# Patient Record
Sex: MALE | Race: WHITE | Employment: FULL TIME | URBAN - NONMETROPOLITAN AREA
[De-identification: names, ages, dates, MRNs, and addresses within clinical notes are randomized per-mention and may not be internally consistent; named-entity substitution may affect disease eponyms.]

---

## 2017-07-20 ENCOUNTER — HOSPITAL ENCOUNTER (EMERGENCY)
Age: 57
Discharge: HOME | End: 2017-07-20
Payer: COMMERCIAL

## 2017-07-20 ENCOUNTER — APPOINTMENT (OUTPATIENT)
Dept: CT IMAGING | Age: 57
End: 2017-07-20
Payer: COMMERCIAL

## 2017-07-20 ENCOUNTER — APPOINTMENT (OUTPATIENT)
Dept: GENERAL RADIOLOGY | Age: 57
End: 2017-07-20
Payer: COMMERCIAL

## 2017-07-20 VITALS
SYSTOLIC BLOOD PRESSURE: 131 MMHG | RESPIRATION RATE: 18 BRPM | HEART RATE: 83 BPM | BODY MASS INDEX: 31.81 KG/M2 | OXYGEN SATURATION: 97 % | HEIGHT: 73 IN | TEMPERATURE: 98.2 F | DIASTOLIC BLOOD PRESSURE: 73 MMHG | WEIGHT: 240 LBS

## 2017-07-20 DIAGNOSIS — M79.602 LEFT ARM PAIN: ICD-10-CM
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
## 2017-07-20 DIAGNOSIS — T07.XXXA MULTIPLE BRUISES: ICD-10-CM

## 2017-07-20 DIAGNOSIS — K80.20 CALCULUS OF GALLBLADDER WITHOUT CHOLECYSTITIS WITHOUT OBSTRUCTION: ICD-10-CM

## 2017-07-20 DIAGNOSIS — T07.XXXA MULTIPLE CONTUSIONS: ICD-10-CM

## 2017-07-20 DIAGNOSIS — S20.212A CONTUSION OF CHEST WALL, LEFT, INITIAL ENCOUNTER: Primary | ICD-10-CM

## 2017-07-20 LAB
ALBUMIN SERPL-MCNC: 4.2 G/DL (ref 3.5–5.2)
ALP BLD-CCNC: 106 U/L (ref 40–130)
ALT SERPL-CCNC: 46 U/L (ref 5–41)
ANION GAP SERPL CALCULATED.3IONS-SCNC: 12 MMOL/L (ref 7–19)
APTT: 27.7 SEC (ref 26–36.2)
AST SERPL-CCNC: 25 U/L (ref 5–40)
BASOPHILS ABSOLUTE: 0.1 K/UL (ref 0–0.2)
BASOPHILS RELATIVE PERCENT: 0.8 % (ref 0–1)
BILIRUB SERPL-MCNC: 0.3 MG/DL (ref 0.2–1.2)
BUN BLDV-MCNC: 13 MG/DL (ref 6–20)
CALCIUM SERPL-MCNC: 9.8 MG/DL (ref 8.6–10)
CHLORIDE BLD-SCNC: 103 MMOL/L (ref 98–111)
CO2: 25 MMOL/L (ref 22–29)
CREAT SERPL-MCNC: 1.1 MG/DL (ref 0.5–1.2)
EOSINOPHILS ABSOLUTE: 0.3 K/UL (ref 0–0.6)
EOSINOPHILS RELATIVE PERCENT: 2.5 % (ref 0–5)
GFR NON-AFRICAN AMERICAN: >60
GLUCOSE BLD-MCNC: 98 MG/DL (ref 74–109)
HCT VFR BLD CALC: 43.1 % (ref 42–52)
HEMOGLOBIN: 14.8 G/DL (ref 14–18)
INR BLD: 0.88 (ref 0.88–1.18)
LIPASE: 30 U/L (ref 13–60)
LYMPHOCYTES ABSOLUTE: 1.9 K/UL (ref 1.1–4.5)
LYMPHOCYTES RELATIVE PERCENT: 19.4 % (ref 20–40)
MCH RBC QN AUTO: 32.5 PG (ref 27–31)
MCHC RBC AUTO-ENTMCNC: 34.3 G/DL (ref 33–37)
MCV RBC AUTO: 94.7 FL (ref 80–94)
MONOCYTES ABSOLUTE: 0.9 K/UL (ref 0–0.9)
MONOCYTES RELATIVE PERCENT: 9.2 % (ref 0–10)
NEUTROPHILS ABSOLUTE: 6.8 K/UL (ref 1.5–7.5)
NEUTROPHILS RELATIVE PERCENT: 67.5 % (ref 50–65)
PDW BLD-RTO: 13 % (ref 11.5–14.5)
PLATELET # BLD: 272 K/UL (ref 130–400)
PMV BLD AUTO: 10.2 FL (ref 9.4–12.4)
POTASSIUM SERPL-SCNC: 4 MMOL/L (ref 3.5–5)
PROTHROMBIN TIME: 11.8 SEC (ref 12–14.6)
RBC # BLD: 4.55 M/UL (ref 4.7–6.1)
SODIUM BLD-SCNC: 140 MMOL/L (ref 136–145)
TOTAL PROTEIN: 6.8 G/DL (ref 6.6–8.7)
WBC # BLD: 10 K/UL (ref 4.8–10.8)

## 2017-07-20 PROCEDURE — 73080 X-RAY EXAM OF ELBOW: CPT

## 2017-07-20 PROCEDURE — 71260 CT THORAX DX C+: CPT

## 2017-07-20 PROCEDURE — 6360000002 HC RX W HCPCS: Performed by: NURSE PRACTITIONER

## 2017-07-20 PROCEDURE — 99284 EMERGENCY DEPT VISIT MOD MDM: CPT

## 2017-07-20 PROCEDURE — 74177 CT ABD & PELVIS W/CONTRAST: CPT

## 2017-07-20 PROCEDURE — 6360000004 HC RX CONTRAST MEDICATION: Performed by: NURSE PRACTITIONER

## 2017-07-20 PROCEDURE — 83690 ASSAY OF LIPASE: CPT

## 2017-07-20 PROCEDURE — 85610 PROTHROMBIN TIME: CPT

## 2017-07-20 PROCEDURE — 85730 THROMBOPLASTIN TIME PARTIAL: CPT

## 2017-07-20 PROCEDURE — 85025 COMPLETE CBC W/AUTO DIFF WBC: CPT

## 2017-07-20 PROCEDURE — 90471 IMMUNIZATION ADMIN: CPT | Performed by: NURSE PRACTITIONER

## 2017-07-20 PROCEDURE — 73090 X-RAY EXAM OF FOREARM: CPT

## 2017-07-20 PROCEDURE — 80053 COMPREHEN METABOLIC PANEL: CPT

## 2017-07-20 PROCEDURE — 90715 TDAP VACCINE 7 YRS/> IM: CPT | Performed by: NURSE PRACTITIONER

## 2017-07-20 PROCEDURE — 99283 EMERGENCY DEPT VISIT LOW MDM: CPT | Performed by: NURSE PRACTITIONER

## 2017-07-20 PROCEDURE — 36415 COLL VENOUS BLD VENIPUNCTURE: CPT

## 2017-07-20 RX ORDER — CYCLOBENZAPRINE HCL 10 MG
10 TABLET ORAL 3 TIMES DAILY PRN
Qty: 30 TABLET | Refills: 0 | Status: SHIPPED | OUTPATIENT
Start: 2017-07-20 | End: 2017-07-30

## 2017-07-20 RX ORDER — NAPROXEN 500 MG/1
500 TABLET ORAL 2 TIMES DAILY
Qty: 20 TABLET | Refills: 0 | Status: SHIPPED | OUTPATIENT
Start: 2017-07-20 | End: 2022-08-21

## 2017-07-20 RX ORDER — HYDROCODONE BITARTRATE AND ACETAMINOPHEN 5; 325 MG/1; MG/1
1 TABLET ORAL EVERY 6 HOURS PRN
Qty: 15 TABLET | Refills: 0 | Status: SHIPPED | OUTPATIENT
Start: 2017-07-20 | End: 2017-07-27

## 2017-07-20 RX ADMIN — TETANUS TOXOID, REDUCED DIPHTHERIA TOXOID AND ACELLULAR PERTUSSIS VACCINE, ADSORBED 0.5 ML: 5; 2.5; 8; 8; 2.5 SUSPENSION INTRAMUSCULAR at 18:50

## 2017-07-20 RX ADMIN — IOVERSOL 90 ML: 741 INJECTION INTRA-ARTERIAL; INTRAVENOUS at 18:03

## 2017-07-20 ASSESSMENT — ENCOUNTER SYMPTOMS
ABDOMINAL PAIN: 0
BACK PAIN: 0
COUGH: 0
DIARRHEA: 0
SORE THROAT: 0
SHORTNESS OF BREATH: 0
WHEEZING: 0
NAUSEA: 0
VOMITING: 0
EYE DISCHARGE: 0
ABDOMINAL DISTENTION: 1

## 2017-07-20 ASSESSMENT — PAIN DESCRIPTION - PAIN TYPE: TYPE: ACUTE PAIN

## 2017-07-20 ASSESSMENT — PAIN DESCRIPTION - LOCATION: LOCATION: SHOULDER

## 2017-07-20 ASSESSMENT — PAIN SCALES - GENERAL: PAINLEVEL_OUTOF10: 4

## 2017-07-20 ASSESSMENT — PAIN DESCRIPTION - ORIENTATION: ORIENTATION: LEFT

## 2022-08-21 ENCOUNTER — APPOINTMENT (OUTPATIENT)
Dept: CT IMAGING | Age: 62
End: 2022-08-21
Payer: COMMERCIAL

## 2022-08-21 ENCOUNTER — HOSPITAL ENCOUNTER (OUTPATIENT)
Age: 62
Setting detail: OBSERVATION
Discharge: HOME | End: 2022-08-22
Attending: INTERNAL MEDICINE
Payer: COMMERCIAL

## 2022-08-21 ENCOUNTER — APPOINTMENT (OUTPATIENT)
Dept: GENERAL RADIOLOGY | Age: 62
End: 2022-08-21
Payer: COMMERCIAL

## 2022-08-21 DIAGNOSIS — R07.9 ACUTE CHEST PAIN: Primary | ICD-10-CM

## 2022-08-21 PROBLEM — E03.9 HYPOTHYROIDISM: Status: ACTIVE | Noted: 2022-08-21

## 2022-08-21 PROBLEM — I10 HYPERTENSION: Status: ACTIVE | Noted: 2022-08-21

## 2022-08-21 LAB
ALBUMIN SERPL-MCNC: 4 G/DL (ref 3.5–5.2)
ALP BLD-CCNC: 108 U/L (ref 40–130)
ALT SERPL-CCNC: 18 U/L (ref 5–41)
ANION GAP SERPL CALCULATED.3IONS-SCNC: 8 MMOL/L (ref 7–19)
AST SERPL-CCNC: 15 U/L (ref 5–40)
BASOPHILS ABSOLUTE: 0.1 K/UL (ref 0–0.2)
BASOPHILS RELATIVE PERCENT: 1.1 % (ref 0–1)
BILIRUB SERPL-MCNC: 0.3 MG/DL (ref 0.2–1.2)
BUN BLDV-MCNC: 13 MG/DL (ref 8–23)
CALCIUM SERPL-MCNC: 9.1 MG/DL (ref 8.8–10.2)
CHLORIDE BLD-SCNC: 105 MMOL/L (ref 98–111)
CO2: 27 MMOL/L (ref 22–29)
CREAT SERPL-MCNC: 1.1 MG/DL (ref 0.5–1.2)
D DIMER: 1.11 UG/ML FEU (ref 0–0.48)
EOSINOPHILS ABSOLUTE: 0.2 K/UL (ref 0–0.6)
EOSINOPHILS RELATIVE PERCENT: 3 % (ref 0–5)
ETHANOL: <10 MG/DL (ref 0–0.08)
GFR AFRICAN AMERICAN: >59
GFR NON-AFRICAN AMERICAN: >60
GLUCOSE BLD-MCNC: 98 MG/DL (ref 74–109)
HCT VFR BLD CALC: 46 % (ref 42–52)
HEMOGLOBIN: 15.3 G/DL (ref 14–18)
IMMATURE GRANULOCYTES #: 0 K/UL
LIPASE: 20 U/L (ref 13–60)
LYMPHOCYTES ABSOLUTE: 2.9 K/UL (ref 1.1–4.5)
LYMPHOCYTES RELATIVE PERCENT: 35.4 % (ref 20–40)
MCH RBC QN AUTO: 31.4 PG (ref 27–31)
MCHC RBC AUTO-ENTMCNC: 33.3 G/DL (ref 33–37)
MCV RBC AUTO: 94.5 FL (ref 80–94)
MONOCYTES ABSOLUTE: 0.8 K/UL (ref 0–0.9)
MONOCYTES RELATIVE PERCENT: 9.3 % (ref 0–10)
NEUTROPHILS ABSOLUTE: 4.1 K/UL (ref 1.5–7.5)
NEUTROPHILS RELATIVE PERCENT: 50.7 % (ref 50–65)
PDW BLD-RTO: 13.3 % (ref 11.5–14.5)
PLATELET # BLD: 262 K/UL (ref 130–400)
PMV BLD AUTO: 9.9 FL (ref 9.4–12.4)
POTASSIUM REFLEX MAGNESIUM: 3.8 MMOL/L (ref 3.5–5)
PRO-BNP: 113 PG/ML (ref 0–900)
RBC # BLD: 4.87 M/UL (ref 4.7–6.1)
SARS-COV-2, NAAT: NOT DETECTED
SODIUM BLD-SCNC: 140 MMOL/L (ref 136–145)
TOTAL PROTEIN: 6.3 G/DL (ref 6.6–8.7)
TROPONIN: <0.01 NG/ML (ref 0–0.03)
WBC # BLD: 8.1 K/UL (ref 4.8–10.8)

## 2022-08-21 PROCEDURE — G0378 HOSPITAL OBSERVATION PER HR: HCPCS

## 2022-08-21 PROCEDURE — 85379 FIBRIN DEGRADATION QUANT: CPT

## 2022-08-21 PROCEDURE — 93005 ELECTROCARDIOGRAM TRACING: CPT | Performed by: PHYSICIAN ASSISTANT

## 2022-08-21 PROCEDURE — 82077 ASSAY SPEC XCP UR&BREATH IA: CPT

## 2022-08-21 PROCEDURE — 85025 COMPLETE CBC W/AUTO DIFF WBC: CPT

## 2022-08-21 PROCEDURE — 99285 EMERGENCY DEPT VISIT HI MDM: CPT

## 2022-08-21 PROCEDURE — 87635 SARS-COV-2 COVID-19 AMP PRB: CPT

## 2022-08-21 PROCEDURE — 71275 CT ANGIOGRAPHY CHEST: CPT

## 2022-08-21 PROCEDURE — 2580000003 HC RX 258: Performed by: NURSE PRACTITIONER

## 2022-08-21 PROCEDURE — 71045 X-RAY EXAM CHEST 1 VIEW: CPT | Performed by: RADIOLOGY

## 2022-08-21 PROCEDURE — 71275 CT ANGIOGRAPHY CHEST: CPT | Performed by: RADIOLOGY

## 2022-08-21 PROCEDURE — 6360000004 HC RX CONTRAST MEDICATION: Performed by: PHYSICIAN ASSISTANT

## 2022-08-21 PROCEDURE — 83690 ASSAY OF LIPASE: CPT

## 2022-08-21 PROCEDURE — 36415 COLL VENOUS BLD VENIPUNCTURE: CPT

## 2022-08-21 PROCEDURE — 80053 COMPREHEN METABOLIC PANEL: CPT

## 2022-08-21 PROCEDURE — 6370000000 HC RX 637 (ALT 250 FOR IP): Performed by: PHYSICIAN ASSISTANT

## 2022-08-21 PROCEDURE — 71045 X-RAY EXAM CHEST 1 VIEW: CPT

## 2022-08-21 PROCEDURE — 6370000000 HC RX 637 (ALT 250 FOR IP): Performed by: NURSE PRACTITIONER

## 2022-08-21 PROCEDURE — 84484 ASSAY OF TROPONIN QUANT: CPT

## 2022-08-21 PROCEDURE — 83880 ASSAY OF NATRIURETIC PEPTIDE: CPT

## 2022-08-21 RX ORDER — ONDANSETRON 4 MG/1
4 TABLET, ORALLY DISINTEGRATING ORAL EVERY 8 HOURS PRN
Status: DISCONTINUED | OUTPATIENT
Start: 2022-08-21 | End: 2022-08-22 | Stop reason: HOSPADM

## 2022-08-21 RX ORDER — BUPROPION HYDROCHLORIDE 75 MG/1
75 TABLET ORAL 2 TIMES DAILY
Status: DISCONTINUED | OUTPATIENT
Start: 2022-08-21 | End: 2022-08-22 | Stop reason: HOSPADM

## 2022-08-21 RX ORDER — ENOXAPARIN SODIUM 100 MG/ML
30 INJECTION SUBCUTANEOUS 2 TIMES DAILY
Status: DISCONTINUED | OUTPATIENT
Start: 2022-08-22 | End: 2022-08-22 | Stop reason: HOSPADM

## 2022-08-21 RX ORDER — LISINOPRIL 20 MG/1
20 TABLET ORAL DAILY
Status: DISCONTINUED | OUTPATIENT
Start: 2022-08-22 | End: 2022-08-22 | Stop reason: HOSPADM

## 2022-08-21 RX ORDER — BUPROPION HYDROCHLORIDE 75 MG/1
75 TABLET ORAL 2 TIMES DAILY
COMMUNITY

## 2022-08-21 RX ORDER — ACETAMINOPHEN 325 MG/1
650 TABLET ORAL EVERY 6 HOURS PRN
Status: DISCONTINUED | OUTPATIENT
Start: 2022-08-21 | End: 2022-08-22 | Stop reason: HOSPADM

## 2022-08-21 RX ORDER — HYDROCHLOROTHIAZIDE 25 MG/1
12.5 TABLET ORAL DAILY
Status: DISCONTINUED | OUTPATIENT
Start: 2022-08-22 | End: 2022-08-22 | Stop reason: HOSPADM

## 2022-08-21 RX ORDER — ENOXAPARIN SODIUM 100 MG/ML
40 INJECTION SUBCUTANEOUS DAILY
Status: DISCONTINUED | OUTPATIENT
Start: 2022-08-22 | End: 2022-08-21 | Stop reason: DRUGHIGH

## 2022-08-21 RX ORDER — ACETAMINOPHEN 650 MG/1
650 SUPPOSITORY RECTAL EVERY 6 HOURS PRN
Status: DISCONTINUED | OUTPATIENT
Start: 2022-08-21 | End: 2022-08-22 | Stop reason: HOSPADM

## 2022-08-21 RX ORDER — POLYETHYLENE GLYCOL 3350 17 G/17G
17 POWDER, FOR SOLUTION ORAL DAILY PRN
Status: DISCONTINUED | OUTPATIENT
Start: 2022-08-21 | End: 2022-08-22 | Stop reason: HOSPADM

## 2022-08-21 RX ORDER — ONDANSETRON 2 MG/ML
4 INJECTION INTRAMUSCULAR; INTRAVENOUS EVERY 6 HOURS PRN
Status: DISCONTINUED | OUTPATIENT
Start: 2022-08-21 | End: 2022-08-22 | Stop reason: HOSPADM

## 2022-08-21 RX ORDER — SODIUM CHLORIDE 9 MG/ML
INJECTION, SOLUTION INTRAVENOUS PRN
Status: DISCONTINUED | OUTPATIENT
Start: 2022-08-21 | End: 2022-08-22 | Stop reason: HOSPADM

## 2022-08-21 RX ORDER — LISINOPRIL AND HYDROCHLOROTHIAZIDE 20; 12.5 MG/1; MG/1
1 TABLET ORAL DAILY
Status: DISCONTINUED | OUTPATIENT
Start: 2022-08-22 | End: 2022-08-21 | Stop reason: CLARIF

## 2022-08-21 RX ORDER — NITROGLYCERIN 0.4 MG/1
0.4 TABLET SUBLINGUAL EVERY 5 MIN PRN
Status: DISCONTINUED | OUTPATIENT
Start: 2022-08-21 | End: 2022-08-22 | Stop reason: HOSPADM

## 2022-08-21 RX ORDER — SODIUM CHLORIDE 0.9 % (FLUSH) 0.9 %
5-40 SYRINGE (ML) INJECTION PRN
Status: DISCONTINUED | OUTPATIENT
Start: 2022-08-21 | End: 2022-08-22 | Stop reason: HOSPADM

## 2022-08-21 RX ORDER — ASPIRIN 81 MG/1
81 TABLET, CHEWABLE ORAL DAILY
Status: DISCONTINUED | OUTPATIENT
Start: 2022-08-22 | End: 2022-08-22 | Stop reason: HOSPADM

## 2022-08-21 RX ORDER — PANTOPRAZOLE SODIUM 40 MG/1
40 TABLET, DELAYED RELEASE ORAL DAILY
COMMUNITY

## 2022-08-21 RX ORDER — ATORVASTATIN CALCIUM 40 MG/1
40 TABLET, FILM COATED ORAL NIGHTLY
Status: DISCONTINUED | OUTPATIENT
Start: 2022-08-21 | End: 2022-08-22 | Stop reason: HOSPADM

## 2022-08-21 RX ORDER — LEVOTHYROXINE SODIUM 0.1 MG/1
200 TABLET ORAL DAILY
Status: DISCONTINUED | OUTPATIENT
Start: 2022-08-22 | End: 2022-08-22 | Stop reason: HOSPADM

## 2022-08-21 RX ORDER — SODIUM CHLORIDE 0.9 % (FLUSH) 0.9 %
5-40 SYRINGE (ML) INJECTION EVERY 12 HOURS SCHEDULED
Status: DISCONTINUED | OUTPATIENT
Start: 2022-08-21 | End: 2022-08-22 | Stop reason: HOSPADM

## 2022-08-21 RX ADMIN — SODIUM CHLORIDE, PRESERVATIVE FREE 10 ML: 5 INJECTION INTRAVENOUS at 22:43

## 2022-08-21 RX ADMIN — ASPIRIN 325 MG: 325 TABLET, COATED ORAL at 17:54

## 2022-08-21 RX ADMIN — NITROGLYCERIN 0.4 MG: 0.4 TABLET, ORALLY DISINTEGRATING SUBLINGUAL at 17:54

## 2022-08-21 RX ADMIN — ATORVASTATIN CALCIUM 40 MG: 40 TABLET, FILM COATED ORAL at 22:41

## 2022-08-21 RX ADMIN — IOPAMIDOL 90 ML: 755 INJECTION, SOLUTION INTRAVENOUS at 18:43

## 2022-08-21 RX ADMIN — BUPROPION HYDROCHLORIDE 75 MG: 75 TABLET, FILM COATED ORAL at 22:41

## 2022-08-21 ASSESSMENT — ENCOUNTER SYMPTOMS
BACK PAIN: 0
EYE DISCHARGE: 0
APNEA: 0
COUGH: 0
SHORTNESS OF BREATH: 1
NAUSEA: 1
EYE ITCHING: 0
COLOR CHANGE: 0
PHOTOPHOBIA: 0

## 2022-08-21 ASSESSMENT — PAIN DESCRIPTION - LOCATION
LOCATION: CHEST
LOCATION: CHEST

## 2022-08-21 ASSESSMENT — PAIN - FUNCTIONAL ASSESSMENT: PAIN_FUNCTIONAL_ASSESSMENT: 0-10

## 2022-08-21 ASSESSMENT — PAIN SCALES - GENERAL
PAINLEVEL_OUTOF10: 0
PAINLEVEL_OUTOF10: 1

## 2022-08-21 ASSESSMENT — PAIN DESCRIPTION - DESCRIPTORS
DESCRIPTORS: HEAVINESS;PRESSURE
DESCRIPTORS: HEAVINESS

## 2022-08-21 NOTE — ED PROVIDER NOTES
[facility]  eMERGENCYdEPARTMENT eNCOUnter      Pt Name: Radha Ward  MRN: [id number]  [place] 1960  Date of evaluation: 8/21/2022  Provider:MOUNIKA Stapleton    CHIEF COMPLAINT       Chief Complaint   Patient presents with    Chest Pain     X2 days notes goes down left arm and nausea         HISTORY OF PRESENT ILLNESS  (Location/Symptom, Timing/Onset, Context/Setting, Quality, Duration, Modifying Factors, Severity.)   Radha Ward is a 57 y.o. male who presents to the emergency department with complaints of chest pain x 2 days with nausea goes down left arm. HTN risk factors and smoker status. No cholesterol meds or diabetic status. He is a night shift worker and tells me yesterday he felt this pressure it was pleuritic and he states that it created nausea he does not really want to call it in our right pain it happened 1 time and self resolved and then again while at work and then today when he woke up and \"felt like something just was not right\" I do feel like I smell alcohol on him his wife tells me he drinks 6-7 beers when he is not working. Denies prior withdrawal. Father had stroke hx. No heart issues. When asked last time he had cardiac work up [employer]. \"    HPI    Nursing Notes were reviewed and I agree. REVIEW OF SYSTEMS    (2-9 systems for level 4, 10 or more for level 5)     Review of Systems   Constitutional:  Negative for activity change, appetite change, chills and fever. HENT:  Negative for congestion and dental problem. Eyes:  Negative for photophobia, discharge and itching. Respiratory:  Positive for shortness of breath. Negative for apnea and cough. Cardiovascular:  Positive for chest pain. Musculoskeletal:  Negative for arthralgias, back pain, gait problem, myalgias and neck pain. Skin:  Negative for color change, pallor and rash. Neurological:  Negative for dizziness, seizures and syncope. Psychiatric/Behavioral:  Negative for agitation.  The patient is not nervous/anxious. Except as noted above the remainder of the review of systems was reviewed and negative. PAST MEDICAL HISTORY     Past Medical History:   Diagnosis Date    Hypertension     Hypothyroidism          SURGICAL HISTORY       Past Surgical History:   Procedure Laterality Date    APPENDECTOMY      HERNIA REPAIR  2005         CURRENT MEDICATIONS       Current Discharge Medication List        CONTINUE these medications which have NOT CHANGED    Details   buPROPion (WELLBUTRIN) 75 MG tablet Take 75 mg by mouth 2 times daily      pantoprazole (PROTONIX) 40 MG tablet Take 40 mg by mouth daily      levothyroxine (SYNTHROID) 175 MCG tablet 200 mcg  Refills: 2      lisinopril-hydrochlorothiazide (PRINZIDE;ZESTORETIC) 20-12.5 MG per tablet   Refills: 2             ALLERGIES     Patient has no known allergies. FAMILY HISTORY       Family History   Problem Relation Age of Onset    Colon Polyps Neg Hx     Colon Cancer Neg Hx     Liver Disease Neg Hx           SOCIAL HISTORY       Social History     Socioeconomic History    Marital status:      Spouse name: None    Number of children: None    Years of education: None    Highest education level: None   Tobacco Use    Smoking status: Every Day     Packs/day: 3.00     Types: Cigarettes    Smokeless tobacco: Never   Vaping Use    Vaping Use: Never used   Substance and Sexual Activity    Alcohol use: Yes     Alcohol/week: 15.0 standard drinks     Types: 15 Cans of beer per week     Comment: weekly    Drug use: No       SCREENINGS    Leigha Coma Scale  Eye Opening: Spontaneous  Best Verbal Response: Oriented  Best Motor Response: Obeys commands  Leigha Coma Scale Score: 15      PHYSICAL EXAM    (up to 7 forlevel 4, 8 or more for level 5)     ED Triage Vitals [08/21/22 1741]   BP Temp Temp src Heart Rate Resp SpO2 Height Weight   (!) 165/91 97.6 °F (36.4 °C) -- 76 16 94 % -- 240 lb (108.9 kg)       Physical Exam  Vitals and nursing note reviewed. Constitutional:       General: He is not in acute distress. Appearance: Normal appearance. He is well-developed. He is not diaphoretic. HENT:      Head: Normocephalic and atraumatic. Right Ear: External ear normal.      Left Ear: External ear normal.      Nose: Nose normal.      Mouth/Throat:      Mouth: Mucous membranes are moist.   Eyes:      Pupils: Pupils are equal, round, and reactive to light. Neck:      Trachea: No tracheal deviation. Cardiovascular:      Rate and Rhythm: Normal rate and regular rhythm. Pulses: Normal pulses. Heart sounds: Normal heart sounds. No murmur heard. Pulmonary:      Effort: Pulmonary effort is normal.      Breath sounds: Normal breath sounds. No stridor. No wheezing. Chest:      Chest wall: No tenderness. Abdominal:      General: Abdomen is flat. Bowel sounds are normal. There is no distension. Palpations: Abdomen is soft. Tenderness: There is no abdominal tenderness. Musculoskeletal:         General: Normal range of motion. Cervical back: Normal range of motion and neck supple. Skin:     General: Skin is warm and dry. Capillary Refill: Capillary refill takes less than 2 seconds. Neurological:      General: No focal deficit present. Mental Status: He is alert and oriented to person, place, and time. Mental status is at baseline. Psychiatric:         Mood and Affect: Mood normal.         Behavior: Behavior normal.         Thought Content: Thought content normal.         Judgment: Judgment normal.         DIAGNOSTIC RESULTS     RADIOLOGY:   Non-plain film images such as CT, Ultrasound and MRI are read by the radiologist. Plain radiographic images are visualized and preliminarilyinterpreted by Oracio Christensen, * with the below findings:      Interpretation per the Radiologist below, if available at the time of this note:    CTA PULMONARY W CONTRAST   Final Result   1.   No evidence of acute pulmonary thromboembolism. 2.  Subsegmental atelectatic bands in the posterior basal segments of both lower lobes. 3.  Old granulomatous disease involving lung, mediastinum, liver, spleen. 4.  Cholelithiasis. 5.  Left adrenal adenoma, as detailed above. Recommendation: Follow up as clinically indicated. All CT scans at this facility utilize dose modulation, iterative reconstruction, and/or weight based dosing when appropriate to reduce radiation dose to as low as reasonably achievable. Electronically Signed by Felix Blanc MD at 21-Aug-2022 09:41:05 PM               XR CHEST PORTABLE   Final Result   negative study   Recommendation:  Follow up as clinically indicated. Electronically Signed by Jericho Paulson at 21-Aug-2022 07:24:48 PM               NM MYOCARDIAL SPECT REST EXERCISE OR RX    (Results Pending)       LABS:  Labs Reviewed   CBC WITH AUTO DIFFERENTIAL - Abnormal; Notable for the following components:       Result Value    MCV 94.5 (*)     MCH 31.4 (*)     Basophils % 1.1 (*)     All other components within normal limits   COMPREHENSIVE METABOLIC PANEL W/ REFLEX TO MG FOR LOW K - Abnormal; Notable for the following components: Total Protein 6.3 (*)     All other components within normal limits   D-DIMER, QUANTITATIVE - Abnormal; Notable for the following components:    D-Dimer, Quant 1.11 (*)     All other components within normal limits   COVID-19, RAPID   TROPONIN   BRAIN NATRIURETIC PEPTIDE   ETHANOL   LIPASE   TSH WITH REFLEX TO FT4   CBC   BASIC METABOLIC PANEL W/ REFLEX TO MG FOR LOW K   HEMOGLOBIN A1C   LIPID PANEL       All other labs were within normal range or notreturned as of this dictation.     RE-ASSESSMENT        EMERGENCY DEPARTMENT COURSE and DIFFERENTIAL DIAGNOSIS/MDM:   Vitals:    Vitals:    08/21/22 1931 08/21/22 2002 08/21/22 2032 08/21/22 2103   BP: 130/75 118/71 (!) 145/76 128/82   Pulse: 66 65 61 65   Resp: 18 11 18 25   Temp:       SpO2: 92% 96% 91% 94%   Weight: EKG -sinus rhythm normal with rate of 70 no ST changes no prior EKG for comparison. MDM  Patient is asymptomatic at this time heart score places him under moderate risk CTA is negative for PE after D-dimer elevated. Dr. Amy Sánchez with hospitalist service will admit under their care. PROCEDURES:    Procedures      FINAL IMPRESSION      1. Acute chest pain          DISPOSITION/PLAN   DISPOSITION Admitted 08/21/2022 09:05:15 PM      PATIENT REFERRED TO:  No follow-up provider specified.     DISCHARGE MEDICATIONS:  Current Discharge Medication List          (Please note that portions of this note were completed with a voice recognition program.  Efforts were made to edit the dictations but occasionallywords are mis-transcribed.)    Rocio Maldonado, [address] [place] [address]  08/21/22 1070

## 2022-08-22 ENCOUNTER — APPOINTMENT (OUTPATIENT)
Dept: NUCLEAR MEDICINE | Age: 62
End: 2022-08-22
Payer: COMMERCIAL

## 2022-08-22 VITALS
RESPIRATION RATE: 18 BRPM | HEIGHT: 73 IN | TEMPERATURE: 97.7 F | SYSTOLIC BLOOD PRESSURE: 130 MMHG | WEIGHT: 226.7 LBS | OXYGEN SATURATION: 95 % | HEART RATE: 63 BPM | DIASTOLIC BLOOD PRESSURE: 84 MMHG | BODY MASS INDEX: 30.05 KG/M2

## 2022-08-22 LAB
ANION GAP SERPL CALCULATED.3IONS-SCNC: 11 MMOL/L (ref 7–19)
BUN BLDV-MCNC: 14 MG/DL (ref 8–23)
CALCIUM SERPL-MCNC: 9.5 MG/DL (ref 8.8–10.2)
CHLORIDE BLD-SCNC: 104 MMOL/L (ref 98–111)
CHOLESTEROL, TOTAL: 147 MG/DL (ref 160–199)
CO2: 26 MMOL/L (ref 22–29)
CREAT SERPL-MCNC: 1.2 MG/DL (ref 0.5–1.2)
EKG P AXIS: 76 DEGREES
EKG P AXIS: 85 DEGREES
EKG P-R INTERVAL: 236 MS
EKG P-R INTERVAL: 244 MS
EKG Q-T INTERVAL: 384 MS
EKG Q-T INTERVAL: 422 MS
EKG QRS DURATION: 84 MS
EKG QRS DURATION: 88 MS
EKG QTC CALCULATION (BAZETT): 401 MS
EKG QTC CALCULATION (BAZETT): 415 MS
EKG T AXIS: 42 DEGREES
EKG T AXIS: 75 DEGREES
GFR AFRICAN AMERICAN: >59
GFR NON-AFRICAN AMERICAN: >60
GLUCOSE BLD-MCNC: 87 MG/DL (ref 74–109)
HBA1C MFR BLD: 5.7 % (ref 4–6)
HCT VFR BLD CALC: 43.8 % (ref 42–52)
HDLC SERPL-MCNC: 41 MG/DL (ref 55–121)
HEMOGLOBIN: 14.7 G/DL (ref 14–18)
LDL CHOLESTEROL CALCULATED: 73 MG/DL
LV EF: 58 %
LV EF: 65 %
LVEF MODALITY: NORMAL
LVEF MODALITY: NORMAL
MAGNESIUM: 2.1 MG/DL (ref 1.6–2.4)
MCH RBC QN AUTO: 32 PG (ref 27–31)
MCHC RBC AUTO-ENTMCNC: 33.6 G/DL (ref 33–37)
MCV RBC AUTO: 95.4 FL (ref 80–94)
PDW BLD-RTO: 13.6 % (ref 11.5–14.5)
PHOSPHORUS: 3.4 MG/DL (ref 2.5–4.5)
PLATELET # BLD: 249 K/UL (ref 130–400)
PMV BLD AUTO: 10.7 FL (ref 9.4–12.4)
POTASSIUM REFLEX MAGNESIUM: 3.6 MMOL/L (ref 3.5–5)
RBC # BLD: 4.59 M/UL (ref 4.7–6.1)
SODIUM BLD-SCNC: 141 MMOL/L (ref 136–145)
TRIGL SERPL-MCNC: 164 MG/DL (ref 0–149)
TROPONIN: <0.01 NG/ML (ref 0–0.03)
TSH REFLEX FT4: 2.79 UIU/ML (ref 0.35–5.5)
VITAMIN D 25-HYDROXY: 22.7 NG/ML
WBC # BLD: 9 K/UL (ref 4.8–10.8)

## 2022-08-22 PROCEDURE — 93010 ELECTROCARDIOGRAM REPORT: CPT | Performed by: INTERNAL MEDICINE

## 2022-08-22 PROCEDURE — G0378 HOSPITAL OBSERVATION PER HR: HCPCS

## 2022-08-22 PROCEDURE — 6360000002 HC RX W HCPCS: Performed by: NURSE PRACTITIONER

## 2022-08-22 PROCEDURE — 85027 COMPLETE CBC AUTOMATED: CPT

## 2022-08-22 PROCEDURE — C8929 TTE W OR WO FOL WCON,DOPPLER: HCPCS

## 2022-08-22 PROCEDURE — 84484 ASSAY OF TROPONIN QUANT: CPT

## 2022-08-22 PROCEDURE — 36415 COLL VENOUS BLD VENIPUNCTURE: CPT

## 2022-08-22 PROCEDURE — 82306 VITAMIN D 25 HYDROXY: CPT

## 2022-08-22 PROCEDURE — 93005 ELECTROCARDIOGRAM TRACING: CPT | Performed by: NURSE PRACTITIONER

## 2022-08-22 PROCEDURE — 83036 HEMOGLOBIN GLYCOSYLATED A1C: CPT

## 2022-08-22 PROCEDURE — 6360000004 HC RX CONTRAST MEDICATION: Performed by: HOSPITALIST

## 2022-08-22 PROCEDURE — 78452 HT MUSCLE IMAGE SPECT MULT: CPT

## 2022-08-22 PROCEDURE — 83735 ASSAY OF MAGNESIUM: CPT

## 2022-08-22 PROCEDURE — 84443 ASSAY THYROID STIM HORMONE: CPT

## 2022-08-22 PROCEDURE — 3430000000 HC RX DIAGNOSTIC RADIOPHARMACEUTICAL: Performed by: NURSE PRACTITIONER

## 2022-08-22 PROCEDURE — 2580000003 HC RX 258: Performed by: NURSE PRACTITIONER

## 2022-08-22 PROCEDURE — 96372 THER/PROPH/DIAG INJ SC/IM: CPT

## 2022-08-22 PROCEDURE — 78452 HT MUSCLE IMAGE SPECT MULT: CPT | Performed by: INTERNAL MEDICINE

## 2022-08-22 PROCEDURE — 80048 BASIC METABOLIC PNL TOTAL CA: CPT

## 2022-08-22 PROCEDURE — A9502 TC99M TETROFOSMIN: HCPCS | Performed by: NURSE PRACTITIONER

## 2022-08-22 PROCEDURE — 84100 ASSAY OF PHOSPHORUS: CPT

## 2022-08-22 PROCEDURE — 6370000000 HC RX 637 (ALT 250 FOR IP): Performed by: NURSE PRACTITIONER

## 2022-08-22 PROCEDURE — 80061 LIPID PANEL: CPT

## 2022-08-22 PROCEDURE — 93018 CV STRESS TEST I&R ONLY: CPT | Performed by: INTERNAL MEDICINE

## 2022-08-22 PROCEDURE — 93016 CV STRESS TEST SUPVJ ONLY: CPT | Performed by: INTERNAL MEDICINE

## 2022-08-22 RX ORDER — ERGOCALCIFEROL 1.25 MG/1
50000 CAPSULE ORAL WEEKLY
Qty: 5 CAPSULE | Refills: 0 | Status: SHIPPED | OUTPATIENT
Start: 2022-08-29

## 2022-08-22 RX ORDER — ASPIRIN 81 MG/1
81 TABLET, CHEWABLE ORAL DAILY
Qty: 30 TABLET | Refills: 0 | Status: SHIPPED | OUTPATIENT
Start: 2022-08-23

## 2022-08-22 RX ORDER — NITROGLYCERIN 0.4 MG/1
TABLET SUBLINGUAL
Qty: 25 TABLET | Refills: 0 | Status: SHIPPED | OUTPATIENT
Start: 2022-08-22

## 2022-08-22 RX ORDER — ERGOCALCIFEROL 1.25 MG/1
50000 CAPSULE ORAL WEEKLY
Status: DISCONTINUED | OUTPATIENT
Start: 2022-08-22 | End: 2022-08-22 | Stop reason: HOSPADM

## 2022-08-22 RX ORDER — ATORVASTATIN CALCIUM 40 MG/1
40 TABLET, FILM COATED ORAL NIGHTLY
Qty: 30 TABLET | Refills: 0 | Status: SHIPPED | OUTPATIENT
Start: 2022-08-22

## 2022-08-22 RX ADMIN — TETROFOSMIN 8 MILLICURIE: 1.38 INJECTION, POWDER, LYOPHILIZED, FOR SOLUTION INTRAVENOUS at 07:17

## 2022-08-22 RX ADMIN — PERFLUTREN 1.5 ML: 6.52 INJECTION, SUSPENSION INTRAVENOUS at 07:30

## 2022-08-22 RX ADMIN — TETROFOSMIN 24 MILLICURIE: 1.38 INJECTION, POWDER, LYOPHILIZED, FOR SOLUTION INTRAVENOUS at 08:44

## 2022-08-22 RX ADMIN — ASPIRIN 81 MG 81 MG: 81 TABLET ORAL at 09:58

## 2022-08-22 RX ADMIN — BUPROPION HYDROCHLORIDE 75 MG: 75 TABLET, FILM COATED ORAL at 09:59

## 2022-08-22 RX ADMIN — SODIUM CHLORIDE, PRESERVATIVE FREE 10 ML: 5 INJECTION INTRAVENOUS at 10:03

## 2022-08-22 RX ADMIN — LISINOPRIL 20 MG: 20 TABLET ORAL at 09:59

## 2022-08-22 RX ADMIN — ENOXAPARIN SODIUM 30 MG: 100 INJECTION SUBCUTANEOUS at 09:58

## 2022-08-22 RX ADMIN — HYDROCHLOROTHIAZIDE 12.5 MG: 25 TABLET ORAL at 09:59

## 2022-08-22 RX ADMIN — ERGOCALCIFEROL 50000 UNITS: 1.25 CAPSULE ORAL at 14:05

## 2022-08-22 RX ADMIN — REGADENOSON 0.4 MG: 0.08 INJECTION, SOLUTION INTRAVENOUS at 09:46

## 2022-08-22 RX ADMIN — ACETAMINOPHEN 650 MG: 325 TABLET, FILM COATED ORAL at 15:38

## 2022-08-22 ASSESSMENT — PAIN SCALES - GENERAL: PAINLEVEL_OUTOF10: 4

## 2022-08-22 ASSESSMENT — PAIN DESCRIPTION - LOCATION: LOCATION: HEAD

## 2022-08-22 NOTE — PROGRESS NOTES
4 Eyes Skin Assessment    Uvaldo Jonas is being assessed upon: Admission    I agree that Charlie Ramires, RN, along with Ange Chance (either 2 RN's or 1 LPN and 1 RN) have performed a thorough Head to Toe Skin Assessment on the patient. ALL assessment sites listed below have been assessed. Areas assessed by both nurses:     [x]   Head, Face, and Ears   [x]   Shoulders, Back, and Chest  [x]   Arms, Elbows, and Hands   [x]   Coccyx, Sacrum, and Ischium  [x]   Legs, Feet, and Heels    Does the Patient have Skin Breakdown?  No    Hipolito Prevention initiated: No  Wound Care Orders initiated: No    WOC nurse consulted for Pressure Injury (Stage 3,4, Unstageable, DTI, NWPT, and Complex wounds) and New or Established Ostomies: No        Primary Nurse eSignature: Maddison Michaels RN on 8/22/2022 at 4:27 AM      Co-Signer eSignature: Electronically signed by Ange Chance RN on 8/22/22 at 4:30 AM CDT

## 2022-08-22 NOTE — PROGRESS NOTES
Automatic Dose Adjustment of                Subcutaneous Anticoagulant for Prophylaxis    Gavin Beltran is a 57 y.o. male. Recent Labs     08/21/22  1750   CREATININE 1.1       CrCl cannot be calculated (Unknown ideal weight.). Weight:  Wt Readings from Last 1 Encounters:   08/21/22 240 lb (108.9 kg)           Pharmacy has adjusted subcutaneous anticoagulant for prophylaxis to Enoxaparin 30 mg SC twice daily based on the patient's weight and estimated CrCl per [facility] policy.                Electronically signed by Michelle Booker [facility] on 8/21/2022 at 10:33 PM

## 2022-08-22 NOTE — H&P
[facility] - History & Physical      PCP: Hakeem Chavez    Date of Admission: 8/21/2022    Date of Service: 8/21/2022    Chief Complaint:  Chest pain    History Of Present Illness: The patient is a 57 y.o. male who presented to [address] [place] [address] ED complaining of chest pain. Pt has history of hypothyroidism, HTN and cigarette smoking. Pt has had intermittent episodes of left sided chest pressure occurring at rest associated with nausea worsening today. He denies fevers, vomiting as well as new or worsening shortness of breath. He denies current chest pain as well as past known history of CAD. In ED, initial ekg and troponin were negative for ischemia. CTA pulmonary- No evidence of acute pulmonary thromboembolism. Cholelithiasis. Wbc 8.1, LFTs normal, sodium 140, potassium 3.8, creatinine 1.1, hgb 15, platelets 994. Pt is placed in observation for further evaluation by hospitalist service. Past Medical History:        Diagnosis Date    Hypertension     Hypothyroidism        Past Surgical History:        Procedure Laterality Date    APPENDECTOMY      HERNIA REPAIR  2005       Home Medications:  Prior to Admission medications    Medication Sig Start Date End Date Taking? Authorizing Provider   buPROPion (WELLBUTRIN) 75 MG tablet Take 75 mg by mouth 2 times daily   Yes Historical Provider, MD   pantoprazole (PROTONIX) 40 MG tablet Take 40 mg by mouth daily   Yes Historical Provider, MD   levothyroxine (SYNTHROID) 175 MCG tablet 200 mcg 6/2/15   Historical Provider, MD   lisinopril-hydrochlorothiazide (PRINZIDE;ZESTORETIC) 20-12.5 MG per tablet  6/2/15   Historical Provider, MD       Allergies:    Patient has no known allergies. Social History:    The patient currently lives with wife  Tobacco:   reports that he has been smoking cigarettes. He has been smoking an average of 3 packs per day.  He has never used smokeless tobacco.  Alcohol:   reports current alcohol use of about 15.0 standard drinks per week. Illicit Drugs: denies    Family History:      Problem Relation Age of Onset    Colon Polyps Neg Hx     Colon Cancer Neg Hx     Liver Disease Neg Hx        Review of Systems:   Review of Systems   Constitutional:  Negative for fever. Respiratory:  Positive for shortness of breath (reports unchanged from baseline). Cardiovascular:  Positive for chest pain. Gastrointestinal:  Positive for nausea. All other systems reviewed and are negative. 14 point review of systems is negative except as specifically addressed above. Physical Examination:  BP (!) 145/76   Pulse 61   Temp 97.6 °F (36.4 °C)   Resp 18   Wt 240 lb (108.9 kg)   SpO2 91%   BMI 31.66 kg/m²   Physical Exam  Vitals reviewed. Constitutional:       Appearance: Normal appearance. HENT:      Right Ear: External ear normal.      Left Ear: External ear normal.   Eyes:      Conjunctiva/sclera: Conjunctivae normal.   Cardiovascular:      Rate and Rhythm: Normal rate and regular rhythm. Pulmonary:      Effort: Pulmonary effort is normal.      Comments: Decreased breath sounds throughout  Abdominal:      Tenderness: There is no abdominal tenderness. Musculoskeletal:      Cervical back: Neck supple. Right lower leg: No edema. Left lower leg: No edema. Skin:     General: Skin is warm and dry. Neurological:      Mental Status: He is alert. Diagnostic Data:  CBC:  Recent Labs     08/21/22  1750   WBC 8.1   HGB 15.3   HCT 46.0        BMP:  Recent Labs     08/21/22  1750      K 3.8      CO2 27   BUN 13   CREATININE 1.1   CALCIUM 9.1     Recent Labs     08/21/22  1750   AST 15   ALT 18   BILITOT 0.3   ALKPHOS 108       Cardiac Enzymes:   Recent Labs     08/21/22  1750   TROPONINI <0.01       XR CHEST PORTABLE    Result Date: 8/21/2022  NO PRIOR REPORT AVAILABLE Exam: X-RAY OF THE CHEST Clinical data: Chest pain.   Technique: Single view of the chest. Prior studies: No prior studies submitted. Findings: The lungs are grossly clear; no evidence of acute infiltrate or pleural effusion. Cardiac silhouette is within normal limits. No acute osseous abnormality is detected. negative study Recommendation:  Follow up as clinically indicated. Electronically Signed by Azul Jacob at 21-Aug-2022 07:24:48 PM             CTA PULMONARY W CONTRAST    Result Date: 8/21/2022  NO PRIOR REPORT AVAILABLE Exam: CTA OF THE CHEST WITH CONTRAST Clinical data: Elevated dimer. Technique: Axial CT angiography images through the lungs were acquired with contrast and imaged using soft tissue and lung algorithms. Coronal, sagittal, and 3D volume reconstructions were performed. Reformatted/3D-MPR images were performed. Radiation dose: CTDIvol =24.64 mGy, DLP =705 mGy x cm. Prior studies: Radiograph of the chest done on same day. Findings: Lungs: Sub-segmental atelectatic bands in the posterior basal segments of both lower lobes. Calcified granuloma measuring 5 mm in the apicoposterior segment of the left upper lobe. There are smaller calcified granulomas in the medial basal right lower lobe and medial basal left lower lobe. No pulmonary infiltrate identified. No pulmonary mass identified. No pleural effusions identified. No pneumothorax. The airway is clear. Soft Tissues: There are multiple calcified me to sentinel lymph nodes. No mediastinal, axillary or supraclavicular adenopathy isidentified. No thyromegaly. Vascular: No filling defect within the pulmonary arteries to the segmental branch level. Unremarkable aorta. Grossly unremarkable sized heart. No definite abnormality seen on 3D reformatted images. Bony structures: No acute or destructive abnormality Upper Abdomen: There is a small sliding hiatal hernia. There are a few punctate scattered calcified granulomas in the visualized liver and spleen. There are intraluminal gallstones.   There is left adrenal adenoma measuring approximately 2.6 x 2.0 x 3.0 cm.    1.  No evidence of acute pulmonary thromboembolism. 2.  Subsegmental atelectatic bands in the posterior basal segments of both lower lobes. 3.  Old granulomatous disease involving lung, mediastinum, liver, spleen. 4.  Cholelithiasis. 5.  Left adrenal adenoma, as detailed above. Recommendation: Follow up as clinically indicated. All CT scans at this facility utilize dose modulation, iterative reconstruction, and/or weight based dosing when appropriate to reduce radiation dose to as low as reasonably achievable. Electronically Signed by Judith Valenzuela MD at 21-Aug-2022 09:41:05 PM               Assessment/Plan:  Active Problems:    Chest pain in adult    Hypertension    Hypothyroidism  Resolved Problems:    * No resolved hospital problems. *     Active Problems:    Chest pain in adult/Abnormal CT pulmonary-cholelithiasis/left adrenal adenoma   -trend serial troponin  -follow ekg  -echocardiogram  -cardiac meds asa/statin/ntg prn  -lexiscan stress test  -cards consult as warranted  -fasting lipid panel  -HgA1c  -tobacco cessation education  -telemetry   -will need outpatient follow up with pcp   for incidental adrenal adenoma noted  -lipase  -etoh    Hypertension  -monitor blood pressure  -continue antihypertensive meds  -avoid hypotension    Hypothyroidism   -continue synthroid   -tsh w/reflex FT4  Resolved Problems:    * No resolved hospital problems.  *  Signed:  CELESTINA Ramesh - CNP, 8/21/2022 9:17 PM

## 2022-08-22 NOTE — PROGRESS NOTES
Dale Case arrived to room # [phone] . Presented with: chest pain  Mental Status: Patient is oriented, alert, coherent, and logical.   Vitals:    08/22/22 0253   BP: 138/64   Pulse: 57   Resp: 14   Temp: 97.2 °F (36.2 °C)   SpO2: 93%     Patient safety contract and falls prevention contract reviewed with patient Yes. Oriented Patient to room. Call light within reach. Yes.   Needs, issues or concerns expressed at this time: no.      Electronically signed by Timmy Davis RN on 8/22/2022 at 4:26 AM

## 2022-08-22 NOTE — DISCHARGE SUMMARY
Sera Conde  :  1960  MRN:  [id number]    Admit date:  2022  Discharge date:  2022    Discharging Physician:  Dr. Norberto Nevarez Directive: Full Code    Consults: None     Primary Care Physician:  Skip Schneider    Discharge Diagnoses: Active Problems:    Chest pain in adult    Hypertension    Hypothyroidism  Resolved Problems:    * No resolved hospital problems. *      Portions of this note have been copied forward, however, changed to reflect the most current clinical status of this patient. Hospital Course:   History Of Present Illness: The patient is a 57 y.o. male with a PMH of HTN, cigarette smoking and thyroid disease who presented to [address] [place] [address] ED on 2022 complaining of chest pain. Pt has had intermittent episodes of left sided chest pressure occurring at rest associated with nausea worsening on his day of admission. Denies a previous history of CAD. In ED, initial ekg and troponin were negative for ischemia. CTA pulmonary- No evidence of acute pulmonary thromboembolism. Cholelithiasis. Wbc 8.1, LFTs normal, sodium 140, potassium 3.8, creatinine 1.1, hgb 15, platelets 434. He was placed in observation for further work-up. Troponin trends have remained negative. Chemistries remain within normal limits  . Vitamin D22.7-supplemented with 50,000 units ergocalciferol weekly. CBC remains within normal limits. 2D echo performed-normal LV function EF 65% mild MR and trace TR. He remains free from chest pain since admission. He will be discharged home in stable condition  Demi scan performed on 2022 is negative. He is to follow-up with his PCP within 1 week.   He will be discharged home in stable condition    Significant Diagnostic Studies:   ECHO Complete 2D W Doppler W Color    Result Date: 2022  Transthoracic Echocardiography Report (TTE)  Demographics   Patient Name   Reid Seaman   Date of Study           2022   MRN            [id number]         Gender Male   Date of Birth  1960     Room Number             [facility]-0717   Age            57 year(s)   Height:        73 inches      Referring Physician     Tre Tabares   Weight:        236.01 pounds  Sonographer             Devorah Schulz   BSA:           2.31 m^2       Interpreting Physician  Sharon Chandler MD   BMI:           31.14 kg/m^2  Procedure Type of Study   TTE procedure:ECHO NO CONTRAST WITH DOP/COLR. Study Location: Echo Lab Technical Quality: Poor visualization due to body habitus. Patient Status: Inpatient HR: 56 bpm BP: 138/64 mmHg Indications:Chest pain. Conclusions   Summary  Normal left ventricular cavity with overall normal systolic function. EF  65%  Morphologically normal valves with mild MR and trace TR   Signature   ----------------------------------------------------------------  Electronically signed by Sharon Chandler MD(Interpreting  physician) on 08/22/2022 04:18 PM  ----------------------------------------------------------------   Findings   Mitral Valve  Structurally normal mitral valve with normal leaflet mobility. No evidence  of mitral valve stenosis  Mild MR   Aortic Valve  Aortic valve appears to be tricuspid. Structurally normal aortic valve. No significant aortic regurgitation or stenosis is noted. Tricuspid Valve  Tricuspid valve is structurally normal.  Trace TR   Pulmonic Valve  The pulmonic valve was not well visualized. Left Atrium  Normal size left atrium. Left Ventricle  Normal left ventricular size with preserved LV function and an estimated  ejection fraction of approximately 65%. No evidence of left ventricular mass or thrombus noted. Right Atrium  Normal right atrial dimension with no evidence of thrombus or mass noted. Right Ventricle  Normal right ventricular size with preserved RV function. Pericardial Effusion  No evidence of significant pericardial effusion is noted. Pleural Effusion  No evidence of pleural effusion.    Miscellaneous  Aortic root is within normal limits. 2D Measurements and Calculations(cm)   LVIDd: 4.37 cm                      LVIDs: 2.73 cm  IVSd: 0.95 cm  LVPWd: 0.99 cm                      AO Root Dimension: 2 cm  Rt. Vent. Dimension: 2.85 cm        LA Dimension: 3.9 cm  % Ejection Fraction: 67.8 %         LA Area: 14 cm^2  LA Volume: 35.2 ml                  LV Systolic dimension: 8.04KC  LA Volume Index: 15 ml/m^2          LV PW diastolic: 1.56PA  LV dimension: 4.37 cm               LVOT diameter: 2.1 cm                                      RA Systolic pressure: 3mmHg                                      RV Diastolic dimension: 1.27LX  Cardic Output (CO): 3.66l/min  Ascending Aorta: 3 cm  Doppler Measurements and Calculations   AV Peak Velocity:102 cm/s              MV Peak E-Wave: 90 cm/s  AV Mean Velocity:72.1 cm/s             MV Peak A-Wave: 52.7 cm/s  AV Peak Gradient: 4.16 mmHg            MV E/A Ratio: 1.71 %  AV Mean Gradient: 2 mmHg               MV Mean velocity: 60.1cm/s  AV Area (Continuity):2.92 cm^2         MV Peak Gradient: 3.24 mmHg  AV VTI:22.4 cm/s                       MV Mean gradient: 2 mmHg  TR Velocity:277 cm/s                   MV P1/2t: 141 msec  TR Gradient:30.69 mmHg                 MVA by PHT1.56 cm^2  Estimated RAP:3 mmHg  RVSP:32 mmHg   MV E' septal velocity: 8.7cm/s  MV E' lateral velocity:12.4 cm/s      XR CHEST PORTABLE    Result Date: 8/21/2022  NO PRIOR REPORT AVAILABLE Exam: X-RAY OF THE CHEST Clinical data: Chest pain. Technique: Single view of the chest. Prior studies: No prior studies submitted. Findings: The lungs are grossly clear; no evidence of acute infiltrate or pleural effusion. Cardiac silhouette is within normal limits. No acute osseous abnormality is detected. negative study Recommendation:  Follow up as clinically indicated.  Electronically Signed by Marky Pride at 21-Aug-2022 07:24:48 PM             CTA PULMONARY W CONTRAST    Result Date: 8/21/2022  NO PRIOR REPORT AVAILABLE Exam: CTA OF THE CHEST WITH CONTRAST Clinical data: Elevated dimer. Technique: Axial CT angiography images through the lungs were acquired with contrast and imaged using soft tissue and lung algorithms. Coronal, sagittal, and 3D volume reconstructions were performed. Reformatted/3D-MPR images were performed. Radiation dose: CTDIvol =24.64 mGy, DLP =705 mGy x cm. Prior studies: Radiograph of the chest done on same day. Findings: Lungs: Sub-segmental atelectatic bands in the posterior basal segments of both lower lobes. Calcified granuloma measuring 5 mm in the apicoposterior segment of the left upper lobe. There are smaller calcified granulomas in the medial basal right lower lobe and medial basal left lower lobe. No pulmonary infiltrate identified. No pulmonary mass identified. No pleural effusions identified. No pneumothorax. The airway is clear. Soft Tissues: There are multiple calcified me to sentinel lymph nodes. No mediastinal, axillary or supraclavicular adenopathy isidentified. No thyromegaly. Vascular: No filling defect within the pulmonary arteries to the segmental branch level. Unremarkable aorta. Grossly unremarkable sized heart. No definite abnormality seen on 3D reformatted images. Bony structures: No acute or destructive abnormality Upper Abdomen: There is a small sliding hiatal hernia. There are a few punctate scattered calcified granulomas in the visualized liver and spleen. There are intraluminal gallstones. There is left adrenal adenoma measuring approximately 2.6 x 2.0 x 3.0  cm.    1.  No evidence of acute pulmonary thromboembolism. 2.  Subsegmental atelectatic bands in the posterior basal segments of both lower lobes. 3.  Old granulomatous disease involving lung, mediastinum, liver, spleen. 4.  Cholelithiasis. 5.  Left adrenal adenoma, as detailed above. Recommendation: Follow up as clinically indicated.  All CT scans at this facility utilize dose modulation, iterative reconstruction, and/or weight based dosing when appropriate to reduce radiation dose to as low as reasonably achievable. Electronically Signed by Abel Carlos MD at 21-Aug-2022 09:41:05 PM               Pertinent Labs:   CBC:   Recent Labs     08/21/22  1750 08/22/22  0154   WBC 8.1 9.0   HGB 15.3 14.7    249     BMP:    Recent Labs     08/21/22  1750 08/22/22  0154    141   K 3.8 3.6    104   CO2 27 26   BUN 13 14   CREATININE 1.1 1.2   GLUCOSE 98 87     INR: No results for input(s): INR in the last 72 hours. Physical Exam:  Vital Signs: /84   Pulse 63   Temp 97.7 °F (36.5 °C) (Temporal)   Resp 18   Ht 6' 1\" (1.854 m)   Wt 226 lb 11.2 oz (102.8 kg)   SpO2 95%   BMI 29.91 kg/m²   Physical Exam  Vitals and nursing note reviewed. Constitutional:       General: He is not in acute distress. Appearance: Normal appearance. He is not ill-appearing. HENT:      Head: Normocephalic and atraumatic. Right Ear: External ear normal.      Left Ear: External ear normal.      Nose: Nose normal.      Mouth/Throat:      Mouth: Mucous membranes are moist.   Eyes:      Extraocular Movements: Extraocular movements intact. Conjunctiva/sclera: Conjunctivae normal.      Pupils: Pupils are equal, round, and reactive to light. Cardiovascular:      Rate and Rhythm: Normal rate and regular rhythm. Pulses: Normal pulses. Heart sounds: Normal heart sounds. Pulmonary:      Effort: Pulmonary effort is normal. No respiratory distress. Breath sounds: Normal breath sounds. No wheezing, rhonchi or rales. Abdominal:      General: Bowel sounds are normal. There is no distension. Palpations: Abdomen is soft. Tenderness: There is no abdominal tenderness. Musculoskeletal:         General: No swelling, tenderness or deformity. Normal range of motion. Cervical back: Normal range of motion and neck supple. No muscular tenderness. Right lower leg: No edema. Left lower leg: No edema. Skin:     General: Skin is warm and dry. Findings: No bruising or lesion. Neurological:      Mental Status: He is alert and oriented to person, place, and time. Psychiatric:         Mood and Affect: Mood normal.         Behavior: Behavior normal.         Thought Content: Thought content normal.       Discharge Medications:         Medication List        START taking these medications      aspirin 81 MG chewable tablet  Take 1 tablet by mouth daily  Start taking on: August 23, 2022     atorvastatin 40 MG tablet  Commonly known as: LIPITOR  Take 1 tablet by mouth nightly     nitroGLYCERIN 0.4 MG SL tablet  Commonly known as: NITROSTAT  up to max of 3 total doses. If no relief after 1 dose, call 911. Vitamin D (Ergocalciferol) 68384 units Caps  Take 50,000 Units by mouth once a week  Start taking on: August 29, 2022            Edyth More taking these medications      buPROPion 75 MG tablet  Commonly known as: WELLBUTRIN     levothyroxine 175 MCG tablet  Commonly known as: SYNTHROID     lisinopril-hydroCHLOROthiazide 20-12.5 MG per tablet  Commonly known as: PRINZIDE;ZESTORETIC     pantoprazole 40 MG tablet  Commonly known as: PROTONIX               Where to Get Your Medications        These medications were sent to [address] [place] [address] [phone]  [address] [place] [address]      Phone: [phone]   aspirin 81 MG chewable tablet  atorvastatin 40 MG tablet  nitroGLYCERIN 0.4 MG SL tablet  Vitamin D (Ergocalciferol) 42537 units Caps         Discharge Instructions: Follow up with Yajaira Amin follow-up within 1 week for a hospital follow-up    Take medications as directed. Resume activity as tolerated. Diet: ADULT DIET; Regular; Low Fat/Low Chol/High Fiber/JAY JAY     Disposition: Patient is Stableand will be discharged to Home.     Time spent on discharge 37 minutes spent in assessing patient, reviewing medications, discussion with nursing, confirming safe discharge plan and preparation of discharge summary.     Signed:  CELESTINA Johns, 8/22/2022 5:56 PM

## 2022-08-23 ENCOUNTER — TELEPHONE (OUTPATIENT)
Dept: GASTROENTEROLOGY | Age: 62
End: 2022-08-23

## 2022-08-23 NOTE — TELEPHONE ENCOUNTER
Patient's wife, Gi Pena, called to reschedule Jakob's colonoscopy that is scheduled for 08/25/22. Patient was recently discharged from the hospital for chest pain. Please return patient's call to reschedule.  Thank you

## 2022-08-23 NOTE — PROGRESS NOTES
Dr. Zachariah Leon called the unit at 1905 and stated that the patient's Vernel Noel had been read and he could be discharged.

## 2022-12-08 ENCOUNTER — OFFICE VISIT (OUTPATIENT)
Dept: CARDIOLOGY CLINIC | Age: 62
End: 2022-12-08
Payer: COMMERCIAL

## 2022-12-08 VITALS
BODY MASS INDEX: 27.96 KG/M2 | SYSTOLIC BLOOD PRESSURE: 98 MMHG | DIASTOLIC BLOOD PRESSURE: 54 MMHG | WEIGHT: 211 LBS | HEIGHT: 73 IN

## 2022-12-08 DIAGNOSIS — R00.1 BRADYCARDIA: Primary | ICD-10-CM

## 2022-12-08 DIAGNOSIS — I10 HYPERTENSION, UNSPECIFIED TYPE: ICD-10-CM

## 2022-12-08 DIAGNOSIS — R55 SYNCOPE, UNSPECIFIED SYNCOPE TYPE: ICD-10-CM

## 2022-12-08 PROCEDURE — 93000 ELECTROCARDIOGRAM COMPLETE: CPT | Performed by: INTERNAL MEDICINE

## 2022-12-08 PROCEDURE — 99205 OFFICE O/P NEW HI 60 MIN: CPT | Performed by: INTERNAL MEDICINE

## 2022-12-08 PROCEDURE — 3078F DIAST BP <80 MM HG: CPT | Performed by: INTERNAL MEDICINE

## 2022-12-08 PROCEDURE — 3074F SYST BP LT 130 MM HG: CPT | Performed by: INTERNAL MEDICINE

## 2022-12-08 RX ORDER — LISINOPRIL 20 MG/1
20 TABLET ORAL DAILY
Qty: 30 TABLET | Refills: 5 | Status: SHIPPED | OUTPATIENT
Start: 2022-12-08

## 2022-12-08 RX ORDER — IBUPROFEN 800 MG/1
800 TABLET ORAL EVERY 6 HOURS PRN
COMMUNITY

## 2022-12-08 NOTE — PROGRESS NOTES
Margoth Castro is a 57 y.o. male presents with dizziness and syncope. The patient reports that he was treated in the hospital for pleuritic chest pain several months ago and at that time his cardiovascular work-up was negative for significant disease. He has since quit smoking and drinking and then he developed lightheadedness. The patient reports that he has no palpitations or tachycardia but often times he feels dizzy and nauseated typically when he is sitting down. He admits that he does not drink much water up until a few weeks ago and that in fact has somewhat resolved the problem. His blood pressure typically runs in the 120s over 70s but is occasionally low. Review of Systems   Constitutional: Negative for fever, chills, diaphoresis, activity change, appetite change, fatigue and unexpected weight change. Eyes: Negative for photophobia, pain, redness and visual disturbance. Respiratory: Negative for apnea, cough, chest tightness, shortness of breath, wheezing and stridor. Cardiovascular: Negative for chest pain, palpitations and leg swelling. Gastrointestinal: Negative for abdominal distention. Genitourinary: Negative for dysuria, urgency and frequency. Musculoskeletal: Negative for myalgias, arthralgias and gait problem. Skin: Negative for color change, pallor, rash and wound. Neurological: Negative for dizziness, tremors, speech difficulty, weakness and numbness. Hematological: Does not bruise/bleed easily. Psychiatric/Behavioral: Negative.         Past Medical History:   Diagnosis Date    Hypertension     Hypothyroidism        Past Surgical History:   Procedure Laterality Date    APPENDECTOMY      HERNIA REPAIR  2005       Family History   Problem Relation Age of Onset    Colon Polyps Neg Hx     Colon Cancer Neg Hx     Liver Disease Neg Hx        Social History     Socioeconomic History    Marital status:      Spouse name: Not on file    Number of children: Not on file Years of education: Not on file    Highest education level: Not on file   Occupational History    Not on file   Tobacco Use    Smoking status: Former     Packs/day: 3.00     Types: Cigarettes     Quit date: 2022     Years since quittin.0    Smokeless tobacco: Never   Vaping Use    Vaping Use: Never used   Substance and Sexual Activity    Alcohol use: Not Currently     Alcohol/week: 15.0 standard drinks     Types: 15 Cans of beer per week     Comment: stopped 3 weeks ago    Drug use: No    Sexual activity: Not on file   Other Topics Concern    Not on file   Social History Narrative    Not on file     Social Determinants of Health     Financial Resource Strain: Not on file   Food Insecurity: Not on file   Transportation Needs: Not on file   Physical Activity: Not on file   Stress: Not on file   Social Connections: Not on file   Intimate Partner Violence: Not on file   Housing Stability: Not on file       No Known Allergies      Current Outpatient Medications:     ibuprofen (ADVIL;MOTRIN) 800 MG tablet, Take 800 mg by mouth every 6 hours as needed for Pain, Disp: , Rfl:     aspirin 81 MG chewable tablet, Take 1 tablet by mouth daily, Disp: 30 tablet, Rfl: 0    atorvastatin (LIPITOR) 40 MG tablet, Take 1 tablet by mouth nightly, Disp: 30 tablet, Rfl: 0    nitroGLYCERIN (NITROSTAT) 0.4 MG SL tablet, up to max of 3 total doses.  If no relief after 1 dose, call 911., Disp: 25 tablet, Rfl: 0    Ergocalciferol (VITAMIN D) 72991 units CAPS, Take 50,000 Units by mouth once a week, Disp: 5 capsule, Rfl: 0    buPROPion (WELLBUTRIN) 75 MG tablet, Take 75 mg by mouth daily, Disp: , Rfl:     pantoprazole (PROTONIX) 40 MG tablet, Take 40 mg by mouth daily, Disp: , Rfl:     levothyroxine (SYNTHROID) 175 MCG tablet, 200 mcg, Disp: , Rfl: 2    lisinopril-hydrochlorothiazide (PRINZIDE;ZESTORETIC) 20-12.5 MG per tablet, Take 1 tablet by mouth daily, Disp: , Rfl: 2    PE:  Vitals:    22 8395 22 0896 22 7648 BP: (!) 142/84 120/64 (!) 98/54   Position: Supine Sitting Standing   Weight: 211 lb (95.7 kg)     Height: 6' 1\" (1.854 m)         Estimated body mass index is 27.84 kg/m² as calculated from the following:    Height as of this encounter: 6' 1\" (1.854 m). Weight as of this encounter: 211 lb (95.7 kg). Constitutional: He is oriented to person, place, and time. He appears well-developed and well-nourished in no acute distress. Neck:  Neck supple without JVD present. No trachea deviation present. No thyromegaly present. Eyes:Conjunctivae and EOM are normal. Pupils equal and reactive to light. ENT:Hearing appears normal.conjunctiva and lids are normal, ears and nose appear normal.  Cardiovascular: Normal rate, S1-S2 regular rhythm, normal heart sounds. No murmur ascultated. No gallop and no friction rub. No carotid bruits. No peripheral edema. Pulmonary/Chest:  Lungs clear to auscultation bilaterally without evidence of respiratory distress. He without wheezes. He without rales or ronchi. Musculoskeletal: Normal range of motion. Gait is normal no assitive device. Head is normocephalic and atraumatic. Skin: Skin is warm and dry without rash or pallor. Psychiatric:He is alert and oriented to person, place, and time. He has a normal mood and affect. His behavior is normal. Thought content normal.       Lab Results   Component Value Date/Time    CREATININE 1.2 08/22/2022 01:54 AM    CREATININE 1.1 08/21/2022 05:50 PM    CREATININE 1.1 07/20/2017 05:15 PM    HGB 14.7 08/22/2022 01:54 AM    HGB 15.3 08/21/2022 05:50 PM    HGB 14.8 07/20/2017 05:15 PM    PROBNP 113 08/21/2022 05:50 PM           ECG 12/08/22    Sinus rhythm normal    TTE EF 60%    Stress test normal       Assessment, Recommendations, & Plan:  57 y.o. male with orthostasis. His blood pressure dropped to 90s over 50s while standing here in the office.   He is already somewhat better with hydration but I think stopping smoking has decreased his need for aggressive blood pressure control. I will stop the hydrochlorothiazide portion of his Zestoretic and treat him simply with lisinopril and he may need a lower dose of that. I will place a 1 week monitor on him just to ensure that he is not having any bradycardic episodes but he has no conduction system disease on his ECG and I think all of this can be remedied by an increase in water intake. He drinks mostly tea at home. Disposition - RTC in 2 wks or sooner if needed      Please do not hesitate to contact me for any questions or concerns. Dr. Chela Fontana.  Luis  Electrophysiology and Cardiology  [facility] [address], Cardiology  [phone]

## 2022-12-21 DIAGNOSIS — R00.1 BRADYCARDIA: Primary | ICD-10-CM

## 2022-12-21 PROCEDURE — 93242 EXT ECG>48HR<7D RECORDING: CPT | Performed by: INTERNAL MEDICINE

## 2022-12-22 PROCEDURE — 93244 EXT ECG>48HR<7D REV&INTERPJ: CPT | Performed by: INTERNAL MEDICINE

## 2023-01-04 ENCOUNTER — OFFICE VISIT (OUTPATIENT)
Dept: CARDIOLOGY CLINIC | Age: 63
End: 2023-01-04
Payer: COMMERCIAL

## 2023-01-04 VITALS
SYSTOLIC BLOOD PRESSURE: 136 MMHG | HEIGHT: 74 IN | WEIGHT: 213 LBS | HEART RATE: 80 BPM | DIASTOLIC BLOOD PRESSURE: 70 MMHG | BODY MASS INDEX: 27.34 KG/M2

## 2023-01-04 DIAGNOSIS — R00.1 BRADYCARDIA: Primary | ICD-10-CM

## 2023-01-04 PROCEDURE — 3078F DIAST BP <80 MM HG: CPT | Performed by: INTERNAL MEDICINE

## 2023-01-04 PROCEDURE — 93000 ELECTROCARDIOGRAM COMPLETE: CPT | Performed by: INTERNAL MEDICINE

## 2023-01-04 PROCEDURE — 3075F SYST BP GE 130 - 139MM HG: CPT | Performed by: INTERNAL MEDICINE

## 2023-01-04 PROCEDURE — 99213 OFFICE O/P EST LOW 20 MIN: CPT | Performed by: INTERNAL MEDICINE

## 2023-01-04 NOTE — PROGRESS NOTES
Ortega Colorado is a 57 y.o. male presents with recent orthostasis. He said increasing his water intake has resolved the lightheadedness. He did wear a monitor a few months ago which revealed a very low burden of atrial fibrillation (less than 1%). He reports he does not have much energy because he works a swing shift and is ready for FDC soon. He has had no further chest pain. He has no palpitations or tachycardia. Review of Systems   Constitutional: Negative for fever, chills, diaphoresis, activity change, appetite change, fatigue and unexpected weight change. Eyes: Negative for photophobia, pain, redness and visual disturbance. Respiratory: Negative for apnea, cough, chest tightness, shortness of breath, wheezing and stridor. Cardiovascular: Negative for chest pain, palpitations and leg swelling. Gastrointestinal: Negative for abdominal distention. Genitourinary: Negative for dysuria, urgency and frequency. Musculoskeletal: Negative for myalgias, arthralgias and gait problem. Skin: Negative for color change, pallor, rash and wound. Neurological: Negative for dizziness, tremors, speech difficulty, weakness and numbness. Hematological: Does not bruise/bleed easily. Psychiatric/Behavioral: Negative.           Social History     Socioeconomic History    Marital status:      Spouse name: Not on file    Number of children: Not on file    Years of education: Not on file    Highest education level: Not on file   Occupational History    Not on file   Tobacco Use    Smoking status: Former     Packs/day: 3.00     Types: Cigarettes     Quit date: 2022     Years since quittin.1    Smokeless tobacco: Never   Vaping Use    Vaping Use: Never used   Substance and Sexual Activity    Alcohol use: Not Currently     Alcohol/week: 15.0 standard drinks     Types: 15 Cans of beer per week     Comment: stopped 3 weeks ago    Drug use: No    Sexual activity: Not on file   Other Topics Concern    Not on file   Social History Narrative    Not on file     Social Determinants of Health     Financial Resource Strain: Not on file   Food Insecurity: Not on file   Transportation Needs: Not on file   Physical Activity: Not on file   Stress: Not on file   Social Connections: Not on file   Intimate Partner Violence: Not on file   Housing Stability: Not on file       No Known Allergies      Current Outpatient Medications:     ibuprofen (ADVIL;MOTRIN) 800 MG tablet, Take 800 mg by mouth every 6 hours as needed for Pain, Disp: , Rfl:     lisinopril (PRINIVIL;ZESTRIL) 20 MG tablet, Take 1 tablet by mouth daily, Disp: 30 tablet, Rfl: 5    aspirin 81 MG chewable tablet, Take 1 tablet by mouth daily, Disp: 30 tablet, Rfl: 0    atorvastatin (LIPITOR) 40 MG tablet, Take 1 tablet by mouth nightly, Disp: 30 tablet, Rfl: 0    nitroGLYCERIN (NITROSTAT) 0.4 MG SL tablet, up to max of 3 total doses. If no relief after 1 dose, call 911., Disp: 25 tablet, Rfl: 0    Ergocalciferol (VITAMIN D) 47744 units CAPS, Take 50,000 Units by mouth once a week, Disp: 5 capsule, Rfl: 0    buPROPion (WELLBUTRIN) 75 MG tablet, Take 75 mg by mouth daily, Disp: , Rfl:     pantoprazole (PROTONIX) 40 MG tablet, Take 40 mg by mouth daily, Disp: , Rfl:     levothyroxine (SYNTHROID) 175 MCG tablet, 200 mcg, Disp: , Rfl: 2    PE:  Vitals:    01/04/23 0821   BP: 136/70   Pulse: 80   Weight: 213 lb (96.6 kg)   Height: 6' 2\" (1.88 m)       Estimated body mass index is 27.35 kg/m² as calculated from the following:    Height as of this encounter: 6' 2\" (1.88 m). Weight as of this encounter: 213 lb (96.6 kg). Constitutional: He is oriented to person, place, and time. He appears well-developed and well-nourished in no acute distress. Neck:  Neck supple without JVD present. No trachea deviation present. No thyromegaly present. Eyes:Conjunctivae and EOM are normal. Pupils equal and reactive to light.    ENT:Hearing appears normal.conjunctiva and lids are normal, ears and nose appear normal.  Cardiovascular: Normal rate, S1-S2 regular rhythm, normal heart sounds. 2 out of 6 holosystolic murmur ascultated. No gallop and no friction rub. No carotid bruits. No peripheral edema. Pulmonary/Chest:  Lungs clear to auscultation bilaterally without evidence of respiratory distress. He without wheezes. He without rales or ronchi. Musculoskeletal: Normal range of motion. Gait is normal  Head is normocephalic and atraumatic. Skin: Skin is warm and dry without rash or pallor. Psychiatric:He is alert and oriented to person, place, and time. He has a normal mood and affect. His behavior is normal. Thought content normal.       Lab Results   Component Value Date/Time    CREATININE 1.2 08/22/2022 01:54 AM    CREATININE 1.1 08/21/2022 05:50 PM    CREATININE 1.1 07/20/2017 05:15 PM    HGB 14.7 08/22/2022 01:54 AM    HGB 15.3 08/21/2022 05:50 PM    HGB 14.8 07/20/2017 05:15 PM    PROBNP 113 08/21/2022 05:50 PM           ECG 01/04/23    Sinus rhythm         Assessment, Recommendations, & Plan:  57 y.o. male with history of orthostasis which has now resolved. He did not have anything on his monitor which would contribute to fatigue as his heart rates were normal.  He does have a very low burden of atrial fibrillation and a [id number] score of 1. He understands that he is turning 70 this year and this would put him at increased risk for thromboembolism with atrial fibrillation although the amount of arrhythmia small. Consider repeating ambulatory monitoring in the future particularly if he begins to have symptoms of tachycardia. Disposition - RTC in 6 months or sooner if needed      Please do not hesitate to contact me for any questions or concerns. Dr. Jessica Tamayo.  Luis  Electrophysiology and Cardiology  [facility] [place], Cardiology  [phone]

## 2023-03-20 ENCOUNTER — OFFICE VISIT (OUTPATIENT)
Dept: INTERNAL MEDICINE | Facility: CLINIC | Age: 63
End: 2023-03-20
Payer: COMMERCIAL

## 2023-03-20 VITALS
SYSTOLIC BLOOD PRESSURE: 102 MMHG | TEMPERATURE: 97.7 F | RESPIRATION RATE: 16 BRPM | OXYGEN SATURATION: 98 % | HEIGHT: 74 IN | HEART RATE: 81 BPM | DIASTOLIC BLOOD PRESSURE: 62 MMHG | BODY MASS INDEX: 26.12 KG/M2 | WEIGHT: 203.5 LBS

## 2023-03-20 DIAGNOSIS — Z00.01 ANNUAL VISIT FOR GENERAL ADULT MEDICAL EXAMINATION WITH ABNORMAL FINDINGS: ICD-10-CM

## 2023-03-20 DIAGNOSIS — E03.9 ACQUIRED HYPOTHYROIDISM: ICD-10-CM

## 2023-03-20 DIAGNOSIS — R05.3 CHRONIC COUGH: ICD-10-CM

## 2023-03-20 DIAGNOSIS — I10 PRIMARY HYPERTENSION: ICD-10-CM

## 2023-03-20 DIAGNOSIS — K21.9 GASTROESOPHAGEAL REFLUX DISEASE, UNSPECIFIED WHETHER ESOPHAGITIS PRESENT: ICD-10-CM

## 2023-03-20 DIAGNOSIS — Z87.891 FORMER SMOKER: ICD-10-CM

## 2023-03-20 DIAGNOSIS — R73.02 IMPAIRED GLUCOSE TOLERANCE: ICD-10-CM

## 2023-03-20 DIAGNOSIS — R91.8 MASS OF MIDDLE LOBE OF RIGHT LUNG: Primary | ICD-10-CM

## 2023-03-20 DIAGNOSIS — J44.9 STAGE 2 MODERATE COPD BY GOLD CLASSIFICATION: ICD-10-CM

## 2023-03-20 DIAGNOSIS — E27.8 ADRENAL NODULE: ICD-10-CM

## 2023-03-20 DIAGNOSIS — E55.9 VITAMIN D DEFICIENCY: ICD-10-CM

## 2023-03-20 PROBLEM — R07.9 CHEST PAIN IN ADULT: Status: ACTIVE | Noted: 2022-08-21

## 2023-03-20 PROBLEM — E27.9 ADRENAL NODULE: Status: ACTIVE | Noted: 2023-03-20

## 2023-03-20 PROBLEM — R07.9 CHEST PAIN IN ADULT: Status: RESOLVED | Noted: 2022-08-21 | Resolved: 2023-03-20

## 2023-03-20 RX ORDER — PANTOPRAZOLE SODIUM 40 MG/1
40 TABLET, DELAYED RELEASE ORAL DAILY
Qty: 30 TABLET | Refills: 5 | Status: SHIPPED | OUTPATIENT
Start: 2023-03-20

## 2023-03-20 RX ORDER — LEVOTHYROXINE SODIUM 0.2 MG/1
200 TABLET ORAL DAILY
Qty: 30 TABLET | Refills: 5 | Status: SHIPPED | OUTPATIENT
Start: 2023-03-20

## 2023-03-20 RX ORDER — IBUPROFEN 800 MG/1
800 TABLET ORAL 3 TIMES DAILY
Qty: 90 TABLET | Refills: 3 | Status: ON HOLD | OUTPATIENT
Start: 2023-03-20 | End: 2023-04-01 | Stop reason: SDUPTHER

## 2023-03-20 RX ORDER — LISINOPRIL 10 MG/1
10 TABLET ORAL DAILY
Qty: 30 TABLET | Refills: 5 | Status: ON HOLD | OUTPATIENT
Start: 2023-03-20 | End: 2023-03-31

## 2023-03-20 RX ORDER — BUPROPION HYDROCHLORIDE 75 MG/1
1 TABLET ORAL DAILY
COMMUNITY
Start: 2023-03-14 | End: 2023-03-20

## 2023-03-20 RX ORDER — PANTOPRAZOLE SODIUM 40 MG/1
1 TABLET, DELAYED RELEASE ORAL DAILY
COMMUNITY
Start: 2023-03-14 | End: 2023-03-20 | Stop reason: SDUPTHER

## 2023-03-20 RX ORDER — ERGOCALCIFEROL 1.25 MG/1
1 CAPSULE ORAL WEEKLY
COMMUNITY
Start: 2023-03-14 | End: 2023-03-20

## 2023-03-20 RX ORDER — LEVOTHYROXINE SODIUM 0.2 MG/1
1 TABLET ORAL DAILY
COMMUNITY
Start: 2023-03-14 | End: 2023-03-20 | Stop reason: SDUPTHER

## 2023-03-20 RX ORDER — IBUPROFEN 800 MG/1
800 TABLET ORAL 3 TIMES DAILY
Qty: 270 TABLET | Refills: 3 | Status: SHIPPED | OUTPATIENT
Start: 2023-03-20 | End: 2023-03-20 | Stop reason: SDUPTHER

## 2023-03-20 RX ORDER — IBUPROFEN 800 MG/1
1 TABLET ORAL 3 TIMES DAILY
COMMUNITY
Start: 2023-03-14 | End: 2023-03-20 | Stop reason: SDUPTHER

## 2023-03-20 RX ORDER — BENZONATATE 200 MG/1
200 CAPSULE ORAL 3 TIMES DAILY PRN
Qty: 60 CAPSULE | Refills: 0 | Status: SHIPPED | OUTPATIENT
Start: 2023-03-20 | End: 2023-04-06

## 2023-03-20 RX ORDER — LISINOPRIL 20 MG/1
1 TABLET ORAL DAILY
COMMUNITY
Start: 2022-12-08 | End: 2023-03-20 | Stop reason: SDUPTHER

## 2023-03-20 RX ORDER — ASPIRIN 81 MG/1
81 TABLET ORAL DAILY
COMMUNITY

## 2023-03-20 RX ORDER — POTASSIUM CHLORIDE 750 MG/1
1 TABLET, FILM COATED, EXTENDED RELEASE ORAL DAILY
COMMUNITY
Start: 2023-03-02

## 2023-03-20 RX ORDER — BUPROPION HYDROCHLORIDE 75 MG/1
75 TABLET ORAL DAILY
Qty: 30 TABLET | Refills: 5 | Status: CANCELLED | OUTPATIENT
Start: 2023-03-20

## 2023-03-20 NOTE — PROGRESS NOTES
CC: cough    History:  Castillo Trujillo is a 63 y.o. male   He notes he has been having cough consistently since November. It has been productive of a dark sputum, but he did have one episode of bright red blood about 1 month ago, but nothing since. He has associated weight loss, going from about 243 in the fall to nearly 200 now. He has had decreased appetite and worsening fatigue rendering him able to do little else than work his 12 hour shift and then collapse in exhaustion. He has some right chest wall pain. He had a CXR on 2/23/23 that showed a right upper opacity and subsequent CT on 3/2/23 showed a large mass-like infiltrate in the right middle and upper lobes. He had not heard anything regarding next steps from his ordering physician, so he has presented here for evaluation.     ROS:  Review of Systems   Constitutional: Positive for diaphoresis, fatigue and unexpected weight change. Negative for chills and fever.   Respiratory: Positive for cough, chest tightness and shortness of breath.    Cardiovascular: Negative for chest pain and palpitations.   Gastrointestinal: Negative for abdominal pain.   Genitourinary: Negative for frequency.   Musculoskeletal: Negative for back pain.   Neurological: Positive for dizziness.   Psychiatric/Behavioral: Negative for dysphoric mood. The patient is nervous/anxious.         reports that he quit smoking about 3 months ago. His smoking use included cigarettes. He has a 135.00 pack-year smoking history. He has been exposed to tobacco smoke. He has never used smokeless tobacco. He reports current alcohol use. He reports that he does not use drugs.      Current Outpatient Medications:   •  aspirin 81 MG EC tablet, Take 1 tablet by mouth Daily., Disp: , Rfl:   •  buPROPion (WELLBUTRIN) 75 MG tablet, Take 1 tablet by mouth Daily., Disp: , Rfl:   •  ibuprofen (ADVIL,MOTRIN) 800 MG tablet, Take 1 tablet by mouth 3 (Three) Times a Day., Disp: , Rfl:   •  levothyroxine (SYNTHROID,  "LEVOTHROID) 200 MCG tablet, Take 1 tablet by mouth Daily., Disp: , Rfl:   •  lisinopril (PRINIVIL,ZESTRIL) 20 MG tablet, Take 1 tablet by mouth Daily., Disp: , Rfl:   •  pantoprazole (PROTONIX) 40 MG EC tablet, Take 1 tablet by mouth Daily., Disp: , Rfl:   •  potassium chloride 10 MEQ CR tablet, Take 1 tablet by mouth Daily., Disp: , Rfl:   •  vitamin D (ERGOCALCIFEROL) 1.25 MG (25978 UT) capsule capsule, Take 1 Units by mouth 1 (One) Time Per Week., Disp: , Rfl:     OBJECTIVE:  /62 (BP Location: Left arm, Patient Position: Sitting, Cuff Size: Adult)   Pulse 81   Temp 97.7 °F (36.5 °C)   Resp 16   Ht 188 cm (74\")   Wt 92.3 kg (203 lb 8 oz)   SpO2 98%   BMI 26.13 kg/m²    Physical Exam  Constitutional:       General: He is not in acute distress.     Appearance: He is ill-appearing.   Cardiovascular:      Rate and Rhythm: Normal rate and regular rhythm.      Heart sounds: Normal heart sounds. No murmur heard.  Pulmonary:      Effort: Pulmonary effort is normal.      Breath sounds: Stridor: right lung field. Rhonchi present. No wheezing.   Neurological:      Mental Status: He is alert and oriented to person, place, and time.      Gait: Gait normal.   Psychiatric:         Mood and Affect: Mood normal.         Behavior: Behavior normal.         Assessment/Plan     Diagnoses and all orders for this visit:    1. Mass of middle lobe of right lung (Primary)  -     NM PET/CT Skull Base to Mid Thigh; Future  -     benzonatate (TESSALON) 200 MG capsule; Take 1 capsule by mouth 3 (Three) Times a Day As Needed for Cough.  Dispense: 60 capsule; Refill: 0  -     Full Pulmonary Function Test With Bronchodilator; Future  Notably, no mass was noted on CT at [facility] in 8/2022. Given mass-like development with weight loss highly suspicious for malignancy, PET for further evaluation and also order PFTs for characterization of lung function in the event of intervention or surgery. We may also need to consider treatment of lung " disease if needed.     2. Adrenal nodule (HCC)  This was noted on CTA in 8/2022 and does not have a significant growth on recent CT, which is reassuring. When neoplastic workup is pushed along, we will pursue functional workup for nodule, though metastasis remains on the differential as well.     3. Primary hypertension  -     lisinopril (PRINIVIL,ZESTRIL) 10 MG tablet; Take 1 tablet by mouth Daily.  Dispense: 30 tablet; Refill: 5  -     Comprehensive Metabolic Panel; Future  -     Lipid Panel; Future  -     CBC & Differential; Future  Well controlled, BP goal for age is <140/90 per JNC 8 guidelines and decrease lisinopril given weight loss.     4. Gastroesophageal reflux disease, unspecified whether esophagitis present  -     pantoprazole (PROTONIX) 40 MG EC tablet; Take 1 tablet by mouth Daily.  Dispense: 30 tablet; Refill: 5  Stable without exacerbation on PPI.    5. Acquired hypothyroidism  -     TSH; Future  -     levothyroxine (SYNTHROID, LEVOTHROID) 200 MCG tablet; Take 1 tablet by mouth Daily.  Dispense: 30 tablet; Refill: 5  TSH to monitor and continue supplementation.     6. Vitamin D deficiency  -     Vitamin D,25-Hydroxy; Future    7. Chronic cough  -     benzonatate (TESSALON) 200 MG capsule; Take 1 capsule by mouth 3 (Three) Times a Day As Needed for Cough.  Dispense: 60 capsule; Refill: 0    8. Impaired glucose tolerance  -     Hemoglobin A1c; Future    9. Former smoker  -     Full Pulmonary Function Test With Bronchodilator; Future  He quit in 11/2022.     10. Annual visit for general adult medical examination with abnormal findings  -     Hepatitis C Antibody; Future    Other orders  -     ibuprofen (ADVIL,MOTRIN) 800 MG tablet; Take 1 tablet by mouth 3 (Three) Times a Day.  Dispense: 90 tablet; Refill: 3        An After Visit Summary was printed and given to the patient at discharge.  Return in about 6 weeks (around 5/1/2023) for Recheck with me.      Juan Vaughn D.O. 3/21/2023   Electronically  signed.

## 2023-03-22 ENCOUNTER — HOSPITAL ENCOUNTER (OUTPATIENT)
Dept: CT IMAGING | Facility: HOSPITAL | Age: 63
Discharge: HOME | End: 2023-03-22
Payer: COMMERCIAL

## 2023-03-22 ENCOUNTER — LAB (OUTPATIENT)
Dept: LAB | Facility: HOSPITAL | Age: 63
End: 2023-03-22
Payer: COMMERCIAL

## 2023-03-22 DIAGNOSIS — R73.02 IMPAIRED GLUCOSE TOLERANCE: ICD-10-CM

## 2023-03-22 DIAGNOSIS — I10 PRIMARY HYPERTENSION: ICD-10-CM

## 2023-03-22 DIAGNOSIS — E55.9 VITAMIN D DEFICIENCY: ICD-10-CM

## 2023-03-22 DIAGNOSIS — R91.8 MASS OF MIDDLE LOBE OF RIGHT LUNG: ICD-10-CM

## 2023-03-22 DIAGNOSIS — E03.9 ACQUIRED HYPOTHYROIDISM: ICD-10-CM

## 2023-03-22 PROCEDURE — 0 FLUDEOXYGLUCOSE F18 SOLUTION: Performed by: INTERNAL MEDICINE

## 2023-03-22 PROCEDURE — 78815 PET IMAGE W/CT SKULL-THIGH: CPT

## 2023-03-22 PROCEDURE — A9552 F18 FDG: HCPCS | Performed by: INTERNAL MEDICINE

## 2023-03-22 RX ADMIN — FLUDEOXYGLUCOSE F18 1 DOSE: 300 INJECTION INTRAVENOUS at 13:04

## 2023-03-23 ENCOUNTER — PATIENT ROUNDING (BHMG ONLY) (OUTPATIENT)
Dept: INTERNAL MEDICINE | Facility: CLINIC | Age: 63
End: 2023-03-23
Payer: COMMERCIAL

## 2023-03-23 NOTE — PROGRESS NOTES
March 23, 2023    Hello, may I speak with Castillo Trujillo?    My name is DEEPTHI SARAH      I am  with [facility]  [address], [address]  [place] [address] [phone].    Before we get started may I verify your date of birth? 1960    I am calling to officially welcome you to our practice and ask about your recent visit. Is this a good time to talk? yes    Tell me about your visit with us. What things went well?  ALL WENT WELL. VERY INFORMATIVE AND GOOD MANNERS.       We're always looking for ways to make our patients' experiences even better. Do you have recommendations on ways we may improve?  no    Overall were you satisfied with your first visit to our practice? yes       I appreciate you taking the time to speak with me today. Is there anything else I can do for you? no      Thank you, and have a great day.

## 2023-03-30 ENCOUNTER — APPOINTMENT (OUTPATIENT)
Dept: CT IMAGING | Facility: HOSPITAL | Age: 63
DRG: 186 | End: 2023-03-30
Payer: COMMERCIAL

## 2023-03-30 ENCOUNTER — APPOINTMENT (OUTPATIENT)
Dept: GENERAL RADIOLOGY | Facility: HOSPITAL | Age: 63
DRG: 186 | End: 2023-03-30
Payer: COMMERCIAL

## 2023-03-30 ENCOUNTER — HOSPITAL ENCOUNTER (INPATIENT)
Facility: HOSPITAL | Age: 63
LOS: 2 days | Discharge: HOME | DRG: 186 | End: 2023-04-01
Attending: EMERGENCY MEDICINE | Admitting: INTERNAL MEDICINE
Payer: COMMERCIAL

## 2023-03-30 ENCOUNTER — TELEPHONE (OUTPATIENT)
Dept: INTERNAL MEDICINE | Facility: CLINIC | Age: 63
End: 2023-03-30
Payer: COMMERCIAL

## 2023-03-30 DIAGNOSIS — R55 SYNCOPE AND COLLAPSE: Primary | ICD-10-CM

## 2023-03-30 DIAGNOSIS — R07.9 CHEST PAIN, UNSPECIFIED TYPE: ICD-10-CM

## 2023-03-30 DIAGNOSIS — J90 PLEURAL EFFUSION ON RIGHT: ICD-10-CM

## 2023-03-30 DIAGNOSIS — R91.8 LUNG MASS: ICD-10-CM

## 2023-03-30 LAB
ALBUMIN SERPL-MCNC: 3.3 G/DL (ref 3.5–5.2)
ALBUMIN/GLOB SERPL: 0.8 G/DL
ALP SERPL-CCNC: 262 U/L (ref 39–117)
ALT SERPL W P-5'-P-CCNC: 27 U/L (ref 1–41)
ANION GAP SERPL CALCULATED.3IONS-SCNC: 13 MMOL/L (ref 5–15)
AST SERPL-CCNC: 18 U/L (ref 1–40)
BASOPHILS # BLD AUTO: 0.08 10*3/MM3 (ref 0–0.2)
BASOPHILS NFR BLD AUTO: 0.4 % (ref 0–1.5)
BILIRUB SERPL-MCNC: 0.5 MG/DL (ref 0–1.2)
BUN SERPL-MCNC: 8 MG/DL (ref 8–23)
BUN/CREAT SERPL: 10.3 (ref 7–25)
CALCIUM SPEC-SCNC: 9.5 MG/DL (ref 8.6–10.5)
CHLORIDE SERPL-SCNC: 95 MMOL/L (ref 98–107)
CO2 SERPL-SCNC: 24 MMOL/L (ref 22–29)
CREAT SERPL-MCNC: 0.78 MG/DL (ref 0.76–1.27)
D-LACTATE SERPL-SCNC: 1.5 MMOL/L (ref 0.5–2)
DEPRECATED RDW RBC AUTO: 47.6 FL (ref 37–54)
EGFRCR SERPLBLD CKD-EPI 2021: 99 ML/MIN/1.73
EOSINOPHIL # BLD AUTO: 0.14 10*3/MM3 (ref 0–0.4)
EOSINOPHIL NFR BLD AUTO: 0.7 % (ref 0.3–6.2)
ERYTHROCYTE [DISTWIDTH] IN BLOOD BY AUTOMATED COUNT: 15.7 % (ref 12.3–15.4)
GEN 5 2HR TROPONIN T REFLEX: <6 NG/L
GLOBULIN UR ELPH-MCNC: 3.9 GM/DL
GLUCOSE SERPL-MCNC: 127 MG/DL (ref 65–99)
HCT VFR BLD AUTO: 37.8 % (ref 37.5–51)
HGB BLD-MCNC: 11.2 G/DL (ref 13–17.7)
HOLD SPECIMEN: NORMAL
HOLD SPECIMEN: NORMAL
IMM GRANULOCYTES # BLD AUTO: 0.16 10*3/MM3 (ref 0–0.05)
IMM GRANULOCYTES NFR BLD AUTO: 0.8 % (ref 0–0.5)
LYMPHOCYTES # BLD AUTO: 1.89 10*3/MM3 (ref 0.7–3.1)
LYMPHOCYTES NFR BLD AUTO: 9 % (ref 19.6–45.3)
MCH RBC QN AUTO: 24.8 PG (ref 26.6–33)
MCHC RBC AUTO-ENTMCNC: 29.6 G/DL (ref 31.5–35.7)
MCV RBC AUTO: 83.8 FL (ref 79–97)
MONOCYTES # BLD AUTO: 1.32 10*3/MM3 (ref 0.1–0.9)
MONOCYTES NFR BLD AUTO: 6.3 % (ref 5–12)
NEUTROPHILS NFR BLD AUTO: 17.35 10*3/MM3 (ref 1.7–7)
NEUTROPHILS NFR BLD AUTO: 82.8 % (ref 42.7–76)
NRBC BLD AUTO-RTO: 0 /100 WBC (ref 0–0.2)
PLATELET # BLD AUTO: 644 10*3/MM3 (ref 140–450)
PMV BLD AUTO: 8.4 FL (ref 6–12)
POTASSIUM SERPL-SCNC: 4 MMOL/L (ref 3.5–5.2)
PROCALCITONIN SERPL-MCNC: 0.13 NG/ML (ref 0–0.25)
PROT SERPL-MCNC: 7.2 G/DL (ref 6–8.5)
RBC # BLD AUTO: 4.51 10*6/MM3 (ref 4.14–5.8)
SODIUM SERPL-SCNC: 132 MMOL/L (ref 136–145)
TROPONIN T DELTA: NORMAL
TROPONIN T SERPL HS-MCNC: <6 NG/L
WBC NRBC COR # BLD: 20.94 10*3/MM3 (ref 3.4–10.8)
WHOLE BLOOD HOLD COAG: NORMAL
WHOLE BLOOD HOLD SPECIMEN: NORMAL

## 2023-03-30 PROCEDURE — 25010000002 CEFTRIAXONE PER 250 MG: Performed by: EMERGENCY MEDICINE

## 2023-03-30 PROCEDURE — 71045 X-RAY EXAM CHEST 1 VIEW: CPT

## 2023-03-30 PROCEDURE — 25010000002 HYDROMORPHONE PER 4 MG: Performed by: EMERGENCY MEDICINE

## 2023-03-30 PROCEDURE — 25510000001 IOPAMIDOL PER 1 ML: Performed by: EMERGENCY MEDICINE

## 2023-03-30 PROCEDURE — 84484 ASSAY OF TROPONIN QUANT: CPT | Performed by: EMERGENCY MEDICINE

## 2023-03-30 PROCEDURE — 83605 ASSAY OF LACTIC ACID: CPT | Performed by: EMERGENCY MEDICINE

## 2023-03-30 PROCEDURE — 84145 PROCALCITONIN (PCT): CPT | Performed by: INTERNAL MEDICINE

## 2023-03-30 PROCEDURE — 87205 SMEAR GRAM STAIN: CPT | Performed by: INTERNAL MEDICINE

## 2023-03-30 PROCEDURE — 87040 BLOOD CULTURE FOR BACTERIA: CPT | Performed by: EMERGENCY MEDICINE

## 2023-03-30 PROCEDURE — 85025 COMPLETE CBC W/AUTO DIFF WBC: CPT | Performed by: EMERGENCY MEDICINE

## 2023-03-30 PROCEDURE — 36415 COLL VENOUS BLD VENIPUNCTURE: CPT

## 2023-03-30 PROCEDURE — 99285 EMERGENCY DEPT VISIT HI MDM: CPT

## 2023-03-30 PROCEDURE — 25010000002 AZITHROMYCIN PER 500 MG: Performed by: EMERGENCY MEDICINE

## 2023-03-30 PROCEDURE — 93010 ELECTROCARDIOGRAM REPORT: CPT | Performed by: INTERNAL MEDICINE

## 2023-03-30 PROCEDURE — 87070 CULTURE OTHR SPECIMN AEROBIC: CPT | Performed by: INTERNAL MEDICINE

## 2023-03-30 PROCEDURE — 93005 ELECTROCARDIOGRAM TRACING: CPT

## 2023-03-30 PROCEDURE — 93005 ELECTROCARDIOGRAM TRACING: CPT | Performed by: EMERGENCY MEDICINE

## 2023-03-30 PROCEDURE — 25010000002 ONDANSETRON PER 1 MG: Performed by: EMERGENCY MEDICINE

## 2023-03-30 PROCEDURE — 71275 CT ANGIOGRAPHY CHEST: CPT

## 2023-03-30 PROCEDURE — 80053 COMPREHEN METABOLIC PANEL: CPT | Performed by: EMERGENCY MEDICINE

## 2023-03-30 RX ORDER — LISINOPRIL 10 MG/1
10 TABLET ORAL DAILY
Status: DISCONTINUED | OUTPATIENT
Start: 2023-03-30 | End: 2023-04-01 | Stop reason: HOSPADM

## 2023-03-30 RX ORDER — SODIUM CHLORIDE 0.9 % (FLUSH) 0.9 %
10 SYRINGE (ML) INJECTION EVERY 12 HOURS SCHEDULED
Status: DISCONTINUED | OUTPATIENT
Start: 2023-03-30 | End: 2023-04-01 | Stop reason: HOSPADM

## 2023-03-30 RX ORDER — LEVOTHYROXINE SODIUM 0.1 MG/1
200 TABLET ORAL
Status: DISCONTINUED | OUTPATIENT
Start: 2023-03-31 | End: 2023-04-01 | Stop reason: HOSPADM

## 2023-03-30 RX ORDER — SODIUM CHLORIDE 0.9 % (FLUSH) 0.9 %
10 SYRINGE (ML) INJECTION AS NEEDED
Status: DISCONTINUED | OUTPATIENT
Start: 2023-03-30 | End: 2023-04-01 | Stop reason: HOSPADM

## 2023-03-30 RX ORDER — HYDROCODONE BITARTRATE AND ACETAMINOPHEN 5; 325 MG/1; MG/1
1 TABLET ORAL ONCE
Status: COMPLETED | OUTPATIENT
Start: 2023-03-30 | End: 2023-03-30

## 2023-03-30 RX ORDER — SODIUM CHLORIDE, SODIUM LACTATE, POTASSIUM CHLORIDE, CALCIUM CHLORIDE 600; 310; 30; 20 MG/100ML; MG/100ML; MG/100ML; MG/100ML
50 INJECTION, SOLUTION INTRAVENOUS CONTINUOUS
Status: DISCONTINUED | OUTPATIENT
Start: 2023-03-30 | End: 2023-04-01 | Stop reason: HOSPADM

## 2023-03-30 RX ORDER — ONDANSETRON 2 MG/ML
4 INJECTION INTRAMUSCULAR; INTRAVENOUS ONCE
Status: COMPLETED | OUTPATIENT
Start: 2023-03-30 | End: 2023-03-30

## 2023-03-30 RX ORDER — ASPIRIN 81 MG/1
81 TABLET ORAL DAILY
Status: DISCONTINUED | OUTPATIENT
Start: 2023-03-30 | End: 2023-04-01 | Stop reason: HOSPADM

## 2023-03-30 RX ORDER — PANTOPRAZOLE SODIUM 40 MG/1
40 TABLET, DELAYED RELEASE ORAL DAILY
Status: DISCONTINUED | OUTPATIENT
Start: 2023-03-30 | End: 2023-04-01 | Stop reason: HOSPADM

## 2023-03-30 RX ORDER — PROMETHAZINE HYDROCHLORIDE 25 MG/1
12.5 TABLET ORAL EVERY 8 HOURS PRN
Status: DISCONTINUED | OUTPATIENT
Start: 2023-03-30 | End: 2023-04-01 | Stop reason: HOSPADM

## 2023-03-30 RX ORDER — ACETAMINOPHEN 160 MG/5ML
650 SOLUTION ORAL EVERY 4 HOURS PRN
Status: DISCONTINUED | OUTPATIENT
Start: 2023-03-30 | End: 2023-04-01 | Stop reason: HOSPADM

## 2023-03-30 RX ORDER — SODIUM CHLORIDE 9 MG/ML
40 INJECTION, SOLUTION INTRAVENOUS AS NEEDED
Status: DISCONTINUED | OUTPATIENT
Start: 2023-03-30 | End: 2023-04-01 | Stop reason: HOSPADM

## 2023-03-30 RX ORDER — ACETAMINOPHEN 650 MG/1
650 SUPPOSITORY RECTAL EVERY 4 HOURS PRN
Status: DISCONTINUED | OUTPATIENT
Start: 2023-03-30 | End: 2023-04-01 | Stop reason: HOSPADM

## 2023-03-30 RX ORDER — HYDROMORPHONE HYDROCHLORIDE 1 MG/ML
0.5 INJECTION, SOLUTION INTRAMUSCULAR; INTRAVENOUS; SUBCUTANEOUS ONCE
Status: COMPLETED | OUTPATIENT
Start: 2023-03-30 | End: 2023-03-30

## 2023-03-30 RX ORDER — BENZONATATE 100 MG/1
200 CAPSULE ORAL 3 TIMES DAILY PRN
Status: DISCONTINUED | OUTPATIENT
Start: 2023-03-30 | End: 2023-04-01 | Stop reason: HOSPADM

## 2023-03-30 RX ORDER — GUAIFENESIN 600 MG/1
1200 TABLET, EXTENDED RELEASE ORAL EVERY 12 HOURS SCHEDULED
Status: DISCONTINUED | OUTPATIENT
Start: 2023-03-30 | End: 2023-04-01 | Stop reason: HOSPADM

## 2023-03-30 RX ORDER — CODEINE PHOSPHATE AND GUAIFENESIN 10; 100 MG/5ML; MG/5ML
10 SOLUTION ORAL EVERY 6 HOURS PRN
Status: DISCONTINUED | OUTPATIENT
Start: 2023-03-30 | End: 2023-03-31

## 2023-03-30 RX ORDER — HYDROCODONE POLISTIREX AND CHLORPHENIRAMINE POLISTIREX 10; 8 MG/5ML; MG/5ML
5 SUSPENSION, EXTENDED RELEASE ORAL EVERY 12 HOURS PRN
Status: DISCONTINUED | OUTPATIENT
Start: 2023-03-30 | End: 2023-03-31

## 2023-03-30 RX ORDER — LEVOFLOXACIN 5 MG/ML
750 INJECTION, SOLUTION INTRAVENOUS EVERY 24 HOURS
Status: DISCONTINUED | OUTPATIENT
Start: 2023-03-31 | End: 2023-04-01 | Stop reason: HOSPADM

## 2023-03-30 RX ORDER — ACETAMINOPHEN 325 MG/1
650 TABLET ORAL EVERY 4 HOURS PRN
Status: DISCONTINUED | OUTPATIENT
Start: 2023-03-30 | End: 2023-04-01 | Stop reason: HOSPADM

## 2023-03-30 RX ADMIN — CEFTRIAXONE 1 G: 1 INJECTION, POWDER, FOR SOLUTION INTRAMUSCULAR; INTRAVENOUS at 11:24

## 2023-03-30 RX ADMIN — HYDROCODONE BITARTRATE AND ACETAMINOPHEN 1 TABLET: 5; 325 TABLET ORAL at 21:54

## 2023-03-30 RX ADMIN — SODIUM CHLORIDE, POTASSIUM CHLORIDE, SODIUM LACTATE AND CALCIUM CHLORIDE 50 ML/HR: 600; 310; 30; 20 INJECTION, SOLUTION INTRAVENOUS at 17:45

## 2023-03-30 RX ADMIN — SODIUM CHLORIDE 2721 ML: 9 INJECTION, SOLUTION INTRAVENOUS at 10:15

## 2023-03-30 RX ADMIN — ONDANSETRON 4 MG: 2 INJECTION INTRAMUSCULAR; INTRAVENOUS at 09:44

## 2023-03-30 RX ADMIN — Medication 10 ML: at 21:11

## 2023-03-30 RX ADMIN — SODIUM CHLORIDE 500 ML: 0.9 INJECTION, SOLUTION INTRAVENOUS at 09:37

## 2023-03-30 RX ADMIN — AZITHROMYCIN MONOHYDRATE 500 MG: 500 INJECTION, POWDER, LYOPHILIZED, FOR SOLUTION INTRAVENOUS at 12:05

## 2023-03-30 RX ADMIN — HYDROCODONE POLISTIREX AND CHLORPHENIRAMINE POLISTIREX 5 ML: 10; 8 SUSPENSION, EXTENDED RELEASE ORAL at 17:25

## 2023-03-30 RX ADMIN — LISINOPRIL 10 MG: 10 TABLET ORAL at 17:45

## 2023-03-30 RX ADMIN — GUAIFENESIN 1200 MG: 600 TABLET, EXTENDED RELEASE ORAL at 21:11

## 2023-03-30 RX ADMIN — GUAIFENESIN AND CODEINE PHOSPHATE 10 ML: 100; 10 SOLUTION ORAL at 18:19

## 2023-03-30 RX ADMIN — IOPAMIDOL 100 ML: 755 INJECTION, SOLUTION INTRAVENOUS at 11:06

## 2023-03-30 RX ADMIN — HYDROMORPHONE HYDROCHLORIDE 0.5 MG: 1 INJECTION, SOLUTION INTRAMUSCULAR; INTRAVENOUS; SUBCUTANEOUS at 10:16

## 2023-03-30 NOTE — ED PROVIDER NOTES
Subjective   History of Present Illness  Patient is a 63-year-old male with a history of hypertension who presents to the ER with right sided chest pain.  Patient states he has had right-sided chest pain since 11 PM last night.  Patient states the pain is sharp with no radiation and has been constant.  Patient was recently diagnosed with right-sided lung cancer.  He states the pain is worse with movement, breathing or cough.  He has had associated cough and shortness of air.  While in the wheelchair being transported to the ER room, patient proceeded to have a syncopal episode.  He did have an episode of nausea and vomiting right after the syncopal episode.  He states he does feel a little better now but still does not feel good.  He denies any fever, abdominal pain, diarrhea, urinary changes, focal neurologic changes.        Review of Systems   Constitutional: Negative.    HENT: Negative.    Eyes: Negative.    Respiratory: Positive for cough and shortness of breath.    Cardiovascular: Positive for chest pain.   Gastrointestinal: Positive for nausea and vomiting.   Endocrine: Negative.    Genitourinary: Negative.    Musculoskeletal: Negative.    Skin: Negative.    Allergic/Immunologic: Negative.    Neurological: Positive for syncope.   Hematological: Negative.    Psychiatric/Behavioral: Negative.    All other systems reviewed and are negative.      Past Medical History:   Diagnosis Date   • Arthritis    • GERD (gastroesophageal reflux disease)    • Hypertension    • Thyroid disorder        No Known Allergies    Past Surgical History:   Procedure Laterality Date   • APPENDECTOMY         Family History   Problem Relation Age of Onset   • Stomach cancer Mother    • Stroke Father    • Breast cancer Sister    • Colon cancer Brother        Social History     Socioeconomic History   • Marital status:    Tobacco Use   • Smoking status: Former     Packs/day: 3.00     Years: 45.00     Pack years: 135.00     Types:  Cigarettes     Quit date: 2022     Years since quittin.3     Passive exposure: Past   • Smokeless tobacco: Never   Vaping Use   • Vaping Use: Never used   Substance and Sexual Activity   • Alcohol use: Yes     Comment: 2-3 weekly   • Drug use: Never   • Sexual activity: Yes     Partners: Female           Objective   Physical Exam  Vitals and nursing note reviewed.   Constitutional:       Appearance: He is well-developed.   HENT:      Head: Normocephalic and atraumatic.   Eyes:      Conjunctiva/sclera: Conjunctivae normal.      Pupils: Pupils are equal, round, and reactive to light.   Cardiovascular:      Rate and Rhythm: Normal rate and regular rhythm.      Heart sounds: Normal heart sounds.   Pulmonary:      Effort: Pulmonary effort is normal. No tachypnea or respiratory distress.      Breath sounds: Examination of the right-middle field reveals decreased breath sounds. Examination of the right-lower field reveals decreased breath sounds. Decreased breath sounds present.   Abdominal:      Palpations: Abdomen is soft.      Tenderness: There is no abdominal tenderness.   Musculoskeletal:         General: No deformity. Normal range of motion.      Cervical back: Normal range of motion.   Skin:     General: Skin is warm.   Neurological:      Mental Status: He is alert and oriented to person, place, and time.   Psychiatric:         Behavior: Behavior normal.         Procedures           ED Course      EKG: Normal sinus rhythm with a rate of 71, no acute ischemia or infarction  EKG 2: Normal sinus rhythm with a rate of 76, no acute ischemia or infarction         Lab Results (last 24 hours)     Procedure Component Value Units Date/Time    CBC & Differential [[id number]]  (Abnormal) Collected: 23 0908    Specimen: Blood Updated: 23 1035    Narrative:      The following orders were created for panel order CBC & Differential.  Procedure                               Abnormality         Status                      ---------                               -----------         ------                     CBC Auto Differential[[id number]]        Abnormal            Final result                 Please view results for these tests on the individual orders.    Comprehensive Metabolic Panel [[id number]]  (Abnormal) Collected: 03/30/23 0908    Specimen: Blood Updated: 03/30/23 1121     Glucose 127 mg/dL      BUN 8 mg/dL      Creatinine 0.78 mg/dL      Sodium 132 mmol/L      Potassium 4.0 mmol/L      Chloride 95 mmol/L      CO2 24.0 mmol/L      Calcium 9.5 mg/dL      Total Protein 7.2 g/dL      Albumin 3.3 g/dL      ALT (SGPT) 27 U/L      AST (SGOT) 18 U/L      Alkaline Phosphatase 262 U/L      Total Bilirubin 0.5 mg/dL      Globulin 3.9 gm/dL      A/G Ratio 0.8 g/dL      BUN/Creatinine Ratio 10.3     Anion Gap 13.0 mmol/L      eGFR 99.0 mL/min/1.73     Narrative:      GFR Normal >60  Chronic Kidney Disease <60  Kidney Failure <15      High Sensitivity Troponin T [[id number]]  (Normal) Collected: 03/30/23 0908    Specimen: Blood Updated: 03/30/23 1118     HS Troponin T <6 ng/L     Narrative:      High Sensitive Troponin T Reference Range:  <10.0 ng/L- Negative Female for AMI  <15.0 ng/L- Negative Male for AMI  >=10 - Abnormal Female indicating possible myocardial injury.  >=15 - Abnormal Male indicating possible myocardial injury.   Clinicians would have to utilize clinical acumen, EKG, Troponin, and serial changes to determine if it is an Acute Myocardial Infarction or myocardial injury due to an underlying chronic condition.         CBC Auto Differential [[id number]]  (Abnormal) Collected: 03/30/23 0908    Specimen: Blood Updated: 03/30/23 1035     WBC 20.94 10*3/mm3      RBC 4.51 10*6/mm3      Hemoglobin 11.2 g/dL      Hematocrit 37.8 %      MCV 83.8 fL      MCH 24.8 pg      MCHC 29.6 g/dL      RDW 15.7 %      RDW-SD 47.6 fl      MPV 8.4 fL      Platelets 644 10*3/mm3      Neutrophil % 82.8 %      Lymphocyte % 9.0 %      Monocyte  % 6.3 %      Eosinophil % 0.7 %      Basophil % 0.4 %      Immature Grans % 0.8 %      Neutrophils, Absolute 17.35 10*3/mm3      Lymphocytes, Absolute 1.89 10*3/mm3      Monocytes, Absolute 1.32 10*3/mm3      Eosinophils, Absolute 0.14 10*3/mm3      Basophils, Absolute 0.08 10*3/mm3      Immature Grans, Absolute 0.16 10*3/mm3      nRBC 0.0 /100 WBC     Blood Culture - Blood, Arm, Right [[id number]] Collected: 03/30/23 0946    Specimen: Blood from Arm, Right Updated: 03/30/23 0955    Blood Culture - Blood, Arm, Left [[id number]] Collected: 03/30/23 0946    Specimen: Blood from Arm, Left Updated: 03/30/23 0955    Lactic Acid, Plasma [[id number]]  (Normal) Collected: 03/30/23 0946    Specimen: Blood Updated: 03/30/23 1011     Lactate 1.5 mmol/L     High Sensitivity Troponin T 2Hr [[id number]] Collected: 03/30/23 1205    Specimen: Blood Updated: 03/30/23 1228     HS Troponin T <6 ng/L      Troponin T Delta --     Comment: Unable to calculate.       Narrative:      High Sensitive Troponin T Reference Range:  <10.0 ng/L- Negative Female for AMI  <15.0 ng/L- Negative Male for AMI  >=10 - Abnormal Female indicating possible myocardial injury.  >=15 - Abnormal Male indicating possible myocardial injury.   Clinicians would have to utilize clinical acumen, EKG, Troponin, and serial changes to determine if it is an Acute Myocardial Infarction or myocardial injury due to an underlying chronic condition.             CT Angiogram Chest   Final Result      XR Chest 1 View   Final Result      Summary:  1. No pulmonary embolism.  2. Known right upper lobe mass.  3. Moderate right pleural effusion.  4. No pneumothorax.     Summary:  1. Known right lung mass.  2. Small pleural effusion persists.  3. Superimposed pneumonia around the mass cannot be ruled out on this  study. A chest CT is planned.     This report was finalized on 03/30/2023 09:41 by Dr. Raman Polo MD.           Specimen Collected: 03/30/23 09:38 CDT Last                                        Medical Decision Making  Patient is a 63-year-old male with a history of hypertension who presents to the ER with right sided chest pain.  Patient states he has had right-sided chest pain since 11 PM last night.  Patient states the pain is sharp with no radiation and has been constant.  Patient was recently diagnosed with right-sided lung cancer.  He states the pain is worse with movement, breathing or cough.  He has had associated cough and shortness of air.  While in the wheelchair being transported to the ER room, patient proceeded to have a syncopal episode.  He did have an episode of nausea and vomiting right after the syncopal episode.  He states he does feel a little better now but still does not feel good.  He denies any fever, abdominal pain, diarrhea, urinary changes, focal neurologic changes.    Differential diagnosis: Acute coronary syndrome, pulmonary embolus, pneumothorax, pleurisy    Patient was given Dilaudid, Zofran and IV fluids.  Pain was improving with treatment.  Labs showed leukocytosis and thrombocytosis.  Troponin x2 was negative.  Patient was given Rocephin and azithromycin.  Cultures have been obtained.  Chest x-ray showed a known right lung mass, small pleural effusion and superimposed pneumonia could not be ruled out.  CTA of the chest showed no evidence of pulmonary embolism.  Patient did have a known right upper lobe mass and a moderate right pleural effusion.  There is no pneumothorax.  I discussed the case with Dr. Pryor with the hospitalist group.  Patient was then admitted to the hospitalist service by Dr. Simental for syncope with pleural effusion and lung mass.      HEART Score for Major Cardiac Events - MDCalc  Calculated on Mar 30 2023 2:54 PM  3 points -> Low Score (0-3 points) Risk of MACE of 0.9-1.7%.    Chest pain, unspecified type: acute illness or injury  Lung mass: chronic illness or injury  Pleural effusion on right: acute illness or  injury  Syncope and collapse: acute illness or injury  Amount and/or Complexity of Data Reviewed  Labs: ordered. Decision-making details documented in ED Course.  Radiology: ordered. Decision-making details documented in ED Course.  ECG/medicine tests: ordered. Decision-making details documented in ED Course.  Discussion of management or test interpretation with external provider(s): Dr Pryor with hospitalist group    Risk  Prescription drug management.  Decision regarding hospitalization.          Final diagnoses:   Syncope and collapse   Pleural effusion on right   Lung mass   Chest pain, unspecified type       ED Disposition  ED Disposition     ED Disposition   Decision to Admit    Condition   --    Comment   Level of Care: Telemetry [5]   Diagnosis: Syncope and collapse [780.2.ICD-9-CM]   Admitting Physician: STEPHENIE SAMS [[id number]]   Attending Physician: STEPHENIE SAMS [[id number]]   Certification: I Certify That Inpatient Hospital Services Are Medically Necessary For Greater Than 2 Midnights               No follow-up provider specified.       Medication List      No changes were made to your prescriptions during this visit.          Mary Carmen Bernard MD  03/30/23 0849

## 2023-03-30 NOTE — CONSULTS
Referring Provider: Dr. Viral Simental  Reason for Consultation: pleural effusin    Patient Care Team:  Juan Vaughn DO as PCP - General (Internal Medicine)    Chief complaint: shortness of breath     Subjective      History of present illness: Mr. Trujillo is a 63-year-old male with arthritis, GERD, hypertension, former chronic tobacco use and recent history of hemoptysis with work-up by his primary care provider, Dr. Vaughn, identifying a right upper lobe opacity on x-ray and then a right upper lobe and middle lobe masslike infiltrate on CT scan.  A PET scan was performed on March 22 that shows a hypermetabolic right middle and upper lobe mass with mildly enlarged and hypermetabolic right hilar and subcarinal mediastinal lymph nodes, small right pleural effusion, area of abnormality along the ascending colon and indeterminate left adrenal gland nodule with mild hypermetabolic activity.  A needle biopsy of the lung mass has been ordered and in fact scheduled for 4/7. Earlier this morning, the patient developed severe shortness of breath and right-sided chest pain.  He was advised to proceed to the ER for further evaluation.  He also reported a productive cough with green phlegm.  White blood cell count elevated at 20.9, sodium 132.  In the ER he had a syncopal episode along with nausea and vomiting thereafter.  CT angiogram of the chest today shows no pulmonary embolism, known right pleural effusion and right upper lobe mass.  He has been admitted to the hospitalist service and given the aforementioned findings, cardiothoracic surgery services have been consulted at the request for diagnostic and therapeutic thoracocentesis.    He is on Rocephin and Levaquin for suspected postobstructive pneumonia. He is on room air.       History  Code Status and Medical Interventions:   Ordered at: 03/30/23 1529     Level Of Support Discussed With:    Patient     Code Status (Patient has no pulse and is not breathing):     CPR (Attempt to Resuscitate)     Medical Interventions (Patient has pulse or is breathing):    Full Support     Past Medical History:   Diagnosis Date   • Arthritis    • GERD (gastroesophageal reflux disease)    • Hypertension    • Thyroid disorder    ,   Past Surgical History:   Procedure Laterality Date   • APPENDECTOMY     ,   Family History   Problem Relation Age of Onset   • Stomach cancer Mother    • Stroke Father    • Breast cancer Sister    • Colon cancer Brother    ,   Social History     Tobacco Use   • Smoking status: Former     Packs/day: 3.00     Years: 45.00     Pack years: 135.00     Types: Cigarettes     Quit date: 2022     Years since quittin.3     Passive exposure: Past   • Smokeless tobacco: Never   Vaping Use   • Vaping Use: Never used   Substance Use Topics   • Alcohol use: Yes     Comment: 2-3 weekly   • Drug use: Never   ,   Medications Prior to Admission   Medication Sig Dispense Refill Last Dose   • aspirin 81 MG EC tablet Take 1 tablet by mouth Daily.   3/29/2023   • benzonatate (TESSALON) 200 MG capsule Take 1 capsule by mouth 3 (Three) Times a Day As Needed for Cough. 60 capsule 0 3/29/2023   • ibuprofen (ADVIL,MOTRIN) 800 MG tablet Take 1 tablet by mouth 3 (Three) Times a Day. 90 tablet 3 3/30/2023   • levothyroxine (SYNTHROID, LEVOTHROID) 200 MCG tablet Take 1 tablet by mouth Daily. 30 tablet 5 3/29/2023   • lisinopril (PRINIVIL,ZESTRIL) 10 MG tablet Take 1 tablet by mouth Daily. 30 tablet 5 3/29/2023   • pantoprazole (PROTONIX) 40 MG EC tablet Take 1 tablet by mouth Daily. 30 tablet 5 3/29/2023   • potassium chloride 10 MEQ CR tablet Take 1 tablet by mouth Daily.   3/29/2023   • buPROPion (WELLBUTRIN) 75 MG tablet Take 1 tablet by mouth Daily.      • vitamin D (ERGOCALCIFEROL) 1.25 MG (88156 UT) capsule capsule Take 1 capsule by mouth 1 (One) Time Per Week. MONDAY      , Allergies: Patient has no known allergies.    Review of Systems  Review of Systems   Constitutional:  "Positive for fatigue. Negative for chills, diaphoresis and fever.   HENT: Negative for trouble swallowing.    Respiratory: Positive for cough and shortness of breath.    Cardiovascular: Positive for chest pain. Negative for leg swelling.   Gastrointestinal: Positive for nausea and vomiting.   Genitourinary: Negative for difficulty urinating.   Musculoskeletal: Positive for arthralgias and back pain.   Neurological: Positive for syncope and weakness.   Psychiatric/Behavioral: Negative for agitation and behavioral problems.        Objective     Vital Signs   Visit Vitals  /67 (BP Location: Right arm)   Pulse 87   Temp 97.8 °F (36.6 °C) (Oral)   Resp 18   Ht 188 cm (74\")   Wt 95 kg (209 lb 8 oz)   SpO2 92%   BMI 26.90 kg/m²       Physical Exam  Vitals reviewed.   Constitutional:       General: He is not in acute distress.     Appearance: He is well-developed. He is ill-appearing. He is not diaphoretic.   HENT:      Head: Normocephalic and atraumatic.   Eyes:      Pupils: Pupils are equal, round, and reactive to light.   Cardiovascular:      Rate and Rhythm: Normal rate and regular rhythm.      Heart sounds: Normal heart sounds. No murmur heard.    No friction rub.   Pulmonary:      Effort: Pulmonary effort is normal. No respiratory distress.      Breath sounds: Examination of the right-upper field reveals decreased breath sounds. Examination of the right-middle field reveals decreased breath sounds. Examination of the right-lower field reveals decreased breath sounds. Decreased breath sounds and rhonchi present. No wheezing or rales.   Abdominal:      General: There is no distension.      Palpations: Abdomen is soft.      Tenderness: There is no abdominal tenderness. There is no guarding.   Musculoskeletal:      Cervical back: Normal range of motion and neck supple.      Right lower leg: No edema.      Left lower leg: No edema.   Skin:     General: Skin is warm and dry.      Coloration: Skin is not pale.      " Findings: No rash.   Neurological:      Mental Status: He is alert and oriented to person, place, and time.   Psychiatric:         Behavior: Behavior normal.           LAB:   CBC:  Results from last 7 days   Lab Units 03/31/23  0357 03/30/23  0908   WBC 10*3/mm3 19.85* 20.94*   HEMATOCRIT % 36.7* 37.8   PLATELETS 10*3/mm3 535* 644*          BMP:)  Results from last 7 days   Lab Units 03/31/23  0357 03/30/23  0908   SODIUM mmol/L 134* 132*   POTASSIUM mmol/L 3.9 4.0   CHLORIDE mmol/L 98 95*   CO2 mmol/L 25.0 24.0   GLUCOSE mg/dL 98 127*   BUN mg/dL 9 8   CREATININE mg/dL 0.59* 0.78            IMAGES:       Imaging Results (Last 24 Hours)     Procedure Component Value Units Date/Time    CT Angiogram Chest [[id number]] Collected: 03/30/23 1116     Updated: 03/30/23 1123    Narrative:      EXAMINATION: CT ANGIOGRAM CHEST-      3/30/2023 10:57 AM CDT     HISTORY: Pulmonary embolism (PE) suspected, unknown D-dimer. Right-sided  chest pain. Recent diagnosis of lung cancer. Dizziness.     In order to have a CT radiation dose as low as reasonably achievable  Automated Exposure Control was utilized for adjustment of the mA and/or  KV according to patient size.     DLP in mGycm= 225.     CT angiogram chest with IV contrast.  CT angiography protocol.   CT imaging with bolus IV contrast injection.   Under concurrent supervision axial, sagittal, coronal,  three-dimensional, and MIP data sets were constructed on an independent  work station.     Normal heart size.  Normal thoracic aorta with no aneurysm or dissection.     Normally opacified pulmonary arteries.  No pulmonary embolism.     Masslike consolidation of the anterior right upper lung. This was  previously evaluated by PET/CT.     Moderate right pleural effusion.     No pneumothorax or heart failure.     The left lung is clear.     Summary:  1. No pulmonary embolism.  2. Known right upper lobe mass.  3. Moderate right pleural effusion.  4. No pneumothorax.                                    This report was finalized on 03/30/2023 11:20 by Dr. Raman Polo MD.        NM PET/CT Skull Base to Mid Thigh (Order [id number])  Study Result    Narrative & Impression   Exam: NM PET/CT SKULL BASE TO MID THIGH-     Indication: Initial staging of lung cancer     Comparison: Outside chest CT 03/02/2023     13.0 mCi F-18 FDG was administered IV per protocol via the RIGHT  antecubital fossa. Blood glucose at the time of injection was 92 mg/dl.     PET/CT images were obtained from the base of the skull to the proximal  femurs approximately 60 minutes after radiotracer administration.  Noncontrast CT images of the neck, chest, abdomen, and pelvis were  obtained for attenuation correction and anatomic localization. DLP: 1071  mGy cm. Automated exposure control was also utilized to decrease patient  radiation dose.     Findings:  Background right hepatic lobe metabolic activity measures max SUV 2.4.     *  3.8 x 6.3 x 15.0 cm hypermetabolic masslike consolidation in the  anterior RIGHT middle and upper lobes.     *  10 mm RIGHT hilar lymph node with mild hypermetabolic activity, max  SUV 3. 15 mm subcarinal mediastinal lymph node with internal  calcifications, with max SUV of 3.7. No axillary or supraclavicular  hypermetabolic lymph nodes.     *  No abnormal hypermetabolic activity in the neck.     *  2.7 cm LEFT adrenal gland nodule with internal density of 13  Hounsfield units and max SUV of 2.9.      *  9 cm long segment of marked for metabolic activity in the ascending  colon with max SUV of 22. No associated colonic wall thickening within  this area of hypermetabolic activity.     *  No focal abnormal metabolic bone lesion.     Non-FDG avid findings:     Lower intracranial cavity appears unremarkable. No acute orbital  finding. Parotid glands appear normal. No enlarged cervical lymph nodes.  Mastoid air cells and paranasal sinuses are clear.     Central airways are clear. Small RIGHT pleural  effusion. No right-sided  pleural nodularity. No LEFT pleural effusion. Nonaneurysmal thoracic  aorta. No pericardial effusion.     Unenhanced liver appears unremarkable. Multiple gallstones within the  gallbladder lumen. No gallbladder wall thickening. Pancreas and RIGHT  adrenal gland are unremarkable. No urolithiasis or hydronephrosis. No  focal urinary bladder abnormality. Prostate is enlarged.     No abnormal bowel distention or evidence of active bowel inflammation.  No ascites or free pelvic fluid. No pelvic mass or pelvic collection.  Nonaneurysmal atherosclerotic abdominal aorta. No enlarged abdominal or  pelvic lymph nodes. No aggressive osseous lesion.     IMPRESSION:     1.  3.8 x 6.3 x 15.0 cm area of hypermetabolic masslike consolidation in  the anterior RIGHT middle and upper lobes. This likely represents a  primary lung neoplasm. Mildly enlarged and mildly hypermetabolic RIGHT  hilar and subcarinal mediastinal lymph nodes are suspicious for blanche  spread of neoplasm although they contain central calcifications and  granulomatous process is also in the differential.      2.  No definite evidence of metastatic disease in the neck, abdomen, or  pelvis.      3.  There is an indeterminate 2.7 cm LEFT adrenal gland nodule with mild  hypermetabolic activity. CT appearance favors adenoma. Also favoring  adenoma, a LEFT adrenal gland nodule is mentioned on a CT from 2017  performed at an outside institution, the report for which is available  in care everywhere.     4.  Long segment of marked hypermetabolic activity in the ascending  colon. On CT, the colon within this region appears relatively normal  without significant wall thickening or surrounding inflammatory change.  Hypermetabolic activity may be physiologic related to excreted tracer,  or related to an inflammatory/infectious process. However, recommend  correlation with colonoscopy to exclude a neoplastic process.     5.  Small RIGHT pleural  effusion.  This report was finalized on 03/22/2023 15:49 by Dr. Yogi Lopez MD.                    Assessment & Plan            Syncope and collapse  Right lung mass with mediastinal adenopathy, hypermetabolic   Chronic tobacco use, recent discontinuance  Leukocytosis  Suspected postobstructive pneumonia        I discussed the patient's findings and my recommendations with patient and family. Additionally, patient was evaluated by Dr. Kim last night. Discussed with Dr. Whiting in radiology-recommend to perform thoracentesis first and then proceed with previously ordered needle biopsy of lung mass if thoracentesis nondiagnostic. Therefore, will proceed with ultrasound guided right thoracocentesis today by interventional radiology with fluid submitted for cytology and cultures. Described procedural conduct with patient and family. Answered all questions to the best of my ability.      Continue antibiotics per hospitalist service for treatment of postobstructive pneumonia.         Barbara May, APRN  03/31/23  09:45 CDT

## 2023-03-30 NOTE — TELEPHONE ENCOUNTER
PATIENTS WIFE HAS CALLED,  SHE STATED THAT PATIENT WOKE UP THIS MORNING AND COULD NOT HARDLY BREATH.  PATIENT STATED THAT HE HAS TO HOLD A PILLOW TO HIS CHEST TO COUGH.  PATIENT STATED THAT HE FEELS LIKE HE HAS A COLLAPSED LUNG.  PATIENT WAS ADVISED TO GO TO THE ER BY THE HUB BUT DECLINED.  PATIENTS WIFE STATED THAT HE WOULD NOT LET HER CALL 911.   SHE WAS ADVISED THAT HE NEEDED TO GO TO THE ER IF HE COULD NOT BREATH.    PATIENT DOES HAVE A PRODUCTIVE COUGH WITH GREEN PHLEGM.

## 2023-03-30 NOTE — H&P
[facility] Medicine Services  HISTORY AND PHYSICAL    Date of Admission: 3/30/2023  Primary Care Physician: Juan Vaughn, DO    Subjective   Primary Historian:   Wife    Chief Complaint: Cough, shortness of breath, difficulty breathing    History of Present Illness    MS to admit this 63-year-old man who for further evaluation of syncopal episode.  Patient reportedly was hypotensive after an episode of vomiting.  Patient reportedly has lung mass and supposedly to undergo biopsy soon.  CT imaging of the chest showed moderate pleural effusion and right upper lobe mass.        White count is 20,000 with predominance of neutrophils at 83%, he has thrombocytosis which could be reactive (644  Sodium is 132, albumin 3.3  Lactic acid of 1.6    Blood pressure was as low as 83/64 but significantly improved at 1/6/1959 systolic.  There has been another episode approximately 45 minutes later where blood pressure was 75/35.  Since then blood pressure has been adequate anywhere from 132-163 systolic.    His primary care provider is Dr. Vaughn.  Clinic record dating back to March 20 reviewed  Cough since November.  Reportedly has had hemoptysis, weight loss of about 43 pounds since fall last year  Decreased appetite and worsening fatigue  Has prior work-up including chest x-ray showing right upper lobe opacity.  Had a follow-up CT scan on March 2 which showed large masslike infiltrate right middle and upper lobes.  He is a former smoker who quit in November 2022.    He requested for PET scan, pulmonary function test    Telephone conversation of wife to medical assistant at Dr. Vaughn clinic indicates he woke up and could not hardly breathe.  He had to hold below to his chest to cough.  They were advised to come to the emergency room for further evaluation.    Patient had difficulty breathing yesterday.  Could not sleep a little related to this, positive pleurisy  Felt feverish described  "as \"hot flashes\".  Positive nonproductive coughing spells  While in the triage area, reportedly vomited and passed out.  No reported incontinence    Patient known hypertensive and takes lisinopril.  Last intake was yesterday.      Review of Systems   Denies dysphagia or odynophagia  Denies any headache  Denies any urinary or bowel disturbance  Denies any focal weakness  No reported calf pain but noted left leg bigger than the other      Otherwise complete ROS reviewed and negative except as mentioned in the HPI.    Past Medical History:   Past Medical History:   Diagnosis Date   • Arthritis    • GERD (gastroesophageal reflux disease)    • Hypertension    • Thyroid disorder      Past Surgical History:  Past Surgical History:   Procedure Laterality Date   • APPENDECTOMY       Social History:  reports that he quit smoking about 4 months ago. His smoking use included cigarettes. He has a 135.00 pack-year smoking history. He has been exposed to tobacco smoke. He has never used smokeless tobacco. He reports current alcohol use. He reports that he does not use drugs.    Family History: family history includes Breast cancer in his sister; Colon cancer in his brother; Stomach cancer in his mother; Stroke in his father.   Allergies:  No Known Allergies    Medications:  Prior to Admission medications    Medication Sig Start Date End Date Taking? Authorizing Provider   aspirin 81 MG EC tablet Take 1 tablet by mouth Daily.    Provider, MD Sofia   benzonatate (TESSALON) 200 MG capsule Take 1 capsule by mouth 3 (Three) Times a Day As Needed for Cough. 3/20/23   Juan Vaughn, DO   ibuprofen (ADVIL,MOTRIN) 800 MG tablet Take 1 tablet by mouth 3 (Three) Times a Day. 3/20/23   Juan Vaughn DO   levothyroxine (SYNTHROID, LEVOTHROID) 200 MCG tablet Take 1 tablet by mouth Daily. 3/20/23   Juan Vaughn DO   lisinopril (PRINIVIL,ZESTRIL) 10 MG tablet Take 1 tablet by mouth Daily. 3/20/23   Juan Vaughn" "Ganesh, DO   pantoprazole (PROTONIX) 40 MG EC tablet Take 1 tablet by mouth Daily. 3/20/23   Juan Vaughn, DO   potassium chloride 10 MEQ CR tablet Take 1 tablet by mouth Daily. 3/2/23   Provider, MD DARRYL Black have utilized all available immediate resources to obtain, update, or review the patient's current medications (including all prescriptions, over-the-counter products, herbals, cannabis/cannabidiol products, and vitamin/mineral/dietary (nutritional) supplements).    Objective     Vital Signs: /74 (BP Location: Right arm, Patient Position: Sitting)   Pulse 76   Temp 98.9 °F (37.2 °C) (Oral)   Resp 21   Ht 188 cm (74\")   Wt 90.7 kg (200 lb)   SpO2 95%   BMI 25.68 kg/m²   Physical Exam      No dentures  Has thick facial hair  GEN: Awake, alert, interactive, in NAD  HEENT: Atraumatic, PERRLA, EOMI, Anicteric, Trachea midline  Lungs: Diminished breath sound on the right.  No adventitious sounds on the left.  heart: RRR, +S1/s2, no rub  ABD: soft, nt/nd, +BS, no guarding/rebound  Extremities: atraumatic, no cyanosis, left lower extremity appears to be edematous compared to the right but no calf tenderness  No gross rash  No gross skin lesion left lower extremity  Skin: no rashes or lesions  Neuro: AAOx3, no focal deficits  Psych: normal mood & affect      Results Reviewed:  Lab Results (last 24 hours)     Procedure Component Value Units Date/Time    High Sensitivity Troponin T 2Hr [[id number]] Collected: 03/30/23 1205    Specimen: Blood Updated: 03/30/23 1228     HS Troponin T <6 ng/L      Troponin T Delta --     Comment: Unable to calculate.       Narrative:      High Sensitive Troponin T Reference Range:  <10.0 ng/L- Negative Female for AMI  <15.0 ng/L- Negative Male for AMI  >=10 - Abnormal Female indicating possible myocardial injury.  >=15 - Abnormal Male indicating possible myocardial injury.   Clinicians would have to utilize clinical acumen, EKG, Troponin, and serial changes to " determine if it is an Acute Myocardial Infarction or myocardial injury due to an underlying chronic condition.         Comprehensive Metabolic Panel [[id number]]  (Abnormal) Collected: 03/30/23 0908    Specimen: Blood Updated: 03/30/23 1121     Glucose 127 mg/dL      BUN 8 mg/dL      Creatinine 0.78 mg/dL      Sodium 132 mmol/L      Potassium 4.0 mmol/L      Chloride 95 mmol/L      CO2 24.0 mmol/L      Calcium 9.5 mg/dL      Total Protein 7.2 g/dL      Albumin 3.3 g/dL      ALT (SGPT) 27 U/L      AST (SGOT) 18 U/L      Alkaline Phosphatase 262 U/L      Total Bilirubin 0.5 mg/dL      Globulin 3.9 gm/dL      A/G Ratio 0.8 g/dL      BUN/Creatinine Ratio 10.3     Anion Gap 13.0 mmol/L      eGFR 99.0 mL/min/1.73     Narrative:      GFR Normal >60  Chronic Kidney Disease <60  Kidney Failure <15      High Sensitivity Troponin T [[id number]]  (Normal) Collected: 03/30/23 0908    Specimen: Blood Updated: 03/30/23 1118     HS Troponin T <6 ng/L     Narrative:      High Sensitive Troponin T Reference Range:  <10.0 ng/L- Negative Female for AMI  <15.0 ng/L- Negative Male for AMI  >=10 - Abnormal Female indicating possible myocardial injury.  >=15 - Abnormal Male indicating possible myocardial injury.   Clinicians would have to utilize clinical acumen, EKG, Troponin, and serial changes to determine if it is an Acute Myocardial Infarction or myocardial injury due to an underlying chronic condition.         CBC & Differential [[id number]]  (Abnormal) Collected: 03/30/23 0908    Specimen: Blood Updated: 03/30/23 1035    Narrative:      The following orders were created for panel order CBC & Differential.  Procedure                               Abnormality         Status                     ---------                               -----------         ------                     CBC Auto Differential[[id number]]        Abnormal            Final result                 Please view results for these tests on the individual orders.    CBC  Auto Differential [[id number]]  (Abnormal) Collected: 03/30/23 0908    Specimen: Blood Updated: 03/30/23 1035     WBC 20.94 10*3/mm3      RBC 4.51 10*6/mm3      Hemoglobin 11.2 g/dL      Hematocrit 37.8 %      MCV 83.8 fL      MCH 24.8 pg      MCHC 29.6 g/dL      RDW 15.7 %      RDW-SD 47.6 fl      MPV 8.4 fL      Platelets 644 10*3/mm3      Neutrophil % 82.8 %      Lymphocyte % 9.0 %      Monocyte % 6.3 %      Eosinophil % 0.7 %      Basophil % 0.4 %      Immature Grans % 0.8 %      Neutrophils, Absolute 17.35 10*3/mm3      Lymphocytes, Absolute 1.89 10*3/mm3      Monocytes, Absolute 1.32 10*3/mm3      Eosinophils, Absolute 0.14 10*3/mm3      Basophils, Absolute 0.08 10*3/mm3      Immature Grans, Absolute 0.16 10*3/mm3      nRBC 0.0 /100 WBC     [address] Draw [[id number]] Collected: 03/30/23 0908    Specimen: Blood Updated: 03/30/23 1017    Narrative:      The following orders were created for panel order [address] Draw.  Procedure                               Abnormality         Status                     ---------                               -----------         ------                     Green Top (Gel)[[id number]]                                  Final result               Lavender Top[[id number]]                                     Final result               Red Top[[id number]]                                          Final result               Light Blue Top[[id number]]                                   Final result                 Please view results for these tests on the individual orders.    Green Top (Gel) [[id number]] Collected: 03/30/23 0908    Specimen: Blood Updated: 03/30/23 1017     Extra Tube Hold for add-ons.     Comment: Auto resulted.       Lavender Top [[id number]] Collected: 03/30/23 0908    Specimen: Blood Updated: 03/30/23 1017     Extra Tube hold for add-on     Comment: Auto resulted       Red Top [[id number]] Collected: 03/30/23 0908    Specimen: Blood Updated: 03/30/23 1017     Extra Tube Hold for  add-ons.     Comment: Auto resulted.       Light Blue Top [[id number]] Collected: 03/30/23 0908    Specimen: Blood Updated: 03/30/23 1017     Extra Tube Hold for add-ons.     Comment: Auto resulted       Lactic Acid, Plasma [[id number]]  (Normal) Collected: 03/30/23 0946    Specimen: Blood Updated: 03/30/23 1011     Lactate 1.5 mmol/L     Blood Culture - Blood, Arm, Left [[id number]] Collected: 03/30/23 0946    Specimen: Blood from Arm, Left Updated: 03/30/23 0955    Blood Culture - Blood, Arm, Right [[id number]] Collected: 03/30/23 0946    Specimen: Blood from Arm, Right Updated: 03/30/23 0955        Imaging Results (Last 24 Hours)     Procedure Component Value Units Date/Time    CT Angiogram Chest [[id number]] Collected: 03/30/23 1116     Updated: 03/30/23 1123    Narrative:      EXAMINATION: CT ANGIOGRAM CHEST-      3/30/2023 10:57 AM CDT     HISTORY: Pulmonary embolism (PE) suspected, unknown D-dimer. Right-sided  chest pain. Recent diagnosis of lung cancer. Dizziness.     In order to have a CT radiation dose as low as reasonably achievable  Automated Exposure Control was utilized for adjustment of the mA and/or  KV according to patient size.     DLP in mGycm= 225.     CT angiogram chest with IV contrast.  CT angiography protocol.   CT imaging with bolus IV contrast injection.   Under concurrent supervision axial, sagittal, coronal,  three-dimensional, and MIP data sets were constructed on an independent  work station.     Normal heart size.  Normal thoracic aorta with no aneurysm or dissection.     Normally opacified pulmonary arteries.  No pulmonary embolism.     Masslike consolidation of the anterior right upper lung. This was  previously evaluated by PET/CT.     Moderate right pleural effusion.     No pneumothorax or heart failure.     The left lung is clear.     Summary:  1. No pulmonary embolism.  2. Known right upper lobe mass.  3. Moderate right pleural effusion.  4. No pneumothorax.                                    This report was finalized on 03/30/2023 11:20 by Dr. Raman Polo MD.    XR Chest 1 View [[id number]] Collected: 03/30/23 0938     Updated: 03/30/23 0944    Narrative:      EXAMINATION: XR CHEST 1 VW-     3/30/2023 9:33 AM CDT     HISTORY: Chest pain.     1 view chest x-ray.     Comparison is made with a PET CT from 8 days ago.     Normal heart size.     The left lung is clear.     Anterior right lower lobe mass lesion seen as an ill-defined opacity on  the chest x-ray.     Small amount of right pleural fluid persists.     No pneumothorax.     Summary:  1. Known right lung mass.  2. Small pleural effusion persists.  3. Superimposed pneumonia around the mass cannot be ruled out on this  study. A chest CT is planned.     This report was finalized on 03/30/2023 09:41 by Dr. Raman Polo MD.        I have personally reviewed and interpreted the radiology studies and ECG obtained at time of admission.     Assessment / Plan   Assessment:   Active Hospital Problems    Diagnosis    • **Syncope and collapse        Medical Decision Making  Number and Complexity of problems:   · Suspected vasovagal syncope following vomiting  · Hypotension probably vasovagal in nature  · History of hypertension-takes lisinopril prior to this admit  · Right lung mass; pleural effusion-suspected malignancy  · Shortness of breath/difficulty breathing related to above  · Cough believed secondary to right lung mass, could not totally rule out absence of pneumonia (possibly postobstructive)  · Leukocytosis possibly reaction to stress   · Tobacco in remission  · Pleurisy secondary to above      Treatment Plan  The patient will be admitted to my service here at [facility]    CT surgery consult.  May need pleural drainage for diagnostic and therapeutic purpose  Oxygen supplementation  Telemetry  Cough syrup/medication  Oxygen as needed to maintain saturation greater than 90%  Venous ultrasound of lower extremity due to asymmetric  swelling left lower extremity in the setting of suspected underlying lung malignancy and a former smoker  Empiric antibiotic    Conditions and Status       Full code    MDM Data  External documents reviewed: Reviewed  Cardiac tracing (EKG, telemetry) interpretation:  EKG showed normal sinus rhythm rate of 75 with no acute ST-T wave changes  Radiology interpretation: Reviewed and discussed with patient and family at bedside    Labs reviewed: Yes  Any tests that were considered but not ordered: None  Decision rules/scores evaluated (example [id number], Wells, etc): None     Discussed with: Wife and patient    Care Planning  Shared decision making wife and patient   code status and discussions: Full code  Disposition  Social Determinants of Health that impact treatment or disposition: None identified at this time   estimated length of stay is to be determined    I confirmed that the patient's advanced care plan is present, code status is documented, and a surrogate decision maker is listed in the patient's medical record.     The patient's surrogate decision maker is wife  The patient was seen and examined by me on  Electronically signed by Viral Simental MD, 03/30/23, 14:42 CDT.

## 2023-03-31 ENCOUNTER — APPOINTMENT (OUTPATIENT)
Dept: ULTRASOUND IMAGING | Facility: HOSPITAL | Age: 63
DRG: 186 | End: 2023-03-31
Payer: COMMERCIAL

## 2023-03-31 ENCOUNTER — APPOINTMENT (OUTPATIENT)
Dept: GENERAL RADIOLOGY | Facility: HOSPITAL | Age: 63
DRG: 186 | End: 2023-03-31
Payer: COMMERCIAL

## 2023-03-31 PROBLEM — J18.9 POSTOBSTRUCTIVE PNEUMONIA: Status: ACTIVE | Noted: 2023-03-31

## 2023-03-31 PROBLEM — R91.8 MASS OF RIGHT LUNG: Status: ACTIVE | Noted: 2023-03-31

## 2023-03-31 PROBLEM — F17.201 TOBACCO ABUSE, IN REMISSION: Status: ACTIVE | Noted: 2023-03-31

## 2023-03-31 LAB
ALBUMIN SERPL-MCNC: 3.1 G/DL (ref 3.5–5.2)
ALBUMIN/GLOB SERPL: 0.8 G/DL
ALP SERPL-CCNC: 224 U/L (ref 39–117)
ALT SERPL W P-5'-P-CCNC: 18 U/L (ref 1–41)
ANION GAP SERPL CALCULATED.3IONS-SCNC: 11 MMOL/L (ref 5–15)
APPEARANCE FLD: ABNORMAL
APTT PPP: 36.5 SECONDS (ref 24.1–35)
AST SERPL-CCNC: 9 U/L (ref 1–40)
BASOPHILS # BLD AUTO: 0.04 10*3/MM3 (ref 0–0.2)
BASOPHILS NFR BLD AUTO: 0.2 % (ref 0–1.5)
BILIRUB SERPL-MCNC: 0.4 MG/DL (ref 0–1.2)
BUN SERPL-MCNC: 9 MG/DL (ref 8–23)
BUN/CREAT SERPL: 15.3 (ref 7–25)
CALCIUM SPEC-SCNC: 9.5 MG/DL (ref 8.6–10.5)
CHLORIDE SERPL-SCNC: 98 MMOL/L (ref 98–107)
CO2 SERPL-SCNC: 25 MMOL/L (ref 22–29)
COLOR FLD: YELLOW
CREAT SERPL-MCNC: 0.59 MG/DL (ref 0.76–1.27)
DEPRECATED RDW RBC AUTO: 47.2 FL (ref 37–54)
EGFRCR SERPLBLD CKD-EPI 2021: 107.7 ML/MIN/1.73
EOSINOPHIL # BLD AUTO: 0.08 10*3/MM3 (ref 0–0.4)
EOSINOPHIL NFR BLD AUTO: 0.4 % (ref 0.3–6.2)
ERYTHROCYTE [DISTWIDTH] IN BLOOD BY AUTOMATED COUNT: 15.5 % (ref 12.3–15.4)
GLOBULIN UR ELPH-MCNC: 3.8 GM/DL
GLUCOSE SERPL-MCNC: 98 MG/DL (ref 65–99)
HCT VFR BLD AUTO: 36.7 % (ref 37.5–51)
HGB BLD-MCNC: 11.1 G/DL (ref 13–17.7)
IMM GRANULOCYTES # BLD AUTO: 0.16 10*3/MM3 (ref 0–0.05)
IMM GRANULOCYTES NFR BLD AUTO: 0.8 % (ref 0–0.5)
INR PPP: 1.18 (ref 0.91–1.09)
LYMPHOCYTES # BLD AUTO: 0.95 10*3/MM3 (ref 0.7–3.1)
LYMPHOCYTES NFR BLD AUTO: 4.8 % (ref 19.6–45.3)
LYMPHOCYTES NFR FLD MANUAL: 4 %
MCH RBC QN AUTO: 25.1 PG (ref 26.6–33)
MCHC RBC AUTO-ENTMCNC: 30.2 G/DL (ref 31.5–35.7)
MCV RBC AUTO: 83 FL (ref 79–97)
MONOCYTES # BLD AUTO: 1.34 10*3/MM3 (ref 0.1–0.9)
MONOCYTES NFR BLD AUTO: 6.8 % (ref 5–12)
MONOCYTES NFR FLD: 3 %
NEUTROPHILS NFR BLD AUTO: 17.28 10*3/MM3 (ref 1.7–7)
NEUTROPHILS NFR BLD AUTO: 87 % (ref 42.7–76)
NEUTROPHILS NFR FLD MANUAL: 93 %
NRBC BLD AUTO-RTO: 0 /100 WBC (ref 0–0.2)
PLATELET # BLD AUTO: 535 10*3/MM3 (ref 140–450)
PMV BLD AUTO: 8.8 FL (ref 6–12)
POTASSIUM SERPL-SCNC: 3.9 MMOL/L (ref 3.5–5.2)
PROCALCITONIN SERPL-MCNC: 0.45 NG/ML (ref 0–0.25)
PROT SERPL-MCNC: 6.9 G/DL (ref 6–8.5)
PROTHROMBIN TIME: 15.2 SECONDS (ref 11.8–14.8)
RBC # BLD AUTO: 4.42 10*6/MM3 (ref 4.14–5.8)
RBC # FLD AUTO: 1000 /MM3
SODIUM SERPL-SCNC: 134 MMOL/L (ref 136–145)
WBC # FLD AUTO: 6571 /MM3
WBC NRBC COR # BLD: 19.85 10*3/MM3 (ref 3.4–10.8)

## 2023-03-31 PROCEDURE — 84145 PROCALCITONIN (PCT): CPT | Performed by: INTERNAL MEDICINE

## 2023-03-31 PROCEDURE — 93970 EXTREMITY STUDY: CPT

## 2023-03-31 PROCEDURE — 89051 BODY FLUID CELL COUNT: CPT

## 2023-03-31 PROCEDURE — 83615 LACTATE (LD) (LDH) ENZYME: CPT

## 2023-03-31 PROCEDURE — 93970 EXTREMITY STUDY: CPT | Performed by: SURGERY

## 2023-03-31 PROCEDURE — 87205 SMEAR GRAM STAIN: CPT

## 2023-03-31 PROCEDURE — 25010000002 LEVOFLOXACIN PER 250 MG: Performed by: INTERNAL MEDICINE

## 2023-03-31 PROCEDURE — 85730 THROMBOPLASTIN TIME PARTIAL: CPT

## 2023-03-31 PROCEDURE — 80053 COMPREHEN METABOLIC PANEL: CPT | Performed by: INTERNAL MEDICINE

## 2023-03-31 PROCEDURE — 87070 CULTURE OTHR SPECIMN AEROBIC: CPT

## 2023-03-31 PROCEDURE — 82042 OTHER SOURCE ALBUMIN QUAN EA: CPT

## 2023-03-31 PROCEDURE — 76942 ECHO GUIDE FOR BIOPSY: CPT

## 2023-03-31 PROCEDURE — 87075 CULTR BACTERIA EXCEPT BLOOD: CPT

## 2023-03-31 PROCEDURE — 0W993ZX DRAINAGE OF RIGHT PLEURAL CAVITY, PERCUTANEOUS APPROACH, DIAGNOSTIC: ICD-10-PCS | Performed by: INTERNAL MEDICINE

## 2023-03-31 PROCEDURE — 71045 X-RAY EXAM CHEST 1 VIEW: CPT

## 2023-03-31 PROCEDURE — 88305 TISSUE EXAM BY PATHOLOGIST: CPT

## 2023-03-31 PROCEDURE — 88112 CYTOPATH CELL ENHANCE TECH: CPT

## 2023-03-31 PROCEDURE — 83986 ASSAY PH BODY FLUID NOS: CPT

## 2023-03-31 PROCEDURE — 85025 COMPLETE CBC W/AUTO DIFF WBC: CPT | Performed by: INTERNAL MEDICINE

## 2023-03-31 PROCEDURE — 85610 PROTHROMBIN TIME: CPT

## 2023-03-31 RX ORDER — CODEINE PHOSPHATE AND GUAIFENESIN 10; 100 MG/5ML; MG/5ML
10 SOLUTION ORAL EVERY 4 HOURS
Status: DISCONTINUED | OUTPATIENT
Start: 2023-03-31 | End: 2023-04-01 | Stop reason: HOSPADM

## 2023-03-31 RX ORDER — BUPROPION HYDROCHLORIDE 75 MG/1
75 TABLET ORAL DAILY
COMMUNITY

## 2023-03-31 RX ORDER — ERGOCALCIFEROL 1.25 MG/1
50000 CAPSULE ORAL WEEKLY
COMMUNITY

## 2023-03-31 RX ORDER — HYDROCODONE POLISTIREX AND CHLORPHENIRAMINE POLISTIREX 10; 8 MG/5ML; MG/5ML
5 SUSPENSION, EXTENDED RELEASE ORAL EVERY 12 HOURS SCHEDULED
Status: DISCONTINUED | OUTPATIENT
Start: 2023-03-31 | End: 2023-04-01 | Stop reason: HOSPADM

## 2023-03-31 RX ORDER — LISINOPRIL AND HYDROCHLOROTHIAZIDE 20; 12.5 MG/1; MG/1
1 TABLET ORAL 2 TIMES DAILY
COMMUNITY
End: 2023-04-06 | Stop reason: SDUPTHER

## 2023-03-31 RX ADMIN — GUAIFENESIN AND CODEINE PHOSPHATE 10 ML: 100; 10 SOLUTION ORAL at 21:54

## 2023-03-31 RX ADMIN — LEVOTHYROXINE SODIUM 200 MCG: 100 TABLET ORAL at 06:07

## 2023-03-31 RX ADMIN — Medication 10 ML: at 09:52

## 2023-03-31 RX ADMIN — GUAIFENESIN AND CODEINE PHOSPHATE 10 ML: 100; 10 SOLUTION ORAL at 23:28

## 2023-03-31 RX ADMIN — GUAIFENESIN 1200 MG: 600 TABLET, EXTENDED RELEASE ORAL at 21:54

## 2023-03-31 RX ADMIN — HYDROCODONE POLISTIREX AND CHLORPHENIRAMINE POLISTIREX 5 ML: 10; 8 SUSPENSION, EXTENDED RELEASE ORAL at 22:49

## 2023-03-31 RX ADMIN — HYDROCODONE POLISTIREX AND CHLORPHENIRAMINE POLISTIREX 5 ML: 10; 8 SUSPENSION, EXTENDED RELEASE ORAL at 11:19

## 2023-03-31 RX ADMIN — LISINOPRIL 10 MG: 10 TABLET ORAL at 09:51

## 2023-03-31 RX ADMIN — LEVOFLOXACIN 750 MG: 750 INJECTION, SOLUTION INTRAVENOUS at 06:07

## 2023-03-31 RX ADMIN — PANTOPRAZOLE SODIUM 40 MG: 40 TABLET, DELAYED RELEASE ORAL at 09:51

## 2023-03-31 RX ADMIN — GUAIFENESIN 1200 MG: 600 TABLET, EXTENDED RELEASE ORAL at 09:51

## 2023-03-31 RX ADMIN — GUAIFENESIN AND CODEINE PHOSPHATE 10 ML: 100; 10 SOLUTION ORAL at 15:15

## 2023-03-31 NOTE — PROGRESS NOTES
[facility] Medicine Services  INPATIENT PROGRESS NOTE    Patient Name: Castillo Trujillo  Date of Admission: 3/30/2023  Today's Date: 03/31/23  Length of Stay: 1  Primary Care Physician: Juan Vaughn DO    Subjective   Chief Complaint: Follow-up shortness of breath  HPI   Mr. Trujillo is a 63-year-old male that presented to [facility] on 3/30 for severe shortness of breath and right-sided chest pain.  Productive cough with green phlegm.  The ER, he had a syncopal episode along with nausea and vomiting.  CT angiogram of the chest shows no pulmonary embolism, known right pleural effusion and right upper lobe mass with concern for suspected postobstructive pneumonia.  Former chronic tobacco use (quit in November 2022), recent history of hemoptysis and weight loss of about 43 pounds since fall last year.  He has had work-up by his primary care provider as right upper lobe opacity identified on chest x-ray.  CT scan showed right upper lobe and middle lobe masslike infiltrate.  He had a PET scan on March 22 that showed a hypermetabolic right middle and upper lobe mass with mildly enlarged hypermetabolic right hilar and subcarinal mediastinal lymph nodes, small right pleural effusion, area of abnormality along the ascending colon and indeterminate left adrenal gland nodule with mild hypermetabolic activity.  A needle biopsy of the lung mass has been scheduled for 4/7.    Up in bed.  On room air.  CTS planning for ultrasound-guided right thoracentesis today by interventional radiology.    Review of Systems   All pertinent negatives and positives are as above. All other systems have been reviewed and are negative unless otherwise stated.     Objective    Temp:  [97.8 °F (36.6 °C)-98.7 °F (37.1 °C)] 98.5 °F (36.9 °C)  Heart Rate:  [76-87] 77  Resp:  [16-20] 18  BP: (154-167)/(59-73) 157/67  Physical Exam  Vitals reviewed.   Constitutional:       General: He is not in acute  distress.     Appearance: He is not toxic-appearing.      Comments: Up in bed.  No acute distress.  On room air.  Discussed with his nurse Minerva.   HENT:      Head: Normocephalic and atraumatic.      Mouth/Throat:      Mouth: Mucous membranes are moist.      Pharynx: Oropharynx is clear.   Eyes:      Extraocular Movements: Extraocular movements intact.      Conjunctiva/sclera: Conjunctivae normal.      Pupils: Pupils are equal, round, and reactive to light.   Cardiovascular:      Rate and Rhythm: Normal rate and regular rhythm.      Pulses: Normal pulses.   Pulmonary:      Effort: Pulmonary effort is normal. No respiratory distress.      Breath sounds: Decreased breath sounds present.   Abdominal:      General: Bowel sounds are normal. There is no distension.      Palpations: Abdomen is soft.      Tenderness: There is no abdominal tenderness.   Musculoskeletal:         General: No swelling or tenderness. Normal range of motion.      Cervical back: Normal range of motion and neck supple. No muscular tenderness.   Skin:     General: Skin is warm and dry.      Findings: No erythema or rash.   Neurological:      General: No focal deficit present.      Mental Status: He is alert and oriented to person, place, and time.      Cranial Nerves: No cranial nerve deficit.      Motor: No weakness.   Psychiatric:         Mood and Affect: Mood normal.         Behavior: Behavior normal.       Results Review:  I have reviewed the labs, radiology results, and diagnostic studies.    Laboratory Data:   Results from last 7 days   Lab Units 03/31/23  0357 03/30/23  0908   WBC 10*3/mm3 19.85* 20.94*   HEMOGLOBIN g/dL 11.1* 11.2*   HEMATOCRIT % 36.7* 37.8   PLATELETS 10*3/mm3 535* 644*        Results from last 7 days   Lab Units 03/31/23  0357 03/30/23  0908   SODIUM mmol/L 134* 132*   POTASSIUM mmol/L 3.9 4.0   CHLORIDE mmol/L 98 95*   CO2 mmol/L 25.0 24.0   BUN mg/dL 9 8   CREATININE mg/dL 0.59* 0.78   CALCIUM mg/dL 9.5 9.5   BILIRUBIN  mg/dL 0.4 0.5   ALK PHOS U/L 224* 262*   ALT (SGPT) U/L 18 27   AST (SGOT) U/L 9 18   GLUCOSE mg/dL 98 127*       Culture Data:   Microbiology Results (last 10 days)     Procedure Component Value - Date/Time    Respiratory Culture - Sputum, Cough [[id number]] Collected: 03/30/23 2352    Lab Status: Preliminary result Specimen: Sputum from Cough Updated: 03/31/23 0704     Gram Stain Many (4+) WBCs per low power field      Few (2+) Epithelial cells per low power field      Few (2+) Mixed gram positive lamar      Few (2+) Gram negative bacilli    Blood Culture - Blood, Arm, Right [[id number]]  (Normal) Collected: 03/30/23 0946    Lab Status: Preliminary result Specimen: Blood from Arm, Right Updated: 03/31/23 1001     Blood Culture No growth at 24 hours    Blood Culture - Blood, Arm, Left [[id number]]  (Normal) Collected: 03/30/23 0946    Lab Status: Preliminary result Specimen: Blood from Arm, Left Updated: 03/31/23 1001     Blood Culture No growth at 24 hours        Radiology Data:   Imaging Results (Last 24 Hours)     Procedure Component Value Units Date/Time    US Thoracentesis [[id number]] Resulted: 03/31/23 1312    Specimen: Body Fluid Updated: 03/31/23 1312    US Venous Doppler Lower Extremity Bilateral (duplex) [[id number]] Resulted: 03/31/23 1240     Updated: 03/31/23 1254          I have reviewed the patient's current medications.     Assessment/Plan   Assessment  Active Hospital Problems    Diagnosis    • **Syncope and collapse    • Right lung mass    • Postobstructive pneumonia    • Tobacco abuse, in remission    • Primary hypertension        Treatment Plan  Suspected postobstructive pneumonia on Levaquin.  He received 30 mL/kilogram fluid bolus in ED.  WBC 20 with follow-up 19.  Procalcitonin 0.45.  Lactate 1.5.  Respiratory culture if able.  Follow blood culture.    Cardiothoracic surgery consulted.  Planning for ultrasound-guided right thoracentesis by interventional radiology.  Follow cytology and cultures.   May need to proceed with previously ordered needle biopsy of lung mass if thoracentesis nondiagnostic.    Continue efforts at pulmonary toilet.    Venous duplex bilateral lower extremities preliminary negative for thrombus.    Medical Decision Making  Number and Complexity of problems: 2 acute complex problems in the form of suspected postobstructive pneumonia, right lung mass and pleural effusion-suspected malignancy  Differential Diagnosis:     Conditions and Status        Condition is unchanged.     [facility] Data  External documents reviewed: Prior epic records  Cardiac tracing (EKG, telemetry) interpretation: Normal sinus rhythm  Radiology interpretation: Interpreted by radiology  Labs reviewed: As above  Any tests that were considered but not ordered: None considered at present     Decision rules/scores evaluated (example [id number], Wells, etc): None considered at present     Discussed with: Patient and Dr. Simental     Care Planning  Shared decision making: Patient is agreeable to ongoing work-up and treatment  Code status and discussions: Full code with full interventions    Disposition  Social Determinants of Health that impact treatment or disposition: None identified at present  I expect the patient to be discharged to home in 2-3 days.     Electronically signed by TRESSA Contreras, 03/31/23, 14:12 CDT.

## 2023-03-31 NOTE — CONSULTS
Adult Nutrition  Assessment/PES    Patient Name:  Castillo Trujillo  YOB: 1960  MRN: [id number]  Admit Date:  3/30/2023    Assessment Date:  3/31/2023         Reason for Assessment     Row Name 03/31/23 1556          Reason for Assessment    Reason For Assessment identified at risk by screening criteria     Diagnosis cardiac disease;pulmonary disease     Identified At Risk by Screening Criteria MST SCORE 2+;unintentional loss of 10 lbs or more in the past 2 mos                Nutrition/Diet History     Row Name 03/31/23 1556          Nutrition/Diet History    Typical Intake (Food/Fluid/EN/PN) Pt reported 50lb loss x 6 m. and poor appetite. Diet changed to regular. Added Boost Breeze Berry; pt likes berry and apple Ensure Clear. Pt denied food allergies and has family x 4 in room to bring in food/snacks from [facility]. Discussed goal for weight stabilization, meals/snacks ~4+ times per day.     Factors Affecting Nutritional Intake appetite;respiratory difficulty/therapies                Labs/Tests/Procedures/Meds     Row Name 03/31/23 1553          Labs/Procedures/Meds    Lab Results Reviewed reviewed        Diagnostic Tests/Procedures    Diagnostic Test/Procedure Reviewed reviewed        Medications    Pertinent Medications Reviewed reviewed     Pertinent Medications Comments See MAR                Physical Findings     Row Name 03/31/23 1558          Physical Findings    Overall Physical Appearance Room air, BM 3/29, Rohith Score 20.                Estimated/Assessed Needs - Anthropometrics     Row Name 03/31/23 1558          Anthropometrics    Weight for Calculation 95 kg (209 lb 8 oz)        Estimated/Assessed Needs    Additional Documentation Fluid Requirements (Group);[place]-St. Jeor Equation (Group);Protein Requirements (Group)        [place]-St. Jeor Equation    RMR ([place]land-St. Jeor Equation) 1805.04     [place]-St. Jeor Activity Factors 1.4 - 1.5     Activity Factors ([place]land-St. Jeor) 7451.316 -  2707.56        Protein Requirements    Weight Used For Protein Calculations 95 kg (209 lb 8 oz)     Est Protein Requirement Amount (gms/kg) 1.3 gm protein     Estimated Protein Requirements (gms/day) 123.54        Fluid Requirements    Fluid Requirements (mL/day) 1805     RDA Method (mL) 1805                Nutrition Prescription Ordered     Row Name 03/31/23 1558          Nutrition Prescription PO    Current PO Diet Regular     Fluid Consistency Thin     Common Modifiers Cardiac                Evaluation of Received Nutrient/Fluid Intake     Row Name 03/31/23 1558          Nutrient/Fluid Evaluation    Number of Days Evaluated Other (comment)  admisison < 24 hours        Fluid Intake Evaluation    Oral Fluid (mL) 500  admit to present total        PO Evaluation    Number of Days PO Intake Evaluated Insufficient Data     % PO Intake did not like dinner last night; NPO for testing today, snacking on cottage cheese fruit snackers from cafeteria during visit                       Problem/Interventions:   Problem 1     Row Name 03/31/23 1600          Nutrition Diagnoses Problem 1    Problem 1 Malnutrition     Etiology (related to) Medical Diagnosis     Pulmonary/Critical Care Chronic respiratory failure     Substance Use Tobacco     Signs/Symptoms (evidenced by) Unintended Weight Change;Report/Observation;Report of Mnimal PO Intake     Unintended Weight Change Loss     Number of Pounds Lost 50     Weight loss time period 6 m.     Reported/Observed By RD/Tech     Other Comment moderate muscle and fat loss.                      Intervention Goal     Row Name 03/31/23 1601          Intervention Goal    General Disease management/therapy;Meet nutritional needs for age/condition;Reduce/improve symptoms     PO Tolerate PO;Increase intake;Meet estimated needs     Weight No significant weight loss                Nutrition Intervention     Row Name 03/31/23 1601          Nutrition Intervention    RD/Tech Action Follow Tx  progress;Care plan reviewd;Recommend/ordered     Recommended/Ordered Diet;Supplement                Nutrition Prescription     Row Name 03/31/23 1601          Nutrition Prescription PO    PO Prescription Begin/change diet;Begin/change supplement     Begin/Change Diet to Regular     Fluid Consistency Thin     Supplement Boost Breeze     Supplement Frequency 3 times a day     New PO Prescription Ordered? Yes                Education/Evaluation     Row Name 03/31/23 1601          Education    Education No discharge needs identified at this time        Monitor/Evaluation    Monitor Per protocol                 Electronically signed by:  Bette Oakley RD  03/31/23 16:01 CDT

## 2023-03-31 NOTE — PROGRESS NOTES
Malnutrition Severity Assessment    Patient Name:  Castillo Trujillo  YOB: 1960  MRN: [id number]  Admit Date:  3/30/2023    Patient meets criteria for : Moderate (non-severe) Malnutrition    Malnutrition Severity Assessment  Malnutrition Type: Chronic Disease - Related Malnutrition  Malnutrition Type (last 8 hours)     Malnutrition Severity Assessment     Row Name 03/31/23 1559       Malnutrition Severity Assessment    Malnutrition Type Chronic Disease - Related Malnutrition    Row Name 03/31/23 1559       Insufficient Energy Intake     Insufficient Energy Intake Findings Moderate    Insufficient Energy Intake  < or equal to 50% of est. energy requirement for > or equal to 5d)    Row Name 03/31/23 1559       Unintentional Weight Loss     Unintentional Weight Loss Findings Severe    Unintentional Weight Loss  Weight loss greater than 10% in six months    Row Name 03/31/23 1559       Muscle Loss    Loss of Muscle Mass Findings Moderate    Anabaptism Region Moderate - slight depression    Clavicle Bone Region Moderate - some protrusion in females, visible in males    Acromion Bone Region Moderate - acromion may slightly protrude    Dorsal Hand Region Moderate - slight depression    Posterior Calf Region Moderate - some roundness, slight firmness    Row Name 03/31/23 1559       Fat Loss    Subcutaneous Fat Loss Findings Moderate    Orbital Region  Moderate -  somewhat hollowness, slightly dark circles    Upper Arm Region Moderate - some fat tissue, not ample    Thoracic & Lumbar Region None    Row Name 03/31/23 1559       Criteria Met (Must meet criteria for severity in at least 2 of these categories: M Wasting, Fat Loss, Fluid, Secondary Signs, Wt. Status, Intake)    Patient meets criteria for  Moderate (non-severe) Malnutrition                Electronically signed by:  Bette Oakley RD  03/31/23 16:02 CDT

## 2023-03-31 NOTE — PAYOR COMM NOTE
"3/31/23. [facility] [phone]  [phone]    ER ADMIT TO INPATIENT ON 3/30/23. FAXING FOR INPATIENT REVIEW AND APPROVAL.    Castillo Trujillo (63 y.o. Male)     Date of Birth   1960    Social Security Number       Address   [address] [place] [address]    Home Phone   [phone]    MRN   [id number]       [facility]    Marital Status                               Admission Date   3/30/23    Admission Type   Emergency    Admitting Provider   Viral Simental MD    Attending Provider   Viral Simental MD    Department, Room/Bed   [address], 435/1       Discharge Date       Discharge Disposition       Discharge Destination                               Attending Provider: Viral Simental MD    Allergies: No Known Allergies    Isolation: None   Infection: None   Code Status: CPR    Ht: 188 cm (74\")   Wt: 95 kg (209 lb 8 oz)    Admission Cmt: None   Principal Problem: Syncope and collapse [R55]                 Active Insurance as of 3/30/2023     Primary Coverage     Payor Plan Insurance Group Employer/Plan Group    [employer]HE[employer] BLUE CROSS ANTHEM BLUE CROSS BLUE SHIELD PPO [id number]     Payor Plan Address Payor Plan Phone Number Payor Plan Fax Number Effective Dates    [address] [phone]  8/1/2018 - None Entered    [place] [address]       Subscriber Name Subscriber Birth Date Member ID       CASTILLO TRUJILLO 1960 [id number]                 Emergency Contacts      (Rel.) Home Phone Work Phone Mobile Phone    VALENTIN TRUJILLO (Spouse) [phone] -- --           [facility] Encounter Date/Time: 3/30/2023 [facility]   Hospital Account: [id number]    MRN: [id number]   Patient:  Castillo Trujillo   Contact Serial #: [id number]   SSN:          ENCOUNTER             Patient Class: Inpatient   Unit: [address] Service: Medicine     Bed: 435/1   Admitting Provider: " Viral Simental*   Referring Physician: Provider, No Known   Attending Provider: Viral Simental*   Adm Diagnosis: Syncope and collapse [R5*               PATIENT          Name: Castillo Trujillo : 1960 (63 yrs)   Address: [address] [place] [address] Sex: Male   [place] [address]   County: [place]   Marital Status:  Ethnicity: NOT                                                                              Race: WHITE   Primary Care Provider: Juan Vaughn DO Patients Phone: Home Phone: [phone]     Mobile Phone: [phone]   EMERGENCY CONTACT   Contact Name Legal Guardian? Relationship to Patient Home Phone Work Phone   1. VALENTIN TRUJILLO  2. *No Contact Specified*      Spouse    [phone]              GUARANTOR            Guarantor: Castillo Trujillo     : 1960   Address: [address] [place] [address] Sex: Male     [place] [id number]     Relation to Patient: Self       Home Phone: [phone]   Guarantor ID: [id number]       Work Phone:     GUARANTOR EMPLOYER   Employer:           Status: UNKNOWN   COVERAGE          PRIMARY INSURANCE   Payor: ANTHEM BLUE CROSS Plan: ANTHEM BLUE CROSS BLUE SHIELD PPO   Group Number: [id number] Insurance Type: INDEMNITY   Subscriber Name: CASTILLO TRUJILLO Subscriber : 1960   Subscriber ID: [id number] Coverage Address: [facility] [address]  [place] [address]   Pat. Rel. to Subscriber: Self Coverage Phone: [phone]   SECONDARY INSURANCE   Payor: N/A Plan: N/A   Group Number:   Insurance Type:     Subscriber Name:   Subscriber :     Subscriber ID:   Coverage Address:     Pat. Rel. to Subscriber:   Coverage Phone:        Contact Serial # ([id number])         2023    Chart ID ([id number])             Viral Simental MD   Physician  Hospitalist  H&P      Signed  Date of Service:  23  Creation Time:  23     Signed        [facility] Team  "[facility] Medicine Services  HISTORY AND PHYSICAL     Date of Admission: 3/30/2023  Primary Care Physician: Juan Vaughn,      Subjective   Primary Historian:   Wife     Chief Complaint: Cough, shortness of breath, difficulty breathing     History of Present Illness     MS to admit this 63-year-old man who for further evaluation of syncopal episode.  Patient reportedly was hypotensive after an episode of vomiting.  Patient reportedly has lung mass and supposedly to undergo biopsy soon.  CT imaging of the chest showed moderate pleural effusion and right upper lobe mass.                 White count is 20,000 with predominance of neutrophils at 83%, he has thrombocytosis which could be reactive (644  Sodium is 132, albumin 3.3  Lactic acid of 1.6     Blood pressure was as low as 83/64 but significantly improved at 1/6/1959 systolic.  There has been another episode approximately 45 minutes later where blood pressure was 75/35.  Since then blood pressure has been adequate anywhere from 132-163 systolic.     His primary care provider is Dr. Vaughn.  Clinic record dating back to March 20 reviewed  Cough since November.  Reportedly has had hemoptysis, weight loss of about 43 pounds since fall last year  Decreased appetite and worsening fatigue  Has prior work-up including chest x-ray showing right upper lobe opacity.  Had a follow-up CT scan on March 2 which showed large masslike infiltrate right middle and upper lobes.  He is a former smoker who quit in November 2022.     He requested for PET scan, pulmonary function test     Telephone conversation of wife to medical assistant at Dr. Vaughn clinic indicates he woke up and could not hardly breathe.  He had to hold below to his chest to cough.  They were advised to come to the emergency room for further evaluation.     Patient had difficulty breathing yesterday.  Could not sleep a little related to this, positive pleurisy  Felt feverish described as \"hot " "flashes\".  Positive nonproductive coughing spells  While in the triage area, reportedly vomited and passed out.  No reported incontinence     Patient known hypertensive and takes lisinopril.  Last intake was yesterday.        Review of Systems   Denies dysphagia or odynophagia  Denies any headache  Denies any urinary or bowel disturbance  Denies any focal weakness  No reported calf pain but noted left leg bigger than the other            Barbara May APRN   Nurse Practitioner  Cardiothoracic Surgery  Consults      Incomplete  Date of Service:  03/30/23 1601  Creation Time:  03/30/23 1601  Consult Orders   Inpatient Cardiothoracic Surgery Consult [[id number]] ordered by Viral Simental MD at 03/30/23 1454          Incomplete        Expand AllCollapse All    Referring Provider: Dr. Viral Simental  Reason for Consultation: pleural effusin     Patient Care Team:  Juan Vaughn DO as PCP - General (Internal Medicine)     Chief complaint: shortness of breath         Subjective          History of present illness: Mr. Trujillo is a 63-year-old male with arthritis, GERD, hypertension, former chronic tobacco use and recent history of hemoptysis with work-up by his primary care provider, Dr. Vaughn, identifying a right upper lobe opacity on x-ray and then a right upper lobe and middle lobe masslike infiltrate on CT scan.  A PET scan was performed on March 22 that shows a hypermetabolic right middle and upper lobe mass with mildly enlarged and hypermetabolic right hilar and subcarinal mediastinal lymph nodes, small right pleural effusion, area of abnormality along the ascending colon and indeterminate left adrenal gland nodule with mild hypermetabolic activity.  A needle biopsy of the lung mass has been ordered and in fact scheduled for 4/7. Earlier this morning, the patient developed severe shortness of breath and right-sided chest pain.  He was advised to proceed to the ER for further evaluation.  He " also reported a productive cough with green phlegm.  White blood cell count elevated at 20.9, sodium 132.  In the ER he had a syncopal episode along with nausea and vomiting thereafter.  CT angiogram of the chest today shows no pulmonary embolism, known right pleural effusion and right upper lobe mass.  He has been admitted to the hospitalist service and given the aforementioned findings, cardiothoracic surgery services have been consulted at the request for diagnostic and therapeutic thoracocentesis.     He is on Rocephin and Levaquin for suspected postobstructive pneumonia. He is on room air.         History      Code Status and Medical Interventions:   Ordered at: 23 1529     Level Of Support Discussed With:     Patient     Code Status (Patient has no pulse and is not breathing):     CPR (Attempt to Resuscitate)     Medical Interventions (Patient has pulse or is breathing):     Full Support      Medical History        Past Medical History:   Diagnosis Date   • Arthritis     • GERD (gastroesophageal reflux disease)     • Hypertension     • Thyroid disorder        ,   Surgical History         Past Surgical History:   Procedure Laterality Date   • APPENDECTOMY          ,         Family History   Problem Relation Age of Onset   • Stomach cancer Mother     • Stroke Father     • Breast cancer Sister     • Colon cancer Brother     ,   Social History            Tobacco Use   • Smoking status: Former       Packs/day: 3.00       Years: 45.00       Pack years: 135.00       Types: Cigarettes       Quit date: 2022       Years since quittin.3       Passive exposure: Past   • Smokeless tobacco: Never   Vaping Use   • Vaping Use: Never used   Substance Use Topics   • Alcohol use: Yes       Comment: 2-3 weekly   • Drug use: Never   ,   Prescriptions Prior to Admission           Medications Prior to Admission   Medication Sig Dispense Refill Last Dose   • aspirin 81 MG EC tablet Take 1 tablet by mouth Daily.        "  • benzonatate (TESSALON) 200 MG capsule Take 1 capsule by mouth 3 (Three) Times a Day As Needed for Cough. 60 capsule 0     • ibuprofen (ADVIL,MOTRIN) 800 MG tablet Take 1 tablet by mouth 3 (Three) Times a Day. 90 tablet 3     • levothyroxine (SYNTHROID, LEVOTHROID) 200 MCG tablet Take 1 tablet by mouth Daily. 30 tablet 5     • lisinopril (PRINIVIL,ZESTRIL) 10 MG tablet Take 1 tablet by mouth Daily. 30 tablet 5     • pantoprazole (PROTONIX) 40 MG EC tablet Take 1 tablet by mouth Daily. 30 tablet 5     • potassium chloride 10 MEQ CR tablet Take 1 tablet by mouth Daily.            , Allergies: Patient has no known allergies.     Review of Systems  Review of Systems   Respiratory: Positive for cough and shortness of breath.    Cardiovascular: Positive for chest pain.   Gastrointestinal: Positive for nausea and vomiting.   Neurological: Positive for syncope and weakness.               Objective         Vital Signs   Visit Vitals  /64   Pulse 76   Temp 98.9 °F (37.2 °C) (Oral)   Resp 21   Ht 188 cm (74\")   Wt 90.7 kg (200 lb)   SpO2 95%   BMI 25.68 kg/m²         Physical Exam        LAB:   CBC:       Results from last 7 days   Lab Units 03/30/23  0908   WBC 10*3/mm3 20.94*   HEMATOCRIT % 37.8   PLATELETS 10*3/mm3 644*            BMP:)       Results from last 7 days   Lab Units 03/30/23  0908   SODIUM mmol/L 132*   POTASSIUM mmol/L 4.0   CHLORIDE mmol/L 95*   CO2 mmol/L 24.0   GLUCOSE mg/dL 127*   BUN mg/dL 8   CREATININE mg/dL 0.78                IMAGES:               Imaging Results (Last 24 Hours)      Procedure Component Value Units Date/Time     CT Angiogram Chest [[id number]] Collected: 03/30/23 1116       Updated: 03/30/23 1123     Narrative:       EXAMINATION: CT ANGIOGRAM CHEST-      3/30/2023 10:57 AM CDT     HISTORY: Pulmonary embolism (PE) suspected, unknown D-dimer. Right-sided  chest pain. Recent diagnosis of lung cancer. Dizziness.     In order to have a CT radiation dose as low as reasonably " achievable  Automated Exposure Control was utilized for adjustment of the mA and/or  KV according to patient size.     DLP in mGycm= 225.     CT angiogram chest with IV contrast.  CT angiography protocol.   CT imaging with bolus IV contrast injection.   Under concurrent supervision axial, sagittal, coronal,  three-dimensional, and MIP data sets were constructed on an independent  work station.     Normal heart size.  Normal thoracic aorta with no aneurysm or dissection.     Normally opacified pulmonary arteries.  No pulmonary embolism.     Masslike consolidation of the anterior right upper lung. This was  previously evaluated by PET/CT.     Moderate right pleural effusion.     No pneumothorax or heart failure.     The left lung is clear.     Summary:  1. No pulmonary embolism.  2. Known right upper lobe mass.  3. Moderate right pleural effusion.  4. No pneumothorax.                                   This report was finalized on 03/30/2023 11:20 by Dr. Raman Polo MD.     XR Chest 1 View [[id number]] Collected: 03/30/23 0938       Updated: 03/30/23 0944     Narrative:       EXAMINATION: XR CHEST 1 VW-     3/30/2023 9:33 AM CDT     HISTORY: Chest pain.     1 view chest x-ray.     Comparison is made with a PET CT from 8 days ago.     Normal heart size.     The left lung is clear.     Anterior right lower lobe mass lesion seen as an ill-defined opacity on  the chest x-ray.     Small amount of right pleural fluid persists.     No pneumothorax.     Summary:  1. Known right lung mass.  2. Small pleural effusion persists.  3. Superimposed pneumonia around the mass cannot be ruled out on this  study. A chest CT is planned.     This report was finalized on 03/30/2023 09:41 by Dr. Raman Polo MD.                  Assessment & Plan          3/31/23 CHEST TUBE                  ED to Hosp-Admission (Current) on 3/30/2023    03/30/23 0933 -- 51 -- 75/35 Abnormal  -- -- 100   03/30/23 0918 -- 71 -- 116/67 -- -- 96   03/30/23  0913 -- 74 -- 100/50 -- -- 98   03/30/23 0908 -- 72 -- 97/46 -- -- 94   03/30/23 0906 -- -- 27 -- -- -- --   03/30/23 0903 -- 72 -- -- -- -- 96   03/30/23 0902 -- 76 -- -- -- -- --   03/30/23 0901 -- -- -- 157/70 -- -- --   03/30/23 0856 98.9 (37.2) 59 20 83/64 Abnormal  -- room air 100     Intake      Range & Units 03:57  (3/31/23)  [facility] 1 d ago  (3/30/23)  [facility] 7 mo ago  (8/22/22)  [facility] 7 mo ago  (8/21/22)  [facility]   WBC  3.40 - 10.80 10*3/mm3 19.85 High   20.94 High   9.0 R  8.1 R    RBC  4.14 - 5.80 10*6/mm3 4.42  4.51  4.59 Low  R  4.87 R    Hemoglobin  13.0 - 17.7 g/dL 11.1 Low   11.2 Low   14.7 R  15.3 R    Hematocrit  37.5 - 51.0 % 36.7 Low   37.8  43.8 R  46.0 R    MCV  79.0 - 97.0 fL 83.0  83.8  95.4 High  R  94.5 High  R    MCH  26.6 - 33.0 pg 25.1 Low   24.8 Low   32.0 High  R  31.4 High  R    MCHC  31.5 - 35.7 g/dL 30.2 Low   29.6 Low   33.6 R  33.3 R    RDW  12.3 - 15.4 % 15.5 High   15.7 High   13.6 R  13.3 R    RDW-SD  37.0 - 54.0 fl 47.2  47.6      MPV  6.0 - 12.0 fL 8.8  8.4  10.7 R  9.9 R    Platelets  140 - 450 10*3/mm3 535 High   644 High   249 R  262 R    Neutrophil %  42.7 - 76.0 % 87.0 High   82.8 High    50.7 R    Lymphocyte %  19.6 - 45.3 % 4.8 Low           Next appt: 04/06/2023 at 07:00 AM in Pulmonology ([facility] PUL LAB ROOM 2)     Specimen Information: Blood    0 Result Notes       Component  Ref Range & Units 03:57 1 d ago   Procalcitonin  0.00 - 0.25 ng/mL 0.45 High           Contains abnormal data Comprehensive Metabolic Panel  Order: [id number]   Status: Final result      Visible to patient: No (not released)      Next appt: 04/06/2023 at 07:00 AM in Pulmonology ([facility] PUL LAB ROOM 2)     Specimen Information: Blood    0 Result Notes       Component  Ref Range & Units 03:57 1 d ago   Glucose  65 - 99 mg/dL 98  127 High     BUN  8 - 23 mg/dL 9  8    Creatinine  0.76 - 1.27 mg/dL 0.59 Low   0.78    Sodium  136 - 145 mmol/L 134 Low                  Current Facility-Administered Medications   Medication Dose Route Frequency Provider Last Rate Last Admin   • acetaminophen (TYLENOL) tablet 650 mg  650 mg Oral Q4H PRN Viral Simental MD        Or   • acetaminophen (TYLENOL) 160 MG/5ML solution 650 mg  650 mg Oral Q4H PRN Viral Simental MD        Or   • acetaminophen (TYLENOL) suppository 650 mg  650 mg Rectal Q4H PRN Viral Simental MD       • aspirin EC tablet 81 mg  81 mg Oral Daily Viral Simental MD       • benzonatate (TESSALON) capsule 200 mg  200 mg Oral TID PRN Viral Simental MD       • guaiFENesin (MUCINEX) 12 hr tablet 1,200 mg  1,200 mg Oral Q12H Viral Simental MD   1,200 mg at 03/30/23 2111   • guaiFENesin-codeine (ROMILAR-AC) syrup 10 mL  10 mL Oral Q6H PRN Viral Simental MD   10 mL at 03/30/23 1819   • Hydrocod Simon-Chlorphe Simon ER (TUSSIONEX PENNKINETIC) 10-8 MG/5ML ER suspension 5 mL  5 mL Oral Q12H PRN Viral Simental MD   5 mL at 03/30/23 1725   • lactated ringers infusion  50 mL/hr Intravenous Continuous Viral Simental MD 50 mL/hr at 03/30/23 1745 50 mL/hr at 03/30/23 1745   • levoFLOXacin (LEVAQUIN) 750 mg/150 mL D5W (premix) (LEVAQUIN) 750 mg  750 mg Intravenous Q24H Viral Simental MD   750 mg at 03/31/23 0607   • levothyroxine (SYNTHROID, LEVOTHROID) tablet 200 mcg  200 mcg Oral Q AM Viral Simental MD   200 mcg at 03/31/23 0607   • lisinopril (PRINIVIL,ZESTRIL) tablet 10 mg  10 mg Oral Daily Viral Simental MD   10 mg at 03/30/23 1745   • pantoprazole (PROTONIX) EC tablet 40 mg  40 mg Oral Daily Viral Simental MD       • promethazine (PHENERGAN) tablet 12.5 mg  12.5 mg Oral Q8H PRN Viral Simental MD       • sodium chloride 0.9 % flush 10 mL  10 mL Intravenous PRN Viral Simental MD       • sodium chloride 0.9 % flush 10 mL  10 mL Intravenous PRN Viral Simental MD        • sodium chloride 0.9 % flush 10 mL  10 mL Intravenous Q12H Viral Simental MD   10 mL at 03/30/23 2111   • sodium chloride 0.9 % flush 10 mL  10 mL Intravenous PRN Viral Simental MD       • sodium chloride 0.9 % infusion 40 mL  40 mL Intravenous Viral Swan MD

## 2023-04-01 ENCOUNTER — READMISSION MANAGEMENT (OUTPATIENT)
Dept: CALL CENTER | Facility: HOSPITAL | Age: 63
End: 2023-04-01
Payer: COMMERCIAL

## 2023-04-01 VITALS
BODY MASS INDEX: 26.41 KG/M2 | TEMPERATURE: 98 F | RESPIRATION RATE: 18 BRPM | HEIGHT: 74 IN | DIASTOLIC BLOOD PRESSURE: 71 MMHG | OXYGEN SATURATION: 93 % | WEIGHT: 205.8 LBS | HEART RATE: 74 BPM | SYSTOLIC BLOOD PRESSURE: 150 MMHG

## 2023-04-01 PROBLEM — E44.0 MODERATE MALNUTRITION: Status: ACTIVE | Noted: 2023-04-01

## 2023-04-01 LAB
ALBUMIN FLD-MCNC: 2 G/DL
ANION GAP SERPL CALCULATED.3IONS-SCNC: 9 MMOL/L (ref 5–15)
BUN SERPL-MCNC: 10 MG/DL (ref 8–23)
BUN/CREAT SERPL: 16.4 (ref 7–25)
CALCIUM SPEC-SCNC: 9 MG/DL (ref 8.6–10.5)
CHLORIDE SERPL-SCNC: 97 MMOL/L (ref 98–107)
CO2 SERPL-SCNC: 27 MMOL/L (ref 22–29)
CREAT SERPL-MCNC: 0.61 MG/DL (ref 0.76–1.27)
DEPRECATED RDW RBC AUTO: 47.7 FL (ref 37–54)
EGFRCR SERPLBLD CKD-EPI 2021: 106.6 ML/MIN/1.73
ERYTHROCYTE [DISTWIDTH] IN BLOOD BY AUTOMATED COUNT: 15.6 % (ref 12.3–15.4)
GLUCOSE SERPL-MCNC: 102 MG/DL (ref 65–99)
HCT VFR BLD AUTO: 34.7 % (ref 37.5–51)
HGB BLD-MCNC: 10.2 G/DL (ref 13–17.7)
LDH FLD-CCNC: 396 U/L
MCH RBC QN AUTO: 24.4 PG (ref 26.6–33)
MCHC RBC AUTO-ENTMCNC: 29.4 G/DL (ref 31.5–35.7)
MCV RBC AUTO: 83 FL (ref 79–97)
PH FLD: 7.44 [PH]
PLATELET # BLD AUTO: 515 10*3/MM3 (ref 140–450)
PMV BLD AUTO: 8.5 FL (ref 6–12)
POTASSIUM SERPL-SCNC: 3.6 MMOL/L (ref 3.5–5.2)
RBC # BLD AUTO: 4.18 10*6/MM3 (ref 4.14–5.8)
SODIUM SERPL-SCNC: 133 MMOL/L (ref 136–145)
WBC NRBC COR # BLD: 16.43 10*3/MM3 (ref 3.4–10.8)

## 2023-04-01 PROCEDURE — 25010000002 LEVOFLOXACIN PER 250 MG: Performed by: INTERNAL MEDICINE

## 2023-04-01 PROCEDURE — 80048 BASIC METABOLIC PNL TOTAL CA: CPT | Performed by: NURSE PRACTITIONER

## 2023-04-01 PROCEDURE — 85027 COMPLETE CBC AUTOMATED: CPT | Performed by: NURSE PRACTITIONER

## 2023-04-01 RX ORDER — GUAIFENESIN 600 MG/1
1200 TABLET, EXTENDED RELEASE ORAL EVERY 12 HOURS SCHEDULED
Start: 2023-04-01 | End: 2023-04-06

## 2023-04-01 RX ORDER — IBUPROFEN 800 MG/1
800 TABLET ORAL 2 TIMES DAILY PRN
Start: 2023-04-01

## 2023-04-01 RX ORDER — LEVOFLOXACIN 750 MG/1
750 TABLET ORAL DAILY
Qty: 5 TABLET | Refills: 0 | Status: SHIPPED | OUTPATIENT
Start: 2023-04-01 | End: 2023-04-06

## 2023-04-01 RX ADMIN — LISINOPRIL 10 MG: 10 TABLET ORAL at 08:27

## 2023-04-01 RX ADMIN — GUAIFENESIN 1200 MG: 600 TABLET, EXTENDED RELEASE ORAL at 08:26

## 2023-04-01 RX ADMIN — LEVOTHYROXINE SODIUM 200 MCG: 100 TABLET ORAL at 06:07

## 2023-04-01 RX ADMIN — HYDROCODONE POLISTIREX AND CHLORPHENIRAMINE POLISTIREX 5 ML: 10; 8 SUSPENSION, EXTENDED RELEASE ORAL at 08:27

## 2023-04-01 RX ADMIN — GUAIFENESIN AND CODEINE PHOSPHATE 10 ML: 100; 10 SOLUTION ORAL at 03:19

## 2023-04-01 RX ADMIN — LEVOFLOXACIN 750 MG: 750 INJECTION, SOLUTION INTRAVENOUS at 06:07

## 2023-04-01 RX ADMIN — Medication 10 ML: at 08:27

## 2023-04-01 RX ADMIN — GUAIFENESIN AND CODEINE PHOSPHATE 10 ML: 100; 10 SOLUTION ORAL at 06:48

## 2023-04-01 RX ADMIN — ASPIRIN 81 MG: 81 TABLET, COATED ORAL at 08:26

## 2023-04-01 RX ADMIN — PANTOPRAZOLE SODIUM 40 MG: 40 TABLET, DELAYED RELEASE ORAL at 08:27

## 2023-04-01 NOTE — DISCHARGE SUMMARY
[facility] Medicine Services  DISCHARGE SUMMARY       Date of Admission: 3/30/2023  Date of Discharge:  4/1/2023  Primary Care Physician: Juan Vaughn DO    Presenting Problem/History of Present Illness:  Shortness of breath    Final Discharge Diagnoses:  Active Hospital Problems    Diagnosis    • **Syncope and collapse    • Moderate Malnutrition (HCC)    • Right lung mass    • Postobstructive pneumonia    • Tobacco abuse, in remission    • Primary hypertension        Consults: Dr. Kim with cardiothoracic surgery    Procedures Performed:   Ultrasound-guided right thoracentesis on 3/31/2023 by interventional radiology - 1500 ml pleural fluid removed.    Pertinent Test Results:       Imaging Results (All)     Procedure Component Value Units Date/Time    XR Chest 1 View [[id number]] Collected: 03/31/23 1455     Updated: 03/31/23 1503    Narrative:      EXAMINATION: XR CHEST 1 VW-     3/31/2023 2:50 PM CDT     HISTORY: post right thoracentesis; R55-Syncope and collapse; P63-Lruxnbs  effusion, not elsewhere classified; R91.8-Other nonspecific abnormal  finding of lung field; R07.9-Chest pain, unspecified     1 view chest x-ray.     No pneumothorax status post right thoracentesis.     No residual pleural fluid.     The left lung remains essentially clear.     Underlying chronic interstitial disease.     Known right lung mass.     Normal heart size.     Summary:  1. No pneumothorax status post thoracentesis.  This report was finalized on 03/31/2023 15:00 by Dr. Raman Polo MD.    US Thoracentesis [[id number]] Collected: 03/31/23 1448    Specimen: Body Fluid Updated: 03/31/23 1457    Narrative:      EXAMINATION: US THORACENTESIS-     3/31/2023 1:12 PM CDT     HISTORY: PLEURAL EFFUSION; R55-Syncope and collapse; T10-Mwvmubm  effusion, not elsewhere classified; R91.8-Other nonspecific abnormal  finding of lung field; R07.9-Chest pain, unspecified     An appropriate time out  was performed with the patient and all present  staff members to ensure correct patient and correct site and procedure.  The procedure was discussed with the patient including risks, benefits,  and alternatives.    Consent was given.     Grayscale ultrasound evaluation confirms a sufficient amount of right  pleural fluid for percutaneous drainage.     A site for percutaneous intercostal access was selected at the right mid  back.  Routine prep, drape, and local anesthesia.     The thoracentesis catheter was placed percutaneously with ultrasound  guidance.     1500 mL's of pleural fluid was removed by gravity drainage.     No complications.  Blood loss = less than 1 cc.     The post procedure chest X-ray shows no pneumothorax.     Summary:  1. Large volume right thoracentesis with ultrasound guidance.  2. No complications.     This report was finalized on 03/31/2023 14:49 by Dr. Raman Polo MD.    US Venous Doppler Lower Extremity Bilateral (duplex) [[id number]] Collected: 03/31/23 1445     Updated: 03/31/23 1448    Narrative:      History: Swelling       Impression:      Impression: There is no evidence of deep venous thrombosis or  superficial thrombophlebitis of right or left lower extremities.     Comments: Bilateral lower extremity venous duplex exam was performed  using color Doppler flow, Doppler waveform analysis, and grayscale  imaging, with and without compression. There is no evidence of deep  venous thrombosis in the common femoral, superficial femoral, popliteal,  peroneal, anterior tibial, and posterior tibial veins bilaterally. No  thrombus is identified in the saphenofemoral junctions and greater  saphenous veins bilaterally.         This report was finalized on 03/31/2023 14:45 by Dr. Phoenix Leyva MD.    CT Angiogram Chest [[id number]] Collected: 03/30/23 1116     Updated: 03/30/23 1123    Narrative:      EXAMINATION: CT ANGIOGRAM CHEST-      3/30/2023 10:57 AM CDT     HISTORY: Pulmonary  embolism (PE) suspected, unknown D-dimer. Right-sided  chest pain. Recent diagnosis of lung cancer. Dizziness.     In order to have a CT radiation dose as low as reasonably achievable  Automated Exposure Control was utilized for adjustment of the mA and/or  KV according to patient size.     DLP in mGycm= 225.     CT angiogram chest with IV contrast.  CT angiography protocol.   CT imaging with bolus IV contrast injection.   Under concurrent supervision axial, sagittal, coronal,  three-dimensional, and MIP data sets were constructed on an independent  work station.     Normal heart size.  Normal thoracic aorta with no aneurysm or dissection.     Normally opacified pulmonary arteries.  No pulmonary embolism.     Masslike consolidation of the anterior right upper lung. This was  previously evaluated by PET/CT.     Moderate right pleural effusion.     No pneumothorax or heart failure.     The left lung is clear.     Summary:  1. No pulmonary embolism.  2. Known right upper lobe mass.  3. Moderate right pleural effusion.  4. No pneumothorax.                                   This report was finalized on 03/30/2023 11:20 by Dr. Raman Polo MD.    XR Chest 1 View [[id number]] Collected: 03/30/23 0938     Updated: 03/30/23 0944    Narrative:      EXAMINATION: XR CHEST 1 VW-     3/30/2023 9:33 AM CDT     HISTORY: Chest pain.     1 view chest x-ray.     Comparison is made with a PET CT from 8 days ago.     Normal heart size.     The left lung is clear.     Anterior right lower lobe mass lesion seen as an ill-defined opacity on  the chest x-ray.     Small amount of right pleural fluid persists.     No pneumothorax.     Summary:  1. Known right lung mass.  2. Small pleural effusion persists.  3. Superimposed pneumonia around the mass cannot be ruled out on this  study. A chest CT is planned.     This report was finalized on 03/30/2023 09:41 by Dr. Raman Polo MD.        LAB RESULTS:      Lab 04/01/23  0505 03/31/23  0928  03/31/23  0357 03/30/23  0946 03/30/23  0908   WBC 16.43*  --  19.85*  --  20.94*   HEMOGLOBIN 10.2*  --  11.1*  --  11.2*   HEMATOCRIT 34.7*  --  36.7*  --  37.8   PLATELETS 515*  --  535*  --  644*   NEUTROS ABS  --   --  17.28*  --  17.35*   IMMATURE GRANS (ABS)  --   --  0.16*  --  0.16*   LYMPHS ABS  --   --  0.95  --  1.89   MONOS ABS  --   --  1.34*  --  1.32*   EOS ABS  --   --  0.08  --  0.14   MCV 83.0  --  83.0  --  83.8   PROCALCITONIN  --   --  0.45*  --  0.13   LACTATE  --   --   --  1.5  --    PROTIME  --  15.2*  --   --   --    APTT  --  36.5*  --   --   --          Lab 04/01/23  0505 03/31/23  0357 03/30/23  0908   SODIUM 133* 134* 132*   POTASSIUM 3.6 3.9 4.0   CHLORIDE 97* 98 95*   CO2 27.0 25.0 24.0   ANION GAP 9.0 11.0 13.0   BUN 10 9 8   CREATININE 0.61* 0.59* 0.78   EGFR 106.6 107.7 99.0   GLUCOSE 102* 98 127*   CALCIUM 9.0 9.5 9.5         Lab 03/31/23  0357 03/30/23  0908   TOTAL PROTEIN 6.9 7.2   ALBUMIN 3.1* 3.3*   GLOBULIN 3.8 3.9   ALT (SGPT) 18 27   AST (SGOT) 9 18   BILIRUBIN 0.4 0.5   ALK PHOS 224* 262*         Lab 03/31/23  0928 03/30/23  1205 03/30/23  0908   HSTROP T  --  <6 <6   PROTIME 15.2*  --   --    INR 1.18*  --   --                  Brief Urine Lab Results     None        Microbiology Results (last 10 days)     Procedure Component Value - Date/Time    Respiratory Culture - Sputum, Cough [[id number]] Collected: 03/30/23 3510    Lab Status: Preliminary result Specimen: Sputum from Cough Updated: 03/31/23 0704     Gram Stain Many (4+) WBCs per low power field      Few (2+) Epithelial cells per low power field      Few (2+) Mixed gram positive lamar      Few (2+) Gram negative bacilli    Blood Culture - Blood, Arm, Right [[id number]]  (Normal) Collected: 03/30/23 0946    Lab Status: Preliminary result Specimen: Blood from Arm, Right Updated: 03/31/23 1001     Blood Culture No growth at 24 hours    Blood Culture - Blood, Arm, Left [[id number]]  (Normal) Collected: 03/30/23 0988     Lab Status: Preliminary result Specimen: Blood from Arm, Left Updated: 03/31/23 1001     Blood Culture No growth at 24 hours          Hospital Course:   Mr. Trujillo is a 63-year-old male that presented to [facility] on 3/30 for severe shortness of breath and right-sided chest pain.  Productive cough with green phlegm.  The ER, he had a syncopal episode along with nausea and vomiting.  CT angiogram of the chest shows no pulmonary embolism, known right pleural effusion and right upper lobe mass with concern for suspected postobstructive pneumonia.  Former chronic tobacco use (quit in November 2022), recent history of hemoptysis and weight loss of about 43 pounds since fall last year.  He has had work-up by his primary care provider as right upper lobe opacity identified on chest x-ray.  CT scan showed right upper lobe and middle lobe masslike infiltrate.  He had a PET scan on March 22 that showed a hypermetabolic right middle and upper lobe mass with mildly enlarged hypermetabolic right hilar and subcarinal mediastinal lymph nodes, small right pleural effusion, area of abnormality along the ascending colon and indeterminate left adrenal gland nodule with mild hypermetabolic activity.  A needle biopsy of the lung mass has been scheduled for 4/7.    Cardiothoracic surgery consulted.  Ultrasound-guided right thoracentesis on 3/31/2023 by interventional radiology - 1500 ml pleural fluid removed. May need to proceed with previously ordered needle biopsy of lung mass if thoracentesis nondiagnostic.  He is okay for discharge from cardiothoracic surgery standpoint with close follow-up as outpatient for cytology/culture follow-up.    Suspected postobstructive pneumonia on Levaquin.  He received 30 mL/kilogram fluid bolus in ED.  WBC 20 with follow-up downtrending at 16.  Procalcitonin 0.45.  Lactate 1.5.  Blood culture with no growth to date.  He is on room air.  Productive cough. Continue efforts at pulmonary  "toilet.     Venous duplex bilateral lower extremities preliminary negative for thrombus.    States breathing feels much improved after thoracentesis.  He is very eager for discharge home.  He has reached maximum benefit of hospitalization.  He is medically stable and appropriate for discharge today.    Physical Exam on Discharge:  /71 (BP Location: Right arm, Patient Position: Lying)   Pulse 74   Temp 98 °F (36.7 °C) (Oral)   Resp 18   Ht 188 cm (74\")   Wt 93.4 kg (205 lb 12.8 oz)   SpO2 93%   BMI 26.42 kg/m²   Physical Exam  Vitals reviewed.   Constitutional:       General: He is not in acute distress.     Appearance: He is not toxic-appearing.      Comments: Up in bed.  No acute distress.  On room air.  Family member at bedside.  HENT:      Head: Normocephalic and atraumatic.      Mouth/Throat:      Mouth: Mucous membranes are moist.      Pharynx: Oropharynx is clear.   Eyes:      Extraocular Movements: Extraocular movements intact.      Conjunctiva/sclera: Conjunctivae normal.      Pupils: Pupils are equal, round, and reactive to light.   Cardiovascular:      Rate and Rhythm: Normal rate and regular rhythm.      Pulses: Normal pulses.   Pulmonary:      Effort: Pulmonary effort is normal. No respiratory distress.      Breath sounds: Decreased breath sounds present.   Abdominal:      General: Bowel sounds are normal. There is no distension.      Palpations: Abdomen is soft.      Tenderness: There is no abdominal tenderness.   Musculoskeletal:         General: No swelling or tenderness. Normal range of motion.      Cervical back: Normal range of motion and neck supple. No muscular tenderness.   Skin:     General: Skin is warm and dry.      Findings: No erythema or rash.   Neurological:      General: No focal deficit present.      Mental Status: He is alert and oriented to person, place, and time.      Cranial Nerves: No cranial nerve deficit.      Motor: No weakness.   Psychiatric:         Mood and " Affect: Mood normal.         Behavior: Behavior normal.     Condition on Discharge: Medically stable.    Discharge Disposition:  Home or Self Care    Discharge Medications:     Discharge Medications      New Medications      Instructions Start Date   guaiFENesin 600 MG 12 hr tablet  Commonly known as: MUCINEX   1,200 mg, Oral, Every 12 Hours Scheduled, Obtain OTC      levoFLOXacin 750 MG tablet  Commonly known as: Levaquin   750 mg, Oral, Daily         Changes to Medications      Instructions Start Date   ibuprofen 800 MG tablet  Commonly known as: ADVILMOTRIN  What changed:   · when to take this  · reasons to take this   800 mg, Oral, 2 Times Daily PRN         Continue These Medications      Instructions Start Date   aspirin 81 MG EC tablet   81 mg, Oral, Daily      benzonatate 200 MG capsule  Commonly known as: TESSALON   200 mg, Oral, 3 Times Daily PRN      buPROPion 75 MG tablet  Commonly known as: WELLBUTRIN   75 mg, Oral, Daily      levothyroxine 200 MCG tablet  Commonly known as: SYNTHROID, LEVOTHROID   200 mcg, Oral, Daily      lisinopril-hydrochlorothiazide 20-12.5 MG per tablet  Commonly known as: PRINZIDE,ZESTORETIC   1 tablet, Oral, 2 Times Daily      pantoprazole 40 MG EC tablet  Commonly known as: PROTONIX   40 mg, Oral, Daily      potassium chloride 10 MEQ CR tablet   1 tablet, Oral, Daily      vitamin D 1.25 MG (80288 UT) capsule capsule  Commonly known as: ERGOCALCIFEROL   50,000 Units, Oral, Weekly, MONDAY             Discharge Diet:   Diet Instructions     Diet: Regular/House Diet; Texture: Regular Texture (IDDSI 7); Fluid Consistency: Thin (IDDSI 0)      Discharge Diet: Regular/House Diet    Texture: Regular Texture (IDDSI 7)    Fluid Consistency: Thin (IDDSI 0)          Activity at Discharge:   Activity Instructions     Activity as Tolerated            Follow-up Appointments:   1.  Follow-up with Dr. Vaughn in 1 week.  2.  Follow-up with cardiothoracic surgery in 1-2 weeks  Future Appointments    Date Time Provider Department Center   4/6/2023  7:00 AM  [facility] PULM LAB ROOM 2  [facility] PFT [facility]   4/7/2023  9:30 AM [facility] CT 1  [facility] CAT [facility]   4/11/2023  2:30 PM Srini Obrien MD [facility]   5/2/2023  9:00 AM Juan Vaughn DO [facility]       Test Results Pending at Discharge: Pleural cytology and cultures to be followed by CTS.    Electronically signed by TRESSA Contreras, 04/01/23, 08:56 CDT.    Time: 35 minutes.

## 2023-04-01 NOTE — OUTREACH NOTE
Prep Survey    Flowsheet Row Responses   [facility] patient discharged from? [place]   Is LACE score < 7 ? Yes   Eligibility [facility]   Date of Admission 03/30/23   Date of Discharge 04/01/23   Discharge Disposition Home or Self Care   Discharge diagnosis Syncope and collapse,   Postobstructive pneumonia   Does the patient have one of the following disease processes/diagnoses(primary or secondary)? Pneumonia   Does the patient have Home health ordered? No   Is there a DME ordered? No   Prep survey completed? Yes          Herminia CARVALHO - Registered Nurse

## 2023-04-01 NOTE — PLAN OF CARE
Problem: Adult Inpatient Plan of Care  Goal: Plan of Care Review  Outcome: Ongoing, Progressing  Flowsheets (Taken 4/1/2023 0443)  Progress: no change  Plan of Care Reviewed With: patient  Outcome Evaluation: Patient had no c/o's.  Cough meds given as ordered.  VSS. . LR still infusing.  S 67-78 first degree.  Cont. to monitor.  Call for concerns.  Good UOP.   Goal Outcome Evaluation:  Plan of Care Reviewed With: patient        Progress: no change  Outcome Evaluation: Patient had no c/o's.  Cough meds given as ordered.  VSS. . LR still infusing.  S 67-78 first degree.  Cont. to monitor.  Call for concerns.  Good UOP.

## 2023-04-02 LAB
BACTERIA SPEC RESP CULT: NORMAL
GRAM STN SPEC: NORMAL

## 2023-04-03 ENCOUNTER — TRANSITIONAL CARE MANAGEMENT TELEPHONE ENCOUNTER (OUTPATIENT)
Dept: CALL CENTER | Facility: HOSPITAL | Age: 63
End: 2023-04-03
Payer: COMMERCIAL

## 2023-04-03 NOTE — PAYOR COMM NOTE
"[place] 4-1-23  [id number]      Castillo Trujillo (63 y.o. Male)     Date of Birth   1960    Social Security Number       Address   [address] [place] [address] [place] [address]    Home Phone   [phone]    MRN   [id number]       [facility]    Marital Status                               Admission Date   3/30/23    Admission Type   Emergency    Admitting Provider   Viral Simental MD    Attending Provider       Department, Room/Bed   [facility] 4B, 435/1       Discharge Date   4/1/2023    Discharge Disposition   Home or Self Care    Discharge Destination                               Attending Provider: (none)   Allergies: No Known Allergies    Isolation: None   Infection: None   Code Status: Prior    Ht: 188 cm (74\")   Wt: 93.4 kg (205 lb 12.8 oz)    Admission Cmt: None   Principal Problem: Syncope and collapse [R55]                 Active Insurance as of 3/30/2023     Primary Coverage     Payor Plan Insurance Group Employer/Plan Group    [employer] BLUE [employer]OSS [employer] BLUE CROSS BLUE [employer] PPO [id number]     Payor Plan Address Payor Plan Phone Number Payor Plan Fax Number Effective Dates    [address] [phone]  8/1/2018 - None Entered    [place] [address]       Subscriber Name Subscriber Birth Date Member ID       CASTILLO TRUJILLO 1960 [id number]                 Emergency Contacts      (Rel.) Home Phone Work Phone Mobile Phone    VALENTIN TRUJILLO (Spouse) [phone] -- --               Discharge Summary      Vannesa Yadav APRN at 04/01/23 0856     Attestation signed by Viral Simental MD at 04/01/23 1124    I have reviewed this documentation and agree.                        [facility] Medicine Services  DISCHARGE SUMMARY       Date of Admission: 3/30/2023  Date of Discharge:  4/1/2023  Primary Care Physician: Juan Vaughn DO    Presenting Problem/History of Present Illness:  Shortness of breath    Final " Discharge Diagnoses:  Active Hospital Problems    Diagnosis    • **Syncope and collapse    • Moderate Malnutrition (HCC)    • Right lung mass    • Postobstructive pneumonia    • Tobacco abuse, in remission    • Primary hypertension        Consults: Dr. Kim with cardiothoracic surgery    Procedures Performed:   Ultrasound-guided right thoracentesis on 3/31/2023 by interventional radiology - 1500 ml pleural fluid removed.    Pertinent Test Results:       Imaging Results (All)     Procedure Component Value Units Date/Time    XR Chest 1 View [[id number]] Collected: 03/31/23 1455     Updated: 03/31/23 1503    Narrative:      EXAMINATION: XR CHEST 1 VW-     3/31/2023 2:50 PM CDT     HISTORY: post right thoracentesis; R55-Syncope and collapse; U20-Fjervha  effusion, not elsewhere classified; R91.8-Other nonspecific abnormal  finding of lung field; R07.9-Chest pain, unspecified     1 view chest x-ray.     No pneumothorax status post right thoracentesis.     No residual pleural fluid.     The left lung remains essentially clear.     Underlying chronic interstitial disease.     Known right lung mass.     Normal heart size.     Summary:  1. No pneumothorax status post thoracentesis.  This report was finalized on 03/31/2023 15:00 by Dr. Raman Polo MD.    US Thoracentesis [[id number]] Collected: 03/31/23 1448    Specimen: Body Fluid Updated: 03/31/23 1457    Narrative:      EXAMINATION: US THORACENTESIS-     3/31/2023 1:12 PM CDT     HISTORY: PLEURAL EFFUSION; R55-Syncope and collapse; Z88-Fjmdlci  effusion, not elsewhere classified; R91.8-Other nonspecific abnormal  finding of lung field; R07.9-Chest pain, unspecified     An appropriate time out was performed with the patient and all present  staff members to ensure correct patient and correct site and procedure.  The procedure was discussed with the patient including risks, benefits,  and alternatives.    Consent was given.     Grayscale ultrasound evaluation confirms a  sufficient amount of right  pleural fluid for percutaneous drainage.     A site for percutaneous intercostal access was selected at the right mid  back.  Routine prep, drape, and local anesthesia.     The thoracentesis catheter was placed percutaneously with ultrasound  guidance.     1500 mL's of pleural fluid was removed by gravity drainage.     No complications.  Blood loss = less than 1 cc.     The post procedure chest X-ray shows no pneumothorax.     Summary:  1. Large volume right thoracentesis with ultrasound guidance.  2. No complications.     This report was finalized on 03/31/2023 14:49 by Dr. Raman Polo MD.    US Venous Doppler Lower Extremity Bilateral (duplex) [[id number]] Collected: 03/31/23 1445     Updated: 03/31/23 1448    Narrative:      History: Swelling       Impression:      Impression: There is no evidence of deep venous thrombosis or  superficial thrombophlebitis of right or left lower extremities.     Comments: Bilateral lower extremity venous duplex exam was performed  using color Doppler flow, Doppler waveform analysis, and grayscale  imaging, with and without compression. There is no evidence of deep  venous thrombosis in the common femoral, superficial femoral, popliteal,  peroneal, anterior tibial, and posterior tibial veins bilaterally. No  thrombus is identified in the saphenofemoral junctions and greater  saphenous veins bilaterally.         This report was finalized on 03/31/2023 14:45 by Dr. Phoenix Leyva MD.    CT Angiogram Chest [[id number]] Collected: 03/30/23 1116     Updated: 03/30/23 1123    Narrative:      EXAMINATION: CT ANGIOGRAM CHEST-      3/30/2023 10:57 AM CDT     HISTORY: Pulmonary embolism (PE) suspected, unknown D-dimer. Right-sided  chest pain. Recent diagnosis of lung cancer. Dizziness.     In order to have a CT radiation dose as low as reasonably achievable  Automated Exposure Control was utilized for adjustment of the mA and/or  KV according to patient  size.     DLP in mGycm= 225.     CT angiogram chest with IV contrast.  CT angiography protocol.   CT imaging with bolus IV contrast injection.   Under concurrent supervision axial, sagittal, coronal,  three-dimensional, and MIP data sets were constructed on an independent  work station.     Normal heart size.  Normal thoracic aorta with no aneurysm or dissection.     Normally opacified pulmonary arteries.  No pulmonary embolism.     Masslike consolidation of the anterior right upper lung. This was  previously evaluated by PET/CT.     Moderate right pleural effusion.     No pneumothorax or heart failure.     The left lung is clear.     Summary:  1. No pulmonary embolism.  2. Known right upper lobe mass.  3. Moderate right pleural effusion.  4. No pneumothorax.                                   This report was finalized on 03/30/2023 11:20 by Dr. Raman Polo MD.    XR Chest 1 View [[id number]] Collected: 03/30/23 0938     Updated: 03/30/23 0944    Narrative:      EXAMINATION: XR CHEST 1 VW-     3/30/2023 9:33 AM CDT     HISTORY: Chest pain.     1 view chest x-ray.     Comparison is made with a PET CT from 8 days ago.     Normal heart size.     The left lung is clear.     Anterior right lower lobe mass lesion seen as an ill-defined opacity on  the chest x-ray.     Small amount of right pleural fluid persists.     No pneumothorax.     Summary:  1. Known right lung mass.  2. Small pleural effusion persists.  3. Superimposed pneumonia around the mass cannot be ruled out on this  study. A chest CT is planned.     This report was finalized on 03/30/2023 09:41 by Dr. Raman Polo MD.        LAB RESULTS:      Lab 04/01/23  0505 03/31/23  0928 03/31/23  0357 03/30/23  0946 03/30/23  0908   WBC 16.43*  --  19.85*  --  20.94*   HEMOGLOBIN 10.2*  --  11.1*  --  11.2*   HEMATOCRIT 34.7*  --  36.7*  --  37.8   PLATELETS 515*  --  535*  --  644*   NEUTROS ABS  --   --  17.28*  --  17.35*   IMMATURE GRANS (ABS)  --   --  0.16*  --   0.16*   LYMPHS ABS  --   --  0.95  --  1.89   MONOS ABS  --   --  1.34*  --  1.32*   EOS ABS  --   --  0.08  --  0.14   MCV 83.0  --  83.0  --  83.8   PROCALCITONIN  --   --  0.45*  --  0.13   LACTATE  --   --   --  1.5  --    PROTIME  --  15.2*  --   --   --    APTT  --  36.5*  --   --   --          Lab 04/01/23  0505 03/31/23  0357 03/30/23  0908   SODIUM 133* 134* 132*   POTASSIUM 3.6 3.9 4.0   CHLORIDE 97* 98 95*   CO2 27.0 25.0 24.0   ANION GAP 9.0 11.0 13.0   BUN 10 9 8   CREATININE 0.61* 0.59* 0.78   EGFR 106.6 107.7 99.0   GLUCOSE 102* 98 127*   CALCIUM 9.0 9.5 9.5         Lab 03/31/23  0357 03/30/23  0908   TOTAL PROTEIN 6.9 7.2   ALBUMIN 3.1* 3.3*   GLOBULIN 3.8 3.9   ALT (SGPT) 18 27   AST (SGOT) 9 18   BILIRUBIN 0.4 0.5   ALK PHOS 224* 262*         Lab 03/31/23  0928 03/30/23  1205 03/30/23  0908   HSTROP T  --  <6 <6   PROTIME 15.2*  --   --    INR 1.18*  --   --                  Brief Urine Lab Results     None        Microbiology Results (last 10 days)     Procedure Component Value - Date/Time    Respiratory Culture - Sputum, Cough [[id number]] Collected: 03/30/23 8544    Lab Status: Preliminary result Specimen: Sputum from Cough Updated: 03/31/23 0704     Gram Stain Many (4+) WBCs per low power field      Few (2+) Epithelial cells per low power field      Few (2+) Mixed gram positive lamar      Few (2+) Gram negative bacilli    Blood Culture - Blood, Arm, Right [[id number]]  (Normal) Collected: 03/30/23 0946    Lab Status: Preliminary result Specimen: Blood from Arm, Right Updated: 03/31/23 1001     Blood Culture No growth at 24 hours    Blood Culture - Blood, Arm, Left [[id number]]  (Normal) Collected: 03/30/23 0946    Lab Status: Preliminary result Specimen: Blood from Arm, Left Updated: 03/31/23 1001     Blood Culture No growth at 24 hours          Hospital Course:   Mr. Trujillo is a 63-year-old male that presented to [facility] on 3/30 for severe shortness of breath and right-sided chest  pain.  Productive cough with green phlegm.  The ER, he had a syncopal episode along with nausea and vomiting.  CT angiogram of the chest shows no pulmonary embolism, known right pleural effusion and right upper lobe mass with concern for suspected postobstructive pneumonia.  Former chronic tobacco use (quit in November 2022), recent history of hemoptysis and weight loss of about 43 pounds since fall last year.  He has had work-up by his primary care provider as right upper lobe opacity identified on chest x-ray.  CT scan showed right upper lobe and middle lobe masslike infiltrate.  He had a PET scan on March 22 that showed a hypermetabolic right middle and upper lobe mass with mildly enlarged hypermetabolic right hilar and subcarinal mediastinal lymph nodes, small right pleural effusion, area of abnormality along the ascending colon and indeterminate left adrenal gland nodule with mild hypermetabolic activity.  A needle biopsy of the lung mass has been scheduled for 4/7.    Cardiothoracic surgery consulted.  Ultrasound-guided right thoracentesis on 3/31/2023 by interventional radiology - 1500 ml pleural fluid removed. May need to proceed with previously ordered needle biopsy of lung mass if thoracentesis nondiagnostic.  He is okay for discharge from cardiothoracic surgery standpoint with close follow-up as outpatient for cytology/culture follow-up.    Suspected postobstructive pneumonia on Levaquin.  He received 30 mL/kilogram fluid bolus in ED.  WBC 20 with follow-up downtrending at 16.  Procalcitonin 0.45.  Lactate 1.5.  Blood culture with no growth to date.  He is on room air.  Productive cough. Continue efforts at pulmonary toilet.     Venous duplex bilateral lower extremities preliminary negative for thrombus.    States breathing feels much improved after thoracentesis.  He is very eager for discharge home.  He has reached maximum benefit of hospitalization.  He is medically stable and appropriate for discharge  "today.    Physical Exam on Discharge:  /71 (BP Location: Right arm, Patient Position: Lying)   Pulse 74   Temp 98 °F (36.7 °C) (Oral)   Resp 18   Ht 188 cm (74\")   Wt 93.4 kg (205 lb 12.8 oz)   SpO2 93%   BMI 26.42 kg/m²   Physical Exam  Vitals reviewed.   Constitutional:       General: He is not in acute distress.     Appearance: He is not toxic-appearing.      Comments: Up in bed.  No acute distress.  On room air.  Family member at bedside.  HENT:      Head: Normocephalic and atraumatic.      Mouth/Throat:      Mouth: Mucous membranes are moist.      Pharynx: Oropharynx is clear.   Eyes:      Extraocular Movements: Extraocular movements intact.      Conjunctiva/sclera: Conjunctivae normal.      Pupils: Pupils are equal, round, and reactive to light.   Cardiovascular:      Rate and Rhythm: Normal rate and regular rhythm.      Pulses: Normal pulses.   Pulmonary:      Effort: Pulmonary effort is normal. No respiratory distress.      Breath sounds: Decreased breath sounds present.   Abdominal:      General: Bowel sounds are normal. There is no distension.      Palpations: Abdomen is soft.      Tenderness: There is no abdominal tenderness.   Musculoskeletal:         General: No swelling or tenderness. Normal range of motion.      Cervical back: Normal range of motion and neck supple. No muscular tenderness.   Skin:     General: Skin is warm and dry.      Findings: No erythema or rash.   Neurological:      General: No focal deficit present.      Mental Status: He is alert and oriented to person, place, and time.      Cranial Nerves: No cranial nerve deficit.      Motor: No weakness.   Psychiatric:         Mood and Affect: Mood normal.         Behavior: Behavior normal.     Condition on Discharge: Medically stable.    Discharge Disposition:  Home or Self Care    Discharge Medications:     Discharge Medications      New Medications      Instructions Start Date   guaiFENesin 600 MG 12 hr tablet  Commonly known " as: MUCINEX   1,200 mg, Oral, Every 12 Hours Scheduled, Obtain OTC      levoFLOXacin 750 MG tablet  Commonly known as: Levaquin   750 mg, Oral, Daily         Changes to Medications      Instructions Start Date   ibuprofen 800 MG tablet  Commonly known as: ADVIL,MOTRIN  What changed:   · when to take this  · reasons to take this   800 mg, Oral, 2 Times Daily PRN         Continue These Medications      Instructions Start Date   aspirin 81 MG EC tablet   81 mg, Oral, Daily      benzonatate 200 MG capsule  Commonly known as: TESSALON   200 mg, Oral, 3 Times Daily PRN      buPROPion 75 MG tablet  Commonly known as: WELLBUTRIN   75 mg, Oral, Daily      levothyroxine 200 MCG tablet  Commonly known as: SYNTHROID, LEVOTHROID   200 mcg, Oral, Daily      lisinopril-hydrochlorothiazide 20-12.5 MG per tablet  Commonly known as: PRINZIDE,ZESTORETIC   1 tablet, Oral, 2 Times Daily      pantoprazole 40 MG EC tablet  Commonly known as: PROTONIX   40 mg, Oral, Daily      potassium chloride 10 MEQ CR tablet   1 tablet, Oral, Daily      vitamin D 1.25 MG (61076 UT) capsule capsule  Commonly known as: ERGOCALCIFEROL   50,000 Units, Oral, Weekly, MONDAY             Discharge Diet:   Diet Instructions     Diet: Regular/House Diet; Texture: Regular Texture (IDDSI 7); Fluid Consistency: Thin (IDDSI 0)      Discharge Diet: Regular/House Diet    Texture: Regular Texture (IDDSI 7)    Fluid Consistency: Thin (IDDSI 0)          Activity at Discharge:   Activity Instructions     Activity as Tolerated            Follow-up Appointments:   1.  Follow-up with Dr. Vaughn in 1 week.  2.  Follow-up with cardiothoracic surgery in 1-2 weeks  Future Appointments   Date Time Provider Department Center   4/6/2023  7:00 AM  [facility] PULM LAB ROOM 2  [facility] PFT [facility]   4/7/2023  9:30 AM P[facility] CT 1  [facility] CAT [facility]   4/11/2023  2:30 PM Srini Obrien MD [facility]   5/2/2023  9:00 AM Juan Vaughn DO [facility]       Test Results Pending at  Discharge: Pleural cytology and cultures to be followed by CTS.    Electronically signed by TRESSA Contreras, 04/01/23, 08:56 CDT.    Time: 35 minutes.           Electronically signed by Viral Simental MD at 04/01/23 1124

## 2023-04-03 NOTE — OUTREACH NOTE
Call Center TCM Note    Flowsheet Row Responses   [facility] patient discharged from? [place]   Does the patient have one of the following disease processes/diagnoses(primary or secondary)? Pneumonia   TCM attempt successful? No   Unsuccessful attempts Attempt 1          Yanet Mohan LPN    4/3/2023, 15:10 CDT

## 2023-04-03 NOTE — OUTREACH NOTE
"Call Center TCM Note    Flowsheet Row Responses   [facility] patient discharged from? [place]   Does the patient have one of the following disease processes/diagnoses(primary or secondary)? Pneumonia   TCM attempt successful? Yes   Call start time 1640   Call end time 1643   Discharge diagnosis Syncope and collapse,   Postobstructive pneumonia   Meds reviewed with patient/caregiver? Yes   Is the patient having any side effects they believe may be caused by any medication additions or changes? No   Does the patient have all medications ordered at discharge? Yes   Is the patient taking all medications as directed (includes completed medication regime)? Yes   Comments Has appt with PCP on 4/6   Does the patient have an appointment with their PCP within 7 days of discharge? Yes   Pulse Ox monitoring None   Psychosocial issues? No   Did the patient receive a copy of their discharge instructions? Yes   Nursing interventions Reviewed instructions with patient   What is the patient's perception of their health status since discharge? Improving   Nursing Interventions Nurse provided patient education   If the patient is a current smoker, are they able to teach back resources for cessation? Not a smoker   Is the patient/caregiver able to teach back the hierarchy of who to call/visit for symptoms/problems? PCP, Specialist, Home health nurse, Urgent Care, ED, 911 Yes   Additional teach back comments States he is doing \"pretty good\".  \"I'm doing a whole lot better\".    Is the patient/caregiver able to teach back signs and symptoms of worsening condition: Fever/chills, Shortness of breath, Chest pain   Is the patient/caregiver able to teach back importance of completing antibiotic course of treatment? Yes   TCM call completed? Yes   Call end time 1643          Yanet Mohan LPN    4/3/2023, 16:44 CDT      "

## 2023-04-04 ENCOUNTER — TELEPHONE (OUTPATIENT)
Dept: PULMONOLOGY | Facility: CLINIC | Age: 63
End: 2023-04-04
Payer: COMMERCIAL

## 2023-04-04 LAB
BACTERIA SPEC AEROBE CULT: NORMAL
BACTERIA SPEC AEROBE CULT: NORMAL

## 2023-04-04 NOTE — TELEPHONE ENCOUNTER
Partial NP referral received 03/10/23.  Faxed back x2  to request additional information with no response returned to schedule.  Fax entry was closed.

## 2023-04-05 LAB
BACTERIA SPEC ANAEROBE CULT: NORMAL
CYTO UR: NORMAL
LAB AP CASE REPORT: NORMAL
Lab: NORMAL
PATH REPORT.ADDENDUM SPEC: NORMAL
PATH REPORT.FINAL DX SPEC: NORMAL
PATH REPORT.GROSS SPEC: NORMAL

## 2023-04-06 ENCOUNTER — HOSPITAL ENCOUNTER (OUTPATIENT)
Dept: PULMONOLOGY | Facility: HOSPITAL | Age: 63
Discharge: HOME | End: 2023-04-06
Admitting: INTERNAL MEDICINE
Payer: COMMERCIAL

## 2023-04-06 ENCOUNTER — OFFICE VISIT (OUTPATIENT)
Dept: INTERNAL MEDICINE | Facility: CLINIC | Age: 63
End: 2023-04-06
Payer: COMMERCIAL

## 2023-04-06 VITALS
HEIGHT: 74 IN | BODY MASS INDEX: 26.1 KG/M2 | DIASTOLIC BLOOD PRESSURE: 58 MMHG | TEMPERATURE: 96.9 F | WEIGHT: 203.4 LBS | OXYGEN SATURATION: 97 % | RESPIRATION RATE: 16 BRPM | HEART RATE: 67 BPM | SYSTOLIC BLOOD PRESSURE: 136 MMHG

## 2023-04-06 DIAGNOSIS — R91.8 MASS OF MIDDLE LOBE OF RIGHT LUNG: ICD-10-CM

## 2023-04-06 DIAGNOSIS — J90 PLEURAL EFFUSION: ICD-10-CM

## 2023-04-06 DIAGNOSIS — J18.9 POSTOBSTRUCTIVE PNEUMONIA: ICD-10-CM

## 2023-04-06 DIAGNOSIS — Z87.891 FORMER SMOKER: ICD-10-CM

## 2023-04-06 DIAGNOSIS — R91.8 MASS OF RIGHT LUNG: Primary | ICD-10-CM

## 2023-04-06 DIAGNOSIS — E87.1 HYPONATREMIA: ICD-10-CM

## 2023-04-06 LAB
BACTERIA FLD CULT: NORMAL
GRAM STN SPEC: NORMAL
GRAM STN SPEC: NORMAL
QT INTERVAL: 386 MS
QTC INTERVAL: 419 MS

## 2023-04-06 PROCEDURE — 94060 EVALUATION OF WHEEZING: CPT | Performed by: INTERNAL MEDICINE

## 2023-04-06 PROCEDURE — 94729 DIFFUSING CAPACITY: CPT | Performed by: INTERNAL MEDICINE

## 2023-04-06 PROCEDURE — 94060 EVALUATION OF WHEEZING: CPT

## 2023-04-06 PROCEDURE — 94726 PLETHYSMOGRAPHY LUNG VOLUMES: CPT | Performed by: INTERNAL MEDICINE

## 2023-04-06 PROCEDURE — 94729 DIFFUSING CAPACITY: CPT

## 2023-04-06 PROCEDURE — 94726 PLETHYSMOGRAPHY LUNG VOLUMES: CPT

## 2023-04-06 RX ORDER — LISINOPRIL 20 MG/1
20 TABLET ORAL DAILY
COMMUNITY
End: 2023-04-06 | Stop reason: SDUPTHER

## 2023-04-06 RX ORDER — LISINOPRIL 10 MG/1
10 TABLET ORAL DAILY
COMMUNITY

## 2023-04-06 RX ORDER — ALBUTEROL SULFATE 2.5 MG/3ML
2.5 SOLUTION RESPIRATORY (INHALATION) ONCE
Status: COMPLETED | OUTPATIENT
Start: 2023-04-06 | End: 2023-04-06

## 2023-04-06 RX ADMIN — ALBUTEROL SULFATE 2.5 MG: 2.5 SOLUTION RESPIRATORY (INHALATION) at 07:24

## 2023-04-06 NOTE — PROGRESS NOTES
Transitional Care Follow Up Visit  Subjective     Castillo Trujillo is a 63 y.o. male who presents for a transitional care management visit.    Within 48 business hours after discharge our office contacted him via telephone to coordinate his care and needs.      I reviewed and discussed the details of that call along with the discharge summary, hospital problems, inpatient lab results, inpatient diagnostic studies, and consultation reports with Castillo.     Current outpatient and discharge medications have been reconciled for the patient.  Reviewed by: TRESSA Pitt      Date of TCM Phone Call 4/1/2023   [facility]   Date of Admission 3/30/2023   Date of Discharge 4/1/2023   Discharge Disposition Home or Self Care     Risk for Readmission (LACE) Score: 5 (4/1/2023  5:01 AM)      History of Present Illness   Course During Hospital Stay:  Mr. Trujillo was recently admitted for severe shortness of breath and right sided chest pain. He was found to have pleural effusion and thoracentesis was performed, 1500ml of fluid was removed. His history is significant for smoking, weight loss, and hemoptysis. He recently underwent PET in March that showed right lobe mass. He was diagnosed with postobstructive pneumonia and treated with Levaquin. Blood cultures were negative. A needle biopsy of the lung mass is scheduled on 4/7.      The following portions of the patient's history were reviewed and updated as appropriate: problem list.    Review of Systems   Constitutional: Negative for fatigue and fever.   Respiratory: Negative.    Cardiovascular: Negative.    Gastrointestinal: Negative.    Psychiatric/Behavioral: Negative.        Objective   Physical Exam  Constitutional:       General: He is not in acute distress.  Cardiovascular:      Rate and Rhythm: Normal rate and regular rhythm.      Heart sounds: Normal heart sounds.   Pulmonary:      Effort: Pulmonary effort is normal.      Breath sounds: No  wheezing, rhonchi or rales.      Comments: Decreased, worse right middle to lower  Psychiatric:         Mood and Affect: Mood normal.         Behavior: Behavior normal.         Thought Content: Thought content normal.         Judgment: Judgment normal.       Anaerobic Culture - Pleural Fluid, Pleural Cavity (03/31/2023 13:59)   Body Fluid Culture - Body Fluid, Pleural Cavity (03/31/2023 13:59)   CBC (No Diff) (04/01/2023 05:05)   Basic Metabolic Panel (04/01/2023 05:05)   Non-gynecologic Cytology (03/31/2023 13:59)   XR Chest 1 View (03/30/2023 09:33)   US Venous Doppler Lower Extremity Bilateral (duplex) (03/31/2023 12:54)   CT Angiogram Chest (03/30/2023 11:06)   XR Chest 1 View (03/31/2023 14:50)     Assessment & Plan   Diagnoses and all orders for this visit:    1. Right lung mass (Primary)  2. Postobstructive pneumonia  3. Pleural effusion  Symptoms significantly improved since thoracentesis. Pleural fluid was negative for malignancy. His last dose of levaquin is today. Will await biopsy results of lung mass to determine next steps.      4. Hyponatremia   Mild hyponatremia noted on recent labs. He says he drinks a lot of fluids. Discussed avoiding overhydration due to this. He is asymptomatic.

## 2023-04-07 ENCOUNTER — HOSPITAL ENCOUNTER (OUTPATIENT)
Dept: GENERAL RADIOLOGY | Facility: HOSPITAL | Age: 63
Discharge: HOME | End: 2023-04-07
Payer: COMMERCIAL

## 2023-04-07 ENCOUNTER — HOSPITAL ENCOUNTER (OUTPATIENT)
Dept: CT IMAGING | Facility: HOSPITAL | Age: 63
Discharge: HOME | End: 2023-04-07
Payer: COMMERCIAL

## 2023-04-07 VITALS
BODY MASS INDEX: 25.98 KG/M2 | SYSTOLIC BLOOD PRESSURE: 150 MMHG | HEART RATE: 75 BPM | HEIGHT: 74 IN | OXYGEN SATURATION: 95 % | TEMPERATURE: 97.8 F | WEIGHT: 202.4 LBS | DIASTOLIC BLOOD PRESSURE: 69 MMHG | RESPIRATION RATE: 19 BRPM

## 2023-04-07 DIAGNOSIS — R91.8 MASS OF MIDDLE LOBE OF RIGHT LUNG: ICD-10-CM

## 2023-04-07 LAB
APTT PPP: 39.1 SECONDS (ref 24.1–35)
INR PPP: 1.06 (ref 0.91–1.09)
PLATELET # BLD AUTO: 684 10*3/MM3 (ref 140–450)
PROTHROMBIN TIME: 13.9 SECONDS (ref 11.8–14.8)

## 2023-04-07 PROCEDURE — 85049 AUTOMATED PLATELET COUNT: CPT | Performed by: RADIOLOGY

## 2023-04-07 PROCEDURE — 85610 PROTHROMBIN TIME: CPT | Performed by: RADIOLOGY

## 2023-04-07 PROCEDURE — 25010000002 FENTANYL CITRATE (PF) 50 MCG/ML SOLUTION: Performed by: RADIOLOGY

## 2023-04-07 PROCEDURE — 71045 X-RAY EXAM CHEST 1 VIEW: CPT

## 2023-04-07 PROCEDURE — 25010000002 MIDAZOLAM PER 1 MG: Performed by: RADIOLOGY

## 2023-04-07 PROCEDURE — 85730 THROMBOPLASTIN TIME PARTIAL: CPT | Performed by: RADIOLOGY

## 2023-04-07 RX ORDER — FENTANYL CITRATE 50 UG/ML
INJECTION, SOLUTION INTRAMUSCULAR; INTRAVENOUS
Status: COMPLETED | OUTPATIENT
Start: 2023-04-07 | End: 2023-04-07

## 2023-04-07 RX ORDER — MIDAZOLAM HYDROCHLORIDE 1 MG/ML
INJECTION INTRAMUSCULAR; INTRAVENOUS
Status: COMPLETED | OUTPATIENT
Start: 2023-04-07 | End: 2023-04-07

## 2023-04-07 RX ORDER — SODIUM CHLORIDE 9 MG/ML
40 INJECTION, SOLUTION INTRAVENOUS AS NEEDED
Status: DISCONTINUED | OUTPATIENT
Start: 2023-04-07 | End: 2023-04-08 | Stop reason: HOSPADM

## 2023-04-07 RX ORDER — SODIUM CHLORIDE 0.9 % (FLUSH) 0.9 %
3 SYRINGE (ML) INJECTION EVERY 12 HOURS SCHEDULED
Status: DISCONTINUED | OUTPATIENT
Start: 2023-04-07 | End: 2023-04-08 | Stop reason: HOSPADM

## 2023-04-07 RX ORDER — SODIUM CHLORIDE 0.9 % (FLUSH) 0.9 %
10 SYRINGE (ML) INJECTION AS NEEDED
Status: DISCONTINUED | OUTPATIENT
Start: 2023-04-07 | End: 2023-04-08 | Stop reason: HOSPADM

## 2023-04-07 RX ADMIN — FENTANYL CITRATE 25 MCG: 50 INJECTION, SOLUTION INTRAMUSCULAR; INTRAVENOUS at 11:13

## 2023-04-07 RX ADMIN — MIDAZOLAM 1 MG: 1 INJECTION INTRAMUSCULAR; INTRAVENOUS at 11:12

## 2023-04-07 NOTE — NURSING NOTE
Patient checked in for Lung Bx. Reports taking Asprin 81 mg today. Called Radiologist to inquire before proceeding with pre work. Verbal ok to proceed to get patient ready continue with procedure.

## 2023-04-10 PROBLEM — J44.9 STAGE 2 MODERATE COPD BY GOLD CLASSIFICATION: Status: ACTIVE | Noted: 2023-04-10

## 2023-04-14 ENCOUNTER — TELEPHONE (OUTPATIENT)
Dept: INTERNAL MEDICINE | Facility: CLINIC | Age: 63
End: 2023-04-14
Payer: COMMERCIAL

## 2023-04-14 NOTE — TELEPHONE ENCOUNTER
It remains in progress without a final result yet. I expect it will be back next week, but can be a total of 1-2 weeks typically. We'll be in touch when we have results.

## 2023-04-17 ENCOUNTER — TELEPHONE (OUTPATIENT)
Dept: CT IMAGING | Facility: HOSPITAL | Age: 63
End: 2023-04-17
Payer: COMMERCIAL

## 2023-04-19 ENCOUNTER — OFFICE VISIT (OUTPATIENT)
Dept: CARDIAC SURGERY | Facility: CLINIC | Age: 63
End: 2023-04-19
Payer: COMMERCIAL

## 2023-04-19 VITALS
DIASTOLIC BLOOD PRESSURE: 80 MMHG | OXYGEN SATURATION: 95 % | SYSTOLIC BLOOD PRESSURE: 168 MMHG | WEIGHT: 201.6 LBS | HEART RATE: 87 BPM | BODY MASS INDEX: 25.87 KG/M2 | HEIGHT: 74 IN

## 2023-04-19 DIAGNOSIS — R91.8 MASS OF RIGHT LUNG: Primary | ICD-10-CM

## 2023-04-19 LAB
QT INTERVAL: 382 MS
QTC INTERVAL: 426 MS

## 2023-04-25 ENCOUNTER — TELEPHONE (OUTPATIENT)
Dept: INTERNAL MEDICINE | Facility: CLINIC | Age: 63
End: 2023-04-25
Payer: COMMERCIAL

## 2023-04-25 NOTE — TELEPHONE ENCOUNTER
Spoke to patient per Dr Vaughn let the patient know that Dr Kim is going to call the patient and discuss the results with them. Patient voice understanding    chlorhexidine

## 2023-04-26 LAB
BEAKER LAB AP INTRAOPERATIVE CONSULTATION: NORMAL
CYTO UR: NORMAL
LAB AP CASE REPORT: NORMAL
LAB AP CLINICAL INFORMATION: NORMAL
Lab: NORMAL
PATH REPORT.FINAL DX SPEC: NORMAL
PATH REPORT.GROSS SPEC: NORMAL

## 2023-04-27 ENCOUNTER — TELEPHONE (OUTPATIENT)
Dept: CARDIAC SURGERY | Facility: CLINIC | Age: 63
End: 2023-04-27
Payer: COMMERCIAL

## 2023-04-27 DIAGNOSIS — R91.8 MASS OF RIGHT LUNG: Primary | ICD-10-CM

## 2023-04-27 NOTE — TELEPHONE ENCOUNTER
Dr. Kim discussed pathology results with Dr. Jackson.  Ultimately, favor repeating needle biopsy to ensure accurate diagnosis.  Called to discuss with patient and his wife.  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  They are agreeable to proceed with repeat needle biopsy.  I have ordered this.  Please schedule and notify patient.

## 2023-05-02 ENCOUNTER — HOSPITAL ENCOUNTER (OUTPATIENT)
Dept: GENERAL RADIOLOGY | Facility: HOSPITAL | Age: 63
Discharge: HOME | End: 2023-05-02
Payer: COMMERCIAL

## 2023-05-02 ENCOUNTER — LAB (OUTPATIENT)
Dept: LAB | Facility: HOSPITAL | Age: 63
End: 2023-05-02
Payer: COMMERCIAL

## 2023-05-02 ENCOUNTER — OFFICE VISIT (OUTPATIENT)
Dept: INTERNAL MEDICINE | Facility: CLINIC | Age: 63
End: 2023-05-02
Payer: MEDICARE

## 2023-05-02 VITALS
OXYGEN SATURATION: 98 % | BODY MASS INDEX: 25.98 KG/M2 | RESPIRATION RATE: 16 BRPM | HEART RATE: 78 BPM | DIASTOLIC BLOOD PRESSURE: 80 MMHG | TEMPERATURE: 97.6 F | SYSTOLIC BLOOD PRESSURE: 160 MMHG | WEIGHT: 202.4 LBS | HEIGHT: 74 IN

## 2023-05-02 DIAGNOSIS — R91.8 MASS OF RIGHT LUNG: ICD-10-CM

## 2023-05-02 DIAGNOSIS — K21.9 GASTROESOPHAGEAL REFLUX DISEASE, UNSPECIFIED WHETHER ESOPHAGITIS PRESENT: ICD-10-CM

## 2023-05-02 DIAGNOSIS — E03.9 ACQUIRED HYPOTHYROIDISM: ICD-10-CM

## 2023-05-02 DIAGNOSIS — R06.09 DYSPNEA ON EXERTION: ICD-10-CM

## 2023-05-02 DIAGNOSIS — R91.8 MASS OF RIGHT LUNG: Primary | ICD-10-CM

## 2023-05-02 DIAGNOSIS — J44.9 STAGE 2 MODERATE COPD BY GOLD CLASSIFICATION: ICD-10-CM

## 2023-05-02 DIAGNOSIS — Z00.01 ANNUAL VISIT FOR GENERAL ADULT MEDICAL EXAMINATION WITH ABNORMAL FINDINGS: ICD-10-CM

## 2023-05-02 DIAGNOSIS — I10 PRIMARY HYPERTENSION: ICD-10-CM

## 2023-05-02 PROBLEM — J18.9 POSTOBSTRUCTIVE PNEUMONIA: Status: RESOLVED | Noted: 2023-03-31 | Resolved: 2023-05-02

## 2023-05-02 LAB
25(OH)D3 SERPL-MCNC: 25.8 NG/ML (ref 30–100)
ALBUMIN SERPL-MCNC: 3.2 G/DL (ref 3.5–5.2)
ALBUMIN/GLOB SERPL: 0.8 G/DL
ALP SERPL-CCNC: 192 U/L (ref 39–117)
ALT SERPL W P-5'-P-CCNC: 17 U/L (ref 1–41)
ANION GAP SERPL CALCULATED.3IONS-SCNC: 11.7 MMOL/L (ref 5–15)
AST SERPL-CCNC: 13 U/L (ref 1–40)
BASOPHILS # BLD AUTO: 0.06 10*3/MM3 (ref 0–0.2)
BASOPHILS NFR BLD AUTO: 0.7 % (ref 0–1.5)
BILIRUB SERPL-MCNC: 0.2 MG/DL (ref 0–1.2)
BUN SERPL-MCNC: 8 MG/DL (ref 8–23)
BUN/CREAT SERPL: 9.2 (ref 7–25)
CALCIUM SPEC-SCNC: 9.5 MG/DL (ref 8.6–10.5)
CHLORIDE SERPL-SCNC: 101 MMOL/L (ref 98–107)
CHOLEST SERPL-MCNC: 139 MG/DL (ref 0–200)
CO2 SERPL-SCNC: 26.3 MMOL/L (ref 22–29)
CREAT SERPL-MCNC: 0.87 MG/DL (ref 0.76–1.27)
DEPRECATED RDW RBC AUTO: 41.4 FL (ref 37–54)
EGFRCR SERPLBLD CKD-EPI 2021: 95.8 ML/MIN/1.73
EOSINOPHIL # BLD AUTO: 0.14 10*3/MM3 (ref 0–0.4)
EOSINOPHIL NFR BLD AUTO: 1.5 % (ref 0.3–6.2)
ERYTHROCYTE [DISTWIDTH] IN BLOOD BY AUTOMATED COUNT: 14.7 % (ref 12.3–15.4)
GLOBULIN UR ELPH-MCNC: 4.1 GM/DL
GLUCOSE SERPL-MCNC: 103 MG/DL (ref 65–99)
HBA1C MFR BLD: 5.5 % (ref 4.8–5.6)
HCT VFR BLD AUTO: 33.1 % (ref 37.5–51)
HCV AB SER DONR QL: NORMAL
HDLC SERPL-MCNC: 53 MG/DL (ref 40–60)
HGB BLD-MCNC: 10.1 G/DL (ref 13–17.7)
IMM GRANULOCYTES # BLD AUTO: 0.03 10*3/MM3 (ref 0–0.05)
IMM GRANULOCYTES NFR BLD AUTO: 0.3 % (ref 0–0.5)
LDLC SERPL CALC-MCNC: 76 MG/DL (ref 0–100)
LDLC/HDLC SERPL: 1.47 {RATIO}
LYMPHOCYTES # BLD AUTO: 1.42 10*3/MM3 (ref 0.7–3.1)
LYMPHOCYTES NFR BLD AUTO: 15.4 % (ref 19.6–45.3)
MCH RBC QN AUTO: 24 PG (ref 26.6–33)
MCHC RBC AUTO-ENTMCNC: 30.5 G/DL (ref 31.5–35.7)
MCV RBC AUTO: 78.6 FL (ref 79–97)
MONOCYTES # BLD AUTO: 0.85 10*3/MM3 (ref 0.1–0.9)
MONOCYTES NFR BLD AUTO: 9.2 % (ref 5–12)
NEUTROPHILS NFR BLD AUTO: 6.7 10*3/MM3 (ref 1.7–7)
NEUTROPHILS NFR BLD AUTO: 72.9 % (ref 42.7–76)
NRBC BLD AUTO-RTO: 0 /100 WBC (ref 0–0.2)
PLATELET # BLD AUTO: 502 10*3/MM3 (ref 140–450)
PMV BLD AUTO: 9.4 FL (ref 6–12)
POTASSIUM SERPL-SCNC: 3.6 MMOL/L (ref 3.5–5.2)
PROT SERPL-MCNC: 7.3 G/DL (ref 6–8.5)
RBC # BLD AUTO: 4.21 10*6/MM3 (ref 4.14–5.8)
SODIUM SERPL-SCNC: 139 MMOL/L (ref 136–145)
TRIGL SERPL-MCNC: 40 MG/DL (ref 0–150)
TSH SERPL DL<=0.05 MIU/L-ACNC: 5.24 UIU/ML (ref 0.27–4.2)
VLDLC SERPL-MCNC: 10 MG/DL (ref 5–40)
WBC NRBC COR # BLD: 9.2 10*3/MM3 (ref 3.4–10.8)

## 2023-05-02 PROCEDURE — 3077F SYST BP >= 140 MM HG: CPT | Performed by: INTERNAL MEDICINE

## 2023-05-02 PROCEDURE — 1159F MED LIST DOCD IN RCRD: CPT | Performed by: INTERNAL MEDICINE

## 2023-05-02 PROCEDURE — 3079F DIAST BP 80-89 MM HG: CPT | Performed by: INTERNAL MEDICINE

## 2023-05-02 PROCEDURE — 83540 ASSAY OF IRON: CPT | Performed by: INTERNAL MEDICINE

## 2023-05-02 PROCEDURE — 36415 COLL VENOUS BLD VENIPUNCTURE: CPT

## 2023-05-02 PROCEDURE — 86803 HEPATITIS C AB TEST: CPT

## 2023-05-02 PROCEDURE — 1160F RVW MEDS BY RX/DR IN RCRD: CPT | Performed by: INTERNAL MEDICINE

## 2023-05-02 PROCEDURE — 99214 OFFICE O/P EST MOD 30 MIN: CPT | Performed by: INTERNAL MEDICINE

## 2023-05-02 PROCEDURE — 82728 ASSAY OF FERRITIN: CPT | Performed by: INTERNAL MEDICINE

## 2023-05-02 PROCEDURE — 71046 X-RAY EXAM CHEST 2 VIEWS: CPT

## 2023-05-02 PROCEDURE — 84466 ASSAY OF TRANSFERRIN: CPT | Performed by: INTERNAL MEDICINE

## 2023-05-02 RX ORDER — TIOTROPIUM BROMIDE AND OLODATEROL 3.124; 2.736 UG/1; UG/1
2 SPRAY, METERED RESPIRATORY (INHALATION)
Qty: 4 G | Refills: 3 | Status: SHIPPED | OUTPATIENT
Start: 2023-05-02

## 2023-05-02 RX ORDER — PANTOPRAZOLE SODIUM 40 MG/1
40 TABLET, DELAYED RELEASE ORAL DAILY
Qty: 90 TABLET | Refills: 1 | Status: SHIPPED | OUTPATIENT
Start: 2023-05-02

## 2023-05-02 RX ORDER — ALBUTEROL SULFATE 90 UG/1
2 AEROSOL, METERED RESPIRATORY (INHALATION) EVERY 4 HOURS PRN
Qty: 8 G | Refills: 3 | Status: SHIPPED | OUTPATIENT
Start: 2023-05-02

## 2023-05-02 RX ORDER — LEVOTHYROXINE SODIUM 0.2 MG/1
200 TABLET ORAL DAILY
Qty: 90 TABLET | Refills: 1 | Status: SHIPPED | OUTPATIENT
Start: 2023-05-02

## 2023-05-02 RX ORDER — LISINOPRIL 10 MG/1
10 TABLET ORAL DAILY
Qty: 90 TABLET | Refills: 1 | Status: SHIPPED | OUTPATIENT
Start: 2023-05-02

## 2023-05-02 NOTE — PROGRESS NOTES
CC: f/u lung mass    History:  Castillo Trujillo is a 63 y.o. male   He presents today with his wife and a family friend. He notes he has been staying active, but has noticed progressive worsening of dyspnea. Previous biopsy showed inflammatory pseudotumor, though a repeat is ordered to be sure of this diagnosis vs other potential malignancy. PFTs show moderate to severe obstructive disease, so concern for surgical performance exists.       ROS:  Review of Systems   Respiratory: Positive for cough (with intermittent hemoptysis) and shortness of breath.    Cardiovascular: Negative for chest pain.        reports that he quit smoking about 5 months ago. His smoking use included cigarettes. He started smoking about 47 years ago. He has a 161.00 pack-year smoking history. He has been exposed to tobacco smoke. He has never used smokeless tobacco. He reports current alcohol use. He reports that he does not currently use drugs.      Current Outpatient Medications:   •  albuterol sulfate  (90 Base) MCG/ACT inhaler, Inhale 2 puffs Every 4 (Four) Hours As Needed for Wheezing., Disp: 8 g, Rfl: 3  •  aspirin 81 MG EC tablet, Take 1 tablet by mouth Daily., Disp: , Rfl:   •  buPROPion (WELLBUTRIN) 75 MG tablet, Take 1 tablet by mouth Daily., Disp: , Rfl:   •  ibuprofen (ADVIL,MOTRIN) 800 MG tablet, Take 1 tablet by mouth 2 (Two) Times a Day As Needed for Mild Pain., Disp: , Rfl:   •  levothyroxine (SYNTHROID, LEVOTHROID) 200 MCG tablet, Take 1 tablet by mouth Daily., Disp: 30 tablet, Rfl: 5  •  lisinopril (PRINIVIL,ZESTRIL) 10 MG tablet, Take 1 tablet by mouth Daily., Disp: , Rfl:   •  pantoprazole (PROTONIX) 40 MG EC tablet, Take 1 tablet by mouth Daily., Disp: 30 tablet, Rfl: 5  •  tiotropium bromide-olodaterol (Stiolto Respimat) 2.5-2.5 MCG/ACT aerosol solution inhaler, Inhale 2 puffs Daily., Disp: 4 g, Rfl: 3  •  vitamin D (ERGOCALCIFEROL) 1.25 MG (94992 UT) capsule capsule, Take 1 capsule by mouth 1 (One) Time Per Week.  "MONDAY, Disp: , Rfl:   •  potassium chloride 10 MEQ CR tablet, Take 1 tablet by mouth Daily. (Patient not taking: Reported on 5/2/2023), Disp: , Rfl:     OBJECTIVE:  /80 (BP Location: Left arm, Patient Position: Sitting, Cuff Size: Adult)   Pulse 78   Temp 97.6 °F (36.4 °C)   Resp 16   Ht 188 cm (74\")   Wt 91.8 kg (202 lb 6.4 oz)   SpO2 98%   BMI 25.99 kg/m²    Physical Exam  Constitutional:       General: He is not in acute distress.  Cardiovascular:      Rate and Rhythm: Normal rate and regular rhythm.      Heart sounds: Normal heart sounds. No murmur heard.  Pulmonary:      Effort: Pulmonary effort is normal. No respiratory distress.      Breath sounds: Decreased air movement present. Examination of the right-middle field reveals decreased breath sounds. Examination of the right-lower field reveals decreased breath sounds. Decreased breath sounds present. No wheezing or rhonchi.   Neurological:      Mental Status: He is alert and oriented to person, place, and time.      Gait: Gait normal.   Psychiatric:         Mood and Affect: Mood normal.         Behavior: Behavior normal.         Assessment/Plan     Diagnoses and all orders for this visit:    1. Right lung mass (Primary)  2. Dyspnea on exertion  -     XR Chest PA & Lateral; Future  Roentgenogram to evaluate for possible re-accumulation of right effusion.     3. Primary hypertension  -     lisinopril (PRINIVIL,ZESTRIL) 10 MG tablet; Take 1 tablet by mouth Daily.  Dispense: 90 tablet; Refill: 1  Well controlled, BP goal for age is <140/90 per JNC 8 guidelines and continue current medications    4. Stage 2 moderate COPD by GOLD classification  -     tiotropium bromide-olodaterol (Stiolto Respimat) 2.5-2.5 MCG/ACT aerosol solution inhaler; Inhale 2 puffs Daily.  Dispense: 4 g; Refill: 3  -     albuterol sulfate  (90 Base) MCG/ACT inhaler; Inhale 2 puffs Every 4 (Four) Hours As Needed for Wheezing.  Dispense: 8 g; Refill: 3  Continue inhaled " medications as he has had more success managing symptoms with Stiolto.     5. Acquired hypothyroidism  -     levothyroxine (SYNTHROID, LEVOTHROID) 200 MCG tablet; Take 1 tablet by mouth Daily.  Dispense: 90 tablet; Refill: 1  Labs per previous orders and may adjust if needed.     6. Gastroesophageal reflux disease, unspecified whether esophagitis present  -     pantoprazole (PROTONIX) 40 MG EC tablet; Take 1 tablet by mouth Daily.  Dispense: 90 tablet; Refill: 1  Stable without exacerbation on PPI.    42 minutes spent in pre-charting, evaluation and documentation on the date of service.     An After Visit Summary was printed and given to the patient at discharge.  Return in about 3 months (around 8/2/2023) for Medicare Wellness with me.      Juan Vaughn D.O. 5/2/2023   Electronically signed.

## 2023-05-02 NOTE — TELEPHONE ENCOUNTER
Per Dr Kim - patient to have Right Thoracentesis with radiology same date/time as needle biopsy, then full PFT's and then to have OV with Dr Kim and CT Chest same day.

## 2023-05-02 NOTE — TELEPHONE ENCOUNTER
Dr. Kim discussed with Dr. Vaughn today.  Patient was seen in the office with a chest x-ray by Dr. Vaughn.  This shows reaccumulation of right-sided pleural fluid.  Ultimately, recommend to proceed with right thoracentesis by radiology at the same time of the right sided lung mass biopsy.  Afterwards, we will obtain repeat full pulmonary function test, a new CT scan of the chest, and follow-up with Dr. Kim.  I called to inform the patient on the above he verbalizes understanding.  We are awaiting approval by radiology services for the lung nodule biopsy prior to scheduling all the above testing.

## 2023-05-03 NOTE — TELEPHONE ENCOUNTER
Nava at scheduling called to report pt's appts have been scheduled.  Pt has bx & thoracentesis on 5-9-23 and PFT & CT are on 5-12-23.  They need to know if OK for pt to hold ASA for 5days prior to bx.  She requested we call pt with this information.  I have sched pt's follow up with Dr Kim for 5-17-23 at 10:15am.  Pt can be informed of this appt as well when we call re: ASA instructions/[place]

## 2023-05-03 NOTE — TELEPHONE ENCOUNTER
Called and discussed with radiology scheduling. Was informed that patient was approved for the needle biopsy yesterday at 925.  This was dropped into their scheduling work queue and patient would be called today.  We did discuss order for right-sided thoracentesis to be done at same time.  I called and discussed with Magnolia Deleon and she will review with radiology providers and let us know.

## 2023-05-04 DIAGNOSIS — J90 RECURRENT RIGHT PLEURAL EFFUSION: ICD-10-CM

## 2023-05-04 DIAGNOSIS — D50.9 MICROCYTIC ANEMIA: Primary | ICD-10-CM

## 2023-05-04 DIAGNOSIS — R91.8 MASS OF RIGHT LUNG: Primary | ICD-10-CM

## 2023-05-04 LAB
FERRITIN SERPL-MCNC: 608 NG/ML (ref 30–400)
IRON 24H UR-MRATE: 23 MCG/DL (ref 59–158)
IRON SATN MFR SERPL: 10 % (ref 20–50)
TIBC SERPL-MCNC: 225 MCG/DL (ref 298–536)
TRANSFERRIN SERPL-MCNC: 151 MG/DL (ref 200–360)

## 2023-05-08 NOTE — PROGRESS NOTES
Subjective   Patient ID: Castillo Trujillo is a 63 y.o. male who is here for follow-up of a known lung mass.   Chief Complaint   Patient presents with   • Pleural Effusion     Pt is here for hospital follow-up   • Lung Nodule     History of Present Illness    He feels well since discharge.  He was found to have a large mass and pleural effusion.  The pleural fluid was evacuated and did not demonstrate malignancy.  He is a non-smoker.  Pathology still pending.        The following portions of the patient's history were reviewed and updated as appropriate: allergies, current medications, past family history, past medical history, past social history, past surgical history and problem list.       Objective   Physical Exam  Constitutional:       Appearance: He is well-developed.   HENT:      Head: Normocephalic and atraumatic.   Eyes:      Pupils: Pupils are equal, round, and reactive to light.   Neck:      Thyroid: No thyromegaly.      Vascular: No JVD.      Trachea: No tracheal deviation.   Cardiovascular:      Rate and Rhythm: Normal rate and regular rhythm.      Heart sounds: Normal heart sounds. No murmur heard.    No friction rub. No gallop.   Pulmonary:      Effort: Pulmonary effort is normal. No respiratory distress.      Breath sounds: Normal breath sounds. No wheezing or rales.   Chest:      Chest wall: No tenderness.   Abdominal:      General: There is no distension.      Palpations: Abdomen is soft.      Tenderness: There is no abdominal tenderness.   Musculoskeletal:         General: Normal range of motion.      Cervical back: Normal range of motion and neck supple.   Lymphadenopathy:      Cervical: No cervical adenopathy.   Skin:     General: Skin is warm and dry.   Neurological:      Mental Status: He is alert and oriented to person, place, and time.      Cranial Nerves: No cranial nerve deficit.               Diagnoses and all orders for this visit:    1. Right lung mass (Primary)          Assessment & Plan      He clinically is doing well after thoracocentesis.  Awaiting pathology at this time prior to making further recommendations.  As he remains clinically able to breathe well and is thriving we will defer any further recommendations time.  Once pathology is available further recommendations be forthcoming.  I will call him to discuss further.  Case between hospitalization and this office visit is also remarkable for myself speaking with Dr. Vaughn.      The patient has been congratulated for non-smoking status.      Many thanks for the opportunity to care for your patient.    I will continue to keep you apprised of provided care as it ensues.      Follow-up depending on pathology finding.

## 2023-05-09 ENCOUNTER — HOSPITAL ENCOUNTER (OUTPATIENT)
Dept: CT IMAGING | Facility: HOSPITAL | Age: 63
Discharge: HOME | DRG: 178 | End: 2023-05-09
Payer: COMMERCIAL

## 2023-05-09 ENCOUNTER — APPOINTMENT (OUTPATIENT)
Dept: CARDIOLOGY | Facility: HOSPITAL | Age: 63
DRG: 178 | End: 2023-05-09
Payer: COMMERCIAL

## 2023-05-09 ENCOUNTER — APPOINTMENT (OUTPATIENT)
Dept: GENERAL RADIOLOGY | Facility: HOSPITAL | Age: 63
DRG: 178 | End: 2023-05-09
Payer: COMMERCIAL

## 2023-05-09 ENCOUNTER — HOSPITAL ENCOUNTER (INPATIENT)
Facility: HOSPITAL | Age: 63
LOS: 21 days | Discharge: HOME | DRG: 178 | End: 2023-05-30
Attending: FAMILY MEDICINE | Admitting: FAMILY MEDICINE
Payer: COMMERCIAL

## 2023-05-09 ENCOUNTER — HOSPITAL ENCOUNTER (OUTPATIENT)
Dept: ULTRASOUND IMAGING | Facility: HOSPITAL | Age: 63
Discharge: HOME | DRG: 178 | End: 2023-05-09
Payer: COMMERCIAL

## 2023-05-09 VITALS
BODY MASS INDEX: 25.67 KG/M2 | HEIGHT: 74 IN | RESPIRATION RATE: 20 BRPM | OXYGEN SATURATION: 93 % | DIASTOLIC BLOOD PRESSURE: 64 MMHG | HEART RATE: 86 BPM | WEIGHT: 200 LBS | TEMPERATURE: 97.8 F | SYSTOLIC BLOOD PRESSURE: 144 MMHG

## 2023-05-09 VITALS
DIASTOLIC BLOOD PRESSURE: 73 MMHG | HEART RATE: 79 BPM | SYSTOLIC BLOOD PRESSURE: 110 MMHG | OXYGEN SATURATION: 94 % | RESPIRATION RATE: 16 BRPM

## 2023-05-09 DIAGNOSIS — R91.8 MASS OF RIGHT LUNG: ICD-10-CM

## 2023-05-09 DIAGNOSIS — J86.9 EMPYEMA LUNG: Primary | ICD-10-CM

## 2023-05-09 DIAGNOSIS — J86.9 EMPYEMA, RIGHT: ICD-10-CM

## 2023-05-09 DIAGNOSIS — J86.9 EMPYEMA: ICD-10-CM

## 2023-05-09 DIAGNOSIS — J90 RECURRENT RIGHT PLEURAL EFFUSION: Primary | ICD-10-CM

## 2023-05-09 DIAGNOSIS — J90 RECURRENT RIGHT PLEURAL EFFUSION: ICD-10-CM

## 2023-05-09 LAB
ALBUMIN SERPL-MCNC: 3.2 G/DL (ref 3.5–5.2)
ALBUMIN/GLOB SERPL: 0.7 G/DL
ALP SERPL-CCNC: 400 U/L (ref 39–117)
ALT SERPL W P-5'-P-CCNC: 27 U/L (ref 1–41)
ANION GAP SERPL CALCULATED.3IONS-SCNC: 14 MMOL/L (ref 5–15)
APPEARANCE FLD: ABNORMAL
APTT PPP: 38.8 SECONDS (ref 24.1–35)
AST SERPL-CCNC: 21 U/L (ref 1–40)
BASOPHILS # BLD AUTO: 0.05 10*3/MM3 (ref 0–0.2)
BASOPHILS NFR BLD AUTO: 0.4 % (ref 0–1.5)
BILIRUB SERPL-MCNC: 0.6 MG/DL (ref 0–1.2)
BUN SERPL-MCNC: 12 MG/DL (ref 8–23)
BUN/CREAT SERPL: 17.6 (ref 7–25)
CALCIUM SPEC-SCNC: 9.3 MG/DL (ref 8.6–10.5)
CHLORIDE SERPL-SCNC: 98 MMOL/L (ref 98–107)
CO2 SERPL-SCNC: 25 MMOL/L (ref 22–29)
COLOR FLD: ABNORMAL
CREAT SERPL-MCNC: 0.68 MG/DL (ref 0.76–1.27)
CRP SERPL-MCNC: 26.79 MG/DL (ref 0–0.5)
D-LACTATE SERPL-SCNC: 1.2 MMOL/L (ref 0.5–2)
DEPRECATED RDW RBC AUTO: 45.5 FL (ref 37–54)
EGFRCR SERPLBLD CKD-EPI 2021: 103.2 ML/MIN/1.73
EOSINOPHIL # BLD AUTO: 0.01 10*3/MM3 (ref 0–0.4)
EOSINOPHIL NFR BLD AUTO: 0.1 % (ref 0.3–6.2)
ERYTHROCYTE [DISTWIDTH] IN BLOOD BY AUTOMATED COUNT: 15.8 % (ref 12.3–15.4)
GLOBULIN UR ELPH-MCNC: 4.5 GM/DL
GLUCOSE SERPL-MCNC: 138 MG/DL (ref 65–99)
HCT VFR BLD AUTO: 32.3 % (ref 37.5–51)
HGB BLD-MCNC: 9.6 G/DL (ref 13–17.7)
IMM GRANULOCYTES # BLD AUTO: 0.08 10*3/MM3 (ref 0–0.05)
IMM GRANULOCYTES NFR BLD AUTO: 0.6 % (ref 0–0.5)
INR PPP: 1.12 (ref 0.91–1.09)
LYMPHOCYTES # BLD AUTO: 1.4 10*3/MM3 (ref 0.7–3.1)
LYMPHOCYTES NFR BLD AUTO: 10.6 % (ref 19.6–45.3)
LYMPHOCYTES NFR FLD MANUAL: 11 %
MAGNESIUM SERPL-MCNC: 2.1 MG/DL (ref 1.6–2.4)
MCH RBC QN AUTO: 23.8 PG (ref 26.6–33)
MCHC RBC AUTO-ENTMCNC: 29.7 G/DL (ref 31.5–35.7)
MCV RBC AUTO: 80.1 FL (ref 79–97)
MONOCYTES # BLD AUTO: 1.08 10*3/MM3 (ref 0.1–0.9)
MONOCYTES NFR BLD AUTO: 8.2 % (ref 5–12)
NEUTROPHILS NFR BLD AUTO: 10.54 10*3/MM3 (ref 1.7–7)
NEUTROPHILS NFR BLD AUTO: 80.1 % (ref 42.7–76)
NEUTROPHILS NFR FLD MANUAL: 89 %
NRBC BLD AUTO-RTO: 0 /100 WBC (ref 0–0.2)
NUC CELL # FLD: ABNORMAL /MM3
PLATELET # BLD AUTO: 607 10*3/MM3 (ref 140–450)
PLATELET # BLD AUTO: 654 10*3/MM3 (ref 140–450)
PMV BLD AUTO: 8.9 FL (ref 6–12)
POTASSIUM SERPL-SCNC: 3.1 MMOL/L (ref 3.5–5.2)
PROCALCITONIN SERPL-MCNC: 0.18 NG/ML (ref 0–0.25)
PROT SERPL-MCNC: 7.7 G/DL (ref 6–8.5)
PROTHROMBIN TIME: 14.5 SECONDS (ref 11.8–14.8)
RBC # BLD AUTO: 4.03 10*6/MM3 (ref 4.14–5.8)
RBC # FLD AUTO: ABNORMAL /MM3
SODIUM SERPL-SCNC: 137 MMOL/L (ref 136–145)
WBC NRBC COR # BLD: 13.16 10*3/MM3 (ref 3.4–10.8)

## 2023-05-09 PROCEDURE — 84145 PROCALCITONIN (PCT): CPT | Performed by: NURSE PRACTITIONER

## 2023-05-09 PROCEDURE — 88305 TISSUE EXAM BY PATHOLOGIST: CPT | Performed by: NURSE PRACTITIONER

## 2023-05-09 PROCEDURE — 88341 IMHCHEM/IMCYTCHM EA ADD ANTB: CPT

## 2023-05-09 PROCEDURE — 83735 ASSAY OF MAGNESIUM: CPT | Performed by: NURSE PRACTITIONER

## 2023-05-09 PROCEDURE — 0BBC3ZX EXCISION OF RIGHT UPPER LUNG LOBE, PERCUTANEOUS APPROACH, DIAGNOSTIC: ICD-10-PCS | Performed by: RADIOLOGY

## 2023-05-09 PROCEDURE — 87206 SMEAR FLUORESCENT/ACID STAI: CPT | Performed by: NURSE PRACTITIONER

## 2023-05-09 PROCEDURE — 0 LIDOCAINE 1 % SOLUTION: Performed by: RADIOLOGY

## 2023-05-09 PROCEDURE — 87070 CULTURE OTHR SPECIMN AEROBIC: CPT | Performed by: NURSE PRACTITIONER

## 2023-05-09 PROCEDURE — 87205 SMEAR GRAM STAIN: CPT | Performed by: NURSE PRACTITIONER

## 2023-05-09 PROCEDURE — G0378 HOSPITAL OBSERVATION PER HR: HCPCS

## 2023-05-09 PROCEDURE — 88323 CONSLTJ&REPRT MATRL PREP SLD: CPT

## 2023-05-09 PROCEDURE — 88312 SPECIAL STAINS GROUP 1: CPT | Performed by: NURSE PRACTITIONER

## 2023-05-09 PROCEDURE — 83605 ASSAY OF LACTIC ACID: CPT | Performed by: NURSE PRACTITIONER

## 2023-05-09 PROCEDURE — 87075 CULTR BACTERIA EXCEPT BLOOD: CPT | Performed by: NURSE PRACTITIONER

## 2023-05-09 PROCEDURE — 85025 COMPLETE CBC W/AUTO DIFF WBC: CPT | Performed by: NURSE PRACTITIONER

## 2023-05-09 PROCEDURE — 87102 FUNGUS ISOLATION CULTURE: CPT | Performed by: NURSE PRACTITIONER

## 2023-05-09 PROCEDURE — 82042 OTHER SOURCE ALBUMIN QUAN EA: CPT | Performed by: NURSE PRACTITIONER

## 2023-05-09 PROCEDURE — 87116 MYCOBACTERIA CULTURE: CPT | Performed by: NURSE PRACTITIONER

## 2023-05-09 PROCEDURE — 25010000002 VANCOMYCIN 1 G RECONSTITUTED SOLUTION 1 EACH VIAL: Performed by: INTERNAL MEDICINE

## 2023-05-09 PROCEDURE — 71045 X-RAY EXAM CHEST 1 VIEW: CPT

## 2023-05-09 PROCEDURE — 80053 COMPREHEN METABOLIC PANEL: CPT | Performed by: NURSE PRACTITIONER

## 2023-05-09 PROCEDURE — 94761 N-INVAS EAR/PLS OXIMETRY MLT: CPT

## 2023-05-09 PROCEDURE — 88172 CYTP DX EVAL FNA 1ST EA SITE: CPT | Performed by: NURSE PRACTITIONER

## 2023-05-09 PROCEDURE — 0W993ZX DRAINAGE OF RIGHT PLEURAL CAVITY, PERCUTANEOUS APPROACH, DIAGNOSTIC: ICD-10-PCS | Performed by: RADIOLOGY

## 2023-05-09 PROCEDURE — 87015 SPECIMEN INFECT AGNT CONCNTJ: CPT | Performed by: NURSE PRACTITIONER

## 2023-05-09 PROCEDURE — 25010000002 MIDAZOLAM PER 1 MG: Performed by: RADIOLOGY

## 2023-05-09 PROCEDURE — 83615 LACTATE (LD) (LDH) ENZYME: CPT | Performed by: NURSE PRACTITIONER

## 2023-05-09 PROCEDURE — 75989 ABSCESS DRAINAGE UNDER X-RAY: CPT

## 2023-05-09 PROCEDURE — 84157 ASSAY OF PROTEIN OTHER: CPT | Performed by: NURSE PRACTITIONER

## 2023-05-09 PROCEDURE — 89051 BODY FLUID CELL COUNT: CPT | Performed by: NURSE PRACTITIONER

## 2023-05-09 PROCEDURE — 94640 AIRWAY INHALATION TREATMENT: CPT

## 2023-05-09 PROCEDURE — 94799 UNLISTED PULMONARY SVC/PX: CPT

## 2023-05-09 PROCEDURE — 85610 PROTHROMBIN TIME: CPT | Performed by: RADIOLOGY

## 2023-05-09 PROCEDURE — 88365 INSITU HYBRIDIZATION (FISH): CPT

## 2023-05-09 PROCEDURE — 85049 AUTOMATED PLATELET COUNT: CPT | Performed by: RADIOLOGY

## 2023-05-09 PROCEDURE — 88112 CYTOPATH CELL ENHANCE TECH: CPT | Performed by: NURSE PRACTITIONER

## 2023-05-09 PROCEDURE — 88364 INSITU HYBRIDIZATION (FISH): CPT

## 2023-05-09 PROCEDURE — 87040 BLOOD CULTURE FOR BACTERIA: CPT | Performed by: NURSE PRACTITIONER

## 2023-05-09 PROCEDURE — 85730 THROMBOPLASTIN TIME PARTIAL: CPT | Performed by: RADIOLOGY

## 2023-05-09 PROCEDURE — 0 CEFEPIME PER 500 MG: Performed by: NURSE PRACTITIONER

## 2023-05-09 PROCEDURE — 25010000002 FENTANYL CITRATE (PF) 50 MCG/ML SOLUTION: Performed by: RADIOLOGY

## 2023-05-09 PROCEDURE — 82945 GLUCOSE OTHER FLUID: CPT | Performed by: NURSE PRACTITIONER

## 2023-05-09 PROCEDURE — 88342 IMHCHEM/IMCYTCHM 1ST ANTB: CPT

## 2023-05-09 PROCEDURE — 0W9930Z DRAINAGE OF RIGHT PLEURAL CAVITY WITH DRAINAGE DEVICE, PERCUTANEOUS APPROACH: ICD-10-PCS | Performed by: RADIOLOGY

## 2023-05-09 PROCEDURE — 76942 ECHO GUIDE FOR BIOPSY: CPT

## 2023-05-09 PROCEDURE — 86140 C-REACTIVE PROTEIN: CPT | Performed by: INTERNAL MEDICINE

## 2023-05-09 PROCEDURE — 94664 DEMO&/EVAL PT USE INHALER: CPT

## 2023-05-09 RX ORDER — SODIUM CHLORIDE 9 MG/ML
40 INJECTION, SOLUTION INTRAVENOUS AS NEEDED
Status: DISCONTINUED | OUTPATIENT
Start: 2023-05-09 | End: 2023-05-09 | Stop reason: SDUPTHER

## 2023-05-09 RX ORDER — LEVOTHYROXINE SODIUM 0.1 MG/1
200 TABLET ORAL
Status: DISCONTINUED | OUTPATIENT
Start: 2023-05-10 | End: 2023-05-30 | Stop reason: HOSPADM

## 2023-05-09 RX ORDER — ACETAMINOPHEN 325 MG/1
650 TABLET ORAL EVERY 4 HOURS PRN
Status: DISCONTINUED | OUTPATIENT
Start: 2023-05-09 | End: 2023-05-30 | Stop reason: HOSPADM

## 2023-05-09 RX ORDER — SODIUM CHLORIDE 0.9 % (FLUSH) 0.9 %
10 SYRINGE (ML) INJECTION AS NEEDED
Status: DISCONTINUED | OUTPATIENT
Start: 2023-05-09 | End: 2023-05-09 | Stop reason: SDUPTHER

## 2023-05-09 RX ORDER — MIDAZOLAM HYDROCHLORIDE 1 MG/ML
INJECTION INTRAMUSCULAR; INTRAVENOUS AS NEEDED
Status: COMPLETED | OUTPATIENT
Start: 2023-05-09 | End: 2023-05-09

## 2023-05-09 RX ORDER — LIDOCAINE HYDROCHLORIDE 10 MG/ML
10 INJECTION, SOLUTION INFILTRATION; PERINEURAL ONCE
Status: DISCONTINUED | OUTPATIENT
Start: 2023-05-09 | End: 2023-05-09

## 2023-05-09 RX ORDER — FENTANYL CITRATE 50 UG/ML
INJECTION, SOLUTION INTRAMUSCULAR; INTRAVENOUS AS NEEDED
Status: COMPLETED | OUTPATIENT
Start: 2023-05-09 | End: 2023-05-09

## 2023-05-09 RX ORDER — HYDROCODONE BITARTRATE AND ACETAMINOPHEN 5; 325 MG/1; MG/1
1 TABLET ORAL EVERY 6 HOURS PRN
Status: DISCONTINUED | OUTPATIENT
Start: 2023-05-09 | End: 2023-05-11

## 2023-05-09 RX ORDER — SODIUM CHLORIDE 9 MG/ML
75 INJECTION, SOLUTION INTRAVENOUS CONTINUOUS
Status: DISCONTINUED | OUTPATIENT
Start: 2023-05-09 | End: 2023-05-12

## 2023-05-09 RX ORDER — ONDANSETRON 4 MG/1
4 TABLET, FILM COATED ORAL EVERY 6 HOURS PRN
Status: DISCONTINUED | OUTPATIENT
Start: 2023-05-09 | End: 2023-05-30 | Stop reason: HOSPADM

## 2023-05-09 RX ORDER — LISINOPRIL 10 MG/1
10 TABLET ORAL DAILY
Status: DISCONTINUED | OUTPATIENT
Start: 2023-05-09 | End: 2023-05-30 | Stop reason: HOSPADM

## 2023-05-09 RX ORDER — SODIUM CHLORIDE 9 MG/ML
40 INJECTION, SOLUTION INTRAVENOUS AS NEEDED
Status: DISCONTINUED | OUTPATIENT
Start: 2023-05-09 | End: 2023-05-30 | Stop reason: HOSPADM

## 2023-05-09 RX ORDER — SODIUM CHLORIDE 0.9 % (FLUSH) 0.9 %
3 SYRINGE (ML) INJECTION EVERY 12 HOURS SCHEDULED
Status: DISCONTINUED | OUTPATIENT
Start: 2023-05-09 | End: 2023-05-10 | Stop reason: HOSPADM

## 2023-05-09 RX ORDER — IPRATROPIUM BROMIDE AND ALBUTEROL SULFATE 2.5; .5 MG/3ML; MG/3ML
3 SOLUTION RESPIRATORY (INHALATION)
Status: DISCONTINUED | OUTPATIENT
Start: 2023-05-09 | End: 2023-05-30 | Stop reason: HOSPADM

## 2023-05-09 RX ORDER — ONDANSETRON 2 MG/ML
4 INJECTION INTRAMUSCULAR; INTRAVENOUS EVERY 6 HOURS PRN
Status: DISCONTINUED | OUTPATIENT
Start: 2023-05-09 | End: 2023-05-30 | Stop reason: HOSPADM

## 2023-05-09 RX ORDER — ACETAMINOPHEN 650 MG/1
650 SUPPOSITORY RECTAL EVERY 4 HOURS PRN
Status: DISCONTINUED | OUTPATIENT
Start: 2023-05-09 | End: 2023-05-30 | Stop reason: HOSPADM

## 2023-05-09 RX ORDER — SODIUM CHLORIDE 0.9 % (FLUSH) 0.9 %
10 SYRINGE (ML) INJECTION AS NEEDED
Status: DISCONTINUED | OUTPATIENT
Start: 2023-05-09 | End: 2023-05-30 | Stop reason: HOSPADM

## 2023-05-09 RX ORDER — ACETAMINOPHEN 160 MG/5ML
650 SOLUTION ORAL EVERY 4 HOURS PRN
Status: DISCONTINUED | OUTPATIENT
Start: 2023-05-09 | End: 2023-05-30 | Stop reason: HOSPADM

## 2023-05-09 RX ORDER — ASPIRIN 81 MG/1
81 TABLET ORAL DAILY
Status: DISCONTINUED | OUTPATIENT
Start: 2023-05-09 | End: 2023-05-30 | Stop reason: HOSPADM

## 2023-05-09 RX ORDER — POTASSIUM CHLORIDE 750 MG/1
40 CAPSULE, EXTENDED RELEASE ORAL
Status: COMPLETED | OUTPATIENT
Start: 2023-05-09 | End: 2023-05-09

## 2023-05-09 RX ORDER — LIDOCAINE HYDROCHLORIDE 10 MG/ML
INJECTION, SOLUTION INFILTRATION; PERINEURAL AS NEEDED
Status: COMPLETED | OUTPATIENT
Start: 2023-05-09 | End: 2023-05-09

## 2023-05-09 RX ORDER — PANTOPRAZOLE SODIUM 40 MG/1
40 TABLET, DELAYED RELEASE ORAL DAILY
Status: DISCONTINUED | OUTPATIENT
Start: 2023-05-09 | End: 2023-05-30 | Stop reason: HOSPADM

## 2023-05-09 RX ORDER — SODIUM CHLORIDE 0.9 % (FLUSH) 0.9 %
10 SYRINGE (ML) INJECTION EVERY 12 HOURS SCHEDULED
Status: DISCONTINUED | OUTPATIENT
Start: 2023-05-09 | End: 2023-05-30 | Stop reason: HOSPADM

## 2023-05-09 RX ADMIN — Medication 10 ML: at 20:08

## 2023-05-09 RX ADMIN — ASPIRIN 81 MG: 81 TABLET, COATED ORAL at 17:12

## 2023-05-09 RX ADMIN — POTASSIUM CHLORIDE 40 MEQ: 10 CAPSULE, COATED, EXTENDED RELEASE ORAL at 17:23

## 2023-05-09 RX ADMIN — LIDOCAINE HYDROCHLORIDE 10 ML: 10 INJECTION, SOLUTION INFILTRATION; PERINEURAL at 11:42

## 2023-05-09 RX ADMIN — LISINOPRIL 10 MG: 10 TABLET ORAL at 17:12

## 2023-05-09 RX ADMIN — PANTOPRAZOLE SODIUM 40 MG: 40 TABLET, DELAYED RELEASE ORAL at 17:13

## 2023-05-09 RX ADMIN — CEFEPIME 2 G: 2 INJECTION, POWDER, FOR SOLUTION INTRAVENOUS at 17:14

## 2023-05-09 RX ADMIN — SODIUM CHLORIDE 75 ML/HR: 9 INJECTION, SOLUTION INTRAVENOUS at 21:58

## 2023-05-09 RX ADMIN — IPRATROPIUM BROMIDE AND ALBUTEROL SULFATE 3 ML: 2.5; .5 SOLUTION RESPIRATORY (INHALATION) at 19:48

## 2023-05-09 RX ADMIN — LIDOCAINE HYDROCHLORIDE 20 ML: 10 INJECTION, SOLUTION INFILTRATION; PERINEURAL at 11:06

## 2023-05-09 RX ADMIN — MIDAZOLAM 0.5 MG: 1 INJECTION INTRAMUSCULAR; INTRAVENOUS at 11:06

## 2023-05-09 RX ADMIN — FENTANYL CITRATE 25 MCG: 50 INJECTION, SOLUTION INTRAMUSCULAR; INTRAVENOUS at 11:06

## 2023-05-09 RX ADMIN — POTASSIUM CHLORIDE 40 MEQ: 10 CAPSULE, COATED, EXTENDED RELEASE ORAL at 20:08

## 2023-05-09 RX ADMIN — SODIUM CHLORIDE 75 ML/HR: 9 INJECTION, SOLUTION INTRAVENOUS at 17:15

## 2023-05-09 RX ADMIN — VANCOMYCIN HYDROCHLORIDE 1000 MG: 1 INJECTION, POWDER, LYOPHILIZED, FOR SOLUTION INTRAVENOUS at 17:13

## 2023-05-09 RX ADMIN — HYDROCODONE BITARTRATE AND ACETAMINOPHEN 1 TABLET: 5; 325 TABLET ORAL at 17:23

## 2023-05-09 NOTE — NURSING NOTE
Pt brought to CAT scan and discussed procedures with Dr Callaway. Pt was then positioned and draped via sterile technique. Conscious sedation administered per MD order, pt tolerated well.

## 2023-05-09 NOTE — TELEPHONE ENCOUNTER
Received call from Landy in ultrasound that thoracentesis was performed today and green fluid was aspirated.  Additional pleural fluid studies have been ordered.  Dr. Kim updated and anticipate hospital admission.

## 2023-05-09 NOTE — INTERVAL H&P NOTE
H&P reviewed. The patient was examined and there are no changes to the H&P.   Thoracentesis today reveals what appears to be a right sided empyema. Will proceed with both biopsy and chest tube placement after discussion with patient and CT surgery. Risk, Benefits, and Alternatives discussed with the patient. Plan is for moderate sedation, and the patient agrees to proceed with procedure.    An immediate assessment was done prior to the administration of moderate sedation.

## 2023-05-09 NOTE — PLAN OF CARE
Goal Outcome Evaluation:   A&O. RA with frequent cough. Pt has been resting throughout the day. Complained of pain in right upper back, pain medication per chart was given. No complaints of SOB, no fever noted. Right drainage was assessed with green drainage, removed per Stephanie, charge nurse replaced with chest tube, right chest tube intact, continuous suction. IV infusing. SCDs. Family at bedside. VSS.

## 2023-05-09 NOTE — H&P
"    [facility] Medicine Services  HISTORY AND PHYSICAL    Date of Admission: 5/9/2023  Primary Care Physician: Juan Vaughn, DO    Subjective   Primary Historian: Patient and wife    Chief Complaint: Back pain, chest tube site pain    History of Present Illness  Castillo Trujillo is a 63-year-old male long-term tobacco use now in remission (stopped 11/22), COPD, hypothyroidism, hypertension, GERD.  Patient admitted 3/30 - 4/1, 2023 with syncope and collapse.  Right lung mass with postobstructive pneumonia noted.  Patient underwent ultrasound-guided right thoracentesis on 3/31/2023 with 1500 mL fluid removed.  Patient reports a 60 pound weight loss over the last 6 months.  Due to hemoptysis and weight loss primary care provider started work-up. PET scan on March 22 that showed a hypermetabolic right middle and upper lobe mass with mildly enlarged hypermetabolic right hilar and subcarinal mediastinal lymph nodes, small right pleural effusion, area of abnormality along the ascending colon and indeterminate left adrenal gland nodule with mild hypermetabolic activity.  Needle biopsy was performed 4/7, he states the right upper lobe mass is a \"pseudomass\" as pathology was nondiagnostic for neoplasm.  Patient has been seen seen by Dr. Kim, cardiothoracic surgeon, during admission and hospital follow-up in office.  Patient back today for repeat needle biopsy by radiologist.  Thoracentesis revealed right-sided empyema, only 200 mL of cloudy yellowish/greenish fluid could be obtained.  Chest tube placed hospitalist have been asked to admit, cardiothoracic surgeon will follow along for management of chest tube.  Currently patient is complaining of acute on chronic back pain and chest tube entry site discomfort.  He has no shortness of breathing.  He is quite pleasant.  He is asking for something to eat.  He is admitted for further evaluation treatment.    Review of Systems   Otherwise " complete ROS reviewed and negative except as mentioned in the HPI.    Past Medical History:   Past Medical History:   Diagnosis Date   • Arthritis    • GERD (gastroesophageal reflux disease)    • Hypertension    • Thyroid disorder      Past Surgical History:  Past Surgical History:   Procedure Laterality Date   • APPENDECTOMY     • INCISIONAL HERNIA REPAIR Left     GROIN WITH MESH     Social History:  reports that he quit smoking about 5 months ago. His smoking use included cigarettes. He started smoking about 47 years ago. He has a 161.00 pack-year smoking history. He has been exposed to tobacco smoke. He has never used smokeless tobacco. He reports current alcohol use. He reports that he does not currently use drugs.    Family History: family history includes Breast cancer in his sister; Colon cancer in his brother; Stomach cancer in his mother; Stroke in his father.       Allergies:  No Known Allergies    Medications:  Prior to Admission medications    Medication Sig Start Date End Date Taking? Authorizing Provider   albuterol sulfate  (90 Base) MCG/ACT inhaler Inhale 2 puffs Every 4 (Four) Hours As Needed for Wheezing. 5/2/23   Juan Vaughn, DO   aspirin 81 MG EC tablet Take 1 tablet by mouth Daily.    Provider, MD Sofia   ibuprofen (ADVIL,MOTRIN) 800 MG tablet Take 1 tablet by mouth 2 (Two) Times a Day As Needed for Mild Pain. 4/1/23   Vannesa Yadav APRN   levothyroxine (SYNTHROID, LEVOTHROID) 200 MCG tablet Take 1 tablet by mouth Daily. 5/2/23   Juan Vaughn, DO   lisinopril (PRINIVIL,ZESTRIL) 10 MG tablet Take 1 tablet by mouth Daily. 5/2/23   Juan Vaughn DO   pantoprazole (PROTONIX) 40 MG EC tablet Take 1 tablet by mouth Daily. 5/2/23   Juan Vaughn, DO   potassium chloride 10 MEQ CR tablet Take 1 tablet by mouth Daily.  Patient not taking: Reported on 5/2/2023 3/2/23   ProviderSofia MD   tiotropium bromide-olodaterol (Stiolto Respimat) 2.5-2.5 MCG/ACT  aerosol solution inhaler Inhale 2 puffs Daily. 5/2/23   Juan Vaughn,    vitamin D (ERGOCALCIFEROL) 1.25 MG (85682 UT) capsule capsule Take 1 capsule by mouth 1 (One) Time Per Week. MONDAY    Provider, MD Sofia     I have utilized all available immediate resources to obtain, update, or review the patient's current medications (including all prescriptions, over-the-counter products, herbals, cannabis/cannabidiol products, and vitamin/mineral/dietary (nutritional) supplements).    Objective     Vital Signs: /53 (BP Location: Right arm, Patient Position: Lying)   Pulse 81   Temp 98.4 °F (36.9 °C) (Oral)   Resp 16   SpO2 95%   Physical Exam  Constitutional:       General: He is not in acute distress.     Appearance: He is ill-appearing. He is not toxic-appearing.   HENT:      Head: Normocephalic and atraumatic.      Mouth/Throat:      Mouth: Mucous membranes are moist.      Pharynx: Oropharynx is clear.      Comments: Edentulous  Eyes:      Extraocular Movements: Extraocular movements intact.      Conjunctiva/sclera: Conjunctivae normal.   Cardiovascular:      Rate and Rhythm: Normal rate and regular rhythm.   Pulmonary:      Effort: Pulmonary effort is normal.      Breath sounds: Normal breath sounds.   Abdominal:      General: There is no distension.      Palpations: Abdomen is soft.   Musculoskeletal:      Cervical back: Normal range of motion and neck supple.      Right lower leg: No edema.      Left lower leg: No edema.   Skin:     General: Skin is warm and dry.      Comments: Sallow   Neurological:      General: No focal deficit present.      Mental Status: He is alert and oriented to person, place, and time.   Psychiatric:         Mood and Affect: Mood normal.         Behavior: Behavior normal.          Results Reviewed:  Lab Results (last 24 hours)     Procedure Component Value Units Date/Time    CBC Auto Differential [[id number]] Collected: 05/09/23 1613    Specimen: Blood Updated:  05/09/23 1631    Comprehensive Metabolic Panel [[id number]] Collected: 05/09/23 1613    Specimen: Blood Updated: 05/09/23 1631    Procalcitonin [[id number]] Collected: 05/09/23 1613    Specimen: Blood Updated: 05/09/23 1631    C-reactive Protein [[id number]] Collected: 05/09/23 1613    Specimen: Blood Updated: 05/09/23 1631    Lactic Acid, Plasma [[id number]] Collected: 05/09/23 1613    Specimen: Blood Updated: 05/09/23 1631        Imaging Results (Last 24 Hours)     ** No results found for the last 24 hours. **      Study Result    Narrative & Impression   CT-GUIDED right CHEST TUBE PLACEMENT, 05/09/2023     HISTORY: Male who is 63 years-old, with concern for malignancy and now  with a loculated right effusion     DOSE LENGTH PRODUCT: 3065 mGy cm. Automated exposure control was also  utilized to decrease patient radiation dose.     The procedure, including but not limited to the risk of hemorrhage,  infection, lung injury was discussed with the patient prior to  examination, informed consent was obtained.      PROCEDURE: Multiple nonenhanced axial images were obtained for  localization purposes with the patient in the left lateral decubitus  position. Radiation dose reduction techniques were utilized, including  automated exposure control and exposure modulation based on body size.  Skin site over the right posterior chest wall was selected and marked.  The skin was prepped with chlorhexidine solution and sterilely draped.  Following adequate local anesthesia with 1% lidocaine, dermatotomy was  made and a 5F trocar guided Yueh was advanced into the pleural space to  a predetermined depth. Trocar was removed with catheter position  confirmed by cloudy fluid return. A Bentson wire was advanced through  the catheter with subsequent discontinuation of the Yueh catheter over  the wire. Serial dilation of the soft tissues performed utilizing 8, 9  and 11 Kazakh soft tissue dilators. A 12 Kazakh locking pigtail catheter  was  then advanced into the pleural space over the Bentson wire. Bentson  wire and stiffener were then removed with position of the pigtail  confirmed by repeat CT. Pigtail was locked and the catheter was secured  to the skin using the provided device. Additional cloudy yellow fluid  obtained by syringe. Collection bag was secured to the catheter. Patient  tolerated the procedure well. There were no immediate complications  noted on the postprocedure repeat CT.     IMPRESSION:  1. Successful CT-guided right chest tube placement, 12  Czech locking  pigtail catheter.  2. No immediate complication.  3. Patient to return to preoperative holding, awaiting admission for  antibiotics in the setting of presumed empyema.     SEDATION: Conscious sedation was provided with supervision by myself and  a dedicated certified nurse observer with 0.5 mg IV Versed and 25 mcg IV  fentanyl. Continuous monitoring of heart rhythm, blood pressure, and  pulse oximetry was performed throughout the procedure. The first dose of  sedation was administered at 1103 hours, and my personal supervision of  the patient was completed at time of the procedure completion at 1149  hours. Total intraprocedure time was 46 minutes.  This report was finalized on 05/09/2023 14:10 by Dr Dejon Callaway, .     Study Result    Narrative & Impression   PROCEDURE: CT NEEDLE BIOPSY LUNG-     HISTORY: right lung mass, Dr. Kim discussed with Dr. Jackson and plan to  repeat biopsy to ensure accurate diagnosis; R91.8-Other nonspecific  abnormal finding of lung field     COMPLICATIONS: None.     DOSE LENGTH PRODUCT: 3065 mGy cm. Automated exposure control was also  utilized to decrease patient radiation dose.     DESCRIPTION:     The procedural risks, benefits, and alternatives were discussed with the  patient.  The patient was apprised of the procedural risks including  potential bleeding, infection, injury to the lung or surrounding  structures, and pneumothorax possibly  requiring chest tube placement.   All questions were satisfactorily answered prior to the procedure.   Verbal and written informed consent was then obtained from the patient.            Medications: Versed 0.5 mg IV, Fentanyl 25 mcg IV.         A formal time-out was taken correctly identifying the patient's name,  patient's date of birth, patient's medical record number, procedure  type, and procedure site.  The patient was placed in the supine position  on the CT procedure table.     Prior to sterile preparation and local anesthetic, noninfused axial CT  scans were obtained. This redemonstrated an area of consolidation in the  anterior/juxtapleural right upper lobe. This is similar to the recent  comparison CT scans although the loculated right pleural effusion has  increased, with the effusion overlying the intended course of the  biopsy. This was discussed with Dr. Kim. Because of the complex nature  of the loculated effusion it seems very unlikely that all of this fluid  could be drained before biopsy. We decided to proceed with biopsy given  the critical need of ensuring diagnosis. The optimal site for biopsy was  marked at the skin. The skin was cleansed with chlorhexidine solution  and sterilely draped. 1% buffered lidocaine was used to anesthetize the  overlying skin and soft tissues to the level of the pleura. A 17-gauge  coaxial needle was introduced down to the right upper lobe consolidation  using an anterior lateral approach. 5 X 2 cm core biopsy samples were  obtained using an 18-gauge cutting needle. Cytopathology was present at  the time of the procedure and determined that adequate tissue samples  were obtained. Patient tolerated the procedure well. There were no  immediate complications.     IMPRESSION:  1. Successful and uncomplicated transthoracic CT guided core biopsy of  the right upper lobe consolidation of interest. No immediate  complication. Patient tolerated procedure well.  2. Exam to  be followed by CT guided pleural drain placement and then  patient Hospital admission for treatment of presumed empyema.     SEDATION: Conscious sedation was provided with supervision by myself and  a dedicated certified nursing observer with 0.5 mg IV Versed and 25 mcg  IV fentanyl. Continuous monitoring of heart rhythm, blood pressure, and  pulse oximetry was performed throughout the procedure. The first dose of  sedation was administered at 1103 hours, and my personal supervision of  the patient was completed at time of the procedure completion at 1149  hours. Total intraprocedure time was 46 minutes.   This report was finalized on 05/09/2023 13:25 by Dr Dejon Callaway, .     Study Result    Narrative & Impression   INDICATIONS FOR PROCEDURE: Right pleural effusion     PROCEDURE:   1. Thoracentesis, diagnostic and therapeutic.  2. Ultrasound guidance for thoracentesis, imaging supervision and  interpretation.     ANESTHESIA: 1% lidocaine, injected locally.          COMPLICATIONS: None.        EXAMINATIONS FOR COMPARISON:  CT scan dated 03/30/2023.     DESCRIPTION OF PROCEDURE:   The risk and benefits of the procedure were explained to the patient.   Specifically, the risks of bleeding, infection, pneumothorax (possible  thoracostomy tube), and damage to surrounding structures were discussed.  The patient's questions were answered. The patient opted to proceed.  Written and verbal consent were obtained from the patient.     TIME OUT was taken and the patient's name, medical record number, date  of birth, procedure and entry site were confirmed. The site of the  procedure was confirmed and marked.      The right posterior thorax was prepped and draped in sterile fashion.  The area was anesthetized with buffered lidocaine 2%.      A 5 Welsh 15cm pigtail catheter was inserted into the pleural effusion  using ultrasound guidance. The collection was quite loculated/complex.  Approximately 200 mL of cloudy  yellowish/greenish fluid was recovered  and sent to [facility] for analysis.     The patient tolerated the procedure well. No immediate complications  were noted.     IMPRESSION:  Successful ultrasound guided right thoracentesis. Fluid collection was  quite complex and only 200 mL of cloudy yellowish/greenish fluid could  be obtained, set aside for lab analysis. Findings and recommendations  were discussed with SUKUMAR COATS on 5/9/2023 at 12:29 PM CDT.          Assessment / Plan   Assessment:   Active Hospital Problems    Diagnosis    • Empyema, right    • Recurrent right pleural effusion    • Stage 2 moderate COPD by GOLD classification    • Right lung mass    • Primary hypertension        Treatment Plan  1.  The patient will be admitted to Dr. Novak's service here at [facility].   2.  Start cefepime, pharmacy to dose vancomycin  3.  Home medications reviewed and restarted as appropriate  4.  DVT prophylaxis with SCDs  5.  Pain management  6.  Stat labs  7.  Supplemental oxygen as needed, incentive spirometry, continuous pulse oximetry  8.  Normal saline 75 mill/hour  9.  Consult cardiothoracic surgeon-Dr. Kim    Medical Decision Making  Number and Complexity of problems: 5  Differential Diagnosis: None    Conditions and Status        Condition is unchanged.     [facility] Data  External documents reviewed: No  Radiology interpretation: Reviewed  Labs reviewed: Yes  Any tests that were considered but not ordered: No     Decision rules/scores evaluated (example [id number], Wells, etc): No     Discussed with: Patient, wife Lilia and Dr. Pryor     Care Planning  Shared decision making: Patient, wife Lilia and Dr. Pryor  Code status and discussions: Full    Disposition  Social Determinants of Health that impact treatment or disposition: No  Estimated length of stay is 2+ days.     I confirmed that the patient's advanced care plan is present, code status is documented, and a surrogate decision maker is  listed in the patient's medical record.     The patient's surrogate decision maker is wife Lilia.     The patient was seen and examined by me on 5/9/2023 at 3:36 PM.    Electronically signed by TRESSA Hitchcock, 05/09/23, 16:51 CDT.

## 2023-05-09 NOTE — PROGRESS NOTES
"Pharmacy Dosing Service  Pharmacokinetics  Vancomycin Initial Evaluation  Assessment/Action/Plan:  Loading dose?: N/A  Current Order: Vancomycin 1000 mg IVPB every 12 hours  Current end date: 05/16/2023  Levels:Obtain Vancomycin trough prior to dose on 5/10 at 1600  Additional antimicrobial agent(s): Cefepime    Vancomycin dosage initiated based on population pharmacokinetic parameters. Pharmacy will continue to follow daily and adjust dose accordingly.     Subjective:  Castillo Trujillo is a 63 y.o. male with a Vancomycin \"Pharmacy to Dose\" consult for the treatment of Emphysema , day 1 of 7 of treatment.    AUC Model Data:  Loading dose: N/A  Regimen: 1000 mg IV every 12 hours.  Start time: 17:00 on 05/09/2023  Exposure target: AUC24 (range) 400-600 mg/L.hr   AUC24,ss: 516 mg/L.hr  PAUC*: 69 %  Ctrough,ss: 15.1 mg/L  Pconc*: 35 %  Tox.: 10 %    Objective:  Ht:  ; Wt:    Estimated Creatinine Clearance: 108.6 mL/min (by C-G formula based on SCr of 0.87 mg/dL).   Creatinine   Date Value Ref Range Status   05/02/2023 0.87 0.76 - 1.27 mg/dL Final   04/01/2023 0.61 (L) 0.76 - 1.27 mg/dL Final   03/31/2023 0.59 (L) 0.76 - 1.27 mg/dL Final      Lab Results   Component Value Date    WBC 9.20 05/02/2023    WBC 16.43 (H) 04/01/2023    WBC 19.85 (H) 03/31/2023      Baseline culture results:  Microbiology Results (last 10 days)       ** No results found for the last 240 hours. **            Cora Barrett, PharmD  05/09/23 16:14 CDT    "

## 2023-05-10 ENCOUNTER — APPOINTMENT (OUTPATIENT)
Dept: GENERAL RADIOLOGY | Facility: HOSPITAL | Age: 63
DRG: 178 | End: 2023-05-10
Payer: COMMERCIAL

## 2023-05-10 PROBLEM — J86.9 EMPYEMA LUNG: Status: ACTIVE | Noted: 2023-05-10

## 2023-05-10 LAB
ALBUMIN FLD-MCNC: 1.7 G/DL
ANION GAP SERPL CALCULATED.3IONS-SCNC: 13 MMOL/L (ref 5–15)
BUN SERPL-MCNC: 9 MG/DL (ref 8–23)
BUN/CREAT SERPL: 14.3 (ref 7–25)
CALCIUM SPEC-SCNC: 8.6 MG/DL (ref 8.6–10.5)
CHLORIDE SERPL-SCNC: 99 MMOL/L (ref 98–107)
CO2 SERPL-SCNC: 21 MMOL/L (ref 22–29)
CREAT SERPL-MCNC: 0.63 MG/DL (ref 0.76–1.27)
EGFRCR SERPLBLD CKD-EPI 2021: 105.6 ML/MIN/1.73
GLUCOSE FLD-MCNC: <2 MG/DL
GLUCOSE SERPL-MCNC: 93 MG/DL (ref 65–99)
LDH FLD-CCNC: >2500 U/L
POTASSIUM SERPL-SCNC: 3.2 MMOL/L (ref 3.5–5.2)
PROT FLD-MCNC: 3.8 G/DL
SODIUM SERPL-SCNC: 133 MMOL/L (ref 136–145)
VANCOMYCIN TROUGH SERPL-MCNC: 5.8 MCG/ML (ref 5–20)

## 2023-05-10 PROCEDURE — 80048 BASIC METABOLIC PNL TOTAL CA: CPT | Performed by: NURSE PRACTITIONER

## 2023-05-10 PROCEDURE — 94761 N-INVAS EAR/PLS OXIMETRY MLT: CPT

## 2023-05-10 PROCEDURE — 71045 X-RAY EXAM CHEST 1 VIEW: CPT

## 2023-05-10 PROCEDURE — 94799 UNLISTED PULMONARY SVC/PX: CPT

## 2023-05-10 PROCEDURE — 99222 1ST HOSP IP/OBS MODERATE 55: CPT | Performed by: THORACIC SURGERY (CARDIOTHORACIC VASCULAR SURGERY)

## 2023-05-10 PROCEDURE — 0 CEFEPIME PER 500 MG: Performed by: NURSE PRACTITIONER

## 2023-05-10 PROCEDURE — 80202 ASSAY OF VANCOMYCIN: CPT | Performed by: INTERNAL MEDICINE

## 2023-05-10 PROCEDURE — 25010000002 VANCOMYCIN 1 G RECONSTITUTED SOLUTION 1 EACH VIAL: Performed by: INTERNAL MEDICINE

## 2023-05-10 RX ORDER — LIDOCAINE HYDROCHLORIDE 10 MG/ML
20 INJECTION, SOLUTION INFILTRATION; PERINEURAL ONCE
Status: DISCONTINUED | OUTPATIENT
Start: 2023-05-10 | End: 2023-05-10

## 2023-05-10 RX ORDER — IBUPROFEN 800 MG/1
800 TABLET ORAL 3 TIMES DAILY PRN
COMMUNITY
End: 2023-06-12 | Stop reason: SDUPTHER

## 2023-05-10 RX ORDER — LIDOCAINE HYDROCHLORIDE 10 MG/ML
10 INJECTION, SOLUTION INFILTRATION; PERINEURAL ONCE
Status: DISCONTINUED | OUTPATIENT
Start: 2023-05-10 | End: 2023-05-30 | Stop reason: HOSPADM

## 2023-05-10 RX ADMIN — IPRATROPIUM BROMIDE AND ALBUTEROL SULFATE 3 ML: 2.5; .5 SOLUTION RESPIRATORY (INHALATION) at 07:16

## 2023-05-10 RX ADMIN — CEFEPIME 2 G: 2 INJECTION, POWDER, FOR SOLUTION INTRAVENOUS at 16:30

## 2023-05-10 RX ADMIN — VANCOMYCIN HYDROCHLORIDE 1000 MG: 1 INJECTION, POWDER, LYOPHILIZED, FOR SOLUTION INTRAVENOUS at 05:31

## 2023-05-10 RX ADMIN — PANTOPRAZOLE SODIUM 40 MG: 40 TABLET, DELAYED RELEASE ORAL at 09:50

## 2023-05-10 RX ADMIN — CEFEPIME 2 G: 2 INJECTION, POWDER, FOR SOLUTION INTRAVENOUS at 00:17

## 2023-05-10 RX ADMIN — HYDROCODONE BITARTRATE AND ACETAMINOPHEN 1 TABLET: 5; 325 TABLET ORAL at 16:30

## 2023-05-10 RX ADMIN — VANCOMYCIN HYDROCHLORIDE 1000 MG: 1 INJECTION, POWDER, LYOPHILIZED, FOR SOLUTION INTRAVENOUS at 16:30

## 2023-05-10 RX ADMIN — IPRATROPIUM BROMIDE AND ALBUTEROL SULFATE 3 ML: 2.5; .5 SOLUTION RESPIRATORY (INHALATION) at 20:49

## 2023-05-10 RX ADMIN — LEVOTHYROXINE SODIUM 200 MCG: 100 TABLET ORAL at 05:16

## 2023-05-10 RX ADMIN — HYDROCODONE BITARTRATE AND ACETAMINOPHEN 1 TABLET: 5; 325 TABLET ORAL at 00:17

## 2023-05-10 RX ADMIN — IPRATROPIUM BROMIDE AND ALBUTEROL SULFATE 3 ML: 2.5; .5 SOLUTION RESPIRATORY (INHALATION) at 11:16

## 2023-05-10 RX ADMIN — IPRATROPIUM BROMIDE AND ALBUTEROL SULFATE 3 ML: 2.5; .5 SOLUTION RESPIRATORY (INHALATION) at 15:25

## 2023-05-10 RX ADMIN — HYDROCODONE BITARTRATE AND ACETAMINOPHEN 1 TABLET: 5; 325 TABLET ORAL at 10:25

## 2023-05-10 RX ADMIN — CEFEPIME 2 G: 2 INJECTION, POWDER, FOR SOLUTION INTRAVENOUS at 09:50

## 2023-05-10 RX ADMIN — LISINOPRIL 10 MG: 10 TABLET ORAL at 09:50

## 2023-05-10 RX ADMIN — SODIUM CHLORIDE 75 ML/HR: 9 INJECTION, SOLUTION INTRAVENOUS at 16:10

## 2023-05-10 RX ADMIN — ASPIRIN 81 MG: 81 TABLET, COATED ORAL at 09:50

## 2023-05-10 NOTE — PLAN OF CARE
Goal Outcome Evaluation:  Plan of Care Reviewed With: patient        Progress: no change  Outcome Evaluation: Ntn assessment completed. Pt reports good appetite. Pt does report wt loss of 60 lbs over the past six months. Oral supplement offerd/refused. Advised of alteranate menu choices and available snacks. Encouraged oral intake. Con to follow for plan of care.

## 2023-05-10 NOTE — PROGRESS NOTES
[facility] Medicine Services  INPATIENT PROGRESS NOTE    Patient Name: Castillo Trujillo  Date of Admission: 5/9/2023  Today's Date: 05/10/23  Length of Stay: 0  Primary Care Physician: Juan Vaughn DO    Subjective   Chief Complaint: Follow-up shortness of breath  HPI   Mr. Trujillo is a 63-year-old male that presented to [facility] on 5/9 for repeat needle biopsy by radiologist.  He has had approximately 60 pounds of unintentional weight loss over the past 6 months.  Longstanding history of tobacco abuse but reports that he quit smoking in November 2022.  In March 2023 PET scan revealed a finding of a right upper lobe and middle lobe lung mass demonstrating hypermetabolic activity.  He was found to have hypermetabolic adenopathy and a right-sided pleural effusion.  He was admitted to our facility on 3/30 to 4/1-he had ultrasound-guided right thoracentesis on 3/31/2023 by interventional radiology-1500 mL pleural fluid removed.  Suspected postobstructive pneumonia and was discharged on Levaquin.  He underwent needle biopsy of the lung mass that showed benign lung parenchyma with inflammatory pseudotumor. He had worsening shortness of breath in which his primary care provider, Dr. Vaughn obtained a chest x-ray that showed an increasing right-sided pleural effusion.  Decision making was made to proceed with repeat needle biopsy with repeat thoracentesis which was scheduled for 5/9.  On 5/9 during thoracentesis 200 mL of cloudy yellowish/greenish fluid aspirated.  Chest tube was placed for drainage of suspected empyema.  Hospitalist was asked to admit with cardiothoracic surgery consult for management of chest tube.    Lying in bed with wife at bedside.  States his breathing is much better today.  On room air.  He has already ambulated in the hallway once today and was looking forward to walking again this afternoon.    Review of Systems   All pertinent negatives and  positives are as above. All other systems have been reviewed and are negative unless otherwise stated.     Objective    Temp:  [97.4 °F (36.3 °C)-98.4 °F (36.9 °C)] 97.4 °F (36.3 °C)  Heart Rate:  [68-84] 82  Resp:  [16-27] 16  BP: (110-153)/(49-73) 153/65  Physical Exam  Vitals reviewed.   Constitutional:       General: He is not in acute distress.     Appearance: He is not toxic-appearing.      Comments: Lying in bed on his left side.  No acute distress.  On room air.  Spouse at bedside.   HENT:      Head: Normocephalic and atraumatic.      Mouth/Throat:      Mouth: Mucous membranes are moist.      Pharynx: Oropharynx is clear.   Eyes:      Extraocular Movements: Extraocular movements intact.      Conjunctiva/sclera: Conjunctivae normal.      Pupils: Pupils are equal, round, and reactive to light.   Cardiovascular:      Rate and Rhythm: Normal rate and regular rhythm.      Pulses: Normal pulses.   Pulmonary:      Effort: Pulmonary effort is normal. No respiratory distress.      Breath sounds: Decreased breath sounds present.   Chest:      Comments: Right chest tube in place to -20 cm H2O suction, purulent output. No airleak.  Abdominal:      General: Bowel sounds are normal. There is no distension.      Palpations: Abdomen is soft.      Tenderness: There is no abdominal tenderness.   Musculoskeletal:         General: No swelling or tenderness. Normal range of motion.      Cervical back: Normal range of motion and neck supple. No muscular tenderness.   Skin:     General: Skin is warm and dry.      Findings: No erythema or rash.   Neurological:      General: No focal deficit present.      Mental Status: He is alert and oriented to person, place, and time.      Cranial Nerves: No cranial nerve deficit.      Motor: No weakness.   Psychiatric:         Mood and Affect: Mood normal.         Behavior: Behavior normal.         Results Review:  I have reviewed the labs, radiology results, and diagnostic  studies.    Laboratory Data:   Results from last 7 days   Lab Units 05/09/23  1613 05/09/23  0743   WBC 10*3/mm3 13.16*  --    HEMOGLOBIN g/dL 9.6*  --    HEMATOCRIT % 32.3*  --    PLATELETS 10*3/mm3 654* 607*        Results from last 7 days   Lab Units 05/09/23  1613   SODIUM mmol/L 137   POTASSIUM mmol/L 3.1*   CHLORIDE mmol/L 98   CO2 mmol/L 25.0   BUN mg/dL 12   CREATININE mg/dL 0.68*   CALCIUM mg/dL 9.3   BILIRUBIN mg/dL 0.6   ALK PHOS U/L 400*   ALT (SGPT) U/L 27   AST (SGOT) U/L 21   GLUCOSE mg/dL 138*       Culture Data:   Microbiology Results (last 10 days)     Procedure Component Value - Date/Time    Body Fluid Culture - Body Fluid, Pleural Cavity [[id number]] Collected: 05/09/23 1346    Lab Status: Preliminary result Specimen: Body Fluid from Pleural Cavity Updated: 05/10/23 1128     Body Fluid Culture No growth     Gram Stain Many (4+) Gram positive cocci      Many (4+) Gram negative bacilli        Radiology Data:   Imaging Results (Last 24 Hours)     Procedure Component Value Units Date/Time    XR Chest 1 View [[id number]] Collected: 05/10/23 0803     Updated: 05/10/23 0807    Narrative:      EXAM/TECHNIQUE: XR CHEST 1 VW-     INDICATION: chest tube     COMPARISON: 05/09/2023     FINDINGS:     RIGHT posterior pleural drain in stable position. No change in small  residual right-sided pleural effusion. No change in RIGHT lung  consolidation. No change in mild LEFT basilar atelectasis. No new  airspace opacities. No visible pneumothorax. Cardiac silhouette is  stable.       Impression:         No change in appearance of the chest.  This report was finalized on 05/10/2023 08:04 by Dr. Yogi Lopez MD.    XR Chest 1 View [[id number]] Collected: 05/09/23 1924     Updated: 05/09/23 1929    Narrative:      EXAMINATION:  XR CHEST 1 VW-  5/9/2023 7:20 PM CDT     HISTORY: Chest tube placement on the right.     COMPARISON: 05/02/2023.     TECHNIQUE: Single view AP image.     FINDINGS:  There is a new chest tube  overlying the right lung base. The  pleural effusion on the right is smaller. There is no measurable  pneumothorax. There are patchy infiltrates in the mid and lower lung  zone on the right. There is minimal infiltrate at the left base. The  inspiration is not as deep on the current study. There are calcified  granulomas bilaterally. Heart size is upper limits of normal.          Impression:      1. New right chest tube. Pleural effusion on the right is smaller. No  evidence of pneumothorax.  2. Patchy consolidation in the mid and lower lung zone on the right may  represent pneumonia.  3. Minimal infiltrate left lung base likely related to a decreased level  of inspiration and atelectasis.        This report was finalized on 05/09/2023 19:26 by Dr. Dariel Beach MD.          I have reviewed the patient's current medications.     Assessment/Plan   Assessment  Active Hospital Problems    Diagnosis    • **Empyema, right    • Empyema lung    • Recurrent right pleural effusion    • Stage 2 moderate COPD by GOLD classification    • Right lung mass    • Tobacco abuse, in remission    • GERD (gastroesophageal reflux disease)    • Primary hypertension    • Acquired hypothyroidism        Treatment Plan  Cardiothoracic surgery, Dr. Kim following.  Dr. Kim sutured right-sided chest tube in place.  Chest tube management per CT surgery.    Pleural fluid culture shows gram-positive cocci in gram-negative bacilli.  Follow-up blood culture and pleural fluid   Continue empiric antibiotics with cefepime and vancomycin IV.    Potassium 3.1 on admission.  Received oral replacement.  Repeat BMP pending.    Incentive spirometer.    Pain control.    SCDs for DVT prophylaxis.  Increase activity as tolerated.    Medical Decision Making  Number and Complexity of problems: 3 acute problems in the form of right empyema, recurrent right pleural effusion in the setting of benign lung parenchyma with features of inflammatory  pseudotumor  Differential Diagnosis: None considered at present    Conditions and Status        Condition is improving.     [facility] Data  External documents reviewed: Prior epic records  Cardiac tracing (EKG, telemetry) interpretation: None  Radiology interpretation: Interpreted by radiology  Labs reviewed: As above  Any tests that were considered but not ordered: None considered at present     Decision rules/scores evaluated (example [id number], Wells, etc): None considered at present     Discussed with: Patient, spouse Lilia and Dr. Pryor     Care Planning  Shared decision making: Patient is agreeable to ongoing work-up and treatment  Code status and discussions: Full code with full interventions    Disposition  Social Determinants of Health that impact treatment or disposition: None identified at present  I expect the patient to be discharged to home in 2-3 days.     Electronically signed by TRESSA Contreras, 05/10/23, 11:44 CDT.

## 2023-05-10 NOTE — PLAN OF CARE
Goal Outcome Evaluation:  A&O. RA. Pt has been resting. Ambulated in hallway twice, tolerated well. Chest tube draining, intact, with continuous suction. Complained of right back pain, pain medication per chart was given. IV fluid infusing.Urinal in use, urine yellow color noted than from yesterday, straw color. Frequent dry coughs. Family was at bedside. VSS.

## 2023-05-10 NOTE — PLAN OF CARE
Problem: Adult Inpatient Plan of Care  Goal: Plan of Care Review  Outcome: Ongoing, Progressing  Flowsheets (Taken 5/10/2023 0316)  Progress: no change  Plan of Care Reviewed With: patient  Outcome Evaluation: Pt complained of pain x1, see MAR. No complaints of nausea. Chest tube to 20cm dry suction, flushing q8hr with 10ml of NS. SCDs. Voiding per urinal. IVF and IV abx. Safety maintained.

## 2023-05-10 NOTE — PAYOR COMM NOTE
"Castillo Trujillo (63 y.o. Male)   [id number]   admit 5/9  [facility] phone    Fax        Date of Birth   1960    Social Security Number       Address   [address] [place] [address] [place] [address]    Home Phone   [phone]    MRN   [id number]       Sabianist   Adventism    Marital Status                               Admission Date   5/9/23    Admission Type   Urgent    Admitting Provider   Osvaldo Pryor MD    Attending Provider   Osvaldo Pryor MD    Department, Room/Bed   [facility] 3C, 364/1       Discharge Date       Discharge Disposition       Discharge Destination                               Attending Provider: Osvaldo Pryor MD    Allergies: No Known Allergies    Isolation: None   Infection: None   Code Status: CPR    Ht: 188 cm (74\")   Wt: 90.7 kg (200 lb)    Admission Cmt: None   Principal Problem: Empyema, right [J86.9]                 Active Insurance as of 5/9/2023     Primary Coverage     Payor Plan Insurance Group Employer/Plan Group    [employer]HE[employer] BLUE CROSS ANTHEM BLUE CROSS BLUE SHIELD PPO [id number]     Payor Plan Address Payor Plan Phone Number Payor Plan Fax Number Effective Dates    [address] [phone]  8/1/2018 - None Entered    [facility] [id number]       Subscriber Name Subscriber Birth Date Member ID       CASTILLO TRUJILLO 1960 [id number]           Secondary Coverage     Payor Plan Insurance Group Employer/Plan Group    MEDICARE MEDICARE A & B      Payor Plan Address Payor Plan Phone Number Payor Plan Fax Number Effective Dates    [address] [phone]  3/1/2023 - None Entered    [facility] [id number]       Subscriber Name Subscriber Birth Date Member ID       CASTILLO TRUJILLO 1960 [id number]                 Emergency Contacts      (Rel.) Home Phone Work Phone Mobile Phone    VALENTIN TRUJILLO (Spouse) [phone] -- --               History & Physical      Carolynn Pryor, APRN " at 05/09/23 1536     Attestation signed by Osvaldo Pryor MD at 05/09/23 3771    I have reviewed this documentation and agree.    This visit was performed by both a physician and an APC. I personally evaluated and examined the patient. I performed all aspects of the MDM as documented.    Empiric antibiotics with vancomycin and cefepime.  Follow-up with pleural fluid studies.  Patient also had a CT-guided biopsy today and will follow-up with pathology results.  CT surgery has been consulted and appreciate their assistance.    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    Patient describes approximately 60 pounds of weight loss, unintentional, over the past 6 months.  Longstanding history of tobacco abuse, but reports that he quit smoking in November 2022.    Workup ongoing.    Electronically signed by Osvaldo Pryor MD, 5/9/2023, 16:53 CDT.                       [facility] Medicine Services  HISTORY AND PHYSICAL    Date of Admission: 5/9/2023  Primary Care Physician: Juan Vaughn, DO [place]   Primary Historian: Patient and wife    Chief Complaint: Back pain, chest tube site pain    History of Present Illness  Castillo Trujillo is a 63-year-old male long-term tobacco use now in remission (stopped 11/22), COPD, hypothyroidism, hypertension, GERD.  Patient admitted 3/30 - 4/1, 2023 with syncope and collapse.  Right lung mass with postobstructive pneumonia noted.  Patient underwent ultrasound-guided right thoracentesis on 3/31/2023 with 1500 mL fluid removed.  Patient reports a 60 pound weight loss over the last 6 months.  Due to hemoptysis and weight loss primary care provider started work-up. PET scan on March 22 that showed a hypermetabolic right middle and upper lobe mass with mildly enlarged hypermetabolic right hilar and subcarinal mediastinal lymph nodes, small right pleural effusion,  "area of abnormality along the ascending colon and indeterminate left adrenal gland nodule with mild hypermetabolic activity.  Needle biopsy was performed 4/7, he states the right upper lobe mass is a \"pseudomass\" as pathology was nondiagnostic for neoplasm.  Patient has been seen seen by Dr. Kim, cardiothoracic surgeon, during admission and hospital follow-up in office.  Patient back today for repeat needle biopsy by radiologist.  Thoracentesis revealed right-sided empyema, only 200 mL of cloudy yellowish/greenish fluid could be obtained.  Chest tube placed hospitalist have been asked to admit, cardiothoracic surgeon will follow along for management of chest tube.  Currently patient is complaining of acute on chronic back pain and chest tube entry site discomfort.  He has no shortness of breathing.  He is quite pleasant.  He is asking for something to eat.  He is admitted for further evaluation treatment.    Review of Systems   Otherwise complete ROS reviewed and negative except as mentioned in the HPI.    Past Medical History:   Past Medical History:   Diagnosis Date   • Arthritis    • GERD (gastroesophageal reflux disease)    • Hypertension    • Thyroid disorder      Past Surgical History:  Past Surgical History:   Procedure Laterality Date   • APPENDECTOMY     • INCISIONAL HERNIA REPAIR Left     GROIN WITH MESH     Social History:  reports that he quit smoking about 5 months ago. His smoking use included cigarettes. He started smoking about 47 years ago. He has a 161.00 pack-year smoking history. He has been exposed to tobacco smoke. He has never used smokeless tobacco. He reports current alcohol use. He reports that he does not currently use drugs.    Family History: family history includes Breast cancer in his sister; Colon cancer in his brother; Stomach cancer in his mother; Stroke in his father.       Allergies:  No Known Allergies    Medications:  Prior to Admission medications    Medication Sig Start Date " End Date Taking? Authorizing Provider   albuterol sulfate  (90 Base) MCG/ACT inhaler Inhale 2 puffs Every 4 (Four) Hours As Needed for Wheezing. 5/2/23   Juan Vaughn, DO   aspirin 81 MG EC tablet Take 1 tablet by mouth Daily.    Sofia Villatoro MD   ibuprofen (ADVIL,MOTRIN) 800 MG tablet Take 1 tablet by mouth 2 (Two) Times a Day As Needed for Mild Pain. 4/1/23   Vannesa Yadav APRN   levothyroxine (SYNTHROID, LEVOTHROID) 200 MCG tablet Take 1 tablet by mouth Daily. 5/2/23   Juan Vaughn DO   lisinopril (PRINIVIL,ZESTRIL) 10 MG tablet Take 1 tablet by mouth Daily. 5/2/23   Juan Vaughn DO   pantoprazole (PROTONIX) 40 MG EC tablet Take 1 tablet by mouth Daily. 5/2/23   Juan Vaughn DO   potassium chloride 10 MEQ CR tablet Take 1 tablet by mouth Daily.  Patient not taking: Reported on 5/2/2023 3/2/23   ProviderSofia MD   tiotropium bromide-olodaterol (Stiolto Respimat) 2.5-2.5 MCG/ACT aerosol solution inhaler Inhale 2 puffs Daily. 5/2/23   Juan Vaughn DO   vitamin D (ERGOCALCIFEROL) 1.25 MG (47902 UT) capsule capsule Take 1 capsule by mouth 1 (One) Time Per Week. MONDAY    Sofia Villatoro MD     I have utilized all available immediate resources to obtain, update, or review the patient's current medications (including all prescriptions, over-the-counter products, herbals, cannabis/cannabidiol products, and vitamin/mineral/dietary (nutritional) supplements).    Objective     Vital Signs: /53 (BP Location: Right arm, Patient Position: Lying)   Pulse 81   Temp 98.4 °F (36.9 °C) (Oral)   Resp 16   SpO2 95%   Physical Exam  Constitutional:       General: He is not in acute distress.     Appearance: He is ill-appearing. He is not toxic-appearing.   HENT:      Head: Normocephalic and atraumatic.      Mouth/Throat:      Mouth: Mucous membranes are moist.      Pharynx: Oropharynx is clear.      Comments: Edentulous  Eyes:      Extraocular  Movements: Extraocular movements intact.      Conjunctiva/sclera: Conjunctivae normal.   Cardiovascular:      Rate and Rhythm: Normal rate and regular rhythm.   Pulmonary:      Effort: Pulmonary effort is normal.      Breath sounds: Normal breath sounds.   Abdominal:      General: There is no distension.      Palpations: Abdomen is soft.   Musculoskeletal:      Cervical back: Normal range of motion and neck supple.      Right lower leg: No edema.      Left lower leg: No edema.   Skin:     General: Skin is warm and dry.      Comments: Sallow   Neurological:      General: No focal deficit present.      Mental Status: He is alert and oriented to person, place, and time.   Psychiatric:         Mood and Affect: Mood normal.         Behavior: Behavior normal.          Results Reviewed:  Lab Results (last 24 hours)     Procedure Component Value Units Date/Time    CBC Auto Differential [[id number]] Collected: 05/09/23 1613    Specimen: Blood Updated: 05/09/23 1631    Comprehensive Metabolic Panel [[id number]] Collected: 05/09/23 1613    Specimen: Blood Updated: 05/09/23 1631    Procalcitonin [[id number]] Collected: 05/09/23 1613    Specimen: Blood Updated: 05/09/23 1631    C-reactive Protein [[id number]] Collected: 05/09/23 1613    Specimen: Blood Updated: 05/09/23 1631    Lactic Acid, Plasma [[id number]] Collected: 05/09/23 1613    Specimen: Blood Updated: 05/09/23 1631        Imaging Results (Last 24 Hours)     ** No results found for the last 24 hours. **      Study Result    Narrative & Impression   CT-GUIDED right CHEST TUBE PLACEMENT, 05/09/2023     HISTORY: Male who is 63 years-old, with concern for malignancy and now  with a loculated right effusion     DOSE LENGTH PRODUCT: 3065 mGy cm. Automated exposure control was also  utilized to decrease patient radiation dose.     The procedure, including but not limited to the risk of hemorrhage,  infection, lung injury was discussed with the patient prior to  examination,  informed consent was obtained.      PROCEDURE: Multiple nonenhanced axial images were obtained for  localization purposes with the patient in the left lateral decubitus  position. Radiation dose reduction techniques were utilized, including  automated exposure control and exposure modulation based on body size.  Skin site over the right posterior chest wall was selected and marked.  The skin was prepped with chlorhexidine solution and sterilely draped.  Following adequate local anesthesia with 1% lidocaine, dermatotomy was  made and a 5F trocar guided Yueh was advanced into the pleural space to  a predetermined depth. Trocar was removed with catheter position  confirmed by cloudy fluid return. A Bentson wire was advanced through  the catheter with subsequent discontinuation of the Yueh catheter over  the wire. Serial dilation of the soft tissues performed utilizing 8, 9  and 11 Swiss soft tissue dilators. A 12 Swiss locking pigtail catheter  was then advanced into the pleural space over the Bentson wire. Bentson  wire and stiffener were then removed with position of the pigtail  confirmed by repeat CT. Pigtail was locked and the catheter was secured  to the skin using the provided device. Additional cloudy yellow fluid  obtained by syringe. Collection bag was secured to the catheter. Patient  tolerated the procedure well. There were no immediate complications  noted on the postprocedure repeat CT.     IMPRESSION:  1. Successful CT-guided right chest tube placement, 12  Swiss locking  pigtail catheter.  2. No immediate complication.  3. Patient to return to preoperative holding, awaiting admission for  antibiotics in the setting of presumed empyema.     SEDATION: Conscious sedation was provided with supervision by myself and  a dedicated certified nurse observer with 0.5 mg IV Versed and 25 mcg IV  fentanyl. Continuous monitoring of heart rhythm, blood pressure, and  pulse oximetry was performed throughout the  procedure. The first dose of  sedation was administered at 1103 hours, and my personal supervision of  the patient was completed at time of the procedure completion at 1149  hours. Total intraprocedure time was 46 minutes.  This report was finalized on 05/09/2023 14:10 by Dr Dejon Callaway, .     Study Result    Narrative & Impression   PROCEDURE: CT NEEDLE BIOPSY LUNG-     HISTORY: right lung mass, Dr. Kim discussed with Dr. Jackson and plan to  repeat biopsy to ensure accurate diagnosis; R91.8-Other nonspecific  abnormal finding of lung field     COMPLICATIONS: None.     DOSE LENGTH PRODUCT: 3065 mGy cm. Automated exposure control was also  utilized to decrease patient radiation dose.     DESCRIPTION:     The procedural risks, benefits, and alternatives were discussed with the  patient.  The patient was apprised of the procedural risks including  potential bleeding, infection, injury to the lung or surrounding  structures, and pneumothorax possibly requiring chest tube placement.   All questions were satisfactorily answered prior to the procedure.   Verbal and written informed consent was then obtained from the patient.            Medications: Versed 0.5 mg IV, Fentanyl 25 mcg IV.         A formal time-out was taken correctly identifying the patient's name,  patient's date of birth, patient's medical record number, procedure  type, and procedure site.  The patient was placed in the supine position  on the CT procedure table.     Prior to sterile preparation and local anesthetic, noninfused axial CT  scans were obtained. This redemonstrated an area of consolidation in the  anterior/juxtapleural right upper lobe. This is similar to the recent  comparison CT scans although the loculated right pleural effusion has  increased, with the effusion overlying the intended course of the  biopsy. This was discussed with Dr. Kim. Because of the complex nature  of the loculated effusion it seems very unlikely that all of this  fluid  could be drained before biopsy. We decided to proceed with biopsy given  the critical need of ensuring diagnosis. The optimal site for biopsy was  marked at the skin. The skin was cleansed with chlorhexidine solution  and sterilely draped. 1% buffered lidocaine was used to anesthetize the  overlying skin and soft tissues to the level of the pleura. A 17-gauge  coaxial needle was introduced down to the right upper lobe consolidation  using an anterior lateral approach. 5 X 2 cm core biopsy samples were  obtained using an 18-gauge cutting needle. Cytopathology was present at  the time of the procedure and determined that adequate tissue samples  were obtained. Patient tolerated the procedure well. There were no  immediate complications.     IMPRESSION:  1. Successful and uncomplicated transthoracic CT guided core biopsy of  the right upper lobe consolidation of interest. No immediate  complication. Patient tolerated procedure well.  2. Exam to be followed by CT guided pleural drain placement and then  patient Hospital admission for treatment of presumed empyema.     SEDATION: Conscious sedation was provided with supervision by myself and  a dedicated certified nursing observer with 0.5 mg IV Versed and 25 mcg  IV fentanyl. Continuous monitoring of heart rhythm, blood pressure, and  pulse oximetry was performed throughout the procedure. The first dose of  sedation was administered at 1103 hours, and my personal supervision of  the patient was completed at time of the procedure completion at 1149  hours. Total intraprocedure time was 46 minutes.   This report was finalized on 05/09/2023 13:25 by Dr Dejon Callaway, .     Study Result    Narrative & Impression   INDICATIONS FOR PROCEDURE: Right pleural effusion     PROCEDURE:   1. Thoracentesis, diagnostic and therapeutic.  2. Ultrasound guidance for thoracentesis, imaging supervision and  interpretation.     ANESTHESIA: 1% lidocaine, injected locally.           COMPLICATIONS: None.        EXAMINATIONS FOR COMPARISON:  CT scan dated 03/30/2023.     DESCRIPTION OF PROCEDURE:   The risk and benefits of the procedure were explained to the patient.   Specifically, the risks of bleeding, infection, pneumothorax (possible  thoracostomy tube), and damage to surrounding structures were discussed.  The patient's questions were answered. The patient opted to proceed.  Written and verbal consent were obtained from the patient.     TIME OUT was taken and the patient's name, medical record number, date  of birth, procedure and entry site were confirmed. The site of the  procedure was confirmed and marked.      The right posterior thorax was prepped and draped in sterile fashion.  The area was anesthetized with buffered lidocaine 2%.      A 5 Sinhala 15cm pigtail catheter was inserted into the pleural effusion  using ultrasound guidance. The collection was quite loculated/complex.  Approximately 200 mL of cloudy yellowish/greenish fluid was recovered  and sent to [facility] for analysis.     The patient tolerated the procedure well. No immediate complications  were noted.     IMPRESSION:  Successful ultrasound guided right thoracentesis. Fluid collection was  quite complex and only 200 mL of cloudy yellowish/greenish fluid could  be obtained, set aside for lab analysis. Findings and recommendations  were discussed with SUKUMAR COATS on 5/9/2023 at 12:29 PM CDT.          Assessment / Plan   Assessment:   Active Hospital Problems    Diagnosis    • Empyema, right    • Recurrent right pleural effusion    • Stage 2 moderate COPD by GOLD classification    • Right lung mass    • Primary hypertension        Treatment Plan  1.  The patient will be admitted to Dr. Novak's service here at [facility].   2.  Start cefepime, pharmacy to dose vancomycin  3.  Home medications reviewed and restarted as appropriate  4.  DVT prophylaxis with SCDs  5.  Pain management  6.  Stat labs  7.   Supplemental oxygen as needed, incentive spirometry, continuous pulse oximetry  8.  Normal saline 75 mill/hour  9.  Consult cardiothoracic surgeon-Dr. Kim    Medical Decision Making  Number and Complexity of problems: 5  Differential Diagnosis: None    Conditions and Status        Condition is unchanged.     [facility] Data  External documents reviewed: No  Radiology interpretation: Reviewed  Labs reviewed: Yes  Any tests that were considered but not ordered: No     Decision rules/scores evaluated (example [id number], Wells, etc): No     Discussed with: Patient, wife Lilia and Dr. Pryor     Care Planning  Shared decision making: Patient, wife Lilia and Dr. Pryor  Code status and discussions: Full    Disposition  Social Determinants of Health that impact treatment or disposition: No  Estimated length of stay is 2+ days.     I confirmed that the patient's advanced care plan is present, code status is documented, and a surrogate decision maker is listed in the patient's medical record.     The patient's surrogate decision maker is alice Rodrigues.     The patient was seen and examined by me on 5/9/2023 at 3:36 PM.    Electronically signed by TRESSA Hitchcock, 05/09/23, 16:51 CDT.              Electronically signed by Osvaldo Pryor MD at 05/09/23 1655       Vital Signs (last day)     Date/Time Temp Temp src Pulse Resp BP Patient Position SpO2    05/10/23 0721 -- -- 77 16 -- -- 98    05/10/23 0716 -- -- 82 16 -- -- 95    05/10/23 0346 98 (36.7) Oral 70 18 153/64 Lying 96    05/09/23 2302 98.2 (36.8) Oral 68 18 128/54 Lying 95    05/09/23 1952 -- -- 73 18 -- -- --    05/09/23 1948 -- -- 72 18 -- -- 97    05/09/23 1928 97.9 (36.6) Oral 71 18 127/50 Lying 97    05/09/23 1529 98.4 (36.9) Oral 81 16 144/53 Lying 95            Current Facility-Administered Medications   Medication Dose Route Frequency Provider Last Rate Last Admin   • acetaminophen (TYLENOL) tablet 650 mg  650 mg Oral Q4H PRN Carolynn Pryor APRN         Or   • acetaminophen (TYLENOL) 160 MG/5ML solution 650 mg  650 mg Oral Q4H PRN Carolynn Pryor, APRCONRADO        Or   • acetaminophen (TYLENOL) suppository 650 mg  650 mg Rectal Q4H PRN Carolynn Pryor APRCONRADO       • aspirin EC tablet 81 mg  81 mg Oral Daily Carolynn Pryor APRN   81 mg at 05/09/23 1712   • cefepime 2 gm IVPB in 100 ml NS (MBP)  2 g Intravenous Q8H Carolynn Pryor APRN   Stopped at 05/10/23 0227   • HYDROcodone-acetaminophen (NORCO) 5-325 MG per tablet 1 tablet  1 tablet Oral Q6H PRN Carolynn Pryor APRN   1 tablet at 05/10/23 0017   • ipratropium-albuterol (DUO-NEB) nebulizer solution 3 mL  3 mL Nebulization 4x Daily - RT Osvaldo Pryor MD   3 mL at 05/10/23 0716   • levothyroxine (SYNTHROID, LEVOTHROID) tablet 200 mcg  200 mcg Oral Q AM Carolynn Pryor APRN   200 mcg at 05/10/23 0516   • lidocaine (XYLOCAINE) 1 % injection 20 mL  20 mL Infiltration Once Benny Kim MD       • lisinopril (PRINIVIL,ZESTRIL) tablet 10 mg  10 mg Oral Daily Carolynn Pryor APRN   10 mg at 05/09/23 1712   • ondansetron (ZOFRAN) tablet 4 mg  4 mg Oral Q6H PRN Carolynn Pryor, TRESSA        Or   • ondansetron (ZOFRAN) injection 4 mg  4 mg Intravenous Q6H PRN Carolynn Pryor APRCONRADO       • pantoprazole (PROTONIX) EC tablet 40 mg  40 mg Oral Daily Carolynn Pryor APRN   40 mg at 05/09/23 1713   • sodium chloride 0.9 % flush 10 mL  10 mL Intravenous Q12H Carolynn Pryor APRN   10 mL at 05/09/23 2008   • sodium chloride 0.9 % flush 10 mL  10 mL Intravenous PRN Carolynn Pryor, APRN       • sodium chloride 0.9 % infusion 40 mL  40 mL Intravenous PRN Carolynn Pryor, APRN       • sodium chloride 0.9 % infusion  75 mL/hr Intravenous Continuous Carolynn Pryor APRN 75 mL/hr at 05/10/23 0706 75 mL/hr at 05/10/23 0706   • vancomycin (VANCOCIN) 1,000 mg in sodium chloride 0.9 % 250 mL IVPB-VTB  1,000 mg Intravenous Q12H Osvaldo Pryor MD 0 mL/hr at 05/09/23 1918 1,000 mg at  05/10/23 0531            5/9  &O. RA with frequent cough. Pt has been resting throughout the day. Complained of pain in right upper back, pain medication per chart was given. No complaints of SOB, no fever noted. Right drainage was assessed with green drainage, removed per Stephanie, charge nurse replaced with chest tube, right chest tube intact, continuous suction. IV infusing. SCDs. Family at bedside. VSS.     5/10 Pt complained of pain x1, see MAR. No complaints of nausea. Chest tube to 20cm dry suction, flushing q8hr with 10ml of NS. SCDs. Voiding per urinal. IVF and IV abx. Safety maintained

## 2023-05-10 NOTE — CASE MANAGEMENT/SOCIAL WORK
Discharge Planning Assessment  [facility]     Patient Name: Castillo Trujillo  MRN: [id number]  Today's Date: 5/10/2023    Admit Date: 5/9/2023        Discharge Needs Assessment     Row Name 05/10/23 1034       Living Environment    People in Home spouse    Name(s) of People in Home Lilia Trujillo - spouse    Current Living Arrangements home    Potentially Unsafe Housing Conditions none    Primary Care Provided by self;spouse/significant other    Provides Primary Care For no one    Family Caregiver if Needed spouse    Quality of Family Relationships helpful;involved;supportive    Able to Return to Prior Arrangements yes       Resource/Environmental Concerns    Resource/Environmental Concerns none    Transportation Concerns none       Food Insecurity    Within the past 12 months, you worried that your food would run out before you got the money to buy more. Never true    Within the past 12 months, the food you bought just didn't last and you didn't have money to get more. Never true       Transition Planning    Patient/Family Anticipates Transition to home    Transportation Anticipated family or friend will provide       Discharge Needs Assessment    Readmission Within the Last 30 Days no previous admission in last 30 days    Equipment Currently Used at Home bp cuff    Concerns to be Addressed discharge planning    Anticipated Changes Related to Illness none    Discharge Coordination/Progress Pt lives at home with spouse Lilia, states he is independent at home up until this admission, drives self at times but has recently began to not drive; has PCP and RX coverage and denies PDHD and DC concerns at this time, will continue to follow.               Discharge Plan    No documentation.               Continued Care and Services - Admitted Since 5/9/2023    Coordination has not been started for this encounter.          Demographic Summary    No documentation.                Functional Status    No documentation.                 Psychosocial    No documentation.                Abuse/Neglect    No documentation.                Legal    No documentation.                Substance Abuse    No documentation.                Patient Forms    No documentation.                   Miriam Valdez RN

## 2023-05-10 NOTE — CONSULTS
Referring Provider: Dr. Olegario Harris  Reason for Consultation: empyema, continuity of care    Patient Care Team:  Juan Vaughn DO as PCP - General (Internal Medicine)  Benny Kim MD as Surgeon (Cardiothoracic Surgery)    Chief complaint shortness of breath     Subjective      History of present illness: Mr. Trujillo is well-known to the cardiothoracic surgery service after initial evaluation at the end of March 2023 for the finding of a right upper lobe and middle lobe lung mass with PET scan demonstrating hypermetabolic activity.  He was also found to have hypermetabolic adenopathy and a right pleural effusion.  He underwent right thoracocentesis.  He underwent needle biopsy of the lung mass that showed benign lung parenchyma with inflammatory pseudotumor.  Decision making was made to proceed with repeat needle biopsy after discussion with pathologist and primary care by Dr. Kim.  In the interim, he was seen by Dr. Vaughn and reported increasing shortness of breath.  A chest x-ray was obtained that showed an increasing right-sided pleural effusion.  Repeat thoracocentesis at the same time of a repeat needle biopsy was scheduled and this was performed yesterday with planned repeat pulmonary function tests and an updated CT scan of the chest to be performed shortly thereafter with follow-up with Dr. Kim to discuss candidacy for surgical resection.  Unfortunately, yesterday during thoracocentesis green to yellow cloudy fluid was aspirated, 200 mL.  We were contacted by Dr. Callaway with recommendation to proceed with needle biopsy of the lung mass, place right-sided chest tube for planned drainage of suspected empyema and hospital admission which Dr. Harris graciously agreed.  Cardiothoracic surgery services have been consulted for chest tube management and continuity of care.    He denies fevers.  He reports weight loss unintentionally, cough, increasing shortness of breath, and right-sided chest pain  over the last few weeks.  WBC 13, CRP 26.79.  Pleural fluid culture shows gram-positive cocci in gram-negative bacilli so far.  Patient has been started on cefepime and vancomycin IV.    History  Code Status and Medical Interventions:   Ordered at: 23 1543     Code Status (Patient has no pulse and is not breathing):    CPR (Attempt to Resuscitate)     Medical Interventions (Patient has pulse or is breathing):    Full Support     Past Medical History:   Diagnosis Date   • Arthritis    • GERD (gastroesophageal reflux disease)    • Hypertension    • Thyroid disorder    ,   Past Surgical History:   Procedure Laterality Date   • APPENDECTOMY     • INCISIONAL HERNIA REPAIR Left     GROIN WITH MESH   ,   Family History   Problem Relation Age of Onset   • Stomach cancer Mother    • Stroke Father    • Breast cancer Sister    • Colon cancer Brother    ,   Social History     Tobacco Use   • Smoking status: Former     Packs/day: 3.50     Years: 46.00     Pack years: 161.00     Types: Cigarettes     Start date:      Quit date: 2022     Years since quittin.4     Passive exposure: Past   • Smokeless tobacco: Never   Vaping Use   • Vaping Use: Never used   Substance Use Topics   • Alcohol use: Yes     Comment: I might drink a 12-pack a month   • Drug use: Not Currently   ,   Facility-Administered Medications Prior to Admission   Medication Dose Route Frequency Provider Last Rate Last Admin   • [DISCONTINUED] lidocaine (XYLOCAINE) 1 % injection 10 mL  10 mL Subcutaneous Once Dejon Callaway MD         Medications Prior to Admission   Medication Sig Dispense Refill Last Dose   • albuterol sulfate  (90 Base) MCG/ACT inhaler Inhale 2 puffs Every 4 (Four) Hours As Needed for Wheezing. 8 g 3    • aspirin 81 MG EC tablet Take 1 tablet by mouth Daily.      • ibuprofen (ADVIL,MOTRIN) 800 MG tablet Take 1 tablet by mouth 2 (Two) Times a Day As Needed for Mild Pain.      • levothyroxine (SYNTHROID, LEVOTHROID) 200  "MCG tablet Take 1 tablet by mouth Daily. 90 tablet 1    • lisinopril (PRINIVIL,ZESTRIL) 10 MG tablet Take 1 tablet by mouth Daily. 90 tablet 1    • pantoprazole (PROTONIX) 40 MG EC tablet Take 1 tablet by mouth Daily. 90 tablet 1    • potassium chloride 10 MEQ CR tablet Take 1 tablet by mouth Daily. (Patient not taking: Reported on 5/2/2023)      • tiotropium bromide-olodaterol (Stiolto Respimat) 2.5-2.5 MCG/ACT aerosol solution inhaler Inhale 2 puffs Daily. 4 g 3    • vitamin D (ERGOCALCIFEROL) 1.25 MG (68140 UT) capsule capsule Take 1 capsule by mouth 1 (One) Time Per Week. MONDAY      , Allergies: Patient has no known allergies.    Review of Systems  Review of Systems   Constitutional: Positive for activity change and fatigue. Negative for chills, diaphoresis and fever.   Respiratory: Positive for cough and shortness of breath.    Cardiovascular: Positive for chest pain. Negative for leg swelling.   Gastrointestinal: Negative for diarrhea, nausea and vomiting.   Genitourinary: Negative for difficulty urinating.   Musculoskeletal: Positive for arthralgias.   Skin: Negative for wound.   Neurological: Positive for weakness.   Psychiatric/Behavioral: Negative for agitation, behavioral problems and confusion.        Objective     Visit Vitals  /64 (BP Location: Right arm, Patient Position: Lying)   Pulse 82   Temp 98 °F (36.7 °C) (Oral)   Resp 16   Ht 188 cm (74\")   Wt 90.7 kg (200 lb)   SpO2 95%   BMI 25.68 kg/m²       Right chest tube output: 140 mL / 24 hours, purulent yellowish color with no airleak    Physical Exam  Vitals reviewed.   Constitutional:       General: He is not in acute distress.     Appearance: Normal appearance. He is well-developed. He is not toxic-appearing or diaphoretic.   HENT:      Head: Normocephalic and atraumatic.   Eyes:      Pupils: Pupils are equal, round, and reactive to light.   Cardiovascular:      Rate and Rhythm: Normal rate and regular rhythm.      Heart sounds: Normal " heart sounds. No murmur heard.    No friction rub.   Pulmonary:      Effort: Pulmonary effort is normal. No respiratory distress.      Breath sounds: Examination of the right-middle field reveals decreased breath sounds. Examination of the right-lower field reveals decreased breath sounds. Decreased breath sounds present. No wheezing or rales.   Abdominal:      General: There is no distension.      Palpations: Abdomen is soft.      Tenderness: There is no abdominal tenderness. There is no guarding.   Musculoskeletal:      Cervical back: Normal range of motion and neck supple.      Right lower leg: No edema.      Left lower leg: No edema.   Skin:     General: Skin is warm and dry.      Coloration: Skin is not pale.      Findings: No rash.      Comments: Right chest tube in place to -20 cm H2O suction, purulent output. No air leak.  Chest tube was sutured in place by Dr. Kim this morning.   Neurological:      Mental Status: He is alert and oriented to person, place, and time.   Psychiatric:         Behavior: Behavior normal.         LAB:   CBC:  Results from last 7 days   Lab Units 05/09/23  1613 05/09/23  0743   WBC 10*3/mm3 13.16*  --    HEMATOCRIT % 32.3*  --    PLATELETS 10*3/mm3 654* 607*          BMP:)  Results from last 7 days   Lab Units 05/09/23  1613   SODIUM mmol/L 137   POTASSIUM mmol/L 3.1*   CHLORIDE mmol/L 98   CO2 mmol/L 25.0   GLUCOSE mg/dL 138*   BUN mg/dL 12   CREATININE mg/dL 0.68*           COAG:  Results from last 7 days   Lab Units 05/09/23  0743   INR  1.12*   APTT seconds 38.8*        Body Fluid Culture Abnormal Stain                Gram Stain  Many (4+) Gram positive cocci      Many (4+) Gram negative bacilli              Resulting Agency: [facility]           Specimen Collected: 05/09/23 13:46 CDT Last Resulted: 05/09/23 19:10 CDT              IMAGES:       Imaging Results (Last 24 Hours)     Procedure Component Value Units Date/Time    XR Chest 1 View [[id number]] Resulted: 05/10/23 0433      Updated: 05/10/23 0433    XR Chest 1 View [[id number]] Collected: 05/09/23 1924     Updated: 05/09/23 1929    Narrative:      EXAMINATION:  XR CHEST 1 VW-  5/9/2023 7:20 PM CDT     HISTORY: Chest tube placement on the right.     COMPARISON: 05/02/2023.     TECHNIQUE: Single view AP image.     FINDINGS:  There is a new chest tube overlying the right lung base. The  pleural effusion on the right is smaller. There is no measurable  pneumothorax. There are patchy infiltrates in the mid and lower lung  zone on the right. There is minimal infiltrate at the left base. The  inspiration is not as deep on the current study. There are calcified  granulomas bilaterally. Heart size is upper limits of normal.          Impression:      1. New right chest tube. Pleural effusion on the right is smaller. No  evidence of pneumothorax.  2. Patchy consolidation in the mid and lower lung zone on the right may  represent pneumonia.  3. Minimal infiltrate left lung base likely related to a decreased level  of inspiration and atelectasis.        This report was finalized on 05/09/2023 19:26 by Dr. Dariel Beach MD.                       Assessment & Plan          Empyema, right    Primary hypertension    Acquired hypothyroidism    GERD (gastroesophageal reflux disease)    Right lung mass    Tobacco abuse, in remission    Stage 2 moderate COPD by GOLD classification    Recurrent right pleural effusion      Discussed the patient's findings and our recommendations with patient.  Continue broad-spectrum IV antibiotics as directed by hospitalist service at this time.  Await further identification on pleural fluid cultures.  Dr. Kim sutured the right-sided chest tube in place and discussed with nursing who will apply new chest tube dressing.  Continue chest tube suction and drainage at -20 cm dry suction.  Continue to flush chest tube with 10 mL normal saline every 8 hours to maintain chest tube patency.  Await needle biopsy results of lung  mass.  Daily chest x-rays.  Discussed with patient and nursing.  Separately called patient's wife Lilia per her request and updated her on plan of care.            Barbara May, APRN  05/10/23  07:22 CDT

## 2023-05-11 ENCOUNTER — APPOINTMENT (OUTPATIENT)
Dept: GENERAL RADIOLOGY | Facility: HOSPITAL | Age: 63
DRG: 178 | End: 2023-05-11
Payer: COMMERCIAL

## 2023-05-11 LAB
BEAKER LAB AP INTRAOPERATIVE CONSULTATION: NORMAL
CYTO UR: NORMAL
CYTO UR: NORMAL
LAB AP CASE REPORT: NORMAL
LAB AP CASE REPORT: NORMAL
LAB AP CLINICAL INFORMATION: NORMAL
LAB AP DIAGNOSIS COMMENT: NORMAL
Lab: NORMAL
Lab: NORMAL
PATH REPORT.FINAL DX SPEC: NORMAL
PATH REPORT.FINAL DX SPEC: NORMAL
PATH REPORT.GROSS SPEC: NORMAL
PATH REPORT.GROSS SPEC: NORMAL
VANCOMYCIN TROUGH SERPL-MCNC: 8.7 MCG/ML (ref 5–20)

## 2023-05-11 PROCEDURE — 25010000002 VANCOMYCIN 10 G RECONSTITUTED SOLUTION: Performed by: INTERNAL MEDICINE

## 2023-05-11 PROCEDURE — 71045 X-RAY EXAM CHEST 1 VIEW: CPT

## 2023-05-11 PROCEDURE — 3E0L3GC INTRODUCTION OF OTHER THERAPEUTIC SUBSTANCE INTO PLEURAL CAVITY, PERCUTANEOUS APPROACH: ICD-10-PCS | Performed by: THORACIC SURGERY (CARDIOTHORACIC VASCULAR SURGERY)

## 2023-05-11 PROCEDURE — 94799 UNLISTED PULMONARY SVC/PX: CPT

## 2023-05-11 PROCEDURE — 80202 ASSAY OF VANCOMYCIN: CPT | Performed by: INTERNAL MEDICINE

## 2023-05-11 PROCEDURE — 0 CEFEPIME PER 500 MG: Performed by: NURSE PRACTITIONER

## 2023-05-11 PROCEDURE — 25010000002 ALTEPLASE 2 MG RECONSTITUTED SOLUTION 1 EACH VIAL: Performed by: NURSE PRACTITIONER

## 2023-05-11 PROCEDURE — 99232 SBSQ HOSP IP/OBS MODERATE 35: CPT | Performed by: THORACIC SURGERY (CARDIOTHORACIC VASCULAR SURGERY)

## 2023-05-11 PROCEDURE — 94664 DEMO&/EVAL PT USE INHALER: CPT

## 2023-05-11 PROCEDURE — 25010000002 VANCOMYCIN 1 G RECONSTITUTED SOLUTION 1 EACH VIAL: Performed by: INTERNAL MEDICINE

## 2023-05-11 PROCEDURE — 94761 N-INVAS EAR/PLS OXIMETRY MLT: CPT

## 2023-05-11 RX ORDER — HYDROCODONE BITARTRATE AND ACETAMINOPHEN 5; 325 MG/1; MG/1
1 TABLET ORAL EVERY 4 HOURS PRN
Status: DISCONTINUED | OUTPATIENT
Start: 2023-05-11 | End: 2023-05-30 | Stop reason: HOSPADM

## 2023-05-11 RX ORDER — POTASSIUM CHLORIDE 750 MG/1
40 CAPSULE, EXTENDED RELEASE ORAL EVERY 4 HOURS
Status: COMPLETED | OUTPATIENT
Start: 2023-05-11 | End: 2023-05-11

## 2023-05-11 RX ADMIN — DORNASE ALFA 5 MG: 1 SOLUTION RESPIRATORY (INHALATION) at 20:52

## 2023-05-11 RX ADMIN — CEFEPIME 2 G: 2 INJECTION, POWDER, FOR SOLUTION INTRAVENOUS at 16:28

## 2023-05-11 RX ADMIN — CEFEPIME 2 G: 2 INJECTION, POWDER, FOR SOLUTION INTRAVENOUS at 08:46

## 2023-05-11 RX ADMIN — IPRATROPIUM BROMIDE AND ALBUTEROL SULFATE 3 ML: 2.5; .5 SOLUTION RESPIRATORY (INHALATION) at 10:53

## 2023-05-11 RX ADMIN — SODIUM CHLORIDE 10 MG: 9 INJECTION INTRAMUSCULAR; INTRAVENOUS; SUBCUTANEOUS at 10:37

## 2023-05-11 RX ADMIN — HYDROCODONE BITARTRATE AND ACETAMINOPHEN 1 TABLET: 5; 325 TABLET ORAL at 20:38

## 2023-05-11 RX ADMIN — PANTOPRAZOLE SODIUM 40 MG: 40 TABLET, DELAYED RELEASE ORAL at 08:45

## 2023-05-11 RX ADMIN — IPRATROPIUM BROMIDE AND ALBUTEROL SULFATE 3 ML: 2.5; .5 SOLUTION RESPIRATORY (INHALATION) at 20:00

## 2023-05-11 RX ADMIN — HYDROCODONE BITARTRATE AND ACETAMINOPHEN 1 TABLET: 5; 325 TABLET ORAL at 11:49

## 2023-05-11 RX ADMIN — POTASSIUM CHLORIDE 40 MEQ: 10 CAPSULE, COATED, EXTENDED RELEASE ORAL at 10:36

## 2023-05-11 RX ADMIN — LEVOTHYROXINE SODIUM 200 MCG: 100 TABLET ORAL at 04:06

## 2023-05-11 RX ADMIN — ASPIRIN 81 MG: 81 TABLET, COATED ORAL at 08:45

## 2023-05-11 RX ADMIN — DORNASE ALFA 5 MG: 1 SOLUTION RESPIRATORY (INHALATION) at 10:37

## 2023-05-11 RX ADMIN — LISINOPRIL 10 MG: 10 TABLET ORAL at 08:45

## 2023-05-11 RX ADMIN — POTASSIUM CHLORIDE 40 MEQ: 10 CAPSULE, COATED, EXTENDED RELEASE ORAL at 13:04

## 2023-05-11 RX ADMIN — IPRATROPIUM BROMIDE AND ALBUTEROL SULFATE 3 ML: 2.5; .5 SOLUTION RESPIRATORY (INHALATION) at 07:19

## 2023-05-11 RX ADMIN — VANCOMYCIN HYDROCHLORIDE 1000 MG: 1 INJECTION, POWDER, LYOPHILIZED, FOR SOLUTION INTRAVENOUS at 04:06

## 2023-05-11 RX ADMIN — HYDROCODONE BITARTRATE AND ACETAMINOPHEN 1 TABLET: 5; 325 TABLET ORAL at 05:34

## 2023-05-11 RX ADMIN — VANCOMYCIN HYDROCHLORIDE 1500 MG: 10 INJECTION, POWDER, LYOPHILIZED, FOR SOLUTION INTRAVENOUS at 17:48

## 2023-05-11 RX ADMIN — SODIUM CHLORIDE 10 MG: 9 INJECTION INTRAMUSCULAR; INTRAVENOUS; SUBCUTANEOUS at 20:52

## 2023-05-11 RX ADMIN — IPRATROPIUM BROMIDE AND ALBUTEROL SULFATE 3 ML: 2.5; .5 SOLUTION RESPIRATORY (INHALATION) at 14:03

## 2023-05-11 RX ADMIN — HYDROCODONE BITARTRATE AND ACETAMINOPHEN 1 TABLET: 5; 325 TABLET ORAL at 00:00

## 2023-05-11 RX ADMIN — HYDROCODONE BITARTRATE AND ACETAMINOPHEN 1 TABLET: 5; 325 TABLET ORAL at 16:28

## 2023-05-11 RX ADMIN — CEFEPIME 2 G: 2 INJECTION, POWDER, FOR SOLUTION INTRAVENOUS at 00:08

## 2023-05-11 NOTE — PROGRESS NOTES
"Pharmacy Dosing Service  Pharmacokinetics  Vancomycin Follow-up Evaluation    Assessment/Action/Plan:  Current Order: Vancomycin 1000 mg IVPB every 12 hours  Current end date:5/16/2023  Levels: 5/11/2023 1554 Vancomycin trough = 8.7 (11.75 hours post 1000 mg dose)  [id number] = 336  Additional antimicrobial agent(s): Cefepime    Vancomycin dose adjusted to 1500 mg iv q12h. Pharmacy will continue to follow daily and adjust dose accordingly.     Subjective:  Castillo Trujillo is a 63 y.o. male currently on Vancomycin for the treatment of empyema, day 3 of treatment.    AUC Model Data:  Regimen: 1500 mg IV every 12 hours.  Exposure target: AUC24 (range)400-600 mg/L.hr   AUC24,ss: 500 mg/L.hr  PAUC*: 85 %  Ctrough,ss: 13.2 mg/L  Pconc*: 8 %  Tox.: 8 %      Objective:  Ht: 188 cm (74\"); Wt: 90.7 kg (200 lb)  Estimated Creatinine Clearance: 150 mL/min (A) (by C-G formula based on SCr of 0.63 mg/dL (L)).   Creatinine   Date Value Ref Range Status   05/10/2023 0.63 (L) 0.76 - 1.27 mg/dL Final   05/09/2023 0.68 (L) 0.76 - 1.27 mg/dL Final   05/02/2023 0.87 0.76 - 1.27 mg/dL Final      Lab Results   Component Value Date    WBC 13.16 (H) 05/09/2023    WBC 9.20 05/02/2023    WBC 16.43 (H) 04/01/2023         Lab Results   Component Value Date    VANCOTROUGH 8.70 05/11/2023       Culture Results:  Microbiology Results (last 10 days)       Procedure Component Value - Date/Time    Blood Culture - Blood, Hand, Left [[id number]]  (Normal) Collected: 05/09/23 1613    Lab Status: Preliminary result Specimen: Blood from Hand, Left Updated: 05/10/23 1746     Blood Culture No growth at 24 hours    Blood Culture - Blood, Arm, Right [[id number]]  (Normal) Collected: 05/09/23 1613    Lab Status: Preliminary result Specimen: Blood from Arm, Right Updated: 05/10/23 1746     Blood Culture No growth at 24 hours    AFB Culture - Body Fluid, Pleural Cavity [[id number]] Collected: 05/09/23 1346    Lab Status: Preliminary result Specimen: Body Fluid from " Pleural Cavity Updated: 05/10/23 1303     AFB Stain No acid fast bacilli seen on direct smear      No acid fast bacilli seen on concentrated smear    Body Fluid Culture - Body Fluid, Pleural Cavity [[id number]] Collected: 05/09/23 1346    Lab Status: Preliminary result Specimen: Body Fluid from Pleural Cavity Updated: 05/11/23 0709     Body Fluid Culture No growth at 2 days     Gram Stain Many (4+) Gram positive cocci      Many (4+) Gram negative bacilli    Anaerobic Culture - Pleural Fluid, Pleural Cavity [[id number]]  (Normal) Collected: 05/09/23 1346    Lab Status: Preliminary result Specimen: Pleural Fluid from Pleural Cavity Updated: 05/11/23 0558     Anaerobic Culture Screening for Anaerobes            Yogi Barrett, PharmD   05/11/23 16:43 CDT

## 2023-05-11 NOTE — PLAN OF CARE
Problem: Adult Inpatient Plan of Care  Goal: Plan of Care Review  Outcome: Ongoing, Progressing  Flowsheets (Taken 5/11/2023 0313)  Progress: no change  Plan of Care Reviewed With: patient  Outcome Evaluation: Pt complained of back pain x1, see MAR. Chest  tube to 20cm dry suction and flushing q8hrs with 10ml NS. A&O x4. Ambulating with stand by assistance. Cont pulse ox. IVF and IV abx. Safety maintained.

## 2023-05-11 NOTE — PROGRESS NOTES
[facility] Medicine Services  INPATIENT PROGRESS NOTE    Patient Name: Castillo Trujillo  Date of Admission: 5/9/2023  Today's Date: 05/11/23  Length of Stay: 1  Primary Care Physician: Juan Vaughn DO    Subjective   Chief Complaint: Follow-up shortness of breath  HPI   Mr. Trujillo is a 63-year-old male that presented to [facility] on 5/9 for repeat needle biopsy by radiologist.  He has had approximately 60 pounds of unintentional weight loss over the past 6 months.  Longstanding history of tobacco abuse but reports that he quit smoking in November 2022.  In March 2023 PET scan revealed a finding of a right upper lobe and middle lobe lung mass demonstrating hypermetabolic activity.  He was found to have hypermetabolic adenopathy and a right-sided pleural effusion.  He was admitted to our facility on 3/30 to 4/1-he had ultrasound-guided right thoracentesis on 3/31/2023 by interventional radiology-1500 mL pleural fluid removed.  Suspected postobstructive pneumonia and was discharged on Levaquin.  He underwent needle biopsy of the lung mass that showed benign lung parenchyma with inflammatory pseudotumor. He had worsening shortness of breath in which his primary care provider, Dr. Vaughn obtained a chest x-ray that showed an increasing right-sided pleural effusion.  Decision making was made to proceed with repeat needle biopsy with repeat thoracentesis which was scheduled for 5/9.  On 5/9 during thoracentesis 200 mL of cloudy yellowish/greenish fluid aspirated.  Chest tube was placed for drainage of suspected empyema.  Hospitalist was asked to admit with cardiothoracic surgery consult for management of chest tube.    Lying in bed with spouse at bedside.  States he was able to walk several times in the cantor yesterday.  Has minimal dyspnea with exertion.  On room air.  Has had minimal pain to chest tube site that is relieved with Norco.    Review of Systems   All  pertinent negatives and positives are as above. All other systems have been reviewed and are negative unless otherwise stated.     Objective    Temp:  [97.7 °F (36.5 °C)-99.8 °F (37.7 °C)] 99.8 °F (37.7 °C)  Heart Rate:  [70-85] 74  Resp:  [16-18] 18  BP: (114-154)/(49-73) 148/73  Physical Exam  Vitals reviewed.   Constitutional:       General: He is not in acute distress.     Appearance: He is not toxic-appearing.      Comments: Lying in bed on his left side.  No acute distress.  On room air.  Spouse at bedside.   HENT:      Head: Normocephalic and atraumatic.      Mouth/Throat:      Mouth: Mucous membranes are moist.      Pharynx: Oropharynx is clear.   Eyes:      Extraocular Movements: Extraocular movements intact.      Conjunctiva/sclera: Conjunctivae normal.      Pupils: Pupils are equal, round, and reactive to light.   Cardiovascular:      Rate and Rhythm: Normal rate and regular rhythm.      Pulses: Normal pulses.   Pulmonary:      Effort: Pulmonary effort is normal. No respiratory distress.      Breath sounds: Decreased breath sounds present.   Chest:      Comments: Right chest tube in place to -20 cm H2O suction, purulent output. No airleak.  Abdominal:      General: Bowel sounds are normal. There is no distension.      Palpations: Abdomen is soft.      Tenderness: There is no abdominal tenderness.   Musculoskeletal:         General: No swelling or tenderness. Normal range of motion.      Cervical back: Normal range of motion and neck supple. No muscular tenderness.   Skin:     General: Skin is warm and dry.      Findings: No erythema or rash.   Neurological:      General: No focal deficit present.      Mental Status: He is alert and oriented to person, place, and time.      Cranial Nerves: No cranial nerve deficit.      Motor: No weakness.   Psychiatric:         Mood and Affect: Mood normal.         Behavior: Behavior normal.         Results Review:  I have reviewed the labs, radiology results, and  diagnostic studies.    Laboratory Data:   Results from last 7 days   Lab Units 05/09/23  1613 05/09/23  0743   WBC 10*3/mm3 13.16*  --    HEMOGLOBIN g/dL 9.6*  --    HEMATOCRIT % 32.3*  --    PLATELETS 10*3/mm3 654* 607*        Results from last 7 days   Lab Units 05/10/23  1153 05/09/23  1613   SODIUM mmol/L 133* 137   POTASSIUM mmol/L 3.2* 3.1*   CHLORIDE mmol/L 99 98   CO2 mmol/L 21.0* 25.0   BUN mg/dL 9 12   CREATININE mg/dL 0.63* 0.68*   CALCIUM mg/dL 8.6 9.3   BILIRUBIN mg/dL  --  0.6   ALK PHOS U/L  --  400*   ALT (SGPT) U/L  --  27   AST (SGOT) U/L  --  21   GLUCOSE mg/dL 93 138*       Culture Data:   Microbiology Results (last 10 days)     Procedure Component Value - Date/Time    Blood Culture - Blood, Hand, Left [[id number]]  (Normal) Collected: 05/09/23 1613    Lab Status: Preliminary result Specimen: Blood from Hand, Left Updated: 05/10/23 1746     Blood Culture No growth at 24 hours    Blood Culture - Blood, Arm, Right [[id number]]  (Normal) Collected: 05/09/23 1613    Lab Status: Preliminary result Specimen: Blood from Arm, Right Updated: 05/10/23 1746     Blood Culture No growth at 24 hours    AFB Culture - Body Fluid, Pleural Cavity [[id number]] Collected: 05/09/23 1346    Lab Status: Preliminary result Specimen: Body Fluid from Pleural Cavity Updated: 05/10/23 1303     AFB Stain No acid fast bacilli seen on direct smear      No acid fast bacilli seen on concentrated smear    Body Fluid Culture - Body Fluid, Pleural Cavity [[id number]] Collected: 05/09/23 1346    Lab Status: Preliminary result Specimen: Body Fluid from Pleural Cavity Updated: 05/11/23 0709     Body Fluid Culture No growth at 2 days     Gram Stain Many (4+) Gram positive cocci      Many (4+) Gram negative bacilli    Anaerobic Culture - Pleural Fluid, Pleural Cavity [[id number]]  (Normal) Collected: 05/09/23 1346    Lab Status: Preliminary result Specimen: Pleural Fluid from Pleural Cavity Updated: 05/11/23 0558     Anaerobic Culture  Screening for Anaerobes        Radiology Data:   Imaging Results (Last 24 Hours)     Procedure Component Value Units Date/Time    XR Chest 1 View [[id number]] Collected: 05/11/23 0720     Updated: 05/11/23 0724    Narrative:      EXAM/TECHNIQUE: XR CHEST 1 VW-     INDICATION: chest tube     COMPARISON: 05/10/2023     FINDINGS:     Right-sided pleural drain is stable in position. No change in small  residual right-sided pleural effusion. No significant change in RIGHT  mid to lower lung zone consolidation. LEFT lung and pleural space appear  clear. No pneumothorax. Stable cardiac silhouette.       Impression:         No change in appearance of the chest.  This report was finalized on 05/11/2023 07:20 by Dr. Yogi Lopez MD.          I have reviewed the patient's current medications.     Assessment/Plan   Assessment  Active Hospital Problems    Diagnosis    • **Empyema, right    • Empyema lung    • Recurrent right pleural effusion    • Stage 2 moderate COPD by GOLD classification    • Right lung mass    • Tobacco abuse, in remission    • GERD (gastroesophageal reflux disease)    • Primary hypertension    • Acquired hypothyroidism        Treatment Plan  Cardiothoracic surgery, Dr. Kim following.  Chest tube management per CT surgery.  5-day course of intrapleural tPA//DNase therapy today.    Pleural fluid culture shows gram-positive cocci and gram-negative bacilli. Follow-up pleural fluid. Blood cultures with no growth to date.  Continue empiric antibiotics with cefepime and vancomycin IV.    Potassium 3.1 on admission.  Potassium 3.2 - oral replacement. BMP and Magnesium.    Incentive spirometer.    Pain control.    SCDs for DVT prophylaxis.  Increase activity as tolerated, ambulate in cantor 3 times daily.    Medical Decision Making  Number and Complexity of problems: 3 acute problems in the form of right empyema, recurrent right pleural effusion in the setting of benign lung parenchyma with features of inflammatory  pseudotumor  Differential Diagnosis: None considered at present    Conditions and Status        Condition is improving.     [facility] Data  External documents reviewed: Prior epic records  Cardiac tracing (EKG, telemetry) interpretation: None  Radiology interpretation: Interpreted by radiology  Labs reviewed: As above  Any tests that were considered but not ordered: None considered at present     Decision rules/scores evaluated (example [id number], Wells, etc): None considered at present     Discussed with: Patient, spouse Lilia and Dr. Pryor     Care Planning  Shared decision making: Patient is agreeable to ongoing work-up and treatment  Code status and discussions: Full code with full interventions    Disposition  Social Determinants of Health that impact treatment or disposition: None identified at present  I expect the patient to be discharged to home in 6-7 days.     Electronically signed by TRESSA Contreras, 05/11/23, 08:48 CDT.

## 2023-05-12 ENCOUNTER — APPOINTMENT (OUTPATIENT)
Dept: GENERAL RADIOLOGY | Facility: HOSPITAL | Age: 63
DRG: 178 | End: 2023-05-12
Payer: COMMERCIAL

## 2023-05-12 LAB
ANION GAP SERPL CALCULATED.3IONS-SCNC: 11 MMOL/L (ref 5–15)
BUN SERPL-MCNC: 5 MG/DL (ref 8–23)
BUN/CREAT SERPL: 8.3 (ref 7–25)
CALCIUM SPEC-SCNC: 8.6 MG/DL (ref 8.6–10.5)
CHLORIDE SERPL-SCNC: 100 MMOL/L (ref 98–107)
CO2 SERPL-SCNC: 23 MMOL/L (ref 22–29)
CREAT SERPL-MCNC: 0.6 MG/DL (ref 0.76–1.27)
DEPRECATED RDW RBC AUTO: 45.2 FL (ref 37–54)
EGFRCR SERPLBLD CKD-EPI 2021: 107.1 ML/MIN/1.73
ERYTHROCYTE [DISTWIDTH] IN BLOOD BY AUTOMATED COUNT: 15.8 % (ref 12.3–15.4)
GLUCOSE SERPL-MCNC: 99 MG/DL (ref 65–99)
HCT VFR BLD AUTO: 31.6 % (ref 37.5–51)
HGB BLD-MCNC: 9.4 G/DL (ref 13–17.7)
MAGNESIUM SERPL-MCNC: 1.7 MG/DL (ref 1.6–2.4)
MCH RBC QN AUTO: 23.7 PG (ref 26.6–33)
MCHC RBC AUTO-ENTMCNC: 29.7 G/DL (ref 31.5–35.7)
MCV RBC AUTO: 79.8 FL (ref 79–97)
PLATELET # BLD AUTO: 704 10*3/MM3 (ref 140–450)
PMV BLD AUTO: 8.9 FL (ref 6–12)
POTASSIUM SERPL-SCNC: 3.3 MMOL/L (ref 3.5–5.2)
RBC # BLD AUTO: 3.96 10*6/MM3 (ref 4.14–5.8)
SODIUM SERPL-SCNC: 134 MMOL/L (ref 136–145)
WBC NRBC COR # BLD: 8.36 10*3/MM3 (ref 3.4–10.8)

## 2023-05-12 PROCEDURE — 25010000002 ALTEPLASE 2 MG RECONSTITUTED SOLUTION 1 EACH VIAL: Performed by: NURSE PRACTITIONER

## 2023-05-12 PROCEDURE — 71045 X-RAY EXAM CHEST 1 VIEW: CPT

## 2023-05-12 PROCEDURE — 94761 N-INVAS EAR/PLS OXIMETRY MLT: CPT

## 2023-05-12 PROCEDURE — 25010000002 MAGNESIUM SULFATE 2 GM/50ML SOLUTION: Performed by: NURSE PRACTITIONER

## 2023-05-12 PROCEDURE — 85027 COMPLETE CBC AUTOMATED: CPT | Performed by: NURSE PRACTITIONER

## 2023-05-12 PROCEDURE — 80048 BASIC METABOLIC PNL TOTAL CA: CPT | Performed by: NURSE PRACTITIONER

## 2023-05-12 PROCEDURE — 99232 SBSQ HOSP IP/OBS MODERATE 35: CPT | Performed by: THORACIC SURGERY (CARDIOTHORACIC VASCULAR SURGERY)

## 2023-05-12 PROCEDURE — 94799 UNLISTED PULMONARY SVC/PX: CPT

## 2023-05-12 PROCEDURE — 0 CEFEPIME PER 500 MG: Performed by: NURSE PRACTITIONER

## 2023-05-12 PROCEDURE — 94664 DEMO&/EVAL PT USE INHALER: CPT

## 2023-05-12 PROCEDURE — 25010000002 VANCOMYCIN 10 G RECONSTITUTED SOLUTION: Performed by: INTERNAL MEDICINE

## 2023-05-12 PROCEDURE — 83735 ASSAY OF MAGNESIUM: CPT | Performed by: NURSE PRACTITIONER

## 2023-05-12 RX ORDER — POTASSIUM CHLORIDE 750 MG/1
40 CAPSULE, EXTENDED RELEASE ORAL EVERY 4 HOURS
Status: COMPLETED | OUTPATIENT
Start: 2023-05-12 | End: 2023-05-12

## 2023-05-12 RX ORDER — MAGNESIUM SULFATE HEPTAHYDRATE 40 MG/ML
2 INJECTION, SOLUTION INTRAVENOUS ONCE
Status: COMPLETED | OUTPATIENT
Start: 2023-05-12 | End: 2023-05-12

## 2023-05-12 RX ADMIN — Medication 10 ML: at 09:16

## 2023-05-12 RX ADMIN — HYDROCODONE BITARTRATE AND ACETAMINOPHEN 1 TABLET: 5; 325 TABLET ORAL at 21:12

## 2023-05-12 RX ADMIN — IPRATROPIUM BROMIDE AND ALBUTEROL SULFATE 3 ML: 2.5; .5 SOLUTION RESPIRATORY (INHALATION) at 14:30

## 2023-05-12 RX ADMIN — HYDROCODONE BITARTRATE AND ACETAMINOPHEN 1 TABLET: 5; 325 TABLET ORAL at 09:42

## 2023-05-12 RX ADMIN — LEVOTHYROXINE SODIUM 200 MCG: 100 TABLET ORAL at 05:16

## 2023-05-12 RX ADMIN — IPRATROPIUM BROMIDE AND ALBUTEROL SULFATE 3 ML: 2.5; .5 SOLUTION RESPIRATORY (INHALATION) at 10:48

## 2023-05-12 RX ADMIN — DORNASE ALFA 5 MG: 1 SOLUTION RESPIRATORY (INHALATION) at 10:46

## 2023-05-12 RX ADMIN — VANCOMYCIN HYDROCHLORIDE 1500 MG: 10 INJECTION, POWDER, LYOPHILIZED, FOR SOLUTION INTRAVENOUS at 19:04

## 2023-05-12 RX ADMIN — IPRATROPIUM BROMIDE AND ALBUTEROL SULFATE 3 ML: 2.5; .5 SOLUTION RESPIRATORY (INHALATION) at 20:50

## 2023-05-12 RX ADMIN — VANCOMYCIN HYDROCHLORIDE 1500 MG: 10 INJECTION, POWDER, LYOPHILIZED, FOR SOLUTION INTRAVENOUS at 05:16

## 2023-05-12 RX ADMIN — POTASSIUM CHLORIDE 40 MEQ: 10 CAPSULE, COATED, EXTENDED RELEASE ORAL at 12:10

## 2023-05-12 RX ADMIN — CEFEPIME 2 G: 2 INJECTION, POWDER, FOR SOLUTION INTRAVENOUS at 09:32

## 2023-05-12 RX ADMIN — POTASSIUM CHLORIDE 40 MEQ: 10 CAPSULE, COATED, EXTENDED RELEASE ORAL at 09:15

## 2023-05-12 RX ADMIN — SODIUM CHLORIDE 10 MG: 9 INJECTION INTRAMUSCULAR; INTRAVENOUS; SUBCUTANEOUS at 10:46

## 2023-05-12 RX ADMIN — DORNASE ALFA 5 MG: 1 SOLUTION RESPIRATORY (INHALATION) at 21:16

## 2023-05-12 RX ADMIN — HYDROCODONE BITARTRATE AND ACETAMINOPHEN 1 TABLET: 5; 325 TABLET ORAL at 00:33

## 2023-05-12 RX ADMIN — SODIUM CHLORIDE 10 MG: 9 INJECTION INTRAMUSCULAR; INTRAVENOUS; SUBCUTANEOUS at 21:16

## 2023-05-12 RX ADMIN — PANTOPRAZOLE SODIUM 40 MG: 40 TABLET, DELAYED RELEASE ORAL at 09:15

## 2023-05-12 RX ADMIN — HYDROCODONE BITARTRATE AND ACETAMINOPHEN 1 TABLET: 5; 325 TABLET ORAL at 05:16

## 2023-05-12 RX ADMIN — IPRATROPIUM BROMIDE AND ALBUTEROL SULFATE 3 ML: 2.5; .5 SOLUTION RESPIRATORY (INHALATION) at 07:21

## 2023-05-12 RX ADMIN — LISINOPRIL 10 MG: 10 TABLET ORAL at 09:15

## 2023-05-12 RX ADMIN — MAGNESIUM SULFATE HEPTAHYDRATE 2 G: 2 INJECTION, SOLUTION INTRAVENOUS at 09:32

## 2023-05-12 RX ADMIN — ASPIRIN 81 MG: 81 TABLET, COATED ORAL at 09:15

## 2023-05-12 RX ADMIN — HYDROCODONE BITARTRATE AND ACETAMINOPHEN 1 TABLET: 5; 325 TABLET ORAL at 17:07

## 2023-05-12 RX ADMIN — CEFEPIME 2 G: 2 INJECTION, POWDER, FOR SOLUTION INTRAVENOUS at 00:34

## 2023-05-12 RX ADMIN — CEFEPIME 2 G: 2 INJECTION, POWDER, FOR SOLUTION INTRAVENOUS at 17:04

## 2023-05-12 NOTE — PROGRESS NOTES
"Pharmacy Dosing Service  Pharmacokinetics  Vancomycin Follow-up Evaluation    Assessment/Action/Plan:  Active Hospital Problems    Diagnosis  POA    **Empyema, right [J86.9]  Yes    Empyema lung [J86.9]  Yes    Recurrent right pleural effusion [J90]  Yes    Stage 2 moderate COPD by GOLD classification [J44.9]  Yes    Right lung mass [R91.8]  Yes    Tobacco abuse, in remission [F17.201]  Yes    GERD (gastroesophageal reflux disease) [K21.9]  Yes    Primary hypertension [I10]  Yes    Acquired hypothyroidism [E03.9]  Yes       Current Order: Vancomycin 1500 mg IVPB every 12 hours  Indication: empyema  Current end date:5/16/23    Levels/Previous evaluations:  Levels: 5/11/2023 1554 Vancomycin trough = 8.7 (11.75 hours post 1000 mg dose)  VBU71br = 336    Other antimicrobials and/or additional factors considered in dosing methods:   Cefepime   Intrapleural TPA-Pulmozyme     AUC Model Data:  Regimen: 1500 mg IV every 12 hours.  Exposure target: AUC24 (range)400-600 mg/L.hr   AUC24,ss: 480 mg/L.hr  PAUC*: 79 %  Ctrough,ss: 12.4 mg/L  Pconc*: 6 %  Tox.: 8 %      Plan: check follow up trough prior to AM dose tomorrow    Clinical pharmacist following daily     Subjective:  Castillo Trujillo is a 63 y.o. male currently on Vancomycin, day 4 of treatment.      Objective:  Ht: 188 cm (74\"); Wt: 90.7 kg (200 lb)  Estimated Creatinine Clearance: 157.5 mL/min (A) (by C-G formula based on SCr of 0.6 mg/dL (L)).   Creatinine   Date Value Ref Range Status   05/12/2023 0.60 (L) 0.76 - 1.27 mg/dL Final   05/10/2023 0.63 (L) 0.76 - 1.27 mg/dL Final   05/09/2023 0.68 (L) 0.76 - 1.27 mg/dL Final      Lab Results   Component Value Date    WBC 8.36 05/12/2023    WBC 13.16 (H) 05/09/2023    WBC 9.20 05/02/2023         Lab Results   Component Value Date    VANCOTROUGH 8.70 05/11/2023       Culture Results:  Microbiology Results (last 10 days)       Procedure Component Value - Date/Time    Blood Culture - Blood, Hand, Left [[id number]]  (Normal) " Collected: 05/09/23 1613    Lab Status: Preliminary result Specimen: Blood from Hand, Left Updated: 05/11/23 1745     Blood Culture No growth at 2 days    Blood Culture - Blood, Arm, Right [[id number]]  (Normal) Collected: 05/09/23 1613    Lab Status: Preliminary result Specimen: Blood from Arm, Right Updated: 05/11/23 1745     Blood Culture No growth at 2 days    AFB Culture - Body Fluid, Pleural Cavity [[id number]] Collected: 05/09/23 1346    Lab Status: Preliminary result Specimen: Body Fluid from Pleural Cavity Updated: 05/10/23 1303     AFB Stain No acid fast bacilli seen on direct smear      No acid fast bacilli seen on concentrated smear    Body Fluid Culture - Body Fluid, Pleural Cavity [[id number]] Collected: 05/09/23 1346    Lab Status: Preliminary result Specimen: Body Fluid from Pleural Cavity Updated: 05/12/23 0839     Body Fluid Culture No growth at 3 days     Gram Stain Many (4+) Gram positive cocci      Many (4+) Gram negative bacilli    Anaerobic Culture - Pleural Fluid, Pleural Cavity [[id number]]  (Normal) Collected: 05/09/23 1346    Lab Status: Preliminary result Specimen: Pleural Fluid from Pleural Cavity Updated: 05/11/23 0558     Anaerobic Culture Screening for Anaerobes            Srini Betancourt, Pilar   05/12/23 16:27 CDT

## 2023-05-12 NOTE — CASE MANAGEMENT/SOCIAL WORK
Continued Stay Note  CIELO Portillo     Patient Name: Castillo Trujillo  MRN: [id number]  Today's Date: 5/12/2023    Admit Date: 5/9/2023    Plan: Home   Discharge Plan     Row Name 05/12/23 1537       Plan    Plan Home    Patient/Family in Agreement with Plan yes    Plan Comments Pt has been ambulating in the cantor. He plans to return home with his spouse at d/c. Will follow.               Discharge Codes    No documentation.               Expected Discharge Date and Time     Expected Discharge Date Expected Discharge Time    May 17, 2023             JOHN Marcus

## 2023-05-12 NOTE — PLAN OF CARE
Goal Outcome Evaluation:  Plan of Care Reviewed With: patient        Progress: improving  Outcome Evaluation: Patient has been resting off and on throughout the night. PRN medication given. Patient tolerated medication via chest tube well. VSS

## 2023-05-12 NOTE — PLAN OF CARE
Goal Outcome Evaluation:  Plan of Care Reviewed With: patient        Progress: no change  Outcome Evaluation: iv abx cont. right posterior chest tube cont to 20cm dry sx. medicating per order for pain. voiding without diff. tolerating diet. cont to monitor.

## 2023-05-12 NOTE — PLAN OF CARE
Goal Outcome Evaluation:  Plan of Care Reviewed With: patient        Progress: no change  Outcome Evaluation: ivf/ iv abx cont. righr posterior pigtail chest tube secure, cont to 20cm dry sx. medicated per order for pain. voiding without diff. tolerating diet. cont to monitor.

## 2023-05-12 NOTE — PROGRESS NOTES
[facility] Medicine Services  INPATIENT PROGRESS NOTE    Patient Name: Castillo Trujillo  Date of Admission: 5/9/2023  Today's Date: 05/12/23  Length of Stay: 2  Primary Care Physician: Juan Vaughn DO    Subjective   Chief Complaint: Follow-up shortness of breath  HPI   Mr. Trujillo is a 63-year-old male that presented to [facility] on 5/9 for repeat needle biopsy by radiologist.  He has had approximately 60 pounds of unintentional weight loss over the past 6 months.  Longstanding history of tobacco abuse but reports that he quit smoking in November 2022.  In March 2023 PET scan revealed a finding of a right upper lobe and middle lobe lung mass demonstrating hypermetabolic activity.  He was found to have hypermetabolic adenopathy and a right-sided pleural effusion.  He was admitted to our facility on 3/30 to 4/1-he had ultrasound-guided right thoracentesis on 3/31/2023 by interventional radiology-1500 mL pleural fluid removed.  Suspected postobstructive pneumonia and was discharged on Levaquin.  He underwent needle biopsy of the lung mass that showed benign lung parenchyma with inflammatory pseudotumor. He had worsening shortness of breath in which his primary care provider, Dr. Vaughn obtained a chest x-ray that showed an increasing right-sided pleural effusion.  Decision making was made to proceed with repeat needle biopsy with repeat thoracentesis which was scheduled for 5/9.  On 5/9 during thoracentesis 200 mL of cloudy yellowish/greenish fluid aspirated.  Chest tube was placed for drainage of suspected empyema.  Hospitalist was asked to admit with cardiothoracic surgery consult for management of chest tube.    Lying in bed with spouse at bedside.  States he was is tolerating intrapleural lytic therapy.  He has been up ambulating in the hallway several times a day.  Has minimal dyspnea with exertion.  On room air.  Tolerating diet.  Has had minimal pain to  chest tube site that is relieved with Norco.    Review of Systems   All pertinent negatives and positives are as above. All other systems have been reviewed and are negative unless otherwise stated.     Objective    Temp:  [97.6 °F (36.4 °C)-98.6 °F (37 °C)] 97.8 °F (36.6 °C)  Heart Rate:  [72-91] 72  Resp:  [16-20] 16  BP: (123-167)/(58-77) 123/70  Physical Exam  Vitals reviewed.   Constitutional:       General: He is not in acute distress.     Appearance: He is not toxic-appearing.      Comments: Lying in bed on his left side.  No acute distress.  On room air.  Spouse at bedside.   HENT:      Head: Normocephalic and atraumatic.      Mouth/Throat:      Mouth: Mucous membranes are moist.      Pharynx: Oropharynx is clear.   Eyes:      Extraocular Movements: Extraocular movements intact.      Conjunctiva/sclera: Conjunctivae normal.      Pupils: Pupils are equal, round, and reactive to light.   Cardiovascular:      Rate and Rhythm: Normal rate and regular rhythm.      Pulses: Normal pulses.   Pulmonary:      Effort: Pulmonary effort is normal. No respiratory distress.      Breath sounds: Decreased breath sounds present.   Chest:      Comments: Right chest tube in place to -20 cm H2O suction, purulent output. No airleak.  Abdominal:      General: Bowel sounds are normal. There is no distension.      Palpations: Abdomen is soft.      Tenderness: There is no abdominal tenderness.   Musculoskeletal:         General: No swelling or tenderness. Normal range of motion.      Cervical back: Normal range of motion and neck supple. No muscular tenderness.   Skin:     General: Skin is warm and dry.      Findings: No erythema or rash.   Neurological:      General: No focal deficit present.      Mental Status: He is alert and oriented to person, place, and time.      Cranial Nerves: No cranial nerve deficit.      Motor: No weakness.   Psychiatric:         Mood and Affect: Mood normal.         Behavior: Behavior normal.          Results Review:  I have reviewed the labs, radiology results, and diagnostic studies.    Laboratory Data:   Results from last 7 days   Lab Units 05/12/23  0537 05/09/23  1613 05/09/23  0743   WBC 10*3/mm3 8.36 13.16*  --    HEMOGLOBIN g/dL 9.4* 9.6*  --    HEMATOCRIT % 31.6* 32.3*  --    PLATELETS 10*3/mm3 704* 654* 607*        Results from last 7 days   Lab Units 05/12/23  0537 05/10/23  1153 05/09/23  1613   SODIUM mmol/L 134* 133* 137   POTASSIUM mmol/L 3.3* 3.2* 3.1*   CHLORIDE mmol/L 100 99 98   CO2 mmol/L 23.0 21.0* 25.0   BUN mg/dL 5* 9 12   CREATININE mg/dL 0.60* 0.63* 0.68*   CALCIUM mg/dL 8.6 8.6 9.3   BILIRUBIN mg/dL  --   --  0.6   ALK PHOS U/L  --   --  400*   ALT (SGPT) U/L  --   --  27   AST (SGOT) U/L  --   --  21   GLUCOSE mg/dL 99 93 138*       Culture Data:   Microbiology Results (last 10 days)     Procedure Component Value - Date/Time    Blood Culture - Blood, Hand, Left [[id number]]  (Normal) Collected: 05/09/23 1613    Lab Status: Preliminary result Specimen: Blood from Hand, Left Updated: 05/11/23 1745     Blood Culture No growth at 2 days    Blood Culture - Blood, Arm, Right [[id number]]  (Normal) Collected: 05/09/23 1613    Lab Status: Preliminary result Specimen: Blood from Arm, Right Updated: 05/11/23 1745     Blood Culture No growth at 2 days    AFB Culture - Body Fluid, Pleural Cavity [[id number]] Collected: 05/09/23 1346    Lab Status: Preliminary result Specimen: Body Fluid from Pleural Cavity Updated: 05/10/23 1303     AFB Stain No acid fast bacilli seen on direct smear      No acid fast bacilli seen on concentrated smear    Body Fluid Culture - Body Fluid, Pleural Cavity [[id number]] Collected: 05/09/23 1346    Lab Status: Preliminary result Specimen: Body Fluid from Pleural Cavity Updated: 05/11/23 0709     Body Fluid Culture No growth at 2 days     Gram Stain Many (4+) Gram positive cocci      Many (4+) Gram negative bacilli    Anaerobic Culture - Pleural Fluid, Pleural Cavity  [[id number]]  (Normal) Collected: 05/09/23 1346    Lab Status: Preliminary result Specimen: Pleural Fluid from Pleural Cavity Updated: 05/11/23 0558     Anaerobic Culture Screening for Anaerobes        Radiology Data:   Imaging Results (Last 24 Hours)     Procedure Component Value Units Date/Time    XR Chest 1 View [[id number]] Collected: 05/12/23 0738     Updated: 05/12/23 0742    Narrative:      EXAM/TECHNIQUE: XR CHEST 1 VW-     INDICATION: chest tube     COMPARISON: 05/11/2023     FINDINGS:     RIGHT basilar pleural drain is stable in position. No change in small  residual right-sided pleural effusion. No change in RIGHT perihilar and  lower lobe consolidation. The LEFT lung and pleural space appear clear.  No visible pneumothorax. Cardiac silhouette is stable.       Impression:         Stable exam. No change in residual small RIGHT pleural effusion with  pleural drain in place. No change in RIGHT perihilar and lower lobe  consolidation.  This report was finalized on 05/12/2023 07:39 by Dr. Yogi Lopez MD.          I have reviewed the patient's current medications.     Assessment/Plan   Assessment  Active Hospital Problems    Diagnosis    • **Empyema, right    • Empyema lung    • Recurrent right pleural effusion    • Stage 2 moderate COPD by GOLD classification    • Right lung mass    • Tobacco abuse, in remission    • GERD (gastroesophageal reflux disease)    • Primary hypertension    • Acquired hypothyroidism        Treatment Plan  Cardiothoracic surgery, Dr. Kim following.  Chest tube management per CT surgery.  5-day course of intrapleural tPA//DNase therapy, day 2/5. Discussed with TRESSA Denton.    Pleural fluid culture shows gram-positive cocci and gram-negative bacilli. Follow-up pleural fluid. Blood cultures with no growth to date.  Continue empiric antibiotics with cefepime and vancomycin IV.    Hold off on consulting oncology.    Potassium 3.1 on admission.  Potassium 3.3 - oral replacement.   Magnesium 1.7, will give 2 g IV magnesium sulfate.    Incentive spirometer.    Pain control.    SCDs for DVT prophylaxis.  Increase activity as tolerated, ambulate in cantor 3 times daily.    Medical Decision Making  Number and Complexity of problems: 3 acute problems in the form of right empyema, recurrent right pleural effusion in the setting of benign lung parenchyma with features of inflammatory pseudotumor  Differential Diagnosis: None considered at present    Conditions and Status        Condition is improving.     [facility] Data  External documents reviewed: Prior [facility] records  Cardiac tracing (EKG, telemetry) interpretation: None  Radiology interpretation: Interpreted by radiology  Labs reviewed: As above  Any tests that were considered but not ordered: None considered at present     Decision rules/scores evaluated (example [id number], Wells, etc): None considered at present     Discussed with: Patient, spouse emani Rodrigues APRN and Dr. Pryor     Care Planning  Shared decision making: Patient is agreeable to ongoing work-up and treatment  Code status and discussions: Full code with full interventions    Disposition  Social Determinants of Health that impact treatment or disposition: None identified at present  I expect the patient to be discharged to home in 6-7 days.     Electronically signed by TRESSA Contreras, 05/12/23, 08:06 CDT.

## 2023-05-12 NOTE — PROGRESS NOTES
"Patient name: Castillo Trujillo  Patient : 1960  VISIT # [id number]  MR #[id number]    Procedure: 12 Divehi right pigtail chest tube placement by interventional radiology  Procedure Date: 2023    Subjective   CC: shortness of breath   Resting in bed.  Wife at bedside.  Tolerating intrapleural lytic therapy.  No new events.           Objective     Visit Vitals  /70 (BP Location: Right arm, Patient Position: Lying)   Pulse 72   Temp 97.8 °F (36.6 °C) (Oral)   Resp 16   Ht 188 cm (74\")   Wt 90.7 kg (200 lb)   SpO2 94%   BMI 25.68 kg/m²       Intake/Output Summary (Last 24 hours) at 2023 0942  Last data filed at 2023 0745  Gross per 24 hour   Intake 740 ml   Output 6260 ml   Net -5520 ml     Right chest tube output: 80 mL / 24 hours, no airleak, cloudy/purulent.  Nursing staff report an additional 230 mL drained this morning.    Lab:     CBC:  Results from last 7 days   Lab Units 23  0537 23  1613 23  0743   WBC 10*3/mm3 8.36 13.16*  --    HEMATOCRIT % 31.6* 32.3*  --    PLATELETS 10*3/mm3 704* 654* 607*          BMP:  Results from last 7 days   Lab Units 23  0537 05/10/23  1153 23  1613   SODIUM mmol/L 134* 133* 137   POTASSIUM mmol/L 3.3* 3.2* 3.1*   CHLORIDE mmol/L 100 99 98   CO2 mmol/L 23.0 21.0* 25.0   GLUCOSE mg/dL 99 93 138*   BUN mg/dL 5* 9 12   CREATININE mg/dL 0.60* 0.63* 0.68*          COAG:  Results from last 7 days   Lab Units 23  0743   INR  1.12*   APTT seconds 38.8*       Blood cultures show no growth at 2 days  Anaerobic culture in process  Pleural fluid culture shows no growth at 3 days, Gram stain shows gram-positive cocci and gram-negative bacilli    Final Diagnosis  Lung, right upper lobe, fine-needle core biopsies and touch preparations:  A. Benign lung parenchyma with features of inflammatory pseudotumor.  B. No acid-fast bacilli or fungal organisms identified utilizing Kinyoun and GMS stains, respectively.  Electronically signed by " Tamia Ernandez MD on 5/11/2023 at 1314  Clinical Information  Right upper lobe lung mass (3.8 x 6.3 x 15 cm)  Comment  The fine-needle core biopsies essentially demonstrate the same histologic changes as those seen in the prior fine-needle  core biopsies of this mass ([id number]). The patient's age and the prominence of plasma cells, in addition to the lack  of ALK 1 staining in the prior core biopsies favors the plasma cell granuloma variant of inflammatory pseudotumor.  Paraffin-embedded tissue will be submitted to [facility] to determine if there is still no ALK1 staining to  mitigate against the inflammatory myofibroblastic variant of inflammatory pseudotumor. These results will be reported as  an addendum upon receipt from [facility].    Final Diagnosis  Pleural fluid, side not stated, ThinPrep preparation (1) and cell block:  A. Marked acute inflammation compatible with empyema.  B. Negative for malignant cells.  C. No acid-fast bacilli or fungal organisms identified utilizing Kinyoun and GMS stains, respectively.  Electronically signed by Tamia Ernandez MD on 5/11/2023 at 1316      IMAGES:       Imaging Results (Last 24 Hours)     Procedure Component Value Units Date/Time    XR Chest 1 View [[id number]] Collected: 05/12/23 0738     Updated: 05/12/23 0742    Narrative:      EXAM/TECHNIQUE: XR CHEST 1 VW-     INDICATION: chest tube     COMPARISON: 05/11/2023     FINDINGS:     RIGHT basilar pleural drain is stable in position. No change in small  residual right-sided pleural effusion. No change in RIGHT perihilar and  lower lobe consolidation. The LEFT lung and pleural space appear clear.  No visible pneumothorax. Cardiac silhouette is stable.       Impression:         Stable exam. No change in residual small RIGHT pleural effusion with  pleural drain in place. No change in RIGHT perihilar and lower lobe  consolidation.  This report was finalized on 05/12/2023 07:39 by Dr. Calix  MD Jessica.        Independent review of imaging today shows right-sided pigtail chest tube in place, no pneumothorax, right basilar opacity    Physical Exam:  General: Alert, oriented. No apparent distress.   Cardiovascular: Regular rate and rhythm without murmur, rubs, or gallops.    Pulmonary: Decreased breath sounds to right lung and bilateral bases.  No wheezing or rhonchi.    Chest: Right side chest tube to-20 cm H2O suction. No air leak. Fluid is yellow/tan, cloudy.     Abdomen: Soft, non distended, and non tender.  Extremities: Warm, moves all extremities. .   Neurologic:  Grossly intact with no focal deficits.          Impression:  Empyema, right  Right lung mass  Recurrent right pleural effusion  Tobacco abuse, in remission  Stage 2 moderate COPD by GOLD classification  Hypokalemia  Unintentional weight loss      Plan:  Continue chest tube suction at -20 cm H2O  Flush chest tube with 10 mL normal saline every 8 hours to maintain patency  Chest x-ray daily  Continue intrapleural tPA/DNase therapy, day 2/5. Plan to reimage chest after 5-day course completed.  Potassium supplementation ordered by hospitalist service. Continue IV antibiotics. DC IVF.   Encourage pulmonary toilet and ambulation  Discussed with patient, wife and nursing        Barbara May, APRN  05/12/23  09:42 CDT

## 2023-05-13 ENCOUNTER — APPOINTMENT (OUTPATIENT)
Dept: GENERAL RADIOLOGY | Facility: HOSPITAL | Age: 63
DRG: 178 | End: 2023-05-13
Payer: COMMERCIAL

## 2023-05-13 LAB
ANION GAP SERPL CALCULATED.3IONS-SCNC: 10 MMOL/L (ref 5–15)
BUN SERPL-MCNC: 6 MG/DL (ref 8–23)
BUN/CREAT SERPL: 11.5 (ref 7–25)
CALCIUM SPEC-SCNC: 8.7 MG/DL (ref 8.6–10.5)
CHLORIDE SERPL-SCNC: 99 MMOL/L (ref 98–107)
CO2 SERPL-SCNC: 22 MMOL/L (ref 22–29)
CREAT SERPL-MCNC: 0.52 MG/DL (ref 0.76–1.27)
EGFRCR SERPLBLD CKD-EPI 2021: 111.9 ML/MIN/1.73
GLUCOSE SERPL-MCNC: 94 MG/DL (ref 65–99)
POTASSIUM SERPL-SCNC: 3.8 MMOL/L (ref 3.5–5.2)
SODIUM SERPL-SCNC: 131 MMOL/L (ref 136–145)
VANCOMYCIN TROUGH SERPL-MCNC: 16.5 MCG/ML (ref 5–20)

## 2023-05-13 PROCEDURE — 0 CEFEPIME PER 500 MG: Performed by: NURSE PRACTITIONER

## 2023-05-13 PROCEDURE — 94799 UNLISTED PULMONARY SVC/PX: CPT

## 2023-05-13 PROCEDURE — 71045 X-RAY EXAM CHEST 1 VIEW: CPT

## 2023-05-13 PROCEDURE — 25010000002 VANCOMYCIN 10 G RECONSTITUTED SOLUTION: Performed by: INTERNAL MEDICINE

## 2023-05-13 PROCEDURE — 25010000002 ALTEPLASE 2 MG RECONSTITUTED SOLUTION 1 EACH VIAL: Performed by: NURSE PRACTITIONER

## 2023-05-13 PROCEDURE — 80202 ASSAY OF VANCOMYCIN: CPT | Performed by: INTERNAL MEDICINE

## 2023-05-13 PROCEDURE — 99232 SBSQ HOSP IP/OBS MODERATE 35: CPT | Performed by: THORACIC SURGERY (CARDIOTHORACIC VASCULAR SURGERY)

## 2023-05-13 PROCEDURE — 80048 BASIC METABOLIC PNL TOTAL CA: CPT | Performed by: NURSE PRACTITIONER

## 2023-05-13 PROCEDURE — 94761 N-INVAS EAR/PLS OXIMETRY MLT: CPT

## 2023-05-13 RX ADMIN — SODIUM CHLORIDE 10 MG: 9 INJECTION INTRAMUSCULAR; INTRAVENOUS; SUBCUTANEOUS at 09:39

## 2023-05-13 RX ADMIN — CEFEPIME 2 G: 2 INJECTION, POWDER, FOR SOLUTION INTRAVENOUS at 08:38

## 2023-05-13 RX ADMIN — IPRATROPIUM BROMIDE AND ALBUTEROL SULFATE 3 ML: 2.5; .5 SOLUTION RESPIRATORY (INHALATION) at 14:04

## 2023-05-13 RX ADMIN — CEFEPIME 2 G: 2 INJECTION, POWDER, FOR SOLUTION INTRAVENOUS at 16:27

## 2023-05-13 RX ADMIN — VANCOMYCIN HYDROCHLORIDE 1500 MG: 10 INJECTION, POWDER, LYOPHILIZED, FOR SOLUTION INTRAVENOUS at 17:40

## 2023-05-13 RX ADMIN — SODIUM CHLORIDE 10 MG: 9 INJECTION INTRAMUSCULAR; INTRAVENOUS; SUBCUTANEOUS at 21:48

## 2023-05-13 RX ADMIN — HYDROCODONE BITARTRATE AND ACETAMINOPHEN 1 TABLET: 5; 325 TABLET ORAL at 21:48

## 2023-05-13 RX ADMIN — DORNASE ALFA 5 MG: 1 SOLUTION RESPIRATORY (INHALATION) at 09:39

## 2023-05-13 RX ADMIN — IPRATROPIUM BROMIDE AND ALBUTEROL SULFATE 3 ML: 2.5; .5 SOLUTION RESPIRATORY (INHALATION) at 06:27

## 2023-05-13 RX ADMIN — IPRATROPIUM BROMIDE AND ALBUTEROL SULFATE 3 ML: 2.5; .5 SOLUTION RESPIRATORY (INHALATION) at 20:53

## 2023-05-13 RX ADMIN — VANCOMYCIN HYDROCHLORIDE 1500 MG: 10 INJECTION, POWDER, LYOPHILIZED, FOR SOLUTION INTRAVENOUS at 06:11

## 2023-05-13 RX ADMIN — HYDROCODONE BITARTRATE AND ACETAMINOPHEN 1 TABLET: 5; 325 TABLET ORAL at 17:40

## 2023-05-13 RX ADMIN — HYDROCODONE BITARTRATE AND ACETAMINOPHEN 1 TABLET: 5; 325 TABLET ORAL at 05:46

## 2023-05-13 RX ADMIN — ASPIRIN 81 MG: 81 TABLET, COATED ORAL at 08:33

## 2023-05-13 RX ADMIN — IPRATROPIUM BROMIDE AND ALBUTEROL SULFATE 3 ML: 2.5; .5 SOLUTION RESPIRATORY (INHALATION) at 10:16

## 2023-05-13 RX ADMIN — HYDROCODONE BITARTRATE AND ACETAMINOPHEN 1 TABLET: 5; 325 TABLET ORAL at 09:46

## 2023-05-13 RX ADMIN — HYDROCODONE BITARTRATE AND ACETAMINOPHEN 1 TABLET: 5; 325 TABLET ORAL at 01:31

## 2023-05-13 RX ADMIN — LEVOTHYROXINE SODIUM 200 MCG: 100 TABLET ORAL at 05:46

## 2023-05-13 RX ADMIN — CEFEPIME 2 G: 2 INJECTION, POWDER, FOR SOLUTION INTRAVENOUS at 01:31

## 2023-05-13 RX ADMIN — DORNASE ALFA 5 MG: 1 SOLUTION RESPIRATORY (INHALATION) at 21:48

## 2023-05-13 RX ADMIN — LISINOPRIL 10 MG: 10 TABLET ORAL at 08:33

## 2023-05-13 RX ADMIN — PANTOPRAZOLE SODIUM 40 MG: 40 TABLET, DELAYED RELEASE ORAL at 08:38

## 2023-05-13 NOTE — PROGRESS NOTES
"Patient name: Castillo Trujillo  Patient : 1960  VISIT # [id number]  MR #[id number]    Procedure:  Procedure Date:  POD:* No surgery found *    Subjective   Shortness of breath    Afebrile.  Sitting up on the side of the bed.  Saturation 96 on room air.  Day 3 of 5 of intrapleural lytic therapy.  Tan-colored, thick output.  No issues.         Objective     Visit Vitals  /62 (BP Location: Left arm, Patient Position: Lying)   Pulse 81   Temp 97.9 °F (36.6 °C) (Oral)   Resp 16   Ht 188 cm (74\")   Wt 90.7 kg (200 lb)   SpO2 96%   BMI 25.68 kg/m²       Intake/Output Summary (Last 24 hours) at 2023 06  Last data filed at 2023 0611  Gross per 24 hour   Intake --   Output 4040 ml   Net -4040 ml       Right chest tube output: 490 mL / 24 hours,  Tiny airleak with cough, cloudy/purulent.     LABS:    BMP  Glucose   Date/Time Value Ref Range Status   2023 0436 94 65 - 99 mg/dL Final     BUN   Date/Time Value Ref Range Status   2023 0436 6 (L) 8 - 23 mg/dL Final     Creatinine   Date/Time Value Ref Range Status   2023 0436 0.52 (L) 0.76 - 1.27 mg/dL Final     Sodium   Date/Time Value Ref Range Status   2023 0436 131 (L) 136 - 145 mmol/L Final     Potassium   Date/Time Value Ref Range Status   2023 0436 3.8 3.5 - 5.2 mmol/L Final     Chloride   Date/Time Value Ref Range Status   2023 0436 99 98 - 107 mmol/L Final     CO2   Date/Time Value Ref Range Status   2023 0436 22.0 22.0 - 29.0 mmol/L Final     Calcium   Date/Time Value Ref Range Status   2023 0436 8.7 8.6 - 10.5 mg/dL Final     BUN/Creatinine Ratio   Date/Time Value Ref Range Status   2023 0436 11.5 7.0 - 25.0 Final     Anion Gap   Date/Time Value Ref Range Status   2023 0436 10.0 5.0 - 15.0 mmol/L Final     eGFR   Date/Time Value Ref Range Status   2023 0436 111.9 >60.0 mL/min/1.73 Final     IMAGES:       Imaging Results (Last 24 Hours)     Procedure Component Value Units Date/Time    " XR Chest 1 View [[id number]] Resulted: 05/13/23 0508     Updated: 05/13/23 0509    XR Chest 1 View [[id number]] Collected: 05/12/23 0738     Updated: 05/12/23 0742    Narrative:      EXAM/TECHNIQUE: XR CHEST 1 VW-     INDICATION: chest tube     COMPARISON: 05/11/2023     FINDINGS:     RIGHT basilar pleural drain is stable in position. No change in small  residual right-sided pleural effusion. No change in RIGHT perihilar and  lower lobe consolidation. The LEFT lung and pleural space appear clear.  No visible pneumothorax. Cardiac silhouette is stable.       Impression:         Stable exam. No change in residual small RIGHT pleural effusion with  pleural drain in place. No change in RIGHT perihilar and lower lobe  consolidation.  This report was finalized on 05/12/2023 07:39 by Dr. Yogi Lopez MD.        My image interpretation: Pigtail catheter on the right, no pneumothorax, stable effusion and consolidation    Physical Exam:  General: Alert, oriented. No apparent distress.   Cardiovascular: Regular rate and rhythm without murmur, rubs, or gallops.    Pulmonary: Decreased breath sounds to right lung and bilateral bases.  No wheezing or rhonchi.    Chest: Right side chest tube to-20 cm H2O suction.  Tiny air leak with cough. Fluid is tan, cloudy.     Abdomen: Soft, non distended, and non tender.  Extremities: Warm, moves all extremities. .   Neurologic:  Grossly intact with no focal deficits.       Impression:  Empyema, right  Right lung mass  Recurrent right pleural effusion  Tobacco abuse, in remission  Stage 2 moderate COPD by GOLD classification  Hypokalemia  Unintentional weight loss      Plan:  Continue chest tube suction at -20 cm H2O  Flush chest tube with 10 mL normal saline every 8 hours to maintain patency  Chest x-ray daily  Continue intrapleural tPA/DNase therapy, day 3/5. Plan to re-image chest after 5-day course completed.  Encourage pulmonary toilet and ambulation  Discussed with patient      Caroline  FREDERICK Nick, APRN  05/13/23  06:23 CDT

## 2023-05-13 NOTE — PROGRESS NOTES
"Pharmacy Dosing Service  Pharmacokinetics  Vancomycin Follow-up Evaluation    Assessment/Action/Plan:  Current Order: Vancomycin 1500 mg IVPB every 12 hours  Current end date:05/16/2023  Levels: none  Additional antimicrobial agent(s): Cefepime 2 g    Patient to continue current dose. Pharmacy will continue to follow daily and adjust dose accordingly.     Subjective:  Castillo Trujillo is a 63 y.o. male currently on Vancomycin 1500 mg IV every 12 hours for the treatment of emphysema, day 2 of 5 of treatment.    AUC Model Data:  Loading dose: N/A  Regimen: 1500 mg IV every 12 hours.  Start time: 08:04 on 05/13/2023  Exposure target: AUC24 (range)400-600 mg/L.hr   AUC24,ss: 514 mg/L.hr  PAUC*: 88 %  Ctrough,ss: 13.7 mg/L  Pconc*: 9 %  Tox.: 9 %    Objective:  Ht: 188 cm (74\"); Wt: 90.7 kg (200 lb)  Estimated Creatinine Clearance: 181.7 mL/min (A) (by C-G formula based on SCr of 0.52 mg/dL (L)).   Creatinine   Date Value Ref Range Status   05/13/2023 0.52 (L) 0.76 - 1.27 mg/dL Final   05/12/2023 0.60 (L) 0.76 - 1.27 mg/dL Final   05/10/2023 0.63 (L) 0.76 - 1.27 mg/dL Final      Lab Results   Component Value Date    WBC 8.36 05/12/2023    WBC 13.16 (H) 05/09/2023    WBC 9.20 05/02/2023         Lab Results   Component Value Date    JOSEPHOTROUGH 16.50 05/13/2023           Srini Pearson  05/13/2023  "

## 2023-05-13 NOTE — PROGRESS NOTES
[facility] Medicine Services  INPATIENT PROGRESS NOTE    Patient Name: Castillo Trujillo  Date of Admission: 5/9/2023  Today's Date: 05/13/23  Length of Stay: 3  Primary Care Physician: Juan Vaughn DO    Subjective   Chief Complaint: follow-up empyema  HPI   Patient sitting up on the side of the bed eating broccoli cheddar soup from [employer].  Patient doing about the same.  Feels pretty good today.  Reports that he is try to be more diligent about going on some walks.  He admitted that he has not been using his incentive spirometer very much.  Denies any new pain symptoms.      Review of Systems   All pertinent negatives and positives are as above. All other systems have been reviewed and are negative unless otherwise stated.     Objective    Temp:  [97.8 °F (36.6 °C)-99 °F (37.2 °C)] 98.5 °F (36.9 °C)  Heart Rate:  [75-90] 83  Resp:  [16] 16  BP: (115-139)/(45-71) 135/52  Physical Exam  Vitals reviewed.   HENT:      Mouth/Throat:      Mouth: Mucous membranes are moist.   Eyes:      Pupils: Pupils are equal, round, and reactive to light.   Pulmonary:      Effort: Pulmonary effort is normal. No respiratory distress.      Comments: Pigtail catheter in place on the right; diminished breath sounds on the right as compared to the left  Musculoskeletal:         General: No deformity.   Skin:     General: Skin is warm.   Neurological:      General: No focal deficit present.      Mental Status: He is alert.   Psychiatric:         Mood and Affect: Mood normal.         Results Review:  I have reviewed the labs, radiology results, and diagnostic studies.    Laboratory Data:   Results from last 7 days   Lab Units 05/12/23  0537 05/09/23  1613 05/09/23  0743   WBC 10*3/mm3 8.36 13.16*  --    HEMOGLOBIN g/dL 9.4* 9.6*  --    HEMATOCRIT % 31.6* 32.3*  --    PLATELETS 10*3/mm3 704* 654* 607*        Results from last 7 days   Lab Units 05/13/23  0436 05/12/23  0537 05/10/23  1153  05/09/23  1613   SODIUM mmol/L 131* 134* 133* 137   POTASSIUM mmol/L 3.8 3.3* 3.2* 3.1*   CHLORIDE mmol/L 99 100 99 98   CO2 mmol/L 22.0 23.0 21.0* 25.0   BUN mg/dL 6* 5* 9 12   CREATININE mg/dL 0.52* 0.60* 0.63* 0.68*   CALCIUM mg/dL 8.7 8.6 8.6 9.3   BILIRUBIN mg/dL  --   --   --  0.6   ALK PHOS U/L  --   --   --  400*   ALT (SGPT) U/L  --   --   --  27   AST (SGOT) U/L  --   --   --  21   GLUCOSE mg/dL 94 99 93 138*       Culture Data:   Blood Culture   Date Value Ref Range Status   05/09/2023 No growth at 3 days  Preliminary   05/09/2023 No growth at 3 days  Preliminary       Radiology Data:   Imaging Results (Last 24 Hours)     Procedure Component Value Units Date/Time    XR Chest 1 View [[id number]] Collected: 05/13/23 0655     Updated: 05/13/23 0701    Narrative:      EXAMINATION: XR CHEST 1 VW-     5/13/2023 4:40 AM CDT     HISTORY: Right empyema. Chest tube placement.     One view chest x-ray.     Comparison is made with yesterday's of 4:12 AM.     Pigtail pleural space catheter still present within the lower right  chest.     Unchanged right basilar pneumonia and fluid.  The right apex is clear.     There is increased consolidation/atelectasis of the retrocardiac left  lower lobe.  The left upper lung is clear.     Heart size is stable.     No pneumothorax or heart failure.     Summary:  1. Pigtail pleural space catheter unchanged in position.  2. Unchanged right lower lobe pneumonia.  3. Increased left basilar consolidation/atelectasis.     This report was finalized on 05/13/2023 06:58 by Dr. Raman Polo MD.          I have reviewed the patient's current medications.     Assessment/Plan   Assessment  Active Hospital Problems    Diagnosis    • **Empyema, right    • Empyema lung    • Recurrent right pleural effusion    • Stage 2 moderate COPD by GOLD classification    • Right lung mass    • Tobacco abuse, in remission    • GERD (gastroesophageal reflux disease)    • Primary hypertension    • Acquired  hypothyroidism        Treatment Plan  1.  Discussed with Dr. Kim  2.  Continue vancomycin and cefepime  3.  So far, pleural fluid Gram stain was positive but cultures have been negative to date  4.  Increase activity  5.  Encourage use of incentive spirometer  6.  Daily chest x-ray  7.  Continue intrapleural tPA treatment per CT surgery    Medical Decision Making  Number and Complexity of problems: Moderate complexity; 3 acute problems in the form of right empyema, recurrent right pleural effusion, and recent FNA of lung mass right lung with pathology consistent with inflammatory pseudotumor       Conditions and Status        Condition is improving.     [facility] Data  External documents reviewed: none today  Cardiac tracing (EKG, telemetry) interpretation: None  Radiology interpretation: Pigtail catheter remains in place on the right with unchanged right-sided opacities.  Labs reviewed: As above  Any tests that were considered but not ordered: None considered at present     Decision rules/scores evaluated (example [id number], Wells, etc): None considered at present     Discussed with: Patient and Dr. Kim today     Care Planning  Shared decision making: Patient; I also conferenced with family in addition to Dr. Kim.    Code status and discussions: Full code with full interventions     Disposition  Social Determinants of Health that impact treatment or disposition: None apparent at this time  I expect the patient to be discharged to home in 3-5 days.     Electronically signed by Osvaldo Pryor MD, 05/13/23, 12:18 CDT.

## 2023-05-14 ENCOUNTER — APPOINTMENT (OUTPATIENT)
Dept: GENERAL RADIOLOGY | Facility: HOSPITAL | Age: 63
DRG: 178 | End: 2023-05-14
Payer: COMMERCIAL

## 2023-05-14 LAB
BACTERIA FLD CULT: NORMAL
BACTERIA SPEC AEROBE CULT: NORMAL
BACTERIA SPEC AEROBE CULT: NORMAL
CREAT SERPL-MCNC: 0.6 MG/DL (ref 0.76–1.27)
EGFRCR SERPLBLD CKD-EPI 2021: 107.1 ML/MIN/1.73
FUNGUS WND CULT: NORMAL
GRAM STN SPEC: NORMAL
GRAM STN SPEC: NORMAL
MYCOBACTERIUM SPEC CULT: NORMAL
NIGHT BLUE STAIN TISS: NORMAL
NIGHT BLUE STAIN TISS: NORMAL
VANCOMYCIN TROUGH SERPL-MCNC: 19 MCG/ML (ref 5–20)

## 2023-05-14 PROCEDURE — 25010000002 ALTEPLASE 2 MG RECONSTITUTED SOLUTION 1 EACH VIAL: Performed by: NURSE PRACTITIONER

## 2023-05-14 PROCEDURE — 82565 ASSAY OF CREATININE: CPT | Performed by: INTERNAL MEDICINE

## 2023-05-14 PROCEDURE — 0 CEFEPIME PER 500 MG: Performed by: NURSE PRACTITIONER

## 2023-05-14 PROCEDURE — 71045 X-RAY EXAM CHEST 1 VIEW: CPT

## 2023-05-14 PROCEDURE — 94799 UNLISTED PULMONARY SVC/PX: CPT

## 2023-05-14 PROCEDURE — 80202 ASSAY OF VANCOMYCIN: CPT | Performed by: THORACIC SURGERY (CARDIOTHORACIC VASCULAR SURGERY)

## 2023-05-14 PROCEDURE — 94761 N-INVAS EAR/PLS OXIMETRY MLT: CPT

## 2023-05-14 PROCEDURE — 99232 SBSQ HOSP IP/OBS MODERATE 35: CPT | Performed by: THORACIC SURGERY (CARDIOTHORACIC VASCULAR SURGERY)

## 2023-05-14 PROCEDURE — 94664 DEMO&/EVAL PT USE INHALER: CPT

## 2023-05-14 PROCEDURE — 25010000002 VANCOMYCIN 10 G RECONSTITUTED SOLUTION: Performed by: INTERNAL MEDICINE

## 2023-05-14 RX ADMIN — SODIUM CHLORIDE 10 MG: 9 INJECTION INTRAMUSCULAR; INTRAVENOUS; SUBCUTANEOUS at 20:58

## 2023-05-14 RX ADMIN — ASPIRIN 81 MG: 81 TABLET, COATED ORAL at 08:20

## 2023-05-14 RX ADMIN — LEVOTHYROXINE SODIUM 200 MCG: 100 TABLET ORAL at 05:52

## 2023-05-14 RX ADMIN — HYDROCODONE BITARTRATE AND ACETAMINOPHEN 1 TABLET: 5; 325 TABLET ORAL at 05:52

## 2023-05-14 RX ADMIN — IPRATROPIUM BROMIDE AND ALBUTEROL SULFATE 3 ML: 2.5; .5 SOLUTION RESPIRATORY (INHALATION) at 20:16

## 2023-05-14 RX ADMIN — CEFEPIME 2 G: 2 INJECTION, POWDER, FOR SOLUTION INTRAVENOUS at 16:51

## 2023-05-14 RX ADMIN — SODIUM CHLORIDE 10 MG: 9 INJECTION INTRAMUSCULAR; INTRAVENOUS; SUBCUTANEOUS at 09:39

## 2023-05-14 RX ADMIN — IPRATROPIUM BROMIDE AND ALBUTEROL SULFATE 3 ML: 2.5; .5 SOLUTION RESPIRATORY (INHALATION) at 11:12

## 2023-05-14 RX ADMIN — DORNASE ALFA 5 MG: 1 SOLUTION RESPIRATORY (INHALATION) at 20:58

## 2023-05-14 RX ADMIN — LISINOPRIL 10 MG: 10 TABLET ORAL at 08:20

## 2023-05-14 RX ADMIN — VANCOMYCIN HYDROCHLORIDE 1500 MG: 10 INJECTION, POWDER, LYOPHILIZED, FOR SOLUTION INTRAVENOUS at 05:52

## 2023-05-14 RX ADMIN — HYDROCODONE BITARTRATE AND ACETAMINOPHEN 1 TABLET: 5; 325 TABLET ORAL at 18:07

## 2023-05-14 RX ADMIN — HYDROCODONE BITARTRATE AND ACETAMINOPHEN 1 TABLET: 5; 325 TABLET ORAL at 09:44

## 2023-05-14 RX ADMIN — PANTOPRAZOLE SODIUM 40 MG: 40 TABLET, DELAYED RELEASE ORAL at 08:22

## 2023-05-14 RX ADMIN — HYDROCODONE BITARTRATE AND ACETAMINOPHEN 1 TABLET: 5; 325 TABLET ORAL at 22:09

## 2023-05-14 RX ADMIN — IPRATROPIUM BROMIDE AND ALBUTEROL SULFATE 3 ML: 2.5; .5 SOLUTION RESPIRATORY (INHALATION) at 15:09

## 2023-05-14 RX ADMIN — CEFEPIME 2 G: 2 INJECTION, POWDER, FOR SOLUTION INTRAVENOUS at 01:37

## 2023-05-14 RX ADMIN — HYDROCODONE BITARTRATE AND ACETAMINOPHEN 1 TABLET: 5; 325 TABLET ORAL at 14:09

## 2023-05-14 RX ADMIN — DORNASE ALFA 5 MG: 1 SOLUTION RESPIRATORY (INHALATION) at 09:39

## 2023-05-14 RX ADMIN — Medication 10 ML: at 22:52

## 2023-05-14 RX ADMIN — HYDROCODONE BITARTRATE AND ACETAMINOPHEN 1 TABLET: 5; 325 TABLET ORAL at 01:37

## 2023-05-14 RX ADMIN — CEFEPIME 2 G: 2 INJECTION, POWDER, FOR SOLUTION INTRAVENOUS at 08:22

## 2023-05-14 RX ADMIN — VANCOMYCIN HYDROCHLORIDE 1250 MG: 10 INJECTION, POWDER, LYOPHILIZED, FOR SOLUTION INTRAVENOUS at 22:49

## 2023-05-14 RX ADMIN — IPRATROPIUM BROMIDE AND ALBUTEROL SULFATE 3 ML: 2.5; .5 SOLUTION RESPIRATORY (INHALATION) at 07:28

## 2023-05-14 NOTE — PROGRESS NOTES
"Patient name: Castillo Trujillo  Patient : 1960  VISIT # [id number]  MR #[id number]    Procedure:  Procedure Date:   POD:* No surgery found *    Subjective   Shortness of breath    Afebrile.  Sitting up on the side of the bed.  Saturation 96 on room air.  Day 4 of 5 of intrapleural lytic therapy.  Tan-colored,  purulent output.  No issues.    Feeling better.       Objective     Visit Vitals  /55 (BP Location: Right arm, Patient Position: Lying)   Pulse 75   Temp 97.6 °F (36.4 °C) (Oral)   Resp 16   Ht 188 cm (74\")   Wt 90.7 kg (200 lb)   SpO2 96%   BMI 25.68 kg/m²       Intake/Output Summary (Last 24 hours) at 2023 06  Last data filed at 2023 0600  Gross per 24 hour   Intake 1340 ml   Output 2480 ml   Net -1140 ml     Right chest tube output: 280mL / 24 hours,  no leak, cloudy/purulent.     LABS:    NONE    IMAGES:       Imaging Results (Last 24 Hours)     Procedure Component Value Units Date/Time    XR Chest 1 View [[id number]] Collected: 23 0500     Updated: 23 050    Narrative:      EXAMINATION: XR CHEST 1 -     2023 3:50 AM CDT     HISTORY: Pneumonia and empyema. Right-sided chest tube.     One view chest x-ray.     Comparison is made with yesterday at 4:24 AM.     Stable heart and mediastinum.     Partial clearing of retrocardiac left lower lobe  consolidation/atelectasis.  The upper left lung is clear.     Unchanged right-sided infiltrate and small amount of loculated pleural  fluid.  Unchanged right basilar chest tube.  No pneumothorax.     Relative sparing of the upper right lung.     Summary:  1. Other than partial clearing of left basilar consolidation the chest  x-ray is stable.           This report was finalized on 2023 05:02 by Dr. Raman Polo MD.    XR Chest 1 View [[id number]] Collected: 23     Updated: 23 0701    Narrative:      EXAMINATION: XR CHEST 1 -     2023 4:40 AM CDT     HISTORY: Right empyema. Chest tube placement.   "   One view chest x-ray.     Comparison is made with yesterday's of 4:12 AM.     Pigtail pleural space catheter still present within the lower right  chest.     Unchanged right basilar pneumonia and fluid.  The right apex is clear.     There is increased consolidation/atelectasis of the retrocardiac left  lower lobe.  The left upper lung is clear.     Heart size is stable.     No pneumothorax or heart failure.     Summary:  1. Pigtail pleural space catheter unchanged in position.  2. Unchanged right lower lobe pneumonia.  3. Increased left basilar consolidation/atelectasis.     This report was finalized on 05/13/2023 06:58 by Dr. Raman Polo MD.        My image interpretation: Pigtail catheter in place, no pneumothorax, improvement in left lower lobe consolidation    Physical Exam:  General: Alert, oriented. No apparent distress.  Sitting up in the bed  Cardiovascular: Regular rate and rhythm without murmur, rubs, or gallops.    Pulmonary: Decreased breath sounds to right lung and bilateral bases.  No wheezing or rhonchi.    Chest: Right side chest tube to-20 cm H2O suction.   No leak.  Fluid is tan, cloudy.     Abdomen: Soft, non distended, and non tender.  Extremities: Warm, moves all extremities. .   Neurologic:  Grossly intact with no focal deficits.         Impression:  Empyema, right  Right lung mass  Recurrent right pleural effusion  Tobacco abuse, in remission  Stage 2 moderate COPD by GOLD classification  Hypokalemia  Unintentional weight loss        Plan:  Continue chest tube suction at -20 cm H2O  Flush chest tube with 10 mL normal saline every 8 hours to maintain patency  Chest x-ray daily  Continue intrapleural tPA/DNase therapy, day 4/5. Plan CT later tomorrow if x-ray in a.m. unimproved   Encourage pulmonary toilet and ambulation  Discussed with patient    Caroline Nick, TRESSA  05/14/23  06:29 CDT

## 2023-05-14 NOTE — PROGRESS NOTES
[facility] Medicine Services  INPATIENT PROGRESS NOTE    Patient Name: Castillo Trujillo  Date of Admission: 5/9/2023  Today's Date: 05/14/23  Length of Stay: 4  Primary Care Physician: Juan Vaughn DO    Subjective   Chief Complaint: follow-up empyema  HPI   Patient states that he always has a little bit of pain after intrapleural tPA is administered.  Reports that he has a pain pill, and in the next 10 minutes or so.  Visiting with family at bedside.  Patient reports that he has been using his incentive spirometer more diligently.  He has been for 2 walks today.  Voices no other new complaints.    Review of Systems   All pertinent negatives and positives are as above. All other systems have been reviewed and are negative unless otherwise stated.     Objective    Temp:  [97.6 °F (36.4 °C)-99.8 °F (37.7 °C)] 98.2 °F (36.8 °C)  Heart Rate:  [72-87] 79  Resp:  [16-18] 18  BP: ()/(54-78) 99/78  Physical Exam  Vitals reviewed.   HENT:      Mouth/Throat:      Mouth: Mucous membranes are moist.   Eyes:      Pupils: Pupils are equal, round, and reactive to light.   Pulmonary:      Effort: Pulmonary effort is normal. No respiratory distress.      Comments: Pigtail catheter in place on the right  Musculoskeletal:         General: No deformity.   Skin:     General: Skin is warm.   Neurological:      General: No focal deficit present.      Mental Status: He is alert.   Psychiatric:         Mood and Affect: Mood normal.         Results Review:  I have reviewed the labs, radiology results, and diagnostic studies.    Laboratory Data:   Results from last 7 days   Lab Units 05/12/23  0537 05/09/23  1613 05/09/23  0743   WBC 10*3/mm3 8.36 13.16*  --    HEMOGLOBIN g/dL 9.4* 9.6*  --    HEMATOCRIT % 31.6* 32.3*  --    PLATELETS 10*3/mm3 704* 654* 607*        Results from last 7 days   Lab Units 05/13/23  0436 05/12/23  0537 05/10/23  1153 05/09/23  1613   SODIUM mmol/L 131* 134* 133* 137    POTASSIUM mmol/L 3.8 3.3* 3.2* 3.1*   CHLORIDE mmol/L 99 100 99 98   CO2 mmol/L 22.0 23.0 21.0* 25.0   BUN mg/dL 6* 5* 9 12   CREATININE mg/dL 0.52* 0.60* 0.63* 0.68*   CALCIUM mg/dL 8.7 8.6 8.6 9.3   BILIRUBIN mg/dL  --   --   --  0.6   ALK PHOS U/L  --   --   --  400*   ALT (SGPT) U/L  --   --   --  27   AST (SGOT) U/L  --   --   --  21   GLUCOSE mg/dL 94 99 93 138*       Culture Data:   Blood Culture   Date Value Ref Range Status   05/09/2023 No growth at 3 days  Preliminary   05/09/2023 No growth at 3 days  Preliminary       Radiology Data:   Imaging Results (Last 24 Hours)     Procedure Component Value Units Date/Time    XR Chest 1 View [[id number]] Collected: 05/14/23 0500     Updated: 05/14/23 0505    Narrative:      EXAMINATION: XR CHEST 1 VW-     5/14/2023 3:50 AM CDT     HISTORY: Pneumonia and empyema. Right-sided chest tube.     One view chest x-ray.     Comparison is made with yesterday at 4:24 AM.     Stable heart and mediastinum.     Partial clearing of retrocardiac left lower lobe  consolidation/atelectasis.  The upper left lung is clear.     Unchanged right-sided infiltrate and small amount of loculated pleural  fluid.  Unchanged right basilar chest tube.  No pneumothorax.     Relative sparing of the upper right lung.     Summary:  1. Other than partial clearing of left basilar consolidation the chest  x-ray is stable.           This report was finalized on 05/14/2023 05:02 by Dr. Raman Polo MD.          I have reviewed the patient's current medications.     Assessment/Plan   Assessment  Active Hospital Problems    Diagnosis    • **Empyema, right    • Empyema lung    • Recurrent right pleural effusion    • Stage 2 moderate COPD by GOLD classification    • Right lung mass    • Tobacco abuse, in remission    • GERD (gastroesophageal reflux disease)    • Primary hypertension    • Acquired hypothyroidism        Treatment Plan  1.  Continue vancomycin and cefepime  2.  Pleural fluid Gram stain was  positive but cultures have been negative to date  3.  Increase activity  4.  Encourage use of incentive spirometer  5.  Daily chest x-ray  6.  Continue intrapleural tPA treatment per CT surgery - day 4/5  7.  Consider repeat CT Chest ? tomorrow    Medical Decision Making  Number and Complexity of problems: Moderate complexity; 3 acute problems in the form of right empyema, recurrent right pleural effusion, and recent FNA of lung mass right lung with pathology consistent with inflammatory pseudotumor       Conditions and Status        Condition is improving.     [facility] Data  External documents reviewed: none today  Cardiac tracing (EKG, telemetry) interpretation: None  Radiology interpretation: Pigtail catheter remains in place on the right with unchanged right-sided opacities; improved aeration left lung particularly at left base  Labs reviewed: As above  Any tests that were considered but not ordered: Consider repeat CT Chest     Decision rules/scores evaluated (example [id number], Wells, etc): None considered at present     Discussed with: Patient      Care Planning  Shared decision making: Patient and family  Code status and discussions: Full code with full interventions     Disposition  Social Determinants of Health that impact treatment or disposition: None apparent at this time  I expect the patient to be discharged to home in 3-5 days.     Electronically signed by Osvaldo Pryor MD, 05/14/23, 13:30 CDT.

## 2023-05-14 NOTE — PROGRESS NOTES
"Pharmacy Dosing Service  Pharmacokinetics  Vancomycin Follow-up Evaluation    Assessment/Action/Plan:  Current Order: Vancomycin 1500 mg IVPB every 12 hours  Current end date: 5/16/23  Levels: Trough = 19.0  Additional antimicrobial agent(s): Cefepime     Vancomycin trough = 19.0 at 16:25 (previous dose administered at 05:52). SCr=0.60 (stable). Expect additional drug accumulation; adjusted Vancomycin to 1250mg IV Q12h. Pharmacy will continue to follow daily and adjust dose accordingly.     Subjective:  Castillo Trujillo is a 63 y.o. male currently on Vancomycin 1500 mg IV every 12 hours for the treatment of empyema, day 6 of treatment.    AUC Model Data:  Regimen: 1250 mg IV every 12 hours  Exposure target: AUC24 (range) 400-600 mg/L.hr   AUC24,ss: 480 mg/L.hr  PAUC*: 91 %  Ctrough,ss: 13.8 mg/L  Pconc*: 2 %  Tox.: 9 %    Objective:  Ht: 188 cm (74\"); Wt: 90.7 kg (200 lb)  Estimated Creatinine Clearance: 157.5 mL/min (A) (by C-G formula based on SCr of 0.6 mg/dL (L)).     Creatinine   Date Value Ref Range Status   05/14/2023 0.60 (L) 0.76 - 1.27 mg/dL Final   05/13/2023 0.52 (L) 0.76 - 1.27 mg/dL Final   05/12/2023 0.60 (L) 0.76 - 1.27 mg/dL Final      Lab Results   Component Value Date    WBC 8.36 05/12/2023    WBC 13.16 (H) 05/09/2023    WBC 9.20 05/02/2023         Lab Results   Component Value Date    VANCOTROUGH 19.00 05/14/2023       Culture Results:  Microbiology Results (last 10 days)       Procedure Component Value - Date/Time    Blood Culture - Blood, Hand, Left [[id number]]  (Normal) Collected: 05/09/23 1613    Lab Status: Final result Specimen: Blood from Hand, Left Updated: 05/14/23 2215     Blood Culture No growth at 5 days    Blood Culture - Blood, Arm, Right [[id number]]  (Normal) Collected: 05/09/23 1613    Lab Status: Final result Specimen: Blood from Arm, Right Updated: 05/14/23 4500     Blood Culture No growth at 5 days    AFB Culture - Body Fluid, Pleural Cavity [[id number]] Collected: 05/09/23 1346 "    Lab Status: Preliminary result Specimen: Body Fluid from Pleural Cavity Updated: 05/14/23 1400     AFB Culture No AFB isolated at less than 1 week     AFB Stain No acid fast bacilli seen on direct smear      No acid fast bacilli seen on concentrated smear    Body Fluid Culture - Body Fluid, Pleural Cavity [[id number]] Collected: 05/09/23 1346    Lab Status: Final result Specimen: Body Fluid from Pleural Cavity Updated: 05/14/23 0846     Body Fluid Culture No growth at 5 days     Gram Stain Many (4+) Gram positive cocci      Many (4+) Gram negative bacilli    Anaerobic Culture - Pleural Fluid, Pleural Cavity [[id number]]  (Normal) Collected: 05/09/23 1346    Lab Status: Preliminary result Specimen: Pleural Fluid from Pleural Cavity Updated: 05/11/23 0558     Anaerobic Culture Screening for Anaerobes    Fungus Culture - Body Fluid, Pleural Cavity [[id number]] Collected: 05/09/23 1346    Lab Status: Preliminary result Specimen: Body Fluid from Pleural Cavity Updated: 05/14/23 1400     Fungus Culture No fungus isolated at less than 1 week            Benny Oconnor, PharmD   05/14/23 17:49 CDT

## 2023-05-15 ENCOUNTER — APPOINTMENT (OUTPATIENT)
Dept: CT IMAGING | Facility: HOSPITAL | Age: 63
DRG: 178 | End: 2023-05-15
Payer: COMMERCIAL

## 2023-05-15 ENCOUNTER — APPOINTMENT (OUTPATIENT)
Dept: GENERAL RADIOLOGY | Facility: HOSPITAL | Age: 63
DRG: 178 | End: 2023-05-15
Payer: COMMERCIAL

## 2023-05-15 LAB
ANION GAP SERPL CALCULATED.3IONS-SCNC: 7 MMOL/L (ref 5–15)
BACTERIA SPEC ANAEROBE CULT: ABNORMAL
BUN SERPL-MCNC: 5 MG/DL (ref 8–23)
BUN/CREAT SERPL: 7.9 (ref 7–25)
CALCIUM SPEC-SCNC: 8.7 MG/DL (ref 8.6–10.5)
CHLORIDE SERPL-SCNC: 101 MMOL/L (ref 98–107)
CO2 SERPL-SCNC: 28 MMOL/L (ref 22–29)
CREAT SERPL-MCNC: 0.63 MG/DL (ref 0.76–1.27)
DEPRECATED RDW RBC AUTO: 46.9 FL (ref 37–54)
EGFRCR SERPLBLD CKD-EPI 2021: 105.6 ML/MIN/1.73
ERYTHROCYTE [DISTWIDTH] IN BLOOD BY AUTOMATED COUNT: 16.4 % (ref 12.3–15.4)
GLUCOSE SERPL-MCNC: 95 MG/DL (ref 65–99)
HCT VFR BLD AUTO: 31.7 % (ref 37.5–51)
HGB BLD-MCNC: 9.2 G/DL (ref 13–17.7)
MCH RBC QN AUTO: 23.2 PG (ref 26.6–33)
MCHC RBC AUTO-ENTMCNC: 29 G/DL (ref 31.5–35.7)
MCV RBC AUTO: 80.1 FL (ref 79–97)
PLATELET # BLD AUTO: 747 10*3/MM3 (ref 140–450)
PMV BLD AUTO: 8.6 FL (ref 6–12)
POTASSIUM SERPL-SCNC: 3.5 MMOL/L (ref 3.5–5.2)
RBC # BLD AUTO: 3.96 10*6/MM3 (ref 4.14–5.8)
SODIUM SERPL-SCNC: 136 MMOL/L (ref 136–145)
WBC NRBC COR # BLD: 8.15 10*3/MM3 (ref 3.4–10.8)

## 2023-05-15 PROCEDURE — 94761 N-INVAS EAR/PLS OXIMETRY MLT: CPT

## 2023-05-15 PROCEDURE — 25510000001 IOPAMIDOL 61 % SOLUTION: Performed by: FAMILY MEDICINE

## 2023-05-15 PROCEDURE — 25010000002 ALTEPLASE 2 MG RECONSTITUTED SOLUTION 1 EACH VIAL: Performed by: NURSE PRACTITIONER

## 2023-05-15 PROCEDURE — 71045 X-RAY EXAM CHEST 1 VIEW: CPT

## 2023-05-15 PROCEDURE — 85027 COMPLETE CBC AUTOMATED: CPT | Performed by: INTERNAL MEDICINE

## 2023-05-15 PROCEDURE — 25010000002 VANCOMYCIN 10 G RECONSTITUTED SOLUTION: Performed by: INTERNAL MEDICINE

## 2023-05-15 PROCEDURE — 94799 UNLISTED PULMONARY SVC/PX: CPT

## 2023-05-15 PROCEDURE — 71260 CT THORAX DX C+: CPT

## 2023-05-15 PROCEDURE — 94664 DEMO&/EVAL PT USE INHALER: CPT

## 2023-05-15 PROCEDURE — 99232 SBSQ HOSP IP/OBS MODERATE 35: CPT | Performed by: THORACIC SURGERY (CARDIOTHORACIC VASCULAR SURGERY)

## 2023-05-15 PROCEDURE — 80048 BASIC METABOLIC PNL TOTAL CA: CPT | Performed by: INTERNAL MEDICINE

## 2023-05-15 PROCEDURE — 0 CEFEPIME PER 500 MG: Performed by: NURSE PRACTITIONER

## 2023-05-15 RX ORDER — POTASSIUM CHLORIDE 750 MG/1
40 CAPSULE, EXTENDED RELEASE ORAL ONCE
Status: COMPLETED | OUTPATIENT
Start: 2023-05-15 | End: 2023-05-15

## 2023-05-15 RX ADMIN — HYDROCODONE BITARTRATE AND ACETAMINOPHEN 1 TABLET: 5; 325 TABLET ORAL at 14:41

## 2023-05-15 RX ADMIN — LISINOPRIL 10 MG: 10 TABLET ORAL at 08:22

## 2023-05-15 RX ADMIN — VANCOMYCIN HYDROCHLORIDE 1250 MG: 10 INJECTION, POWDER, LYOPHILIZED, FOR SOLUTION INTRAVENOUS at 10:17

## 2023-05-15 RX ADMIN — CEFEPIME 2 G: 2 INJECTION, POWDER, FOR SOLUTION INTRAVENOUS at 16:39

## 2023-05-15 RX ADMIN — ASPIRIN 81 MG: 81 TABLET, COATED ORAL at 08:22

## 2023-05-15 RX ADMIN — IPRATROPIUM BROMIDE AND ALBUTEROL SULFATE 3 ML: 2.5; .5 SOLUTION RESPIRATORY (INHALATION) at 14:51

## 2023-05-15 RX ADMIN — PANTOPRAZOLE SODIUM 40 MG: 40 TABLET, DELAYED RELEASE ORAL at 08:22

## 2023-05-15 RX ADMIN — DORNASE ALFA 5 MG: 1 SOLUTION RESPIRATORY (INHALATION) at 21:13

## 2023-05-15 RX ADMIN — HYDROCODONE BITARTRATE AND ACETAMINOPHEN 1 TABLET: 5; 325 TABLET ORAL at 18:19

## 2023-05-15 RX ADMIN — POTASSIUM CHLORIDE 40 MEQ: 10 CAPSULE, COATED, EXTENDED RELEASE ORAL at 11:54

## 2023-05-15 RX ADMIN — CEFEPIME 2 G: 2 INJECTION, POWDER, FOR SOLUTION INTRAVENOUS at 02:04

## 2023-05-15 RX ADMIN — HYDROCODONE BITARTRATE AND ACETAMINOPHEN 1 TABLET: 5; 325 TABLET ORAL at 06:32

## 2023-05-15 RX ADMIN — IPRATROPIUM BROMIDE AND ALBUTEROL SULFATE 3 ML: 2.5; .5 SOLUTION RESPIRATORY (INHALATION) at 21:16

## 2023-05-15 RX ADMIN — LEVOTHYROXINE SODIUM 200 MCG: 100 TABLET ORAL at 06:33

## 2023-05-15 RX ADMIN — HYDROCODONE BITARTRATE AND ACETAMINOPHEN 1 TABLET: 5; 325 TABLET ORAL at 22:51

## 2023-05-15 RX ADMIN — IPRATROPIUM BROMIDE AND ALBUTEROL SULFATE 3 ML: 2.5; .5 SOLUTION RESPIRATORY (INHALATION) at 11:30

## 2023-05-15 RX ADMIN — CEFEPIME 2 G: 2 INJECTION, POWDER, FOR SOLUTION INTRAVENOUS at 08:22

## 2023-05-15 RX ADMIN — IPRATROPIUM BROMIDE AND ALBUTEROL SULFATE 3 ML: 2.5; .5 SOLUTION RESPIRATORY (INHALATION) at 06:12

## 2023-05-15 RX ADMIN — VANCOMYCIN HYDROCHLORIDE 1250 MG: 10 INJECTION, POWDER, LYOPHILIZED, FOR SOLUTION INTRAVENOUS at 21:11

## 2023-05-15 RX ADMIN — HYDROCODONE BITARTRATE AND ACETAMINOPHEN 1 TABLET: 5; 325 TABLET ORAL at 02:05

## 2023-05-15 RX ADMIN — HYDROCODONE BITARTRATE AND ACETAMINOPHEN 1 TABLET: 5; 325 TABLET ORAL at 10:25

## 2023-05-15 RX ADMIN — SODIUM CHLORIDE 10 MG: 9 INJECTION INTRAMUSCULAR; INTRAVENOUS; SUBCUTANEOUS at 10:17

## 2023-05-15 RX ADMIN — IOPAMIDOL 100 ML: 612 INJECTION, SOLUTION INTRAVENOUS at 10:04

## 2023-05-15 RX ADMIN — DORNASE ALFA 5 MG: 1 SOLUTION RESPIRATORY (INHALATION) at 10:17

## 2023-05-15 RX ADMIN — SODIUM CHLORIDE 10 MG: 9 INJECTION INTRAMUSCULAR; INTRAVENOUS; SUBCUTANEOUS at 21:13

## 2023-05-15 NOTE — PAYOR COMM NOTE
"5/14 CLINICAL  [id number]   [id number]    Castillo Trujillo (63 y.o. Male)     Date of Birth   1960    Social Security Number       Address   [address] [place], [place] [address] [facility] [place] [address]    Home Phone   [phone]    MRN   [id number]       [facility]    Marital Status                               Admission Date   5/9/23    Admission Type   Urgent    Admitting Provider   Barry Rich MD    Attending Provider   Barry Rich MD    Department, Room/Bed   [facility] 3C, 364/1       Discharge Date       Discharge Disposition       Discharge Destination                               Attending Provider: Barry Rich MD    Allergies: No Known Allergies    Isolation: None   Infection: None   Code Status: CPR    Ht: 188 cm (74\")   Wt: 90.7 kg (200 lb)    Admission Cmt: None   Principal Problem: Empyema, right [J86.9]                 Active Insurance as of 5/9/2023     Primary Coverage     Payor Plan Insurance Group Employer/Plan Group    ANTH BLUE CROSS ANTH BLUE CROSS BLUE SHIELD PPO [id number]     Payor Plan Address Payor Plan Phone Number Payor Plan Fax Number Effective Dates    [address] [phone]  8/1/2018 - None Entered    [facility] [address]       Subscriber Name Subscriber Birth Date Member ID       CASTILLO TRUJILLO 1960 [id number]           Secondary Coverage     Payor Plan Insurance Group Employer/Plan Group    MEDICARE MEDICARE A & B      Payor Plan Address Payor Plan Phone Number Payor Plan Fax Number Effective Dates    [address] [phone]  3/1/2023 - None Entered    [facility] [id number]       Subscriber Name Subscriber Birth Date Member ID       CASTILLO TRUJILLO 1960 [id number]                 Emergency Contacts      (Rel.) Home Phone Work Phone Mobile Phone    VALENTIN TRUJILLO (Spouse) [phone] -- --              Current Facility-Administered Medications   Medication Dose Route Frequency Provider Last Rate Last " Admin   • acetaminophen (TYLENOL) tablet 650 mg  650 mg Oral Q4H PRN Carolynn Pryor APRN        Or   • acetaminophen (TYLENOL) 160 MG/5ML solution 650 mg  650 mg Oral Q4H PRN Carolynn Pryor APRN        Or   • acetaminophen (TYLENOL) suppository 650 mg  650 mg Rectal Q4H PRN Carolynn Pryor APRN       • alteplase (ACTIVASE) 10 mg in sodium chloride 0.9 % 30 mL Syringe  10 mg Intrapleural BID Barbara May APRN   10 mg at 05/14/23 2058    And   • dornase alpha (PULMOZYME) 5 mg in sterile water (preservative free) 30 mL  5 mg Intrapleural BID Barbara May APRN   5 mg at 05/14/23 2058   • aspirin EC tablet 81 mg  81 mg Oral Daily Carolynn Pryor APRN   81 mg at 05/14/23 0820   • cefepime 2 gm IVPB in 100 ml NS (MBP)  2 g Intravenous Q8H Carolynn Pryor APRN 0 mL/hr at 05/10/23 0227 2 g at 05/15/23 0204   • HYDROcodone-acetaminophen (NORCO) 5-325 MG per tablet 1 tablet  1 tablet Oral Q4H PRN Benny Kim MD   1 tablet at 05/15/23 0632   • ipratropium-albuterol (DUO-NEB) nebulizer solution 3 mL  3 mL Nebulization 4x Daily - RT Osvaldo Pryor MD   3 mL at 05/15/23 0612   • levothyroxine (SYNTHROID, LEVOTHROID) tablet 200 mcg  200 mcg Oral Q AM Carolynn Pryor APRN   200 mcg at 05/15/23 0633   • lidocaine (XYLOCAINE) 1 % injection 10 mL  10 mL Infiltration Once Benny Kim MD       • lisinopril (PRINIVIL,ZESTRIL) tablet 10 mg  10 mg Oral Daily Carolynn Pryor APRN   10 mg at 05/14/23 0820   • ondansetron (ZOFRAN) tablet 4 mg  4 mg Oral Q6H PRN Carolynn Pryor APRN        Or   • ondansetron (ZOFRAN) injection 4 mg  4 mg Intravenous Q6H PRN Carolynn Pryor APRN       • pantoprazole (PROTONIX) EC tablet 40 mg  40 mg Oral Daily Carolynn Pryor APRN   40 mg at 05/14/23 0822   • sodium chloride 0.9 % flush 10 mL  10 mL Intravenous Q12H Carolynn Pryor APRN   10 mL at 05/14/23 9143   • sodium chloride 0.9 % flush 10 mL  10 mL Intravenous PRN Carolynn Pryor,  APRN       • sodium chloride 0.9 % infusion 40 mL  40 mL Intravenous PRN Carolynn Pryor APRN       • vancomycin 1250 mg/250 mL 0.9% NS IVPB ([facility])  1,250 mg Intravenous Q12H Osvaldo Pryor MD   1,250 mg at 05/14/23 2249     Orders (last 48 hrs)      Start     Ordered    05/15/23 0600  CBC (No Diff)  Morning Draw         05/14/23 1332    05/15/23 0600  Basic Metabolic Panel  Morning Draw         05/14/23 1332    05/14/23 2100  vancomycin 1250 mg/250 mL 0.9% NS IVPB ([facility])  Every 12 Hours         05/14/23 1733    05/14/23 1737  Creatinine Serum (kidney function) GFR component  STAT        Comments: Please use existing specimen collected at 16:25. Thanks      05/14/23 1737    05/14/23 1700  Vancomycin, Trough Please call results to Pharmacy prior to administering next dose  Timed        Comments: Please call results to Pharmacy prior to administering next dose      05/14/23 1153    05/14/23 1600  Vancomycin, Trough Please draw 30-60 minutes prior to 1700 dose.  Timed,   Status:  Canceled        Comments: Please draw 30-60 minutes prior to 1700 dose.      05/13/23 0600    05/11/23 1700  vancomycin 1500 mg/500 mL 0.9% NS IVPB ([facility])  Every 12 Hours,   Status:  Discontinued         05/11/23 1642    05/11/23 1645  HYDROcodone-acetaminophen (NORCO) 5-325 MG per tablet 1 tablet  Every 4 Hours PRN         05/11/23 1635    05/11/23 0945  alteplase (ACTIVASE) 10 mg in sodium chloride 0.9 % 30 mL Syringe  2 Times Daily        See Hyperspace for full Linked Orders Report.    05/11/23 0853    05/11/23 0945  dornase alpha (PULMOZYME) 5 mg in sterile water (preservative free) 30 mL  2 Times Daily        See Hyperspace for full Linked Orders Report.    05/11/23 0853    05/10/23 0845  lidocaine (XYLOCAINE) 1 % injection 10 mL  Once         05/10/23 0758    05/10/23 0600  levothyroxine (SYNTHROID, LEVOTHROID) tablet 200 mcg  Every Early Morning         05/09/23 1631    05/10/23 0100  cefepime 2 gm IVPB in 100 ml NS (MBP)  Every  8 Hours         05/09/23 1542    05/09/23 2100  sodium chloride 0.9 % flush 10 mL  Every 12 Hours Scheduled         05/09/23 1542    05/09/23 2030  ipratropium-albuterol (DUO-NEB) nebulizer solution 3 mL  4 Times Daily - RT         05/09/23 1626    05/09/23 1800  Incentive Spirometry  Every 4 Hours While Awake       05/09/23 1542    05/09/23 1800  aspirin EC tablet 81 mg  Daily         05/09/23 1631    05/09/23 1800  lisinopril (PRINIVIL,ZESTRIL) tablet 10 mg  Daily         05/09/23 1631    05/09/23 1800  pantoprazole (PROTONIX) EC tablet 40 mg  Daily         05/09/23 1631    05/09/23 1600  Vital Signs  Every 4 Hours       05/09/23 1542    05/09/23 1600  Oral Care  Every 4 Hours       05/09/23 1542    05/09/23 1547  XR Chest 1 View  Daily       05/09/23 1546    05/09/23 1542  ondansetron (ZOFRAN) tablet 4 mg  Every 6 Hours PRN        See Hyperspace for full Linked Orders Report.    05/09/23 1542    05/09/23 1542  ondansetron (ZOFRAN) injection 4 mg  Every 6 Hours PRN        See Hyperspace for full Linked Orders Report.    05/09/23 1542    05/09/23 1542  acetaminophen (TYLENOL) tablet 650 mg  Every 4 Hours PRN        See Hyperspace for full Linked Orders Report.    05/09/23 1542    05/09/23 1542  acetaminophen (TYLENOL) 160 MG/5ML solution 650 mg  Every 4 Hours PRN        See Hyperspace for full Linked Orders Report.    05/09/23 1542    05/09/23 1542  acetaminophen (TYLENOL) suppository 650 mg  Every 4 Hours PRN        See Hyperspace for full Linked Orders Report.    05/09/23 1542    05/09/23 1538  Intake & Output  Every Shift       05/09/23 1542    05/09/23 1537  sodium chloride 0.9 % flush 10 mL  As Needed         05/09/23 1542    05/09/23 1537  sodium chloride 0.9 % infusion 40 mL  As Needed         05/09/23 1542    Unscheduled  Up With Assistance  As Needed       05/09/23 1542    --  ibuprofen (ADVIL,MOTRIN) 800 MG tablet  3 Times Daily PRN         05/10/23 0959              Operative/Procedure Notes (last 48  hours)  Notes from 05/13/23 0642 through 05/15/23 0642   No notes of this type exist for this encounter.          Physician Progress Notes (last 48 hours)      Osvaldo Pryor MD at 05/14/23 1330              [facility] Medicine Services  INPATIENT PROGRESS NOTE    Patient Name: Castillo Trujillo  Date of Admission: 5/9/2023  Today's Date: 05/14/23  Length of Stay: 4  Primary Care Physician: Juan Vaughn DO    Subjective   Chief Complaint: follow-up empyema  HPI   Patient states that he always has a little bit of pain after intrapleural tPA is administered.  Reports that he has a pain pill, and in the next 10 minutes or so.  Visiting with family at bedside.  Patient reports that he has been using his incentive spirometer more diligently.  He has been for 2 walks today.  Voices no other new complaints.    Review of Systems   All pertinent negatives and positives are as above. All other systems have been reviewed and are negative unless otherwise stated.     Objective    Temp:  [97.6 °F (36.4 °C)-99.8 °F (37.7 °C)] 98.2 °F (36.8 °C)  Heart Rate:  [72-87] 79  Resp:  [16-18] 18  BP: ()/(54-78) 99/78  Physical Exam  Vitals reviewed.   HENT:      Mouth/Throat:      Mouth: Mucous membranes are moist.   Eyes:      Pupils: Pupils are equal, round, and reactive to light.   Pulmonary:      Effort: Pulmonary effort is normal. No respiratory distress.      Comments: Pigtail catheter in place on the right  Musculoskeletal:         General: No deformity.   Skin:     General: Skin is warm.   Neurological:      General: No focal deficit present.      Mental Status: He is alert.   Psychiatric:         Mood and Affect: Mood normal.         Results Review:  I have reviewed the labs, radiology results, and diagnostic studies.    Laboratory Data:   Results from last 7 days   Lab Units 05/12/23  0537 05/09/23  1613 05/09/23  0743   WBC 10*3/mm3 8.36 13.16*  --    HEMOGLOBIN g/dL 9.4* 9.6*   --    HEMATOCRIT % 31.6* 32.3*  --    PLATELETS 10*3/mm3 704* 654* 607*        Results from last 7 days   Lab Units 05/13/23  0436 05/12/23  0537 05/10/23  1153 05/09/23  1613   SODIUM mmol/L 131* 134* 133* 137   POTASSIUM mmol/L 3.8 3.3* 3.2* 3.1*   CHLORIDE mmol/L 99 100 99 98   CO2 mmol/L 22.0 23.0 21.0* 25.0   BUN mg/dL 6* 5* 9 12   CREATININE mg/dL 0.52* 0.60* 0.63* 0.68*   CALCIUM mg/dL 8.7 8.6 8.6 9.3   BILIRUBIN mg/dL  --   --   --  0.6   ALK PHOS U/L  --   --   --  400*   ALT (SGPT) U/L  --   --   --  27   AST (SGOT) U/L  --   --   --  21   GLUCOSE mg/dL 94 99 93 138*       Culture Data:   Blood Culture   Date Value Ref Range Status   05/09/2023 No growth at 3 days  Preliminary   05/09/2023 No growth at 3 days  Preliminary       Radiology Data:   Imaging Results (Last 24 Hours)     Procedure Component Value Units Date/Time    XR Chest 1 View [[id number]] Collected: 05/14/23 0500     Updated: 05/14/23 0505    Narrative:      EXAMINATION: XR CHEST 1 VW-     5/14/2023 3:50 AM CDT     HISTORY: Pneumonia and empyema. Right-sided chest tube.     One view chest x-ray.     Comparison is made with yesterday at 4:24 AM.     Stable heart and mediastinum.     Partial clearing of retrocardiac left lower lobe  consolidation/atelectasis.  The upper left lung is clear.     Unchanged right-sided infiltrate and small amount of loculated pleural  fluid.  Unchanged right basilar chest tube.  No pneumothorax.     Relative sparing of the upper right lung.     Summary:  1. Other than partial clearing of left basilar consolidation the chest  x-ray is stable.           This report was finalized on 05/14/2023 05:02 by Dr. Raman Polo MD.          I have reviewed the patient's current medications.     Assessment/Plan   Assessment  Active Hospital Problems    Diagnosis    • **Empyema, right    • Empyema lung    • Recurrent right pleural effusion    • Stage 2 moderate COPD by GOLD classification    • Right lung mass    • Tobacco  abuse, in remission    • GERD (gastroesophageal reflux disease)    • Primary hypertension    • Acquired hypothyroidism        Treatment Plan  1.  Continue vancomycin and cefepime  2.  Pleural fluid Gram stain was positive but cultures have been negative to date  3.  Increase activity  4.  Encourage use of incentive spirometer  5.  Daily chest x-ray  6.  Continue intrapleural tPA treatment per CT surgery - day 4/5  7.  Consider repeat CT Chest ? tomorrow    Medical Decision Making  Number and Complexity of problems: Moderate complexity; 3 acute problems in the form of right empyema, recurrent right pleural effusion, and recent FNA of lung mass right lung with pathology consistent with inflammatory pseudotumor       Conditions and Status        Condition is improving.     [facility] Data  External documents reviewed: none today  Cardiac tracing (EKG, telemetry) interpretation: None  Radiology interpretation: Pigtail catheter remains in place on the right with unchanged right-sided opacities; improved aeration left lung particularly at left base  Labs reviewed: As above  Any tests that were considered but not ordered: Consider repeat CT Chest     Decision rules/scores evaluated (example [id number], Wells, etc): None considered at present     Discussed with: Patient      Care Planning  Shared decision making: Patient and family  Code status and discussions: Full code with full interventions     Disposition  Social Determinants of Health that impact treatment or disposition: None apparent at this time  I expect the patient to be discharged to home in 3-5 days.     Electronically signed by Osvaldo Pryor MD, 05/14/23, 13:30 CDT.    Electronically signed by Osvaldo Pryor MD at 05/14/23 1344     Caroline Nick APRN at 05/14/23 0629     Attestation signed by Benny Kim MD at 05/14/23 1721    I have reviewed this documentation and agree.  I have personally seen and examined Castillo Trujillo and reviewed the  "record. Agree with the aforementioned plan rendered jointly with Caroline Nick.  Pleural drainage remains purulent.  No challenges with lytic therapy.  Radiograph remained stable.  If his radiograph remains unchanged we will plan to obtain CT chest imaging.  Discussed with the patient the plan of care.  He remains agreeable to the plan of care.                  Patient name: Castillo Trujillo  Patient : 1960  VISIT # [id number]  MR #[id number]    Procedure:  Procedure Date:   POD:* No surgery found *    Subjective   Shortness of breath    Afebrile.  Sitting up on the side of the bed.  Saturation 96 on room air.  Day 4 of 5 of intrapleural lytic therapy.  Tan-colored,  purulent output.  No issues.    Feeling better.      Objective     Visit Vitals  /55 (BP Location: Right arm, Patient Position: Lying)   Pulse 75   Temp 97.6 °F (36.4 °C) (Oral)   Resp 16   Ht 188 cm (74\")   Wt 90.7 kg (200 lb)   SpO2 96%   BMI 25.68 kg/m²       Intake/Output Summary (Last 24 hours) at 2023 0629  Last data filed at 2023 0600  Gross per 24 hour   Intake 1340 ml   Output 2480 ml   Net -1140 ml     Right chest tube output: 280mL / 24 hours,  no leak, cloudy/purulent.     LABS:    NONE    IMAGES:       Imaging Results (Last 24 Hours)     Procedure Component Value Units Date/Time    XR Chest 1 View [[id number]] Collected: 23 0500     Updated: 23 050    Narrative:      EXAMINATION: XR CHEST 1 VW-     2023 3:50 AM CDT     HISTORY: Pneumonia and empyema. Right-sided chest tube.     One view chest x-ray.     Comparison is made with yesterday at 4:24 AM.     Stable heart and mediastinum.     Partial clearing of retrocardiac left lower lobe  consolidation/atelectasis.  The upper left lung is clear.     Unchanged right-sided infiltrate and small amount of loculated pleural  fluid.  Unchanged right basilar chest tube.  No pneumothorax.     Relative sparing of the upper right lung.     Summary:  1. Other than " partial clearing of left basilar consolidation the chest  x-ray is stable.           This report was finalized on 05/14/2023 05:02 by Dr. Raman Polo MD.    XR Chest 1 View [[id number]] Collected: 05/13/23 0655     Updated: 05/13/23 0701    Narrative:      EXAMINATION: XR CHEST 1 VW-     5/13/2023 4:40 AM CDT     HISTORY: Right empyema. Chest tube placement.     One view chest x-ray.     Comparison is made with yesterday's of 4:12 AM.     Pigtail pleural space catheter still present within the lower right  chest.     Unchanged right basilar pneumonia and fluid.  The right apex is clear.     There is increased consolidation/atelectasis of the retrocardiac left  lower lobe.  The left upper lung is clear.     Heart size is stable.     No pneumothorax or heart failure.     Summary:  1. Pigtail pleural space catheter unchanged in position.  2. Unchanged right lower lobe pneumonia.  3. Increased left basilar consolidation/atelectasis.     This report was finalized on 05/13/2023 06:58 by Dr. Raman Polo MD.        My image interpretation: Pigtail catheter in place, no pneumothorax, improvement in left lower lobe consolidation    Physical Exam:  General: Alert, oriented. No apparent distress.  Sitting up in the bed  Cardiovascular: Regular rate and rhythm without murmur, rubs, or gallops.    Pulmonary: Decreased breath sounds to right lung and bilateral bases.  No wheezing or rhonchi.    Chest: Right side chest tube to-20 cm H2O suction.   No leak.  Fluid is tan, cloudy.     Abdomen: Soft, non distended, and non tender.  Extremities: Warm, moves all extremities. .   Neurologic:  Grossly intact with no focal deficits.         Impression:  Empyema, right  Right lung mass  Recurrent right pleural effusion  Tobacco abuse, in remission  Stage 2 moderate COPD by GOLD classification  Hypokalemia  Unintentional weight loss        Plan:  Continue chest tube suction at -20 cm H2O  Flush chest tube with 10 mL normal saline every 8  hours to maintain patency  Chest x-ray daily  Continue intrapleural tPA/DNase therapy, day 4/5. Plan CT later tomorrow if x-ray in a.m. unimproved   Encourage pulmonary toilet and ambulation  Discussed with patient    Caroline AVINASHTRESSA Dunbar  05/14/23  06:29 CDT      Electronically signed by Benny Kim MD at 05/14/23 1721     Osvaldo Pryor MD at 05/13/23 1218              [facility] Medicine Services  INPATIENT PROGRESS NOTE    Patient Name: Castillo Trujillo  Date of Admission: 5/9/2023  Today's Date: 05/13/23  Length of Stay: 3  Primary Care Physician: Juan Vaughn DO    Subjective   Chief Complaint: follow-up empyema  HPI   Patient sitting up on the side of the bed eating broccoli cheddar soup from [employer].  Patient doing about the same.  Feels pretty good today.  Reports that he is try to be more diligent about going on some walks.  He admitted that he has not been using his incentive spirometer very much.  Denies any new pain symptoms.      Review of Systems   All pertinent negatives and positives are as above. All other systems have been reviewed and are negative unless otherwise stated.     Objective    Temp:  [97.8 °F (36.6 °C)-99 °F (37.2 °C)] 98.5 °F (36.9 °C)  Heart Rate:  [75-90] 83  Resp:  [16] 16  BP: (115-139)/(45-71) 135/52  Physical Exam  Vitals reviewed.   HENT:      Mouth/Throat:      Mouth: Mucous membranes are moist.   Eyes:      Pupils: Pupils are equal, round, and reactive to light.   Pulmonary:      Effort: Pulmonary effort is normal. No respiratory distress.      Comments: Pigtail catheter in place on the right; diminished breath sounds on the right as compared to the left  Musculoskeletal:         General: No deformity.   Skin:     General: Skin is warm.   Neurological:      General: No focal deficit present.      Mental Status: He is alert.   Psychiatric:         Mood and Affect: Mood normal.         Results Review:  I have reviewed the  labs, radiology results, and diagnostic studies.    Laboratory Data:   Results from last 7 days   Lab Units 05/12/23  0537 05/09/23  1613 05/09/23  0743   WBC 10*3/mm3 8.36 13.16*  --    HEMOGLOBIN g/dL 9.4* 9.6*  --    HEMATOCRIT % 31.6* 32.3*  --    PLATELETS 10*3/mm3 704* 654* 607*        Results from last 7 days   Lab Units 05/13/23  0436 05/12/23  0537 05/10/23  1153 05/09/23  1613   SODIUM mmol/L 131* 134* 133* 137   POTASSIUM mmol/L 3.8 3.3* 3.2* 3.1*   CHLORIDE mmol/L 99 100 99 98   CO2 mmol/L 22.0 23.0 21.0* 25.0   BUN mg/dL 6* 5* 9 12   CREATININE mg/dL 0.52* 0.60* 0.63* 0.68*   CALCIUM mg/dL 8.7 8.6 8.6 9.3   BILIRUBIN mg/dL  --   --   --  0.6   ALK PHOS U/L  --   --   --  400*   ALT (SGPT) U/L  --   --   --  27   AST (SGOT) U/L  --   --   --  21   GLUCOSE mg/dL 94 99 93 138*       Culture Data:   Blood Culture   Date Value Ref Range Status   05/09/2023 No growth at 3 days  Preliminary   05/09/2023 No growth at 3 days  Preliminary       Radiology Data:   Imaging Results (Last 24 Hours)     Procedure Component Value Units Date/Time    XR Chest 1 View [[id number]] Collected: 05/13/23 0655     Updated: 05/13/23 0701    Narrative:      EXAMINATION: XR CHEST 1 VW-     5/13/2023 4:40 AM CDT     HISTORY: Right empyema. Chest tube placement.     One view chest x-ray.     Comparison is made with yesterday's of 4:12 AM.     Pigtail pleural space catheter still present within the lower right  chest.     Unchanged right basilar pneumonia and fluid.  The right apex is clear.     There is increased consolidation/atelectasis of the retrocardiac left  lower lobe.  The left upper lung is clear.     Heart size is stable.     No pneumothorax or heart failure.     Summary:  1. Pigtail pleural space catheter unchanged in position.  2. Unchanged right lower lobe pneumonia.  3. Increased left basilar consolidation/atelectasis.     This report was finalized on 05/13/2023 06:58 by Dr. Raman Polo MD.          I have reviewed the  patient's current medications.     Assessment/Plan   Assessment  Active Hospital Problems    Diagnosis    • **Empyema, right    • Empyema lung    • Recurrent right pleural effusion    • Stage 2 moderate COPD by GOLD classification    • Right lung mass    • Tobacco abuse, in remission    • GERD (gastroesophageal reflux disease)    • Primary hypertension    • Acquired hypothyroidism        Treatment Plan  1.  Discussed with Dr. iKm  2.  Continue vancomycin and cefepime  3.  So far, pleural fluid Gram stain was positive but cultures have been negative to date  4.  Increase activity  5.  Encourage use of incentive spirometer  6.  Daily chest x-ray  7.  Continue intrapleural tPA treatment per CT surgery    Medical Decision Making  Number and Complexity of problems: Moderate complexity; 3 acute problems in the form of right empyema, recurrent right pleural effusion, and recent FNA of lung mass right lung with pathology consistent with inflammatory pseudotumor       Conditions and Status        Condition is improving.     [facility] Data  External documents reviewed: none today  Cardiac tracing (EKG, telemetry) interpretation: None  Radiology interpretation: Pigtail catheter remains in place on the right with unchanged right-sided opacities.  Labs reviewed: As above  Any tests that were considered but not ordered: None considered at present     Decision rules/scores evaluated (example [id number], Wells, etc): None considered at present     Discussed with: Patient and Dr. Kim today     Care Planning  Shared decision making: Patient; I also conferenced with family in addition to Dr. Kim.    Code status and discussions: Full code with full interventions     Disposition  Social Determinants of Health that impact treatment or disposition: None apparent at this time  I expect the patient to be discharged to home in 3-5 days.     Electronically signed by Osvaldo Pryor MD, 05/13/23, 12:18 CDT.    Electronically signed by  Osvaldo Pryor MD at 05/13/23 1233       Consult Notes (last 48 hours)  Notes from 05/13/23 0642 through 05/15/23 0642   No notes of this type exist for this encounter.

## 2023-05-15 NOTE — PLAN OF CARE
Problem: Adult Inpatient Plan of Care  Goal: Plan of Care Review  Outcome: Ongoing, Progressing  Flowsheets (Taken 5/15/2023 0802)  Progress: no change  Plan of Care Reviewed With: patient  Outcome Evaluation: VSS. Pt c/o pain. See MAR. Flushed chest tube x2 with NS. IV abx. Safety maintained.

## 2023-05-15 NOTE — PROGRESS NOTES
"Patient name: Castillo Trujillo  Patient : 1960  VISIT # [id number]  MR #[id number]    Procedure:  Procedure Date:   POD:* No surgery found *    Subjective   Shortness of breath    Sitting up on the side of the bed. No new complaints. Tolerating room air. Day 5 of 5 of intrapleural lytic therapy. No overnight events. Patient reports he is feeling much better.        Objective     Visit Vitals  /70 (BP Location: Left arm, Patient Position: Lying)   Pulse 77   Temp 98.2 °F (36.8 °C) (Oral)   Resp 16   Ht 188 cm (74\")   Wt 90.7 kg (200 lb)   SpO2 96%   BMI 25.68 kg/m²       Intake/Output Summary (Last 24 hours) at 5/15/2023 0848  Last data filed at 5/15/2023 0657  Gross per 24 hour   Intake 480 ml   Output 2545 ml   Net -2065 ml     Right chest tube output: 370 mL / 24 hours,  no leak, cloudy/purulent.     LABS:    NONE    IMAGES:       Imaging Results (Last 24 Hours)     Procedure Component Value Units Date/Time    XR Chest 1 View [[id number]] Collected: 05/15/23 0716     Updated: 05/15/23 0720    Narrative:      EXAMINATION: XR CHEST 1 VW-  5/15/2023 7:16 AM CDT 1 view     HISTORY: chest tube     COMPARISON: 2023.     FINDINGS:   Pigtail catheter projects over the RIGHT lower chest. Overall, similar  appearance of the RIGHT lung. The LEFT lung is clear. The  cardiomediastinal silhouette and pulmonary vascularity are within normal  limits.      The osseous structures and surrounding soft tissues demonstrate no acute  abnormality.          Impression:         1.  Small chest tube at the RIGHT lung base redemonstrated. Persistent  perihilar and infrahilar opacity on the RIGHT. Persistent pleural fluid  on the RIGHT. Overall, very similar to the prior exam.        This report was finalized on 05/15/2023 07:17 by Dr. Bo Pastor MD.        My image interpretation: Pigtail catheter in place, no pneumothorax, stable from previous exam.     Physical Exam:  General: Alert, oriented. No apparent distress.  " Sitting up in the bed  Cardiovascular: Regular rate and rhythm without murmur, rubs, or gallops.    Pulmonary: Decreased breath sounds to right lung and bilateral bases.  No wheezing or rhonchi.    Chest: Right side chest tube to-20 cm H2O suction.  No air leak.  Fluid is cloudy and purulent  Abdomen: Soft, non distended, and non tender.  Extremities: Warm, moves all extremities. .   Neurologic:  Grossly intact with no focal deficits.         Impression:  Empyema, right  Right lung mass  Recurrent right pleural effusion  Tobacco abuse, in remission  Stage 2 moderate COPD by GOLD classification  Hypokalemia  Unintentional weight loss        Plan:  CT of the chest today  Continue chest tube suction at -20 cm H2O  Flush chest tube with 10 mL normal saline every 8 hours to maintain patency  Chest x-ray daily  Continue intrapleural tPA/DNase therapy, day 5/5   Encourage pulmonary toilet and ambulation  Discussed with patient and nursing     Barbara Garrido, TRESSA  05/15/23  08:48 CDT

## 2023-05-15 NOTE — CASE MANAGEMENT/SOCIAL WORK
Continued Stay Note  CIELO Portillo     Patient Name: Castillo Trujillo  MRN: [id number]  Today's Date: 5/15/2023    Admit Date: 5/9/2023    Plan: Home   Discharge Plan     Row Name 05/15/23 1412       Plan    Plan Home    Patient/Family in Agreement with Plan yes    Plan Comments Pt has been up independently. He will return home with his spouse at d/c. No needs identified at this time.    Final Discharge Disposition Code 01 - home or self-care               Discharge Codes    No documentation.               Expected Discharge Date and Time     Expected Discharge Date Expected Discharge Time    May 17, 2023             JOHN Marcus

## 2023-05-15 NOTE — PLAN OF CARE
Goal Outcome Evaluation:  Plan of Care Reviewed With: patient        Progress: no change  Outcome Evaluation: Ntn follow up. Cont on a regular diet. Has a good appetite. Has consumed 75% of the last 4 meals recorded. His wife also brings in food for him. No new weight to review. He cont to decline nutrition supplements. Cont to follow and encourage intake.

## 2023-05-15 NOTE — PROGRESS NOTES
[facility] Medicine Services  INPATIENT PROGRESS NOTE    Patient Name: Castillo Trujillo  Date of Admission: 5/9/2023  Today's Date: 05/15/23  Length of Stay: 5  Primary Care Physician: Juan Vaughn DO    Subjective   Chief Complaint: Follow-up shortness of breath  HPI   Mr. Trujillo is a 63-year-old male that presented to [facility] on 5/9 for repeat needle biopsy by radiologist.  He has had approximately 60 pounds of unintentional weight loss over the past 6 months.  Longstanding history of tobacco abuse but reports that he quit smoking in November 2022.  In March 2023 PET scan revealed a finding of a right upper lobe and middle lobe lung mass demonstrating hypermetabolic activity.  He was found to have hypermetabolic adenopathy and a right-sided pleural effusion.  He was admitted to our facility on 3/30 to 4/1-he had ultrasound-guided right thoracentesis on 3/31/2023 by interventional radiology-1500 mL pleural fluid removed.  Suspected postobstructive pneumonia and was discharged on Levaquin.  He underwent needle biopsy of the lung mass that showed benign lung parenchyma with inflammatory pseudotumor. He had worsening shortness of breath in which his primary care provider, Dr. Vaughn obtained a chest x-ray that showed an increasing right-sided pleural effusion.  Decision making was made to proceed with repeat needle biopsy with repeat thoracentesis which was scheduled for 5/9.  On 5/9 during thoracentesis 200 mL of cloudy yellowish/greenish fluid aspirated.  Chest tube was placed for drainage of suspected empyema.  Hospitalist was asked to admit with cardiothoracic surgery consult for management of chest tube.    Sitting on the edge of the bed eating lunch.  Spouse at bedside.  Day 5 of 5 of intrapleural lytic therapy.  States he is feeling well overall.  Pain controlled.  He has been ambulating 2-3 times a day in the hallway.  Incentive spirometry up to  3000.    CTS repeating CT chest.    Review of Systems   All pertinent negatives and positives are as above. All other systems have been reviewed and are negative unless otherwise stated.     Objective    Temp:  [97.5 °F (36.4 °C)-98.3 °F (36.8 °C)] 98.2 °F (36.8 °C)  Heart Rate:  [77-94] 77  Resp:  [16-18] 16  BP: (132-152)/(56-70) 149/70  Physical Exam  Vitals reviewed.   Constitutional:       General: He is not in acute distress.     Appearance: He is not toxic-appearing.      Comments: Lying in bed on his left side.  No acute distress.  On room air.  Spouse at bedside.   HENT:      Head: Normocephalic and atraumatic.      Mouth/Throat:      Mouth: Mucous membranes are moist.      Pharynx: Oropharynx is clear.   Eyes:      Extraocular Movements: Extraocular movements intact.      Conjunctiva/sclera: Conjunctivae normal.      Pupils: Pupils are equal, round, and reactive to light.   Cardiovascular:      Rate and Rhythm: Normal rate and regular rhythm.      Pulses: Normal pulses.   Pulmonary:      Effort: Pulmonary effort is normal. No respiratory distress.      Breath sounds: Decreased breath sounds present.   Chest:      Comments: Right chest tube in place to -20 cm H2O suction, purulent output. No airleak.  Abdominal:      General: Bowel sounds are normal. There is no distension.      Palpations: Abdomen is soft.      Tenderness: There is no abdominal tenderness.   Musculoskeletal:         General: No swelling or tenderness. Normal range of motion.      Cervical back: Normal range of motion and neck supple. No muscular tenderness.   Skin:     General: Skin is warm and dry.      Findings: No erythema or rash.   Neurological:      General: No focal deficit present.      Mental Status: He is alert and oriented to person, place, and time.      Cranial Nerves: No cranial nerve deficit.      Motor: No weakness.   Psychiatric:         Mood and Affect: Mood normal.         Behavior: Behavior normal.         Results  Review:  I have reviewed the labs, radiology results, and diagnostic studies.    Laboratory Data:   Results from last 7 days   Lab Units 05/15/23  0450 05/12/23  0537 05/09/23  1613   WBC 10*3/mm3 8.15 8.36 13.16*   HEMOGLOBIN g/dL 9.2* 9.4* 9.6*   HEMATOCRIT % 31.7* 31.6* 32.3*   PLATELETS 10*3/mm3 747* 704* 654*        Results from last 7 days   Lab Units 05/15/23  0450 05/14/23  1625 05/13/23  0436 05/12/23  0537 05/10/23  1153 05/09/23  1613   SODIUM mmol/L 136  --  131* 134*   < > 137   POTASSIUM mmol/L 3.5  --  3.8 3.3*   < > 3.1*   CHLORIDE mmol/L 101  --  99 100   < > 98   CO2 mmol/L 28.0  --  22.0 23.0   < > 25.0   BUN mg/dL 5*  --  6* 5*   < > 12   CREATININE mg/dL 0.63* 0.60* 0.52* 0.60*   < > 0.68*   CALCIUM mg/dL 8.7  --  8.7 8.6   < > 9.3   BILIRUBIN mg/dL  --   --   --   --   --  0.6   ALK PHOS U/L  --   --   --   --   --  400*   ALT (SGPT) U/L  --   --   --   --   --  27   AST (SGOT) U/L  --   --   --   --   --  21   GLUCOSE mg/dL 95  --  94 99   < > 138*    < > = values in this interval not displayed.       Culture Data:   Microbiology Results (last 10 days)     Procedure Component Value - Date/Time    Blood Culture - Blood, Hand, Left [[id number]]  (Normal) Collected: 05/09/23 1613    Lab Status: Final result Specimen: Blood from Hand, Left Updated: 05/14/23 1745     Blood Culture No growth at 5 days    Blood Culture - Blood, Arm, Right [[id number]]  (Normal) Collected: 05/09/23 1613    Lab Status: Final result Specimen: Blood from Arm, Right Updated: 05/14/23 1745     Blood Culture No growth at 5 days    AFB Culture - Body Fluid, Pleural Cavity [[id number]] Collected: 05/09/23 1346    Lab Status: Preliminary result Specimen: Body Fluid from Pleural Cavity Updated: 05/14/23 1400     AFB Culture No AFB isolated at less than 1 week     AFB Stain No acid fast bacilli seen on direct smear      No acid fast bacilli seen on concentrated smear    Body Fluid Culture - Body Fluid, Pleural Cavity [[id number]]  Collected: 05/09/23 1346    Lab Status: Final result Specimen: Body Fluid from Pleural Cavity Updated: 05/14/23 0846     Body Fluid Culture No growth at 5 days     Gram Stain Many (4+) Gram positive cocci      Many (4+) Gram negative bacilli    Anaerobic Culture - Pleural Fluid, Pleural Cavity [[id number]]  (Abnormal) Collected: 05/09/23 1346    Lab Status: Final result Specimen: Pleural Fluid from Pleural Cavity Updated: 05/15/23 1113     Anaerobic Culture Peptostreptococcus anaerobius    Fungus Culture - Body Fluid, Pleural Cavity [[id number]] Collected: 05/09/23 1346    Lab Status: Preliminary result Specimen: Body Fluid from Pleural Cavity Updated: 05/14/23 1400     Fungus Culture No fungus isolated at less than 1 week        Radiology Data:   Imaging Results (Last 24 Hours)     Procedure Component Value Units Date/Time    CT Chest With Contrast Diagnostic [[id number]] Resulted: 05/15/23 0942     Updated: 05/15/23 1004    XR Chest 1 View [[id number]] Collected: 05/15/23 0716     Updated: 05/15/23 0720    Narrative:      EXAMINATION: XR CHEST 1 VW-  5/15/2023 7:16 AM CDT 1 view     HISTORY: chest tube     COMPARISON: 05/14/2023.     FINDINGS:   Pigtail catheter projects over the RIGHT lower chest. Overall, similar  appearance of the RIGHT lung. The LEFT lung is clear. The  cardiomediastinal silhouette and pulmonary vascularity are within normal  limits.      The osseous structures and surrounding soft tissues demonstrate no acute  abnormality.          Impression:         1.  Small chest tube at the RIGHT lung base redemonstrated. Persistent  perihilar and infrahilar opacity on the RIGHT. Persistent pleural fluid  on the RIGHT. Overall, very similar to the prior exam.        This report was finalized on 05/15/2023 07:17 by Dr. Bo Pastor MD.          I have reviewed the patient's current medications.     Assessment/Plan   Assessment  Active Hospital Problems    Diagnosis    • **Empyema, right    • Empyema lung     • Recurrent right pleural effusion    • Stage 2 moderate COPD by GOLD classification    • Right lung mass    • Tobacco abuse, in remission    • GERD (gastroesophageal reflux disease)    • Primary hypertension    • Acquired hypothyroidism        Treatment Plan  Cardiothoracic surgery, Dr. Kim following.  Chest tube management per CT surgery.  5-day course of intrapleural tPA//DNase therapy, day 5/5.  CT of the chest today    Pleural fluid culture shows gram-positive cocci and gram-negative bacilli.  Follow pleural fluid. Blood cultures with no growth to date.  Continue empiric antibiotics with cefepime and vancomycin IV.    Hold off on consulting oncology at present.    Incentive spirometer.    Pain control.    SCDs for DVT prophylaxis.  Increase activity as tolerated, ambulate in cantor 3 times daily.    Medical Decision Making  Number and Complexity of problems: 3 acute problems in the form of right empyema, recurrent right pleural effusion in the setting of benign lung parenchyma with features of inflammatory pseudotumor  Differential Diagnosis: None considered at present    Conditions and Status        Condition is improving.     [facility] Data  External documents reviewed: Prior [facility] records  Cardiac tracing (EKG, telemetry) interpretation: None  Radiology interpretation: Interpreted by radiology  Labs reviewed: As above  Any tests that were considered but not ordered: None considered at present     Decision rules/scores evaluated (example [id number], Wells, etc): None considered at present     Discussed with: Patient, spouse Lilia, and Dr. Rich     Care Planning  Shared decision making: Patient is agreeable to ongoing work-up and treatment  Code status and discussions: Full code with full interventions    Disposition  Social Determinants of Health that impact treatment or disposition: None identified at present  I expect the patient to be discharged to home in 2-3 days.     Electronically signed by Vannesa Yadav  TRESSA, 05/15/23, 11:21 CDT.

## 2023-05-16 ENCOUNTER — APPOINTMENT (OUTPATIENT)
Dept: GENERAL RADIOLOGY | Facility: HOSPITAL | Age: 63
DRG: 178 | End: 2023-05-16
Payer: COMMERCIAL

## 2023-05-16 LAB
FUNGUS WND CULT: NORMAL
MYCOBACTERIUM SPEC CULT: NORMAL
NIGHT BLUE STAIN TISS: NORMAL
NIGHT BLUE STAIN TISS: NORMAL

## 2023-05-16 PROCEDURE — 94761 N-INVAS EAR/PLS OXIMETRY MLT: CPT

## 2023-05-16 PROCEDURE — 71045 X-RAY EXAM CHEST 1 VIEW: CPT

## 2023-05-16 PROCEDURE — 25010000002 ALTEPLASE 2 MG RECONSTITUTED SOLUTION 1 EACH VIAL

## 2023-05-16 PROCEDURE — 94664 DEMO&/EVAL PT USE INHALER: CPT

## 2023-05-16 PROCEDURE — 94799 UNLISTED PULMONARY SVC/PX: CPT

## 2023-05-16 PROCEDURE — 0 CEFEPIME PER 500 MG: Performed by: NURSE PRACTITIONER

## 2023-05-16 PROCEDURE — 25010000002 VANCOMYCIN 10 G RECONSTITUTED SOLUTION: Performed by: INTERNAL MEDICINE

## 2023-05-16 PROCEDURE — 99232 SBSQ HOSP IP/OBS MODERATE 35: CPT

## 2023-05-16 PROCEDURE — 25010000002 PIPERACILLIN SOD-TAZOBACTAM PER 1 G: Performed by: NURSE PRACTITIONER

## 2023-05-16 RX ADMIN — PIPERACILLIN SODIUM AND TAZOBACTAM SODIUM 4.5 G: 4; .5 INJECTION, SOLUTION INTRAVENOUS at 20:48

## 2023-05-16 RX ADMIN — ASPIRIN 81 MG: 81 TABLET, COATED ORAL at 08:49

## 2023-05-16 RX ADMIN — PANTOPRAZOLE SODIUM 40 MG: 40 TABLET, DELAYED RELEASE ORAL at 08:48

## 2023-05-16 RX ADMIN — WATER 5 MG: 1 INJECTION INTRAMUSCULAR; INTRAVENOUS; SUBCUTANEOUS at 10:13

## 2023-05-16 RX ADMIN — HYDROCODONE BITARTRATE AND ACETAMINOPHEN 1 TABLET: 5; 325 TABLET ORAL at 05:47

## 2023-05-16 RX ADMIN — SODIUM CHLORIDE 10 MG: 9 INJECTION INTRAMUSCULAR; INTRAVENOUS; SUBCUTANEOUS at 10:09

## 2023-05-16 RX ADMIN — HYDROCODONE BITARTRATE AND ACETAMINOPHEN 1 TABLET: 5; 325 TABLET ORAL at 22:56

## 2023-05-16 RX ADMIN — HYDROCODONE BITARTRATE AND ACETAMINOPHEN 1 TABLET: 5; 325 TABLET ORAL at 14:13

## 2023-05-16 RX ADMIN — LISINOPRIL 10 MG: 10 TABLET ORAL at 08:48

## 2023-05-16 RX ADMIN — CEFEPIME 2 G: 2 INJECTION, POWDER, FOR SOLUTION INTRAVENOUS at 00:41

## 2023-05-16 RX ADMIN — LEVOTHYROXINE SODIUM 200 MCG: 100 TABLET ORAL at 05:41

## 2023-05-16 RX ADMIN — WATER 5 MG: 1 INJECTION INTRAMUSCULAR; INTRAVENOUS; SUBCUTANEOUS at 10:09

## 2023-05-16 RX ADMIN — Medication 10 ML: at 08:49

## 2023-05-16 RX ADMIN — TAZOBACTAM SODIUM AND PIPERACILLIN SODIUM 4.5 G: 500; 4 INJECTION, SOLUTION INTRAVENOUS at 13:20

## 2023-05-16 RX ADMIN — WATER 5 MG: 1 INJECTION INTRAMUSCULAR; INTRAVENOUS; SUBCUTANEOUS at 20:48

## 2023-05-16 RX ADMIN — IPRATROPIUM BROMIDE AND ALBUTEROL SULFATE 3 ML: 2.5; .5 SOLUTION RESPIRATORY (INHALATION) at 13:34

## 2023-05-16 RX ADMIN — HYDROCODONE BITARTRATE AND ACETAMINOPHEN 1 TABLET: 5; 325 TABLET ORAL at 10:15

## 2023-05-16 RX ADMIN — SODIUM CHLORIDE 10 MG: 9 INJECTION INTRAMUSCULAR; INTRAVENOUS; SUBCUTANEOUS at 20:49

## 2023-05-16 RX ADMIN — VANCOMYCIN HYDROCHLORIDE 1250 MG: 10 INJECTION, POWDER, LYOPHILIZED, FOR SOLUTION INTRAVENOUS at 10:09

## 2023-05-16 RX ADMIN — IPRATROPIUM BROMIDE AND ALBUTEROL SULFATE 3 ML: 2.5; .5 SOLUTION RESPIRATORY (INHALATION) at 09:46

## 2023-05-16 RX ADMIN — Medication 10 ML: at 20:49

## 2023-05-16 RX ADMIN — IPRATROPIUM BROMIDE AND ALBUTEROL SULFATE 3 ML: 2.5; .5 SOLUTION RESPIRATORY (INHALATION) at 19:37

## 2023-05-16 RX ADMIN — HYDROCODONE BITARTRATE AND ACETAMINOPHEN 1 TABLET: 5; 325 TABLET ORAL at 18:46

## 2023-05-16 RX ADMIN — IPRATROPIUM BROMIDE AND ALBUTEROL SULFATE 3 ML: 2.5; .5 SOLUTION RESPIRATORY (INHALATION) at 06:27

## 2023-05-16 RX ADMIN — CEFEPIME 2 G: 2 INJECTION, POWDER, FOR SOLUTION INTRAVENOUS at 08:48

## 2023-05-16 NOTE — PROGRESS NOTES
[facility] Medicine Services  INPATIENT PROGRESS NOTE    Patient Name: Castillo Trujillo  Date of Admission: 5/9/2023  Today's Date: 05/16/23  Length of Stay: 6  Primary Care Physician: Juan Vaughn DO    Subjective   Chief Complaint: Follow-up shortness of breath  HPI   Mr. Trujillo is a 63-year-old male that presented to [facility] on 5/9 for repeat needle biopsy by radiologist.  He has had approximately 60 pounds of unintentional weight loss over the past 6 months.  Longstanding history of tobacco abuse but reports that he quit smoking in November 2022.  In March 2023 PET scan revealed a finding of a right upper lobe and middle lobe lung mass demonstrating hypermetabolic activity.  He was found to have hypermetabolic adenopathy and a right-sided pleural effusion.  He was admitted to our facility on 3/30 to 4/1-he had ultrasound-guided right thoracentesis on 3/31/2023 by interventional radiology-1500 mL pleural fluid removed.  Suspected postobstructive pneumonia and was discharged on Levaquin.  He underwent needle biopsy of the lung mass that showed benign lung parenchyma with inflammatory pseudotumor. He had worsening shortness of breath in which his primary care provider, Dr. Vaughn obtained a chest x-ray that showed an increasing right-sided pleural effusion.  Decision making was made to proceed with repeat needle biopsy with repeat thoracentesis which was scheduled for 5/9.  On 5/9 during thoracentesis 200 mL of cloudy yellowish/greenish fluid aspirated.  Chest tube was placed for drainage of suspected empyema.  Hospitalist was asked to admit with cardiothoracic surgery consult for management of chest tube.    Lying in bed with spouse at bedside.  Completed 5-day course of intrapleural lytic therapy yesterday.  CTS plans for second 5-day course of intrapleural lytic therapy to begin today.    Tolerating diet.  Pain controlled.  He has been ambulating the  hallway several times a day.    Review of Systems   All pertinent negatives and positives are as above. All other systems have been reviewed and are negative unless otherwise stated.     Objective    Temp:  [97.6 °F (36.4 °C)-98.8 °F (37.1 °C)] 98 °F (36.7 °C)  Heart Rate:  [77-84] 79  Resp:  [16-18] 16  BP: (126-147)/(50-79) 136/61  Physical Exam  Vitals reviewed.   Constitutional:       General: He is not in acute distress.     Appearance: He is not toxic-appearing.      Comments: Lying in bed on his left side.  No acute distress.  On room air.  Spouse at bedside.   HENT:      Head: Normocephalic and atraumatic.      Mouth/Throat:      Mouth: Mucous membranes are moist.      Pharynx: Oropharynx is clear.   Eyes:      Extraocular Movements: Extraocular movements intact.      Conjunctiva/sclera: Conjunctivae normal.      Pupils: Pupils are equal, round, and reactive to light.   Cardiovascular:      Rate and Rhythm: Normal rate and regular rhythm.      Pulses: Normal pulses.   Pulmonary:      Effort: Pulmonary effort is normal. No respiratory distress.      Breath sounds: Decreased breath sounds present.   Chest:      Comments: Right chest tube in place to -20 cm H2O suction, purulent output. No airleak.  Abdominal:      General: Bowel sounds are normal. There is no distension.      Palpations: Abdomen is soft.      Tenderness: There is no abdominal tenderness.   Musculoskeletal:         General: No swelling or tenderness. Normal range of motion.      Cervical back: Normal range of motion and neck supple. No muscular tenderness.   Skin:     General: Skin is warm and dry.      Findings: No erythema or rash.   Neurological:      General: No focal deficit present.      Mental Status: He is alert and oriented to person, place, and time.      Cranial Nerves: No cranial nerve deficit.      Motor: No weakness.   Psychiatric:         Mood and Affect: Mood normal.         Behavior: Behavior normal.       Results Review:  I  have reviewed the labs, radiology results, and diagnostic studies.    Laboratory Data:   Results from last 7 days   Lab Units 05/15/23  0450 05/12/23  0537 05/09/23  1613   WBC 10*3/mm3 8.15 8.36 13.16*   HEMOGLOBIN g/dL 9.2* 9.4* 9.6*   HEMATOCRIT % 31.7* 31.6* 32.3*   PLATELETS 10*3/mm3 747* 704* 654*          Results from last 7 days   Lab Units 05/15/23  0450 05/14/23  1625 05/13/23  0436 05/12/23  0537 05/10/23  1153 05/09/23  1613   SODIUM mmol/L 136  --  131* 134*   < > 137   POTASSIUM mmol/L 3.5  --  3.8 3.3*   < > 3.1*   CHLORIDE mmol/L 101  --  99 100   < > 98   CO2 mmol/L 28.0  --  22.0 23.0   < > 25.0   BUN mg/dL 5*  --  6* 5*   < > 12   CREATININE mg/dL 0.63* 0.60* 0.52* 0.60*   < > 0.68*   CALCIUM mg/dL 8.7  --  8.7 8.6   < > 9.3   BILIRUBIN mg/dL  --   --   --   --   --  0.6   ALK PHOS U/L  --   --   --   --   --  400*   ALT (SGPT) U/L  --   --   --   --   --  27   AST (SGOT) U/L  --   --   --   --   --  21   GLUCOSE mg/dL 95  --  94 99   < > 138*    < > = values in this interval not displayed.         Culture Data:   Microbiology Results (last 10 days)       Procedure Component Value - Date/Time    Blood Culture - Blood, Hand, Left [[id number]]  (Normal) Collected: 05/09/23 1613    Lab Status: Final result Specimen: Blood from Hand, Left Updated: 05/14/23 1745     Blood Culture No growth at 5 days    Blood Culture - Blood, Arm, Right [[id number]]  (Normal) Collected: 05/09/23 1613    Lab Status: Final result Specimen: Blood from Arm, Right Updated: 05/14/23 1745     Blood Culture No growth at 5 days    AFB Culture - Body Fluid, Pleural Cavity [[id number]] Collected: 05/09/23 1346    Lab Status: Preliminary result Specimen: Body Fluid from Pleural Cavity Updated: 05/14/23 1400     AFB Culture No AFB isolated at less than 1 week     AFB Stain No acid fast bacilli seen on direct smear      No acid fast bacilli seen on concentrated smear    Body Fluid Culture - Body Fluid, Pleural Cavity [[id number]]  Collected: 05/09/23 1346    Lab Status: Final result Specimen: Body Fluid from Pleural Cavity Updated: 05/14/23 0846     Body Fluid Culture No growth at 5 days     Gram Stain Many (4+) Gram positive cocci      Many (4+) Gram negative bacilli    Anaerobic Culture - Pleural Fluid, Pleural Cavity [[id number]]  (Abnormal) Collected: 05/09/23 1346    Lab Status: Final result Specimen: Pleural Fluid from Pleural Cavity Updated: 05/15/23 1113     Anaerobic Culture Peptostreptococcus anaerobius    Fungus Culture - Body Fluid, Pleural Cavity [[id number]] Collected: 05/09/23 1346    Lab Status: Preliminary result Specimen: Body Fluid from Pleural Cavity Updated: 05/14/23 1400     Fungus Culture No fungus isolated at less than 1 week          Radiology Data:   Imaging Results (Last 24 Hours)       Procedure Component Value Units Date/Time    XR Chest 1 View [[id number]] Collected: 05/16/23 0722     Updated: 05/16/23 0726    Narrative:      EXAM/TECHNIQUE: XR CHEST 1 VW-     INDICATION: chest tube     COMPARISON: Prior day     FINDINGS:     Right-sided pleural drain is stable in position. No change in small  residual right-sided pleural effusion with overlying atelectasis. No  change in RIGHT mid and lower lung zone parenchymal opacity. LEFT lung  and pleural space are clear. No visible pneumothorax. Cardiac silhouette  is normal size and stable.       Impression:         No change in appearance of the chest.  This report was finalized on 05/16/2023 07:23 by Dr. Yogi Lopez MD.    CT Chest With Contrast Diagnostic [[id number]] Collected: 05/15/23 1458     Updated: 05/15/23 1517    Narrative:      EXAMINATION:  CT CHEST W CONTRAST DIAGNOSTIC-  5/15/2023 9:41 AM CDT     HISTORY: Right pleural effusion.     COMPARISON : 03/30/2023.     DLP: 246 mGy-cm. Automated dosage reduction technique was utilized to  reduce patient dosage.     TECHNIQUE: CT was performed of the chest with IV contrast. Sagittal and  coronal images were  reconstructed.     MEDIASTINUM, HEART AND VASCULAR STRUCTURES: There is mild atheromatous  disease of the thoracic aorta. There is mild coronary artery  calcification. There is moderate cardiomegaly. A small amount of  pericardial fluid is probably physiologic. There is a proximal right  hilar lymph node short axis diameter of 1.4 cm. There are several  subcentimeter right paratracheal lymph nodes.     LUNGS: There is a small pleural effusion on the left, new compared to  the prior study. There is simple appearing pleural effusion on the left.  There is a very mild degree of left lower lobe atelectasis. There is a  chest tube on the right side. There is a smaller amount of residual  pleural fluid on the right. There is also a small amount of air in the  pleural space in the right lung base posteriorly around the chest tube.  There is some loculated pleural fluid located laterally and anteriorly  in the lung base. There is thickening of the pleura on the right side.  There is right upper lobe, right middle lobe and right lower lobe volume  loss. In the right upper lobe, there is somewhat linear opacity located  anteriorly with some air bronchograms likely representing atelectatic  lung. There is severe right middle lobe volume loss with consolidation  and air bronchograms likely related to atelectasis. There are dependent  opacities in the right lower lobe likely representing atelectasis.     UPPER ABDOMEN: There is a small hiatal hernia. Images of the upper  abdomen are otherwise unremarkable.     BONES: There are degenerative changes of the spine.          Impression:      1. The overall amount of pleural fluid on the right side is small when  compared to the prior study. However, pleural thickening is new. The  pleural thickening is greatest in the right lung base. A right chest  tube is in place in the right lung base. There is a small amount of air  in the right pleural space located in the region of the chest  tube.  Residual empyema and this area is considered. The fluid is also somewhat  loculated in the lung base laterally and anteriorly where the fluid is  lower in density and more simple.  2. There is volume loss in the right lung. There are opacities in the  right lung likely related to atelectasis. Pneumonia is also considered.  3. There is a new small left pleural effusion that has a simple layering  appearance. There is mild left lower lobe atelectasis.  4. There is a mildly enlarged right hilar lymph node, likely reactive.  There are small right paratracheal lymph nodes.  5. Mild atheromatous disease of the thoracic aorta and coronary arteries  is moderate cardiomegaly.  6. Small hiatal hernia.          This report was finalized on 05/15/2023 15:14 by Dr. Dariel Beach MD.            I have reviewed the patient's current medications.     Assessment/Plan   Assessment  Active Hospital Problems    Diagnosis     **Empyema, right     Empyema lung     Recurrent right pleural effusion     Stage 2 moderate COPD by GOLD classification     Right lung mass     Tobacco abuse, in remission     GERD (gastroesophageal reflux disease)     Primary hypertension     Acquired hypothyroidism        Treatment Plan  Cardiothoracic surgery, Dr. Kim following.  Chest tube management per CT surgery.  Completed 5-day course of intrapleural tPA//DNase therapy on 5/15.  Repeat CT chest on 5/15.  CTS waiting for second 5-day course of intrapleural tPA//DNase therapy, day 1/5.    Pleural fluid culture shows gram-positive cocci and gram-negative bacilli.  Anaerobic culture shows Peptostreptococcus anaerobius. Blood cultures with no growth to date.  Discontinue vancomycin and cefepime in favor of Zosyn.    Hold off on consulting oncology at present.    Incentive spirometer.    Pain control.    SCDs for DVT prophylaxis.  Increase activity as tolerated, ambulate in cantor 3 times daily.    Medical Decision Making  Number and Complexity of  problems: 3 acute problems in the form of right empyema, recurrent right pleural effusion in the setting of benign lung parenchyma with features of inflammatory pseudotumor  Differential Diagnosis: None considered at present    Conditions and Status        Condition is improving.     [facility] Data  External documents reviewed: Prior epic records  Cardiac tracing (EKG, telemetry) interpretation: None  Radiology interpretation: Interpreted by radiology  Labs reviewed: As above  Any tests that were considered but not ordered: None considered at present     Decision rules/scores evaluated (example [id number], Wells, etc): None considered at present     Discussed with: Patient, spouse Lilia, and Dr. Rich     Care Planning  Shared decision making: Patient is agreeable to ongoing work-up and treatment  Code status and discussions: Full code with full interventions    Disposition  Social Determinants of Health that impact treatment or disposition: None identified at present  I expect the patient to be discharged to home in 6-7 days.     Electronically signed by TRESSA Contreras, 05/16/23, 11:42 CDT.

## 2023-05-16 NOTE — PLAN OF CARE
Goal Outcome Evaluation:  Plan of Care Reviewed With: patient        Progress: no change  Outcome Evaluation: IID; IV ABX; medicated for pain; up with sba; Right pigtail to 20 dry suction; TPA administered; Room air; new IV; resting between care; safety maintained

## 2023-05-16 NOTE — PAYOR COMM NOTE
"Castillo Trujillo (63 y.o. Male) [id number]   cont stay  Please review clinical for Additional Days     [facility] phone   fax          Date of Birth   1960    Social Security Number       Address   [address] [place] [address] [place] [address]    Home Phone   [phone]    MRN   [id number]       Yarsanism   Caodaism    Marital Status                               Admission Date   5/9/23    Admission Type   Urgent    Admitting Provider   Barry Rich MD    Attending Provider   Barry Rich MD    Department, Room/Bed   [facility] 3C, 364/1       Discharge Date       Discharge Disposition       Discharge Destination                                 Attending Provider: Barry Rich MD    Allergies: No Known Allergies    Isolation: None   Infection: None   Code Status: CPR    Ht: 188 cm (74\")   Wt: 90.7 kg (200 lb)    Admission Cmt: None   Principal Problem: Empyema, right [J86.9]                   Active Insurance as of 5/9/2023       Primary Coverage       Payor Plan Insurance Group Employer/Plan Group    ANTHEM BLUE CROSS ANTHEM BLUE CROSS BLUE SHIELD PPO [id number]       Payor Plan Address Payor Plan Phone Number Payor Plan Fax Number Effective Dates    [address] [phone]  8/1/2018 - None Entered    [facility] [id number]         Subscriber Name Subscriber Birth Date Member ID       CASTILLO TRUJILLO 1960 [id number]               Secondary Coverage       Payor Plan Insurance Group Employer/Plan Group    MEDICARE MEDICARE A & B        Payor Plan Address Payor Plan Phone Number Payor Plan Fax Number Effective Dates    [address] [phone]  3/1/2023 - None Entered    [facility] [id number]         Subscriber Name Subscriber Birth Date Member ID       CASTILLO TRUJILLO 1960 [id number]                     Emergency Contacts        (Rel.) Home Phone Work Phone Mobile Phone    VALENTIN TRUJILLO (Spouse) " [phone] -- --              Vital Signs (last day)       Date/Time Temp Temp src Pulse Resp BP Patient Position SpO2    05/16/23 1110 98 (36.7) Oral 79 16 136/61 Lying 93    05/16/23 0946 -- -- 80 16 -- -- 96    05/16/23 0813 97.6 (36.4) Oral 77 16 126/63 Lying 95    05/16/23 0633 -- -- 77 16 -- -- 99    05/16/23 0627 -- -- 78 16 -- -- 96    05/16/23 0420 98.8 (37.1) Oral 77 16 140/62 Lying 92    05/15/23 2339 98.3 (36.8) Oral 82 16 128/50 Lying 93    05/15/23 2125 -- -- 79 -- -- -- 97    05/15/23 2116 -- -- 84 16 -- -- 94    05/15/23 1935 97.9 (36.6) Oral 84 16 134/57 Lying 93    05/15/23 1508 97.7 (36.5) Oral 83 18 133/79 Lying 94    05/15/23 1457 -- -- 82 18 -- -- 100    05/15/23 1451 -- -- 80 18 -- -- 96    05/15/23 1201 98.6 (37) Oral 77 16 147/57 Lying 94    05/15/23 1136 -- -- 82 16 -- -- 100    05/15/23 1130 -- -- 80 18 -- -- 100    05/15/23 0805 98.2 (36.8) Oral 77 16 149/70 Lying 96    05/15/23 0618 -- -- 83 16 -- -- 100    05/15/23 0612 -- -- 81 16 -- -- 94    05/15/23 0401 97.5 (36.4) Oral 79 16 152/64 Lying 94          Current Facility-Administered Medications   Medication Dose Route Frequency Provider Last Rate Last Admin    acetaminophen (TYLENOL) tablet 650 mg  650 mg Oral Q4H PRN Carolynn Pryor APRN        Or    acetaminophen (TYLENOL) 160 MG/5ML solution 650 mg  650 mg Oral Q4H PRN Pryor, Carolynn D, APRN        Or    acetaminophen (TYLENOL) suppository 650 mg  650 mg Rectal Q4H PRN Carolynn Pryor APRN        alteplase (ACTIVASE) 10 mg in sodium chloride 0.9 % 30 mL Syringe  10 mg Intrapleural BID Barbara Garrido, APRN   10 mg at 05/16/23 1009    And    dornase alpha (PULMOZYME) 5 mg in sterile water (preservative free) 30 mL  5 mg Intrapleural BID Barbara Garrido, APRN   5 mg at 05/16/23 1013    aspirin EC tablet 81 mg  81 mg Oral Daily Carolynn Pryor APRN   81 mg at 05/16/23 0849    HYDROcodone-acetaminophen (NORCO) 5-325 MG per tablet 1 tablet  1 tablet Oral Q4H PRN Julio  Benny DA SILVA MD   1 tablet at 05/16/23 1015    ipratropium-albuterol (DUO-NEB) nebulizer solution 3 mL  3 mL Nebulization 4x Daily - RT Osvaldo Pryor MD   3 mL at 05/16/23 0946    levothyroxine (SYNTHROID, LEVOTHROID) tablet 200 mcg  200 mcg Oral Q AM Carolynn Pryor APRN   200 mcg at 05/16/23 0541    lidocaine (XYLOCAINE) 1 % injection 10 mL  10 mL Infiltration Once Benny Kim MD        lisinopril (PRINIVIL,ZESTRIL) tablet 10 mg  10 mg Oral Daily Carolynn Pryor APRN   10 mg at 05/16/23 0848    ondansetron (ZOFRAN) tablet 4 mg  4 mg Oral Q6H PRN Carolynn Pryor APRN        Or    ondansetron (ZOFRAN) injection 4 mg  4 mg Intravenous Q6H PRN Carolynn Pryor APRN        pantoprazole (PROTONIX) EC tablet 40 mg  40 mg Oral Daily Carolynn Pryor APRN   40 mg at 05/16/23 0848    Pharmacy to Dose Zosyn   Does not apply Continuous PRN Vannesa Yadav APRN        piperacillin-tazobactam (ZOSYN) 4.5 g in iso-osmotic dextrose 100 mL IVPB (premix)  4.5 g Intravenous Once Vannesa Yadav APRN   4.5 g at 05/16/23 1320    piperacillin-tazobactam (ZOSYN) 4.5 g in iso-osmotic dextrose 100 mL IVPB (premix)  4.5 g Intravenous Q8H Vannesa Yadav APRN        sodium chloride 0.9 % flush 10 mL  10 mL Intravenous Q12H Carolynn Pryor APRN   10 mL at 05/16/23 0849    sodium chloride 0.9 % flush 10 mL  10 mL Intravenous PRN Carolynn Pryor APRN        sodium chloride 0.9 % infusion 40 mL  40 mL Intravenous PRN Carolynn Pryor APRN            Physician Progress Notes (last 24 hours)        Vannesa Yadav APRN at 05/16/23 1141              [facility] Medicine Services  INPATIENT PROGRESS NOTE    Patient Name: Castillo Trujillo  Date of Admission: 5/9/2023  Today's Date: 05/16/23  Length of Stay: 6  Primary Care Physician: Juan Vaughn DO    Subjective   Chief Complaint: Follow-up shortness of breath  HPI   Mr. Trujillo is a 63-year-old male that presented to [facility]  on 5/9 for repeat needle biopsy by radiologist.  He has had approximately 60 pounds of unintentional weight loss over the past 6 months.  Longstanding history of tobacco abuse but reports that he quit smoking in November 2022.  In March 2023 PET scan revealed a finding of a right upper lobe and middle lobe lung mass demonstrating hypermetabolic activity.  He was found to have hypermetabolic adenopathy and a right-sided pleural effusion.  He was admitted to our facility on 3/30 to 4/1-he had ultrasound-guided right thoracentesis on 3/31/2023 by interventional radiology-1500 mL pleural fluid removed.  Suspected postobstructive pneumonia and was discharged on Levaquin.  He underwent needle biopsy of the lung mass that showed benign lung parenchyma with inflammatory pseudotumor. He had worsening shortness of breath in which his primary care provider, Dr. Vaughn obtained a chest x-ray that showed an increasing right-sided pleural effusion.  Decision making was made to proceed with repeat needle biopsy with repeat thoracentesis which was scheduled for 5/9.  On 5/9 during thoracentesis 200 mL of cloudy yellowish/greenish fluid aspirated.  Chest tube was placed for drainage of suspected empyema.  Hospitalist was asked to admit with cardiothoracic surgery consult for management of chest tube.    Lying in bed with spouse at bedside.  Completed 5-day course of intrapleural lytic therapy yesterday.  CTS plans for second 5-day course of intrapleural lytic therapy to begin today.    Tolerating diet.  Pain controlled.  He has been ambulating the hallway several times a day.    Review of Systems   All pertinent negatives and positives are as above. All other systems have been reviewed and are negative unless otherwise stated.     Objective    Temp:  [97.6 °F (36.4 °C)-98.8 °F (37.1 °C)] 98 °F (36.7 °C)  Heart Rate:  [77-84] 79  Resp:  [16-18] 16  BP: (126-147)/(50-79) 136/61  Physical Exam  Vitals reviewed.   Constitutional:        General: He is not in acute distress.     Appearance: He is not toxic-appearing.      Comments: Lying in bed on his left side.  No acute distress.  On room air.  Spouse at bedside.   HENT:      Head: Normocephalic and atraumatic.      Mouth/Throat:      Mouth: Mucous membranes are moist.      Pharynx: Oropharynx is clear.   Eyes:      Extraocular Movements: Extraocular movements intact.      Conjunctiva/sclera: Conjunctivae normal.      Pupils: Pupils are equal, round, and reactive to light.   Cardiovascular:      Rate and Rhythm: Normal rate and regular rhythm.      Pulses: Normal pulses.   Pulmonary:      Effort: Pulmonary effort is normal. No respiratory distress.      Breath sounds: Decreased breath sounds present.   Chest:      Comments: Right chest tube in place to -20 cm H2O suction, purulent output. No airleak.  Abdominal:      General: Bowel sounds are normal. There is no distension.      Palpations: Abdomen is soft.      Tenderness: There is no abdominal tenderness.   Musculoskeletal:         General: No swelling or tenderness. Normal range of motion.      Cervical back: Normal range of motion and neck supple. No muscular tenderness.   Skin:     General: Skin is warm and dry.      Findings: No erythema or rash.   Neurological:      General: No focal deficit present.      Mental Status: He is alert and oriented to person, place, and time.      Cranial Nerves: No cranial nerve deficit.      Motor: No weakness.   Psychiatric:         Mood and Affect: Mood normal.         Behavior: Behavior normal.       Results Review:  I have reviewed the labs, radiology results, and diagnostic studies.    Laboratory Data:   Results from last 7 days   Lab Units 05/15/23  0450 05/12/23  0537 05/09/23  1613   WBC 10*3/mm3 8.15 8.36 13.16*   HEMOGLOBIN g/dL 9.2* 9.4* 9.6*   HEMATOCRIT % 31.7* 31.6* 32.3*   PLATELETS 10*3/mm3 747* 704* 654*          Results from last 7 days   Lab Units 05/15/23  0450 05/14/23  1625  05/13/23  0436 05/12/23  0537 05/10/23  1153 05/09/23  1613   SODIUM mmol/L 136  --  131* 134*   < > 137   POTASSIUM mmol/L 3.5  --  3.8 3.3*   < > 3.1*   CHLORIDE mmol/L 101  --  99 100   < > 98   CO2 mmol/L 28.0  --  22.0 23.0   < > 25.0   BUN mg/dL 5*  --  6* 5*   < > 12   CREATININE mg/dL 0.63* 0.60* 0.52* 0.60*   < > 0.68*   CALCIUM mg/dL 8.7  --  8.7 8.6   < > 9.3   BILIRUBIN mg/dL  --   --   --   --   --  0.6   ALK PHOS U/L  --   --   --   --   --  400*   ALT (SGPT) U/L  --   --   --   --   --  27   AST (SGOT) U/L  --   --   --   --   --  21   GLUCOSE mg/dL 95  --  94 99   < > 138*    < > = values in this interval not displayed.         Culture Data:   Microbiology Results (last 10 days)       Procedure Component Value - Date/Time    Blood Culture - Blood, Hand, Left [[id number]]  (Normal) Collected: 05/09/23 1613    Lab Status: Final result Specimen: Blood from Hand, Left Updated: 05/14/23 1745     Blood Culture No growth at 5 days    Blood Culture - Blood, Arm, Right [[id number]]  (Normal) Collected: 05/09/23 1613    Lab Status: Final result Specimen: Blood from Arm, Right Updated: 05/14/23 1745     Blood Culture No growth at 5 days    AFB Culture - Body Fluid, Pleural Cavity [[id number]] Collected: 05/09/23 1346    Lab Status: Preliminary result Specimen: Body Fluid from Pleural Cavity Updated: 05/14/23 1400     AFB Culture No AFB isolated at less than 1 week     AFB Stain No acid fast bacilli seen on direct smear      No acid fast bacilli seen on concentrated smear    Body Fluid Culture - Body Fluid, Pleural Cavity [[id number]] Collected: 05/09/23 1346    Lab Status: Final result Specimen: Body Fluid from Pleural Cavity Updated: 05/14/23 0846     Body Fluid Culture No growth at 5 days     Gram Stain Many (4+) Gram positive cocci      Many (4+) Gram negative bacilli    Anaerobic Culture - Pleural Fluid, Pleural Cavity [[id number]]  (Abnormal) Collected: 05/09/23 1346    Lab Status: Final result Specimen:  Pleural Fluid from Pleural Cavity Updated: 05/15/23 1113     Anaerobic Culture Peptostreptococcus anaerobius    Fungus Culture - Body Fluid, Pleural Cavity [[id number]] Collected: 05/09/23 1346    Lab Status: Preliminary result Specimen: Body Fluid from Pleural Cavity Updated: 05/14/23 1400     Fungus Culture No fungus isolated at less than 1 week          Radiology Data:   Imaging Results (Last 24 Hours)       Procedure Component Value Units Date/Time    XR Chest 1 View [[id number]] Collected: 05/16/23 0722     Updated: 05/16/23 0726    Narrative:      EXAM/TECHNIQUE: XR CHEST 1 VW-     INDICATION: chest tube     COMPARISON: Prior day     FINDINGS:     Right-sided pleural drain is stable in position. No change in small  residual right-sided pleural effusion with overlying atelectasis. No  change in RIGHT mid and lower lung zone parenchymal opacity. LEFT lung  and pleural space are clear. No visible pneumothorax. Cardiac silhouette  is normal size and stable.       Impression:         No change in appearance of the chest.  This report was finalized on 05/16/2023 07:23 by Dr. Yogi Lopez MD.    CT Chest With Contrast Diagnostic [[id number]] Collected: 05/15/23 1458     Updated: 05/15/23 1517    Narrative:      EXAMINATION:  CT CHEST W CONTRAST DIAGNOSTIC-  5/15/2023 9:41 AM CDT     HISTORY: Right pleural effusion.     COMPARISON : 03/30/2023.     DLP: 246 mGy-cm. Automated dosage reduction technique was utilized to  reduce patient dosage.     TECHNIQUE: CT was performed of the chest with IV contrast. Sagittal and  coronal images were reconstructed.     MEDIASTINUM, HEART AND VASCULAR STRUCTURES: There is mild atheromatous  disease of the thoracic aorta. There is mild coronary artery  calcification. There is moderate cardiomegaly. A small amount of  pericardial fluid is probably physiologic. There is a proximal right  hilar lymph node short axis diameter of 1.4 cm. There are several  subcentimeter right paratracheal  lymph nodes.     LUNGS: There is a small pleural effusion on the left, new compared to  the prior study. There is simple appearing pleural effusion on the left.  There is a very mild degree of left lower lobe atelectasis. There is a  chest tube on the right side. There is a smaller amount of residual  pleural fluid on the right. There is also a small amount of air in the  pleural space in the right lung base posteriorly around the chest tube.  There is some loculated pleural fluid located laterally and anteriorly  in the lung base. There is thickening of the pleura on the right side.  There is right upper lobe, right middle lobe and right lower lobe volume  loss. In the right upper lobe, there is somewhat linear opacity located  anteriorly with some air bronchograms likely representing atelectatic  lung. There is severe right middle lobe volume loss with consolidation  and air bronchograms likely related to atelectasis. There are dependent  opacities in the right lower lobe likely representing atelectasis.     UPPER ABDOMEN: There is a small hiatal hernia. Images of the upper  abdomen are otherwise unremarkable.     BONES: There are degenerative changes of the spine.          Impression:      1. The overall amount of pleural fluid on the right side is small when  compared to the prior study. However, pleural thickening is new. The  pleural thickening is greatest in the right lung base. A right chest  tube is in place in the right lung base. There is a small amount of air  in the right pleural space located in the region of the chest tube.  Residual empyema and this area is considered. The fluid is also somewhat  loculated in the lung base laterally and anteriorly where the fluid is  lower in density and more simple.  2. There is volume loss in the right lung. There are opacities in the  right lung likely related to atelectasis. Pneumonia is also considered.  3. There is a new small left pleural effusion that has a  simple layering  appearance. There is mild left lower lobe atelectasis.  4. There is a mildly enlarged right hilar lymph node, likely reactive.  There are small right paratracheal lymph nodes.  5. Mild atheromatous disease of the thoracic aorta and coronary arteries  is moderate cardiomegaly.  6. Small hiatal hernia.          This report was finalized on 05/15/2023 15:14 by Dr. Dariel Beach MD.            I have reviewed the patient's current medications.     Assessment/Plan   Assessment  Active Hospital Problems    Diagnosis     **Empyema, right     Empyema lung     Recurrent right pleural effusion     Stage 2 moderate COPD by GOLD classification     Right lung mass     Tobacco abuse, in remission     GERD (gastroesophageal reflux disease)     Primary hypertension     Acquired hypothyroidism        Treatment Plan  Cardiothoracic surgery, Dr. Kim following.  Chest tube management per CT surgery.  Completed 5-day course of intrapleural tPA//DNase therapy on 5/15.  Repeat CT chest on 5/15.  CTS waiting for second 5-day course of intrapleural tPA//DNase therapy, day 1/5.    Pleural fluid culture shows gram-positive cocci and gram-negative bacilli.  Anaerobic culture shows Peptostreptococcus anaerobius. Blood cultures with no growth to date.  Discontinue vancomycin and cefepime in favor of Zosyn.    Hold off on consulting oncology at present.    Incentive spirometer.    Pain control.    SCDs for DVT prophylaxis.  Increase activity as tolerated, ambulate in cantor 3 times daily.    Medical Decision Making  Number and Complexity of problems: 3 acute problems in the form of right empyema, recurrent right pleural effusion in the setting of benign lung parenchyma with features of inflammatory pseudotumor  Differential Diagnosis: None considered at present    Conditions and Status        Condition is improving.     [facility] Data  External documents reviewed: Prior [facility] records  Cardiac tracing (EKG, telemetry) interpretation:  "None  Radiology interpretation: Interpreted by radiology  Labs reviewed: As above  Any tests that were considered but not ordered: None considered at present     Decision rules/scores evaluated (example [id number], Wells, etc): None considered at present     Discussed with: Patient, spouse Lilia, and Dr. Rich     Care Planning  Shared decision making: Patient is agreeable to ongoing work-up and treatment  Code status and discussions: Full code with full interventions    Disposition  Social Determinants of Health that impact treatment or disposition: None identified at present  I expect the patient to be discharged to home in 6-7 days.     Electronically signed by TRESSA Contreras, 23, 11:42 CDT.      Electronically signed by Vannesa Yadav APRN at 23 1145       Barbara Garrido APRN at 23 0943          Patient name: Castillo Trujillo  Patient : 1960  VISIT # [id number]  MR #[id number]    Procedure:  Procedure Date:   POD:* No surgery found *    Subjective   Shortness of breath    Sitting up on the side of the bed. No new complaints. Tolerating room air. CT chest completed yesterday, will begin a second 5 day course of intrapleural lytic therapy, Day 1 of 5. No overnight events. Patient reports that he has been ambulating in the hallways several times a day.       Objective     Visit Vitals  /63 (BP Location: Right arm, Patient Position: Lying)   Pulse 77   Temp 97.6 °F (36.4 °C) (Oral)   Resp 16   Ht 188 cm (74\")   Wt 90.7 kg (200 lb)   SpO2 95%   BMI 25.68 kg/m²       Intake/Output Summary (Last 24 hours) at 2023 0943  Last data filed at 2023 0929  Gross per 24 hour   Intake 600 ml   Output 2666 ml   Net -2066 ml     Right chest tube output: 366 mL / 24 hours,  no leak, cloudy/purulent.     LABS:    NONE    IMAGES:       Imaging Results (Last 24 Hours)       Procedure Component Value Units Date/Time    XR Chest 1 View [[id number]] Collected: 23     Updated: 23 " 0726    Narrative:      EXAM/TECHNIQUE: XR CHEST 1 VW-     INDICATION: chest tube     COMPARISON: Prior day     FINDINGS:     Right-sided pleural drain is stable in position. No change in small  residual right-sided pleural effusion with overlying atelectasis. No  change in RIGHT mid and lower lung zone parenchymal opacity. LEFT lung  and pleural space are clear. No visible pneumothorax. Cardiac silhouette  is normal size and stable.       Impression:         No change in appearance of the chest.  This report was finalized on 05/16/2023 07:23 by Dr. Yogi Lopez MD.    CT Chest With Contrast Diagnostic [[id number]] Collected: 05/15/23 1458     Updated: 05/15/23 1517    Narrative:      EXAMINATION:  CT CHEST W CONTRAST DIAGNOSTIC-  5/15/2023 9:41 AM CDT     HISTORY: Right pleural effusion.     COMPARISON : 03/30/2023.     DLP: 246 mGy-cm. Automated dosage reduction technique was utilized to  reduce patient dosage.     TECHNIQUE: CT was performed of the chest with IV contrast. Sagittal and  coronal images were reconstructed.     MEDIASTINUM, HEART AND VASCULAR STRUCTURES: There is mild atheromatous  disease of the thoracic aorta. There is mild coronary artery  calcification. There is moderate cardiomegaly. A small amount of  pericardial fluid is probably physiologic. There is a proximal right  hilar lymph node short axis diameter of 1.4 cm. There are several  subcentimeter right paratracheal lymph nodes.     LUNGS: There is a small pleural effusion on the left, new compared to  the prior study. There is simple appearing pleural effusion on the left.  There is a very mild degree of left lower lobe atelectasis. There is a  chest tube on the right side. There is a smaller amount of residual  pleural fluid on the right. There is also a small amount of air in the  pleural space in the right lung base posteriorly around the chest tube.  There is some loculated pleural fluid located laterally and anteriorly  in the lung  base. There is thickening of the pleura on the right side.  There is right upper lobe, right middle lobe and right lower lobe volume  loss. In the right upper lobe, there is somewhat linear opacity located  anteriorly with some air bronchograms likely representing atelectatic  lung. There is severe right middle lobe volume loss with consolidation  and air bronchograms likely related to atelectasis. There are dependent  opacities in the right lower lobe likely representing atelectasis.     UPPER ABDOMEN: There is a small hiatal hernia. Images of the upper  abdomen are otherwise unremarkable.     BONES: There are degenerative changes of the spine.          Impression:      1. The overall amount of pleural fluid on the right side is small when  compared to the prior study. However, pleural thickening is new. The  pleural thickening is greatest in the right lung base. A right chest  tube is in place in the right lung base. There is a small amount of air  in the right pleural space located in the region of the chest tube.  Residual empyema and this area is considered. The fluid is also somewhat  loculated in the lung base laterally and anteriorly where the fluid is  lower in density and more simple.  2. There is volume loss in the right lung. There are opacities in the  right lung likely related to atelectasis. Pneumonia is also considered.  3. There is a new small left pleural effusion that has a simple layering  appearance. There is mild left lower lobe atelectasis.  4. There is a mildly enlarged right hilar lymph node, likely reactive.  There are small right paratracheal lymph nodes.  5. Mild atheromatous disease of the thoracic aorta and coronary arteries  is moderate cardiomegaly.  6. Small hiatal hernia.          This report was finalized on 05/15/2023 15:14 by Dr. Dariel Beach MD.          My image interpretation: Pigtail catheter in place, no pneumothorax, stable from previous exam.     Physical Exam:  General:  Alert, oriented. No apparent distress.  Sitting up in the bed  Cardiovascular: Regular rate and rhythm without murmur, rubs, or gallops.    Pulmonary: Decreased breath sounds to right lung and bilateral bases.  No wheezing or rhonchi.    Chest: Right side chest tube to-20 cm H2O suction.  No air leak.  Fluid is cloudy and purulent  Abdomen: Soft, non distended, and non tender.  Extremities: Warm, moves all extremities. .   Neurologic:  Grossly intact with no focal deficits.         Impression:  Empyema, right  Right lung mass  Recurrent right pleural effusion  Tobacco abuse, in remission  Stage 2 moderate COPD by GOLD classification  Hypokalemia  Unintentional weight loss        Plan:  Start 5 day course of intrapleural lytic therapy, day 1 of 5   Continue chest tube suction at -20 cm H2O  Flush chest tube with 10 mL normal saline every 8 hours to maintain patency  Chest x-ray daily  Encourage pulmonary toilet and ambulation  Discussed with patient and nursing     TRESSA Howard  05/16/23  09:43 CDT      Electronically signed by Barbara Garrido APRN at 05/16/23 0947    5/16    Pt is awake and alert. Pt c/o moderate/severe pain in right post. back. PRN pain medication given per MD order. Continue TPA for another 5 day per Dr. Kim based off CT findings yesterday. Right CT to -20 cm of dry suction with moderate of thick white/yellow drainage. Tolerating diet. Voiding. Safety maintained

## 2023-05-16 NOTE — PROGRESS NOTES
"Pharmacy Dosing Service  Antimicrobial Dosing  Zosyn    Assessment/Action/Plan:  Active Hospital Problems    Diagnosis  POA    **Empyema, right [J86.9]  Yes    Empyema lung [J86.9]  Yes    Recurrent right pleural effusion [J90]  Yes    Stage 2 moderate COPD by GOLD classification [J44.9]  Yes    Right lung mass [R91.8]  Yes    Tobacco abuse, in remission [F17.201]  Yes    GERD (gastroesophageal reflux disease) [K21.9]  Yes    Primary hypertension [I10]  Yes    Acquired hypothyroidism [E03.9]  Yes       Current order: pharmacist to dose Zosyn for 7 days for empyema    Plan: initiate Zosyn 4.5 g IV once over 30 minutes. Followed by Zosyn 4.5 g IV Q8h over 4 hours.      Subjective:  Castillo Trujillo is a 63 y.o. male with a  \"Pharmacy to Dose Zosyn\" consult for the treatment of empyema , day 1 of 7 of treatment.    Objective:  Ht: 188 cm (74\"); Wt: 90.7 kg (200 lb)  Estimated Creatinine Clearance: 150 mL/min (A) (by C-G formula based on SCr of 0.63 mg/dL (L)).   Creatinine   Date Value Ref Range Status   05/15/2023 0.63 (L) 0.76 - 1.27 mg/dL Final   05/14/2023 0.60 (L) 0.76 - 1.27 mg/dL Final   05/13/2023 0.52 (L) 0.76 - 1.27 mg/dL Final      Lab Results   Component Value Date    WBC 8.15 05/15/2023    WBC 8.36 05/12/2023    WBC 13.16 (H) 05/09/2023      Baseline culture results:  Microbiology Results (last 10 days)       Procedure Component Value - Date/Time    Blood Culture - Blood, Hand, Left [[id number]]  (Normal) Collected: 05/09/23 1613    Lab Status: Final result Specimen: Blood from Hand, Left Updated: 05/14/23 1745     Blood Culture No growth at 5 days    Blood Culture - Blood, Arm, Right [[id number]]  (Normal) Collected: 05/09/23 1613    Lab Status: Final result Specimen: Blood from Arm, Right Updated: 05/14/23 1745     Blood Culture No growth at 5 days    AFB Culture - Body Fluid, Pleural Cavity [[id number]] Collected: 05/09/23 1346    Lab Status: Preliminary result Specimen: Body Fluid from Pleural Cavity " Updated: 05/14/23 1400     AFB Culture No AFB isolated at less than 1 week     AFB Stain No acid fast bacilli seen on direct smear      No acid fast bacilli seen on concentrated smear    Body Fluid Culture - Body Fluid, Pleural Cavity [[id number]] Collected: 05/09/23 1346    Lab Status: Final result Specimen: Body Fluid from Pleural Cavity Updated: 05/14/23 0846     Body Fluid Culture No growth at 5 days     Gram Stain Many (4+) Gram positive cocci      Many (4+) Gram negative bacilli    Anaerobic Culture - Pleural Fluid, Pleural Cavity [[id number]]  (Abnormal) Collected: 05/09/23 1346    Lab Status: Final result Specimen: Pleural Fluid from Pleural Cavity Updated: 05/15/23 1113     Anaerobic Culture Peptostreptococcus anaerobius    Fungus Culture - Body Fluid, Pleural Cavity [[id number]] Collected: 05/09/23 1346    Lab Status: Preliminary result Specimen: Body Fluid from Pleural Cavity Updated: 05/14/23 1400     Fungus Culture No fungus isolated at less than 1 week            Srini Betancourt, PharmBRYN  05/16/23 11:41 CDT

## 2023-05-16 NOTE — PLAN OF CARE
Goal Outcome Evaluation:  Plan of Care Reviewed With: patient        Progress: no change  Outcome Evaluation: Pt is awake and alert. Pt c/o moderate/severe pain in right post. back. PRN pain medication given per MD order. Continue TPA for another 5 day per Dr. Kim based off CT findings yesterday. Right CT to -20 cm of dry suction with moderate of thick white/yellow drainage. Tolerating diet. Voiding. Safety maintained.

## 2023-05-16 NOTE — PROGRESS NOTES
"Patient name: Castillo Trujillo  Patient : 1960  VISIT # [id number]  MR #[id number]    Procedure:  Procedure Date:   POD:* No surgery found *    Subjective   Shortness of breath    Sitting up on the side of the bed. No new complaints. Tolerating room air. CT chest completed yesterday, will begin a second 5 day course of intrapleural lytic therapy, Day 1 of 5. No overnight events. Patient reports that he has been ambulating in the hallways several times a day.        Objective     Visit Vitals  /63 (BP Location: Right arm, Patient Position: Lying)   Pulse 77   Temp 97.6 °F (36.4 °C) (Oral)   Resp 16   Ht 188 cm (74\")   Wt 90.7 kg (200 lb)   SpO2 95%   BMI 25.68 kg/m²       Intake/Output Summary (Last 24 hours) at 2023 0943  Last data filed at 2023 0929  Gross per 24 hour   Intake 600 ml   Output 2666 ml   Net -2066 ml     Right chest tube output: 366 mL / 24 hours,  no leak, cloudy/purulent.     LABS:    NONE    IMAGES:       Imaging Results (Last 24 Hours)       Procedure Component Value Units Date/Time    XR Chest 1 View [[id number]] Collected: 23     Updated: 23    Narrative:      EXAM/TECHNIQUE: XR CHEST 1 VW-     INDICATION: chest tube     COMPARISON: Prior day     FINDINGS:     Right-sided pleural drain is stable in position. No change in small  residual right-sided pleural effusion with overlying atelectasis. No  change in RIGHT mid and lower lung zone parenchymal opacity. LEFT lung  and pleural space are clear. No visible pneumothorax. Cardiac silhouette  is normal size and stable.       Impression:         No change in appearance of the chest.  This report was finalized on 2023 07:23 by Dr. Yogi Lopez MD.    CT Chest With Contrast Diagnostic [[id number]] Collected: 05/15/23 1458     Updated: 05/15/23 1517    Narrative:      EXAMINATION:  CT CHEST W CONTRAST DIAGNOSTIC-  5/15/2023 9:41 AM CDT     HISTORY: Right pleural effusion.     COMPARISON : 2023.   "   DLP: 246 mGy-cm. Automated dosage reduction technique was utilized to  reduce patient dosage.     TECHNIQUE: CT was performed of the chest with IV contrast. Sagittal and  coronal images were reconstructed.     MEDIASTINUM, HEART AND VASCULAR STRUCTURES: There is mild atheromatous  disease of the thoracic aorta. There is mild coronary artery  calcification. There is moderate cardiomegaly. A small amount of  pericardial fluid is probably physiologic. There is a proximal right  hilar lymph node short axis diameter of 1.4 cm. There are several  subcentimeter right paratracheal lymph nodes.     LUNGS: There is a small pleural effusion on the left, new compared to  the prior study. There is simple appearing pleural effusion on the left.  There is a very mild degree of left lower lobe atelectasis. There is a  chest tube on the right side. There is a smaller amount of residual  pleural fluid on the right. There is also a small amount of air in the  pleural space in the right lung base posteriorly around the chest tube.  There is some loculated pleural fluid located laterally and anteriorly  in the lung base. There is thickening of the pleura on the right side.  There is right upper lobe, right middle lobe and right lower lobe volume  loss. In the right upper lobe, there is somewhat linear opacity located  anteriorly with some air bronchograms likely representing atelectatic  lung. There is severe right middle lobe volume loss with consolidation  and air bronchograms likely related to atelectasis. There are dependent  opacities in the right lower lobe likely representing atelectasis.     UPPER ABDOMEN: There is a small hiatal hernia. Images of the upper  abdomen are otherwise unremarkable.     BONES: There are degenerative changes of the spine.          Impression:      1. The overall amount of pleural fluid on the right side is small when  compared to the prior study. However, pleural thickening is new. The  pleural  thickening is greatest in the right lung base. A right chest  tube is in place in the right lung base. There is a small amount of air  in the right pleural space located in the region of the chest tube.  Residual empyema and this area is considered. The fluid is also somewhat  loculated in the lung base laterally and anteriorly where the fluid is  lower in density and more simple.  2. There is volume loss in the right lung. There are opacities in the  right lung likely related to atelectasis. Pneumonia is also considered.  3. There is a new small left pleural effusion that has a simple layering  appearance. There is mild left lower lobe atelectasis.  4. There is a mildly enlarged right hilar lymph node, likely reactive.  There are small right paratracheal lymph nodes.  5. Mild atheromatous disease of the thoracic aorta and coronary arteries  is moderate cardiomegaly.  6. Small hiatal hernia.          This report was finalized on 05/15/2023 15:14 by Dr. Dariel Beach MD.          My image interpretation: Pigtail catheter in place, no pneumothorax, stable from previous exam.     Physical Exam:  General: Alert, oriented. No apparent distress.  Sitting up in the bed  Cardiovascular: Regular rate and rhythm without murmur, rubs, or gallops.    Pulmonary: Decreased breath sounds to right lung and bilateral bases.  No wheezing or rhonchi.    Chest: Right side chest tube to-20 cm H2O suction.  No air leak.  Fluid is cloudy and purulent  Abdomen: Soft, non distended, and non tender.  Extremities: Warm, moves all extremities. .   Neurologic:  Grossly intact with no focal deficits.         Impression:  Empyema, right  Right lung mass  Recurrent right pleural effusion  Tobacco abuse, in remission  Stage 2 moderate COPD by GOLD classification  Hypokalemia  Unintentional weight loss        Plan:  Start 5 day course of intrapleural lytic therapy, day 1 of 5   Continue chest tube suction at -20 cm H2O  Flush chest tube with 10 mL  normal saline every 8 hours to maintain patency  Chest x-ray daily  Encourage pulmonary toilet and ambulation  Discussed with patient and nursing     Barbara Garrido, APRN  05/16/23  09:43 CDT

## 2023-05-17 ENCOUNTER — APPOINTMENT (OUTPATIENT)
Dept: GENERAL RADIOLOGY | Facility: HOSPITAL | Age: 63
DRG: 178 | End: 2023-05-17
Payer: COMMERCIAL

## 2023-05-17 PROCEDURE — 71045 X-RAY EXAM CHEST 1 VIEW: CPT

## 2023-05-17 PROCEDURE — 99232 SBSQ HOSP IP/OBS MODERATE 35: CPT | Performed by: THORACIC SURGERY (CARDIOTHORACIC VASCULAR SURGERY)

## 2023-05-17 PROCEDURE — 25010000002 ALTEPLASE 2 MG RECONSTITUTED SOLUTION 1 EACH VIAL

## 2023-05-17 PROCEDURE — 94799 UNLISTED PULMONARY SVC/PX: CPT

## 2023-05-17 PROCEDURE — 25010000002 PIPERACILLIN SOD-TAZOBACTAM PER 1 G: Performed by: NURSE PRACTITIONER

## 2023-05-17 PROCEDURE — 94664 DEMO&/EVAL PT USE INHALER: CPT

## 2023-05-17 PROCEDURE — 94760 N-INVAS EAR/PLS OXIMETRY 1: CPT

## 2023-05-17 RX ADMIN — WATER 5 MG: 1 INJECTION INTRAMUSCULAR; INTRAVENOUS; SUBCUTANEOUS at 10:08

## 2023-05-17 RX ADMIN — IPRATROPIUM BROMIDE AND ALBUTEROL SULFATE 3 ML: 2.5; .5 SOLUTION RESPIRATORY (INHALATION) at 21:24

## 2023-05-17 RX ADMIN — IPRATROPIUM BROMIDE AND ALBUTEROL SULFATE 3 ML: 2.5; .5 SOLUTION RESPIRATORY (INHALATION) at 09:37

## 2023-05-17 RX ADMIN — PIPERACILLIN SODIUM AND TAZOBACTAM SODIUM 4.5 G: 4; .5 INJECTION, SOLUTION INTRAVENOUS at 20:35

## 2023-05-17 RX ADMIN — SODIUM CHLORIDE 10 MG: 9 INJECTION INTRAMUSCULAR; INTRAVENOUS; SUBCUTANEOUS at 10:07

## 2023-05-17 RX ADMIN — HYDROCODONE BITARTRATE AND ACETAMINOPHEN 1 TABLET: 5; 325 TABLET ORAL at 07:28

## 2023-05-17 RX ADMIN — PIPERACILLIN SODIUM AND TAZOBACTAM SODIUM 4.5 G: 4; .5 INJECTION, SOLUTION INTRAVENOUS at 11:27

## 2023-05-17 RX ADMIN — WATER 5 MG: 1 INJECTION INTRAMUSCULAR; INTRAVENOUS; SUBCUTANEOUS at 20:29

## 2023-05-17 RX ADMIN — HYDROCODONE BITARTRATE AND ACETAMINOPHEN 1 TABLET: 5; 325 TABLET ORAL at 02:53

## 2023-05-17 RX ADMIN — HYDROCODONE BITARTRATE AND ACETAMINOPHEN 1 TABLET: 5; 325 TABLET ORAL at 11:33

## 2023-05-17 RX ADMIN — PANTOPRAZOLE SODIUM 40 MG: 40 TABLET, DELAYED RELEASE ORAL at 08:54

## 2023-05-17 RX ADMIN — SODIUM CHLORIDE 10 MG: 9 INJECTION INTRAMUSCULAR; INTRAVENOUS; SUBCUTANEOUS at 20:29

## 2023-05-17 RX ADMIN — LEVOTHYROXINE SODIUM 200 MCG: 100 TABLET ORAL at 05:54

## 2023-05-17 RX ADMIN — LISINOPRIL 10 MG: 10 TABLET ORAL at 08:54

## 2023-05-17 RX ADMIN — IPRATROPIUM BROMIDE AND ALBUTEROL SULFATE 3 ML: 2.5; .5 SOLUTION RESPIRATORY (INHALATION) at 05:56

## 2023-05-17 RX ADMIN — HYDROCODONE BITARTRATE AND ACETAMINOPHEN 1 TABLET: 5; 325 TABLET ORAL at 15:38

## 2023-05-17 RX ADMIN — ASPIRIN 81 MG: 81 TABLET, COATED ORAL at 08:54

## 2023-05-17 RX ADMIN — PIPERACILLIN SODIUM AND TAZOBACTAM SODIUM 4.5 G: 4; .5 INJECTION, SOLUTION INTRAVENOUS at 03:57

## 2023-05-17 RX ADMIN — IPRATROPIUM BROMIDE AND ALBUTEROL SULFATE 3 ML: 2.5; .5 SOLUTION RESPIRATORY (INHALATION) at 14:10

## 2023-05-17 RX ADMIN — HYDROCODONE BITARTRATE AND ACETAMINOPHEN 1 TABLET: 5; 325 TABLET ORAL at 19:41

## 2023-05-17 NOTE — PLAN OF CARE
Goal Outcome Evaluation: Patient medicated for pain this shift. Alteplase and Pulmozyme have been placed in chest tube as ordered.  Iv antibiotics have been administered as ordered. Patient has ambulated in hallway this shift. Patient safety to be maintained this shift, continue to monitor and report abnormal to provider.

## 2023-05-17 NOTE — PROGRESS NOTES
"Patient name: Castillo Trujillo  Patient : 1960  VISIT # [id number]  MR #[id number]    Procedure:  Procedure Date:   POD:* No surgery found *    Subjective   Shortness of breath    Sitting up in bed. Tolerating room air. Patient reports that he is slightly frustrated today. He states that he feels like it took a long time to get pain medication last night. PCA was offered and patient declined. Patient reports that he got his pain medication on time this morning and it was effective in alleviating his pain. Intrapleural lytic therapy continues. Day .      Objective     Visit Vitals  /61 (BP Location: Right arm, Patient Position: Lying)   Pulse 85   Temp 97.7 °F (36.5 °C) (Oral)   Resp 18   Ht 188 cm (74\")   Wt 90.7 kg (200 lb)   SpO2 94%   BMI 25.68 kg/m²       Intake/Output Summary (Last 24 hours) at 2023 0956  Last data filed at 2023 0912  Gross per 24 hour   Intake 240 ml   Output 3813 ml   Net -3573 ml     Right chest tube output: 238 mL / 24 hours,  no leak, cloudy/purulent.     LABS:    NONE    IMAGES:       Imaging Results (Last 24 Hours)       Procedure Component Value Units Date/Time    XR Chest 1 View [[id number]] Collected: 23     Updated: 23    Narrative:      EXAM/TECHNIQUE: XR CHEST 1 VW-     INDICATION: chest tube     COMPARISON: Prior to     FINDINGS:     Right-sided pleural drain is stable in position. No change in residual  small RIGHT pleural effusion. No change in RIGHT perihilar and lower  lobe consolidation. LEFT lung and pleural space are clear. No visible  pneumothorax. Cardiac silhouette is stable.       Impression:         No change in appearance of the chest.  This report was finalized on 2023 07:31 by Dr. Yogi Lopez MD.          My image interpretation: Pigtail catheter in place, no pneumothorax, stable from previous exam.     Physical Exam:  General: Alert, oriented. No apparent distress.  Sitting up in the bed  Cardiovascular: Regular " rate and rhythm without murmur, rubs, or gallops.    Pulmonary: Decreased breath sounds to right lung and bilateral bases.  No wheezing or rhonchi.    Chest: Right side chest tube to-20 cm H2O suction.  No air leak.  Fluid is cloudy and purulent  Abdomen: Soft, non distended, and non tender.  Extremities: Warm, moves all extremities. .   Neurologic:  Grossly intact with no focal deficits.         Impression:  Empyema, right  Right lung mass  Recurrent right pleural effusion  Tobacco abuse, in remission  Stage 2 moderate COPD by GOLD classification  Hypokalemia  Unintentional weight loss        Plan:  Continue intrapleural lytic therapy, day 2/5  Continue chest tube suction at -20 cm H2O  Flush chest tube with 10 mL normal saline every 8 hours to maintain patency  Chest x-ray daily  Encourage pulmonary toilet and ambulation  Discussed with patient     Barbara Garrido, TRESSA  05/17/23  09:56 CDT

## 2023-05-17 NOTE — CASE MANAGEMENT/SOCIAL WORK
Continued Stay Note  CIELO Portillo     Patient Name: Castillo Trujillo  MRN: [id number]  Today's Date: 5/17/2023    Admit Date: 5/9/2023    Plan: Home   Discharge Plan       Row Name 05/17/23 1030       Plan    Plan Home    Patient/Family in Agreement with Plan yes    Plan Comments Pt's plan is to return home with wife at d/c. Pt up ad jo, does have chest tube at present time. Please inform if SW assist needed. Will follow.    Final Discharge Disposition Code 01 - home or self-care                   Discharge Codes    No documentation.                 Expected Discharge Date and Time       Expected Discharge Date Expected Discharge Time    May 23, 2023               JOHN Viera

## 2023-05-17 NOTE — PROGRESS NOTES
[facility]  INPATIENT PROGRESS NOTE    Patient Name: Castillo Trujillo  Date of Admission: 5/9/2023  Today's Date: 05/17/23  Length of Stay: 7  Primary Care Physician: Juan Vaughn DO    Subjective   Chief Complaint: Follow-up shortness of breath  HPI   Mr. Trujillo is a 63-year-old male that presented to [facility] on 5/9 for repeat needle biopsy by radiologist.  He has had approximately 60 pounds of unintentional weight loss over the past 6 months.  Longstanding history of tobacco abuse but reports that he quit smoking in November 2022.  In March 2023 PET scan revealed a finding of a right upper lobe and middle lobe lung mass demonstrating hypermetabolic activity.  He was found to have hypermetabolic adenopathy and a right-sided pleural effusion.  He was admitted to our facility on 3/30 to 4/1-he had ultrasound-guided right thoracentesis on 3/31/2023 by interventional radiology-1500 mL pleural fluid removed.  Suspected postobstructive pneumonia and was discharged on Levaquin.  He underwent needle biopsy of the lung mass that showed benign lung parenchyma with inflammatory pseudotumor. He had worsening shortness of breath in which his primary care provider, Dr. Vaughn obtained a chest x-ray that showed an increasing right-sided pleural effusion.  Decision making was made to proceed with repeat needle biopsy with repeat thoracentesis which was scheduled for 5/9.  On 5/9 during thoracentesis 200 mL of cloudy yellowish/greenish fluid aspirated.  Chest tube was placed for drainage of suspected empyema.  Hospitalist was asked to admit with cardiothoracic surgery consult for management of chest tube.    Lying in bed with spouse at bedside.  Intrapleural lytic therapy, day 2/5.  States he is feeling well today.  He has already walked once in the hallway this morning.  Pain is controlled as long as he gets pain medication every 4 hours.  Has had a good  bowel movement.    Review of Systems   All pertinent negatives and positives are as above. All other systems have been reviewed and are negative unless otherwise stated.     Objective    Temp:  [97.4 °F (36.3 °C)-98.7 °F (37.1 °C)] 97.7 °F (36.5 °C)  Heart Rate:  [75-88] 85  Resp:  [16-18] 18  BP: (126-143)/(53-62) 131/61  Physical Exam  Vitals reviewed.   Constitutional:       General: He is not in acute distress.     Appearance: He is not toxic-appearing.      Comments: Lying in bed.  No acute distress.  On room air.  Spouse at bedside.   HENT:      Head: Normocephalic and atraumatic.      Mouth/Throat:      Mouth: Mucous membranes are moist.      Pharynx: Oropharynx is clear.   Eyes:      Extraocular Movements: Extraocular movements intact.      Conjunctiva/sclera: Conjunctivae normal.      Pupils: Pupils are equal, round, and reactive to light.   Cardiovascular:      Rate and Rhythm: Normal rate and regular rhythm.      Pulses: Normal pulses.   Pulmonary:      Effort: Pulmonary effort is normal. No respiratory distress.      Breath sounds: Decreased breath sounds present.   Chest:      Comments: Right chest tube in place to -20 cm H2O suction, purulent output. No airleak.  Abdominal:      General: Bowel sounds are normal. There is no distension.      Palpations: Abdomen is soft.      Tenderness: There is no abdominal tenderness.   Musculoskeletal:         General: No swelling or tenderness. Normal range of motion.      Cervical back: Normal range of motion and neck supple. No muscular tenderness.   Skin:     General: Skin is warm and dry.      Findings: No erythema or rash.   Neurological:      General: No focal deficit present.      Mental Status: He is alert and oriented to person, place, and time.      Cranial Nerves: No cranial nerve deficit.      Motor: No weakness.   Psychiatric:         Mood and Affect: Mood normal.         Behavior: Behavior normal.       Results Review:  I have reviewed the labs,  radiology results, and diagnostic studies.    Laboratory Data:   Results from last 7 days   Lab Units 05/15/23  0450 05/12/23  0537   WBC 10*3/mm3 8.15 8.36   HEMOGLOBIN g/dL 9.2* 9.4*   HEMATOCRIT % 31.7* 31.6*   PLATELETS 10*3/mm3 747* 704*          Results from last 7 days   Lab Units 05/15/23  0450 05/14/23  1625 05/13/23  0436 05/12/23  0537   SODIUM mmol/L 136  --  131* 134*   POTASSIUM mmol/L 3.5  --  3.8 3.3*   CHLORIDE mmol/L 101  --  99 100   CO2 mmol/L 28.0  --  22.0 23.0   BUN mg/dL 5*  --  6* 5*   CREATININE mg/dL 0.63* 0.60* 0.52* 0.60*   CALCIUM mg/dL 8.7  --  8.7 8.6   GLUCOSE mg/dL 95  --  94 99         Culture Data:   Microbiology Results (last 10 days)       Procedure Component Value - Date/Time    Blood Culture - Blood, Hand, Left [[id number]]  (Normal) Collected: 05/09/23 1613    Lab Status: Final result Specimen: Blood from Hand, Left Updated: 05/14/23 1745     Blood Culture No growth at 5 days    Blood Culture - Blood, Arm, Right [[id number]]  (Normal) Collected: 05/09/23 1613    Lab Status: Final result Specimen: Blood from Arm, Right Updated: 05/14/23 1745     Blood Culture No growth at 5 days    AFB Culture - Body Fluid, Pleural Cavity [[id number]] Collected: 05/09/23 1346    Lab Status: Preliminary result Specimen: Body Fluid from Pleural Cavity Updated: 05/16/23 1401     AFB Culture No AFB isolated at 1 week     AFB Stain No acid fast bacilli seen on direct smear      No acid fast bacilli seen on concentrated smear    Body Fluid Culture - Body Fluid, Pleural Cavity [[id number]] Collected: 05/09/23 1346    Lab Status: Final result Specimen: Body Fluid from Pleural Cavity Updated: 05/14/23 0846     Body Fluid Culture No growth at 5 days     Gram Stain Many (4+) Gram positive cocci      Many (4+) Gram negative bacilli    Anaerobic Culture - Pleural Fluid, Pleural Cavity [[id number]]  (Abnormal) Collected: 05/09/23 1346    Lab Status: Final result Specimen: Pleural Fluid from Pleural Cavity  Updated: 05/15/23 1113     Anaerobic Culture Peptostreptococcus anaerobius    Fungus Culture - Body Fluid, Pleural Cavity [[id number]] Collected: 05/09/23 1346    Lab Status: Preliminary result Specimen: Body Fluid from Pleural Cavity Updated: 05/16/23 1401     Fungus Culture No fungus isolated at 1 week          Radiology Data:   Imaging Results (Last 24 Hours)       Procedure Component Value Units Date/Time    XR Chest 1 View [[id number]] Collected: 05/17/23 0731     Updated: 05/17/23 0734    Narrative:      EXAM/TECHNIQUE: XR CHEST 1 VW-     INDICATION: chest tube     COMPARISON: Prior to     FINDINGS:     Right-sided pleural drain is stable in position. No change in residual  small RIGHT pleural effusion. No change in RIGHT perihilar and lower  lobe consolidation. LEFT lung and pleural space are clear. No visible  pneumothorax. Cardiac silhouette is stable.       Impression:         No change in appearance of the chest.  This report was finalized on 05/17/2023 07:31 by Dr. Yogi Lopez MD.            I have reviewed the patient's current medications.     Assessment/Plan   Assessment  Active Hospital Problems    Diagnosis     **Empyema, right     Empyema lung     Recurrent right pleural effusion     Stage 2 moderate COPD by GOLD classification     Right lung mass     Tobacco abuse, in remission     GERD (gastroesophageal reflux disease)     Primary hypertension     Acquired hypothyroidism        Treatment Plan  Cardiothoracic surgery, Dr. Kim following.  Chest tube management per CT surgery.  Completed 5-day course of intrapleural tPA//DNase therapy on 5/15.  Repeat CT chest on 5/15.  CTS recommends second 5-day course of intrapleural tPA//DNase therapy, day 2/5.    Blood cultures with no growth at 5 days. Pleural fluid culture showed no growth at 5 days. Anaerobic culture shows Peptostreptococcus anaerobius - Vancomycin and cefepime discontinued on 5/16 in favor of Zosyn.    Hold off on consulting oncology at  present.    Incentive spirometer.    Pain control.    SCDs for DVT prophylaxis.  Increase activity as tolerated, ambulate in cantor 3 times daily.    Medical Decision Making  Number and Complexity of problems: 3 acute problems in the form of right empyema, recurrent right pleural effusion in the setting of benign lung parenchyma with features of inflammatory pseudotumor  Differential Diagnosis: None considered at present    Conditions and Status        Condition is improving.     [facility] Data  External documents reviewed: Prior [facility] records  Cardiac tracing (EKG, telemetry) interpretation: None  Radiology interpretation: Interpreted by radiology  Labs reviewed: As above  Any tests that were considered but not ordered: None considered at present     Decision rules/scores evaluated (example [id number], Wells, etc): None considered at present     Discussed with: Patient, spouse Lilia, and Dr. Rich     Care Planning  Shared decision making: Patient is agreeable to ongoing work-up and treatment  Code status and discussions: Full code with full interventions    Disposition  Social Determinants of Health that impact treatment or disposition: None identified at present  I expect the patient to be discharged to home in 4-5 days.     Electronically signed by TRESSA Contreras, 05/17/23, 10:08 CDT.

## 2023-05-18 ENCOUNTER — APPOINTMENT (OUTPATIENT)
Dept: GENERAL RADIOLOGY | Facility: HOSPITAL | Age: 63
DRG: 178 | End: 2023-05-18
Payer: COMMERCIAL

## 2023-05-18 LAB
ANION GAP SERPL CALCULATED.3IONS-SCNC: 10 MMOL/L (ref 5–15)
BUN SERPL-MCNC: 6 MG/DL (ref 8–23)
BUN/CREAT SERPL: 9.8 (ref 7–25)
CALCIUM SPEC-SCNC: 8.9 MG/DL (ref 8.6–10.5)
CHLORIDE SERPL-SCNC: 96 MMOL/L (ref 98–107)
CO2 SERPL-SCNC: 26 MMOL/L (ref 22–29)
CREAT SERPL-MCNC: 0.61 MG/DL (ref 0.76–1.27)
EGFRCR SERPLBLD CKD-EPI 2021: 106.6 ML/MIN/1.73
GLUCOSE SERPL-MCNC: 114 MG/DL (ref 65–99)
POTASSIUM SERPL-SCNC: 3.5 MMOL/L (ref 3.5–5.2)
SODIUM SERPL-SCNC: 132 MMOL/L (ref 136–145)

## 2023-05-18 PROCEDURE — 99232 SBSQ HOSP IP/OBS MODERATE 35: CPT | Performed by: THORACIC SURGERY (CARDIOTHORACIC VASCULAR SURGERY)

## 2023-05-18 PROCEDURE — 25010000002 ALTEPLASE 2 MG RECONSTITUTED SOLUTION 1 EACH VIAL

## 2023-05-18 PROCEDURE — 94761 N-INVAS EAR/PLS OXIMETRY MLT: CPT

## 2023-05-18 PROCEDURE — 71045 X-RAY EXAM CHEST 1 VIEW: CPT

## 2023-05-18 PROCEDURE — 94799 UNLISTED PULMONARY SVC/PX: CPT

## 2023-05-18 PROCEDURE — 25010000002 PIPERACILLIN SOD-TAZOBACTAM PER 1 G: Performed by: NURSE PRACTITIONER

## 2023-05-18 PROCEDURE — 94664 DEMO&/EVAL PT USE INHALER: CPT

## 2023-05-18 PROCEDURE — 80048 BASIC METABOLIC PNL TOTAL CA: CPT | Performed by: NURSE PRACTITIONER

## 2023-05-18 RX ORDER — POTASSIUM CHLORIDE 750 MG/1
40 CAPSULE, EXTENDED RELEASE ORAL ONCE
Status: COMPLETED | OUTPATIENT
Start: 2023-05-18 | End: 2023-05-18

## 2023-05-18 RX ADMIN — IPRATROPIUM BROMIDE AND ALBUTEROL SULFATE 3 ML: 2.5; .5 SOLUTION RESPIRATORY (INHALATION) at 20:20

## 2023-05-18 RX ADMIN — WATER 5 MG: 1 INJECTION INTRAMUSCULAR; INTRAVENOUS; SUBCUTANEOUS at 09:59

## 2023-05-18 RX ADMIN — IPRATROPIUM BROMIDE AND ALBUTEROL SULFATE 3 ML: 2.5; .5 SOLUTION RESPIRATORY (INHALATION) at 06:22

## 2023-05-18 RX ADMIN — PIPERACILLIN SODIUM AND TAZOBACTAM SODIUM 4.5 G: 4; .5 INJECTION, SOLUTION INTRAVENOUS at 20:55

## 2023-05-18 RX ADMIN — PANTOPRAZOLE SODIUM 40 MG: 40 TABLET, DELAYED RELEASE ORAL at 09:42

## 2023-05-18 RX ADMIN — SODIUM CHLORIDE 10 MG: 9 INJECTION INTRAMUSCULAR; INTRAVENOUS; SUBCUTANEOUS at 21:01

## 2023-05-18 RX ADMIN — PIPERACILLIN SODIUM AND TAZOBACTAM SODIUM 4.5 G: 4; .5 INJECTION, SOLUTION INTRAVENOUS at 03:35

## 2023-05-18 RX ADMIN — HYDROCODONE BITARTRATE AND ACETAMINOPHEN 1 TABLET: 5; 325 TABLET ORAL at 12:39

## 2023-05-18 RX ADMIN — WATER 5 MG: 1 INJECTION INTRAMUSCULAR; INTRAVENOUS; SUBCUTANEOUS at 21:01

## 2023-05-18 RX ADMIN — LISINOPRIL 10 MG: 10 TABLET ORAL at 09:44

## 2023-05-18 RX ADMIN — Medication 10 ML: at 09:45

## 2023-05-18 RX ADMIN — LEVOTHYROXINE SODIUM 200 MCG: 100 TABLET ORAL at 06:00

## 2023-05-18 RX ADMIN — SODIUM CHLORIDE 10 MG: 9 INJECTION INTRAMUSCULAR; INTRAVENOUS; SUBCUTANEOUS at 09:58

## 2023-05-18 RX ADMIN — HYDROCODONE BITARTRATE AND ACETAMINOPHEN 1 TABLET: 5; 325 TABLET ORAL at 06:00

## 2023-05-18 RX ADMIN — POTASSIUM CHLORIDE 40 MEQ: 10 CAPSULE, COATED, EXTENDED RELEASE ORAL at 09:55

## 2023-05-18 RX ADMIN — HYDROCODONE BITARTRATE AND ACETAMINOPHEN 1 TABLET: 5; 325 TABLET ORAL at 16:45

## 2023-05-18 RX ADMIN — IPRATROPIUM BROMIDE AND ALBUTEROL SULFATE 3 ML: 2.5; .5 SOLUTION RESPIRATORY (INHALATION) at 13:45

## 2023-05-18 RX ADMIN — IPRATROPIUM BROMIDE AND ALBUTEROL SULFATE 3 ML: 2.5; .5 SOLUTION RESPIRATORY (INHALATION) at 09:47

## 2023-05-18 RX ADMIN — PIPERACILLIN SODIUM AND TAZOBACTAM SODIUM 4.5 G: 4; .5 INJECTION, SOLUTION INTRAVENOUS at 12:08

## 2023-05-18 RX ADMIN — ASPIRIN 81 MG: 81 TABLET, COATED ORAL at 09:42

## 2023-05-18 RX ADMIN — HYDROCODONE BITARTRATE AND ACETAMINOPHEN 1 TABLET: 5; 325 TABLET ORAL at 01:53

## 2023-05-18 RX ADMIN — HYDROCODONE BITARTRATE AND ACETAMINOPHEN 1 TABLET: 5; 325 TABLET ORAL at 20:55

## 2023-05-18 RX ADMIN — Medication 10 ML: at 20:55

## 2023-05-18 NOTE — NURSING NOTE
Dr. Burks called per oncology consult he stated since there was no path report back and diagnosis he could not complete consult.

## 2023-05-18 NOTE — PROGRESS NOTES
"Patient name: Castillo Trujillo  Patient : 1960  VISIT # [id number]  MR #[id number]    Procedure:  Procedure Date:   POD:* No surgery found *    Subjective   Shortness of breath    Sitting up in bed. Tolerating room air. Pain is well controlled at this time. Intrapleural lytic therapy continues, Day 3/5. Patient reports that he has been ambulating in the hallways without difficulty.      Objective     Visit Vitals  /56 (BP Location: Right arm, Patient Position: Sitting)   Pulse 77   Temp 97.7 °F (36.5 °C) (Oral)   Resp 16   Ht 188 cm (74\")   Wt 90.7 kg (200 lb)   SpO2 94%   BMI 25.68 kg/m²       Intake/Output Summary (Last 24 hours) at 2023 0855  Last data filed at 2023 0840  Gross per 24 hour   Intake 920 ml   Output 3050 ml   Net -2130 ml     Right chest tube output: 250 mL / 24 hours,  no leak, cloudy/purulent.     LABS:    NONE    IMAGES:       Imaging Results (Last 24 Hours)       Procedure Component Value Units Date/Time    XR Chest 1 View [[id number]] Collected: 23     Updated: 23    Narrative:      EXAM/TECHNIQUE: XR CHEST 1 VW-     INDICATION: chest tube     COMPARISON: 2023     FINDINGS:     Right-sided pleural drain is stable in position. No change in small  residual RIGHT pleural effusion. No change in RIGHT perihilar and lower  lobe consolidation. LEFT lung and pleural space appear clear. Cardiac  silhouette is stable. Calcified granuloma in the LEFT upper lobe. No  visible pneumothorax.       Impression:         No change in small right-sided pleural effusion and RIGHT mid-lower lung  zone consolidation, with right-sided pleural drain in stable position.  This report was finalized on 2023 07:20 by Dr. Yogi Lopez MD.          My image interpretation: Pigtail catheter in place, no pneumothorax, stable from previous exam.     Physical Exam:  General: Alert, oriented. No apparent distress.  Sitting up in the bed  Cardiovascular: Regular rate and " rhythm without murmur, rubs, or gallops.    Pulmonary: Decreased breath sounds to right lung and bilateral bases.  No wheezing or rhonchi.    Chest: Right side chest tube to-20 cm H2O suction.  No air leak.  Fluid is cloudy and purulent  Abdomen: Soft, non distended, and non tender.  Extremities: Warm, moves all extremities. .   Neurologic:  Grossly intact with no focal deficits.         Impression:  Empyema, right  Right lung mass  Recurrent right pleural effusion  Tobacco abuse, in remission  Stage 2 moderate COPD by GOLD classification  Hypokalemia  Unintentional weight loss        Plan:  Continue intrapleural lytic therapy, day 3/5  Continue chest tube suction at -20 cm H2O  Flush chest tube with 10 mL normal saline every 8 hours to maintain patency  Chest x-ray daily  Encourage pulmonary toilet and ambulation  Discussed with patient       Barbara Garrido, APRN  05/18/23  08:55 CDT

## 2023-05-18 NOTE — PROGRESS NOTES
[facility] Medicine Services  INPATIENT PROGRESS NOTE    Patient Name: Castillo Trujillo  Date of Admission: 5/9/2023  Today's Date: 05/18/23  Length of Stay: 8  Primary Care Physician: Juan Vaughn DO    Subjective   Chief Complaint: Follow-up shortness of breath  HPI   Mr. Trujillo is a 63-year-old male that presented to [facility] on 5/9 for repeat needle biopsy by radiologist.  He has had approximately 60 pounds of unintentional weight loss over the past 6 months.  Longstanding history of tobacco abuse but reports that he quit smoking in November 2022.  In March 2023 PET scan revealed a finding of a right upper lobe and middle lobe lung mass demonstrating hypermetabolic activity.  He was found to have hypermetabolic adenopathy and a right-sided pleural effusion.  He was admitted to our facility on 3/30 to 4/1-he had ultrasound-guided right thoracentesis on 3/31/2023 by interventional radiology-1500 mL pleural fluid removed.  Suspected postobstructive pneumonia and was discharged on Levaquin.  He underwent needle biopsy of the lung mass that showed benign lung parenchyma with inflammatory pseudotumor. He had worsening shortness of breath in which his primary care provider, Dr. Vauhgn obtained a chest x-ray that showed an increasing right-sided pleural effusion.  Decision making was made to proceed with repeat needle biopsy with repeat thoracentesis which was scheduled for 5/9.  On 5/9 during thoracentesis 200 mL of cloudy yellowish/greenish fluid aspirated.  Chest tube was placed for drainage of suspected empyema.  Hospitalist was asked to admit with cardiothoracic surgery consult for management of chest tube.    Lying in bed.  Intrapleural lytic therapy, day 3/5.  Reports he is feeling good.  Continues to ambulate in the hallway several times a day.  Pain controlled.  Has had a good bowel movement.    Review of Systems   All pertinent negatives and positives are  as above. All other systems have been reviewed and are negative unless otherwise stated.     Objective    Temp:  [97.7 °F (36.5 °C)-98.2 °F (36.8 °C)] 97.7 °F (36.5 °C)  Heart Rate:  [77-89] 79  Resp:  [16-20] 16  BP: (133-146)/(53-63) 146/56  Physical Exam  Vitals reviewed.   Constitutional:       General: He is not in acute distress.     Appearance: He is not toxic-appearing.      Comments: Lying in bed.  No acute distress.  On room air.   HENT:      Head: Normocephalic and atraumatic.      Mouth/Throat:      Mouth: Mucous membranes are moist.      Pharynx: Oropharynx is clear.   Eyes:      Extraocular Movements: Extraocular movements intact.      Conjunctiva/sclera: Conjunctivae normal.      Pupils: Pupils are equal, round, and reactive to light.   Cardiovascular:      Rate and Rhythm: Normal rate and regular rhythm.      Pulses: Normal pulses.   Pulmonary:      Effort: Pulmonary effort is normal. No respiratory distress.      Breath sounds: Decreased breath sounds present.   Chest:      Comments: Right chest tube in place to -20 cm H2O suction, purulent output. No airleak.  Abdominal:      General: Bowel sounds are normal. There is no distension.      Palpations: Abdomen is soft.      Tenderness: There is no abdominal tenderness.   Musculoskeletal:         General: No swelling or tenderness. Normal range of motion.      Cervical back: Normal range of motion and neck supple. No muscular tenderness.   Skin:     General: Skin is warm and dry.      Findings: No erythema or rash.   Neurological:      General: No focal deficit present.      Mental Status: He is alert and oriented to person, place, and time.      Cranial Nerves: No cranial nerve deficit.      Motor: No weakness.   Psychiatric:         Mood and Affect: Mood normal.         Behavior: Behavior normal.       Results Review:  I have reviewed the labs, radiology results, and diagnostic studies.    Laboratory Data:   Results from last 7 days   Lab Units  05/15/23  0450 05/12/23  0537   WBC 10*3/mm3 8.15 8.36   HEMOGLOBIN g/dL 9.2* 9.4*   HEMATOCRIT % 31.7* 31.6*   PLATELETS 10*3/mm3 747* 704*          Results from last 7 days   Lab Units 05/18/23  0358 05/15/23  0450 05/14/23  1625 05/13/23  0436   SODIUM mmol/L 132* 136  --  131*   POTASSIUM mmol/L 3.5 3.5  --  3.8   CHLORIDE mmol/L 96* 101  --  99   CO2 mmol/L 26.0 28.0  --  22.0   BUN mg/dL 6* 5*  --  6*   CREATININE mg/dL 0.61* 0.63* 0.60* 0.52*   CALCIUM mg/dL 8.9 8.7  --  8.7   GLUCOSE mg/dL 114* 95  --  94         Culture Data:   Microbiology Results (last 10 days)       Procedure Component Value - Date/Time    Blood Culture - Blood, Hand, Left [[id number]]  (Normal) Collected: 05/09/23 1613    Lab Status: Final result Specimen: Blood from Hand, Left Updated: 05/14/23 1745     Blood Culture No growth at 5 days    Blood Culture - Blood, Arm, Right [[id number]]  (Normal) Collected: 05/09/23 1613    Lab Status: Final result Specimen: Blood from Arm, Right Updated: 05/14/23 1745     Blood Culture No growth at 5 days    AFB Culture - Body Fluid, Pleural Cavity [[id number]] Collected: 05/09/23 1346    Lab Status: Preliminary result Specimen: Body Fluid from Pleural Cavity Updated: 05/16/23 1401     AFB Culture No AFB isolated at 1 week     AFB Stain No acid fast bacilli seen on direct smear      No acid fast bacilli seen on concentrated smear    Body Fluid Culture - Body Fluid, Pleural Cavity [[id number]] Collected: 05/09/23 1346    Lab Status: Final result Specimen: Body Fluid from Pleural Cavity Updated: 05/14/23 0846     Body Fluid Culture No growth at 5 days     Gram Stain Many (4+) Gram positive cocci      Many (4+) Gram negative bacilli    Anaerobic Culture - Pleural Fluid, Pleural Cavity [[id number]]  (Abnormal) Collected: 05/09/23 1346    Lab Status: Final result Specimen: Pleural Fluid from Pleural Cavity Updated: 05/15/23 1113     Anaerobic Culture Peptostreptococcus anaerobius    Fungus Culture - Body Fluid,  Pleural Cavity [[id number]] Collected: 05/09/23 1346    Lab Status: Preliminary result Specimen: Body Fluid from Pleural Cavity Updated: 05/16/23 1401     Fungus Culture No fungus isolated at 1 week          Radiology Data:   Imaging Results (Last 24 Hours)       Procedure Component Value Units Date/Time    XR Chest 1 View [[id number]] Collected: 05/18/23 0719     Updated: 05/18/23 0723    Narrative:      EXAM/TECHNIQUE: XR CHEST 1 VW-     INDICATION: chest tube     COMPARISON: 05/17/2023     FINDINGS:     Right-sided pleural drain is stable in position. No change in small  residual RIGHT pleural effusion. No change in RIGHT perihilar and lower  lobe consolidation. LEFT lung and pleural space appear clear. Cardiac  silhouette is stable. Calcified granuloma in the LEFT upper lobe. No  visible pneumothorax.       Impression:         No change in small right-sided pleural effusion and RIGHT mid-lower lung  zone consolidation, with right-sided pleural drain in stable position.  This report was finalized on 05/18/2023 07:20 by Dr. Yogi Lopez MD.            I have reviewed the patient's current medications.     Assessment/Plan   Assessment  Active Hospital Problems    Diagnosis     **Empyema, right     Empyema lung     Recurrent right pleural effusion     Stage 2 moderate COPD by GOLD classification     Right lung mass     Tobacco abuse, in remission     GERD (gastroesophageal reflux disease)     Primary hypertension     Acquired hypothyroidism        Treatment Plan  Cardiothoracic surgery, Dr. Kim following.  Chest tube management per CT surgery.  Completed 5-day course of intrapleural tPA//DNase therapy on 5/15.  Repeat CT chest on 5/15.  CTS recommends second 5-day course of intrapleural tPA//DNase therapy, day 3/5.    Blood cultures with no growth at 5 days. Pleural fluid culture showed no growth at 5 days. Anaerobic culture shows Peptostreptococcus anaerobius - Vancomycin and cefepime discontinued on 5/16 in  favor of Zosyn.    Hold off on consulting oncology at present.    Incentive spirometer.    Pain control.    SCDs for DVT prophylaxis.  Increase activity as tolerated, ambulate in cantor 3 times daily.    Medical Decision Making  Number and Complexity of problems: 3 acute problems in the form of right empyema, recurrent right pleural effusion in the setting of benign lung parenchyma with features of inflammatory pseudotumor  Differential Diagnosis: None considered at present    Conditions and Status        Condition is improving.     [facility] Data  External documents reviewed: Prior [facility] records  Cardiac tracing (EKG, telemetry) interpretation: None  Radiology interpretation: Interpreted by radiology  Labs reviewed: As above  Any tests that were considered but not ordered: None considered at present     Decision rules/scores evaluated (example [id number], Wells, etc): None considered at present     Discussed with: Patient, spouse Lilia, and Dr. Rich     Care Planning  Shared decision making: Patient is agreeable to ongoing work-up and treatment  Code status and discussions: Full code with full interventions    Disposition  Social Determinants of Health that impact treatment or disposition: None identified at present  I expect the patient to be discharged to home in 4-5 days.     Electronically signed by TRESSA Contreras, 05/18/23, 11:03 CDT.

## 2023-05-18 NOTE — PLAN OF CARE
Goal Outcome Evaluation:  Plan of Care Reviewed With: patient        Progress: no change  Outcome Evaluation: iid patent. right posterior chest tube cont to sx- pale serosang drainage noted. po pain meds per order. no acute distress. frequently ambulating in cantor with minimal assist. voiding without difficulty. tolerating diet. no distress noted. cont to monitor.

## 2023-05-18 NOTE — PLAN OF CARE
Problem: Adult Inpatient Plan of Care  Goal: Plan of Care Review  Outcome: Ongoing, Progressing  Flowsheets (Taken 5/18/2023 0310)  Progress: no change  Plan of Care Reviewed With: patient  Outcome Evaluation: Pt complains of pain, see MAR. No complaints of nausea. TPA given through chest tube as ordered. Chest tube to 20cm dry suction. IV abx. Up and ambulating. Safety maintained.

## 2023-05-18 NOTE — PLAN OF CARE
Goal Outcome Evaluation:  Plan of Care Reviewed With: patient        Progress: no change  Outcome Evaluation: Ntn follow up. He cont on a regular diet with good appetite. He has consumed 81% of the last 4 meals. No sig weight changes in the hospital. He declines nutrition supplements. He is aware of alternate food selections if needed.

## 2023-05-19 ENCOUNTER — APPOINTMENT (OUTPATIENT)
Dept: GENERAL RADIOLOGY | Facility: HOSPITAL | Age: 63
DRG: 178 | End: 2023-05-19
Payer: COMMERCIAL

## 2023-05-19 PROCEDURE — 94799 UNLISTED PULMONARY SVC/PX: CPT

## 2023-05-19 PROCEDURE — 71045 X-RAY EXAM CHEST 1 VIEW: CPT

## 2023-05-19 PROCEDURE — 94760 N-INVAS EAR/PLS OXIMETRY 1: CPT

## 2023-05-19 PROCEDURE — 25010000002 PIPERACILLIN SOD-TAZOBACTAM PER 1 G: Performed by: NURSE PRACTITIONER

## 2023-05-19 PROCEDURE — 25010000002 ALTEPLASE 2 MG RECONSTITUTED SOLUTION 1 EACH VIAL

## 2023-05-19 PROCEDURE — 99232 SBSQ HOSP IP/OBS MODERATE 35: CPT

## 2023-05-19 RX ADMIN — HYDROCODONE BITARTRATE AND ACETAMINOPHEN 1 TABLET: 5; 325 TABLET ORAL at 02:50

## 2023-05-19 RX ADMIN — WATER 5 MG: 1 INJECTION INTRAMUSCULAR; INTRAVENOUS; SUBCUTANEOUS at 09:22

## 2023-05-19 RX ADMIN — IPRATROPIUM BROMIDE AND ALBUTEROL SULFATE 3 ML: 2.5; .5 SOLUTION RESPIRATORY (INHALATION) at 10:33

## 2023-05-19 RX ADMIN — PANTOPRAZOLE SODIUM 40 MG: 40 TABLET, DELAYED RELEASE ORAL at 09:19

## 2023-05-19 RX ADMIN — IPRATROPIUM BROMIDE AND ALBUTEROL SULFATE 3 ML: 2.5; .5 SOLUTION RESPIRATORY (INHALATION) at 07:15

## 2023-05-19 RX ADMIN — LEVOTHYROXINE SODIUM 200 MCG: 100 TABLET ORAL at 04:55

## 2023-05-19 RX ADMIN — HYDROCODONE BITARTRATE AND ACETAMINOPHEN 1 TABLET: 5; 325 TABLET ORAL at 19:55

## 2023-05-19 RX ADMIN — PIPERACILLIN SODIUM AND TAZOBACTAM SODIUM 4.5 G: 4; .5 INJECTION, SOLUTION INTRAVENOUS at 12:58

## 2023-05-19 RX ADMIN — PIPERACILLIN SODIUM AND TAZOBACTAM SODIUM 4.5 G: 4; .5 INJECTION, SOLUTION INTRAVENOUS at 04:55

## 2023-05-19 RX ADMIN — HYDROCODONE BITARTRATE AND ACETAMINOPHEN 1 TABLET: 5; 325 TABLET ORAL at 06:53

## 2023-05-19 RX ADMIN — HYDROCODONE BITARTRATE AND ACETAMINOPHEN 1 TABLET: 5; 325 TABLET ORAL at 11:36

## 2023-05-19 RX ADMIN — SODIUM CHLORIDE 10 MG: 9 INJECTION INTRAMUSCULAR; INTRAVENOUS; SUBCUTANEOUS at 22:13

## 2023-05-19 RX ADMIN — HYDROCODONE BITARTRATE AND ACETAMINOPHEN 1 TABLET: 5; 325 TABLET ORAL at 15:34

## 2023-05-19 RX ADMIN — PIPERACILLIN SODIUM AND TAZOBACTAM SODIUM 4.5 G: 4; .5 INJECTION, SOLUTION INTRAVENOUS at 19:55

## 2023-05-19 RX ADMIN — Medication 10 ML: at 09:19

## 2023-05-19 RX ADMIN — IPRATROPIUM BROMIDE AND ALBUTEROL SULFATE 3 ML: 2.5; .5 SOLUTION RESPIRATORY (INHALATION) at 14:45

## 2023-05-19 RX ADMIN — IPRATROPIUM BROMIDE AND ALBUTEROL SULFATE 3 ML: 2.5; .5 SOLUTION RESPIRATORY (INHALATION) at 20:12

## 2023-05-19 RX ADMIN — ASPIRIN 81 MG: 81 TABLET, COATED ORAL at 09:19

## 2023-05-19 RX ADMIN — Medication 10 ML: at 19:56

## 2023-05-19 RX ADMIN — SODIUM CHLORIDE 10 MG: 9 INJECTION INTRAMUSCULAR; INTRAVENOUS; SUBCUTANEOUS at 09:22

## 2023-05-19 RX ADMIN — LISINOPRIL 10 MG: 10 TABLET ORAL at 09:19

## 2023-05-19 RX ADMIN — WATER 5 MG: 1 INJECTION INTRAMUSCULAR; INTRAVENOUS; SUBCUTANEOUS at 22:13

## 2023-05-19 NOTE — PLAN OF CARE
Goal Outcome Evaluation:           Progress: no change  Outcome Evaluation: Pt c/o pain PRN meds given per orders, cont. IV ABX, IID, up ad jo, RA, ambulates frequently in cantor ad jo, voiding per urinal, TPA to right chest tube per orders liz well, liz diet

## 2023-05-19 NOTE — PLAN OF CARE
Goal Outcome Evaluation:  Plan of Care Reviewed With: patient        Progress: no change  Outcome Evaluation: iid patent. cont iv abx. tolerating diet. voiding without difficulty. up ad jo multiple times in cantor. pain cont r/t chest tube- medicating per order.  right posterior chest tube to 20cm dry suction.. ont to monitor.          No

## 2023-05-19 NOTE — PROGRESS NOTES
"Patient name: Castillo Trujillo  Patient : 1960  VISIT # [id number]  MR #[id number]    Procedure:  Procedure Date:   POD:* No surgery found *    Subjective   Shortness of breath    Patient is resting in bed with wife at bedside. Tolerating room air. Pain is well controlled at this time. Intrapleural lytic therapy continues, Day 4/5. Patient reports that he has been ambulating in the hallways without difficulty. Oncology consult today.      Objective     Visit Vitals  /71 (BP Location: Left arm, Patient Position: Lying)   Pulse 85   Temp 97.6 °F (36.4 °C) (Oral)   Resp 16   Ht 188 cm (74\")   Wt 90.7 kg (200 lb)   SpO2 93%   BMI 25.68 kg/m²       Intake/Output Summary (Last 24 hours) at 2023 1249  Last data filed at 2023 1145  Gross per 24 hour   Intake 600 ml   Output 4730 ml   Net -4130 ml     Right chest tube output: 150 mL / 24 hours,  no leak, cloudy/purulent.     LABS:    NONE    IMAGES:       Imaging Results (Last 24 Hours)       Procedure Component Value Units Date/Time    XR Chest 1 View [[id number]] Collected: 2336     Updated: 23    Narrative:      Frontal supine radiograph of the chest 2023 3:50 AM CDT     History: chest tube     Comparison: 2023      Findings:   Lines and tubes are stable in position. No new opacities or  pneumothoraces are visualized in the chest. The cardiomediastinal  silhouette and pulmonary vascularity are unchanged.       No acute osseous or soft tissue abnormality is noted.        Impression:      Impression:   1. No significant interval change since previous exam.        This report was finalized on 2023 07:36 by Dr. Nathan Hayden MD.          My image interpretation: Pigtail catheter in place, no pneumothorax, stable from previous exam.     Physical Exam:  General: Alert, oriented. No apparent distress.  Sitting up in the bed  Cardiovascular: Regular rate and rhythm without murmur, rubs, or gallops.    Pulmonary: " Decreased breath sounds to right lung and bilateral bases.  No wheezing or rhonchi.    Chest: Right side chest tube to-20 cm H2O suction.  No air leak.  Fluid is cloudy and purulent  Abdomen: Soft, non distended, and non tender.  Extremities: Warm, moves all extremities. .   Neurologic:  Grossly intact with no focal deficits.         Impression:  Empyema, right  Right lung mass  Recurrent right pleural effusion  Tobacco abuse, in remission  Stage 2 moderate COPD by GOLD classification  Hypokalemia  Unintentional weight loss        Plan  Oncology consult   Continue intrapleural lytic therapy, day 4/5  Continue chest tube suction at -20 cm H2O  Flush chest tube with 10 mL normal saline every 8 hours to maintain patency  Chest x-ray daily  Encourage pulmonary toilet and ambulation  Discussed with patient       Barbara Garrido, TRESSA  05/19/23  12:49 CDT

## 2023-05-19 NOTE — PROGRESS NOTES
[facility] Medicine Services  INPATIENT PROGRESS NOTE    Patient Name: Castillo Trujillo  Date of Admission: 5/9/2023  Today's Date: 05/19/23  Length of Stay: 9  Primary Care Physician: Juan Vaughn DO    Subjective   Chief Complaint: Follow-up shortness of breath  HPI   Mr. Trujillo is a 63-year-old male that presented to [facility] on 5/9 for repeat needle biopsy by radiologist.  He has had approximately 60 pounds of unintentional weight loss over the past 6 months.  Longstanding history of tobacco abuse but reports that he quit smoking in November 2022.  In March 2023 PET scan revealed a finding of a right upper lobe and middle lobe lung mass demonstrating hypermetabolic activity.  He was found to have hypermetabolic adenopathy and a right-sided pleural effusion.  He was admitted to our facility on 3/30 to 4/1-he had ultrasound-guided right thoracentesis on 3/31/2023 by interventional radiology-1500 mL pleural fluid removed.  Suspected postobstructive pneumonia and was discharged on Levaquin.  He underwent needle biopsy of the lung mass that showed benign lung parenchyma with inflammatory pseudotumor. He had worsening shortness of breath in which his primary care provider, Dr. Vaughn obtained a chest x-ray that showed an increasing right-sided pleural effusion.  Decision making was made to proceed with repeat needle biopsy with repeat thoracentesis which was scheduled for 5/9.  On 5/9 during thoracentesis 200 mL of cloudy yellowish/greenish fluid aspirated.  Chest tube was placed for drainage of suspected empyema.  Hospitalist was asked to admit with cardiothoracic surgery consult for management of chest tube.    Lying in bed.  Intrapleural lytic therapy, day 4/5.  Reports he is feeling good.  Continues to ambulate in the hallway several times a day.  Pain controlled.  Has had a good bowel movement.    Dr. Kim has consulted oncology.    Review of Systems   All  pertinent negatives and positives are as above. All other systems have been reviewed and are negative unless otherwise stated.     Objective    Temp:  [97.4 °F (36.3 °C)-98.7 °F (37.1 °C)] 97.4 °F (36.3 °C)  Heart Rate:  [71-85] 79  Resp:  [16] 16  BP: (135-155)/(54-73) 143/73  Physical Exam  Vitals reviewed.   Constitutional:       General: He is not in acute distress.     Appearance: He is not toxic-appearing.      Comments: Lying in bed.  No acute distress.  On room air.  Wife at bedside.   HENT:      Head: Normocephalic and atraumatic.      Mouth/Throat:      Mouth: Mucous membranes are moist.      Pharynx: Oropharynx is clear.   Eyes:      Extraocular Movements: Extraocular movements intact.      Conjunctiva/sclera: Conjunctivae normal.      Pupils: Pupils are equal, round, and reactive to light.   Cardiovascular:      Rate and Rhythm: Normal rate and regular rhythm.      Pulses: Normal pulses.   Pulmonary:      Effort: Pulmonary effort is normal. No respiratory distress.      Breath sounds: Decreased breath sounds present.   Chest:      Comments: Right chest tube in place to -20 cm H2O suction, purulent output. No airleak.  Abdominal:      General: Bowel sounds are normal. There is no distension.      Palpations: Abdomen is soft.      Tenderness: There is no abdominal tenderness.   Musculoskeletal:         General: No swelling or tenderness. Normal range of motion.      Cervical back: Normal range of motion and neck supple. No muscular tenderness.   Skin:     General: Skin is warm and dry.      Findings: No erythema or rash.   Neurological:      General: No focal deficit present.      Mental Status: He is alert and oriented to person, place, and time.      Cranial Nerves: No cranial nerve deficit.      Motor: No weakness.   Psychiatric:         Mood and Affect: Mood normal.         Behavior: Behavior normal.       Results Review:  I have reviewed the labs, radiology results, and diagnostic  studies.    Laboratory Data:   Results from last 7 days   Lab Units 05/15/23  0450   WBC 10*3/mm3 8.15   HEMOGLOBIN g/dL 9.2*   HEMATOCRIT % 31.7*   PLATELETS 10*3/mm3 747*          Results from last 7 days   Lab Units 05/18/23  0358 05/15/23  0450 05/14/23  1625 05/13/23  0436   SODIUM mmol/L 132* 136  --  131*   POTASSIUM mmol/L 3.5 3.5  --  3.8   CHLORIDE mmol/L 96* 101  --  99   CO2 mmol/L 26.0 28.0  --  22.0   BUN mg/dL 6* 5*  --  6*   CREATININE mg/dL 0.61* 0.63* 0.60* 0.52*   CALCIUM mg/dL 8.9 8.7  --  8.7   GLUCOSE mg/dL 114* 95  --  94         Culture Data:   Microbiology Results (last 10 days)       Procedure Component Value - Date/Time    Blood Culture - Blood, Hand, Left [[id number]]  (Normal) Collected: 05/09/23 1613    Lab Status: Final result Specimen: Blood from Hand, Left Updated: 05/14/23 1745     Blood Culture No growth at 5 days    Blood Culture - Blood, Arm, Right [[id number]]  (Normal) Collected: 05/09/23 1613    Lab Status: Final result Specimen: Blood from Arm, Right Updated: 05/14/23 1745     Blood Culture No growth at 5 days    AFB Culture - Body Fluid, Pleural Cavity [[id number]] Collected: 05/09/23 1346    Lab Status: Preliminary result Specimen: Body Fluid from Pleural Cavity Updated: 05/16/23 1401     AFB Culture No AFB isolated at 1 week     AFB Stain No acid fast bacilli seen on direct smear      No acid fast bacilli seen on concentrated smear    Body Fluid Culture - Body Fluid, Pleural Cavity [[id number]] Collected: 05/09/23 1346    Lab Status: Final result Specimen: Body Fluid from Pleural Cavity Updated: 05/14/23 0846     Body Fluid Culture No growth at 5 days     Gram Stain Many (4+) Gram positive cocci      Many (4+) Gram negative bacilli    Anaerobic Culture - Pleural Fluid, Pleural Cavity [[id number]]  (Abnormal) Collected: 05/09/23 1346    Lab Status: Final result Specimen: Pleural Fluid from Pleural Cavity Updated: 05/15/23 1113     Anaerobic Culture Peptostreptococcus anaerobius     Fungus Culture - Body Fluid, Pleural Cavity [[id number]] Collected: 05/09/23 1346    Lab Status: Preliminary result Specimen: Body Fluid from Pleural Cavity Updated: 05/16/23 1401     Fungus Culture No fungus isolated at 1 week          Radiology Data:   Imaging Results (Last 24 Hours)       Procedure Component Value Units Date/Time    XR Chest 1 View [[id number]] Collected: 05/19/23 0736     Updated: 05/19/23 0739    Narrative:      Frontal supine radiograph of the chest 5/19/2023 3:50 AM CDT     History: chest tube     Comparison: 05/18/2023      Findings:   Lines and tubes are stable in position. No new opacities or  pneumothoraces are visualized in the chest. The cardiomediastinal  silhouette and pulmonary vascularity are unchanged.       No acute osseous or soft tissue abnormality is noted.        Impression:      Impression:   1. No significant interval change since previous exam.        This report was finalized on 05/19/2023 07:36 by Dr. Nathan Hayden MD.            I have reviewed the patient's current medications.     Assessment/Plan   Assessment  Active Hospital Problems    Diagnosis     **Empyema, right     Empyema lung     Recurrent right pleural effusion     Stage 2 moderate COPD by GOLD classification     Right lung mass     Tobacco abuse, in remission     GERD (gastroesophageal reflux disease)     Primary hypertension     Acquired hypothyroidism        Treatment Plan  Cardiothoracic surgery, Dr. Kim following.  Chest tube management per CT surgery.  Completed 5-day course of intrapleural tPA//DNase therapy on 5/15.  Repeat CT chest on 5/15.  CTS recommends second 5-day course of intrapleural tPA//DNase therapy, day 4/5.  Nonsurgical candidate.  Oncology consulted.    Blood cultures with no growth at 5 days. Pleural fluid culture showed no growth at 5 days. Anaerobic culture shows Peptostreptococcus anaerobius - Vancomycin and cefepime discontinued on 5/16 in favor of Zosyn.    Incentive  spirometer.    Pain control.    SCDs for DVT prophylaxis.  Increase activity as tolerated, ambulate in cantor 3 times daily.    Medical Decision Making  Number and Complexity of problems: 3 acute problems in the form of right empyema, recurrent right pleural effusion in the setting of benign lung parenchyma with features of inflammatory pseudotumor  Differential Diagnosis: None considered at present    Conditions and Status        Condition is improving.     [facility] Data  External documents reviewed: Prior epic records  Cardiac tracing (EKG, telemetry) interpretation: None  Radiology interpretation: Interpreted by radiology  Labs reviewed: As above  Any tests that were considered but not ordered: None considered at present     Decision rules/scores evaluated (example [id number], Wells, etc): None considered at present     Discussed with: Patient, spouse Lilia, and Dr. Rich     Care Planning  Shared decision making: Patient is agreeable to ongoing work-up and treatment  Code status and discussions: Full code with full interventions    Disposition  Social Determinants of Health that impact treatment or disposition: None identified at present  I expect the patient to be discharged to home in 2-3 days.     Electronically signed by TRESSA Contreras, 05/19/23, 10:35 CDT.

## 2023-05-20 ENCOUNTER — APPOINTMENT (OUTPATIENT)
Dept: GENERAL RADIOLOGY | Facility: HOSPITAL | Age: 63
DRG: 178 | End: 2023-05-20
Payer: COMMERCIAL

## 2023-05-20 PROCEDURE — 25010000002 PIPERACILLIN SOD-TAZOBACTAM PER 1 G: Performed by: NURSE PRACTITIONER

## 2023-05-20 PROCEDURE — 99232 SBSQ HOSP IP/OBS MODERATE 35: CPT | Performed by: NURSE PRACTITIONER

## 2023-05-20 PROCEDURE — 94664 DEMO&/EVAL PT USE INHALER: CPT

## 2023-05-20 PROCEDURE — 94799 UNLISTED PULMONARY SVC/PX: CPT

## 2023-05-20 PROCEDURE — 71045 X-RAY EXAM CHEST 1 VIEW: CPT

## 2023-05-20 PROCEDURE — 25010000002 ALTEPLASE 2 MG RECONSTITUTED SOLUTION 1 EACH VIAL

## 2023-05-20 PROCEDURE — 94760 N-INVAS EAR/PLS OXIMETRY 1: CPT

## 2023-05-20 RX ADMIN — SODIUM CHLORIDE 10 MG: 9 INJECTION INTRAMUSCULAR; INTRAVENOUS; SUBCUTANEOUS at 20:50

## 2023-05-20 RX ADMIN — IPRATROPIUM BROMIDE AND ALBUTEROL SULFATE 3 ML: 2.5; .5 SOLUTION RESPIRATORY (INHALATION) at 11:04

## 2023-05-20 RX ADMIN — PIPERACILLIN SODIUM AND TAZOBACTAM SODIUM 4.5 G: 4; .5 INJECTION, SOLUTION INTRAVENOUS at 20:42

## 2023-05-20 RX ADMIN — PIPERACILLIN SODIUM AND TAZOBACTAM SODIUM 4.5 G: 4; .5 INJECTION, SOLUTION INTRAVENOUS at 13:13

## 2023-05-20 RX ADMIN — HYDROCODONE BITARTRATE AND ACETAMINOPHEN 1 TABLET: 5; 325 TABLET ORAL at 04:27

## 2023-05-20 RX ADMIN — Medication 10 ML: at 10:21

## 2023-05-20 RX ADMIN — HYDROCODONE BITARTRATE AND ACETAMINOPHEN 1 TABLET: 5; 325 TABLET ORAL at 10:21

## 2023-05-20 RX ADMIN — IPRATROPIUM BROMIDE AND ALBUTEROL SULFATE 3 ML: 2.5; .5 SOLUTION RESPIRATORY (INHALATION) at 06:08

## 2023-05-20 RX ADMIN — LISINOPRIL 10 MG: 10 TABLET ORAL at 10:21

## 2023-05-20 RX ADMIN — HYDROCODONE BITARTRATE AND ACETAMINOPHEN 1 TABLET: 5; 325 TABLET ORAL at 22:26

## 2023-05-20 RX ADMIN — LEVOTHYROXINE SODIUM 200 MCG: 100 TABLET ORAL at 05:17

## 2023-05-20 RX ADMIN — PIPERACILLIN SODIUM AND TAZOBACTAM SODIUM 4.5 G: 4; .5 INJECTION, SOLUTION INTRAVENOUS at 05:17

## 2023-05-20 RX ADMIN — IPRATROPIUM BROMIDE AND ALBUTEROL SULFATE 3 ML: 2.5; .5 SOLUTION RESPIRATORY (INHALATION) at 20:20

## 2023-05-20 RX ADMIN — PANTOPRAZOLE SODIUM 40 MG: 40 TABLET, DELAYED RELEASE ORAL at 10:21

## 2023-05-20 RX ADMIN — DORNASE ALFA 5 MG: 1 SOLUTION RESPIRATORY (INHALATION) at 20:50

## 2023-05-20 RX ADMIN — WATER 5 MG: 1 INJECTION INTRAMUSCULAR; INTRAVENOUS; SUBCUTANEOUS at 10:22

## 2023-05-20 RX ADMIN — ASPIRIN 81 MG: 81 TABLET, COATED ORAL at 10:21

## 2023-05-20 RX ADMIN — HYDROCODONE BITARTRATE AND ACETAMINOPHEN 1 TABLET: 5; 325 TABLET ORAL at 18:24

## 2023-05-20 RX ADMIN — HYDROCODONE BITARTRATE AND ACETAMINOPHEN 1 TABLET: 5; 325 TABLET ORAL at 14:20

## 2023-05-20 RX ADMIN — IPRATROPIUM BROMIDE AND ALBUTEROL SULFATE 3 ML: 2.5; .5 SOLUTION RESPIRATORY (INHALATION) at 14:33

## 2023-05-20 RX ADMIN — SODIUM CHLORIDE 10 MG: 9 INJECTION INTRAMUSCULAR; INTRAVENOUS; SUBCUTANEOUS at 10:22

## 2023-05-20 RX ADMIN — Medication 10 ML: at 20:42

## 2023-05-20 RX ADMIN — HYDROCODONE BITARTRATE AND ACETAMINOPHEN 1 TABLET: 5; 325 TABLET ORAL at 00:21

## 2023-05-20 NOTE — PROGRESS NOTES
[facility] Medicine Services  INPATIENT PROGRESS NOTE    Patient Name: Castillo Trujillo  Date of Admission: 5/9/2023  Today's Date: 05/20/23  Length of Stay: 10  Primary Care Physician: Juan Vaughn DO    Subjective   Chief Complaint: follow up empyema  HPI   Doing ok, afebrile, no SOA, denies pain        Review of Systems   Constitutional:  Positive for fatigue. Negative for fever.   HENT:  Negative for congestion and ear pain.    Eyes:  Negative for redness and visual disturbance.   Respiratory:  Negative for cough, shortness of breath and wheezing.    Cardiovascular:  Negative for chest pain and palpitations.   Gastrointestinal:  Negative for abdominal pain, diarrhea, nausea and vomiting.   Endocrine: Negative for cold intolerance and heat intolerance.   Genitourinary:  Negative for dysuria and frequency.   Musculoskeletal:  Negative for arthralgias and back pain.   Skin:  Negative for rash and wound.   Neurological:  Negative for dizziness and headaches.   Psychiatric/Behavioral:  Negative for confusion. The patient is not nervous/anxious.         All pertinent negatives and positives are as above. All other systems have been reviewed and are negative unless otherwise stated.     Objective    Temp:  [97.5 °F (36.4 °C)-98.4 °F (36.9 °C)] 97.7 °F (36.5 °C)  Heart Rate:  [63-85] 85  Resp:  [16] 16  BP: (121-152)/(56-76) 145/62  Physical Exam  Vitals reviewed.   Constitutional:       Appearance: He is well-developed.   HENT:      Head: Normocephalic and atraumatic.      Right Ear: External ear normal.      Left Ear: External ear normal.      Nose: Nose normal.   Eyes:      General: No scleral icterus.        Right eye: No discharge.         Left eye: No discharge.      Conjunctiva/sclera: Conjunctivae normal.      Pupils: Pupils are equal, round, and reactive to light.   Neck:      Thyroid: No thyromegaly.      Trachea: No tracheal deviation.   Cardiovascular:      Rate and  Rhythm: Normal rate and regular rhythm.      Heart sounds: Normal heart sounds. No murmur heard.    No friction rub. No gallop.   Pulmonary:      Effort: Pulmonary effort is normal. No respiratory distress.      Breath sounds: Normal breath sounds. No stridor. No wheezing or rales.   Chest:      Chest wall: No tenderness.   Abdominal:      General: Bowel sounds are normal. There is no distension.      Palpations: Abdomen is soft. There is no mass.      Tenderness: There is no abdominal tenderness. There is no guarding or rebound.      Hernia: No hernia is present.   Musculoskeletal:         General: No deformity. Normal range of motion.      Cervical back: Normal range of motion and neck supple.   Lymphadenopathy:      Cervical: No cervical adenopathy.   Skin:     General: Skin is warm and dry.      Coloration: Skin is not pale.      Findings: No erythema or rash.   Neurological:      Mental Status: He is alert and oriented to person, place, and time.      Cranial Nerves: No cranial nerve deficit.      Motor: No abnormal muscle tone.      Coordination: Coordination normal.      Deep Tendon Reflexes: Reflexes are normal and symmetric. Reflexes normal.   Psychiatric:         Behavior: Behavior normal.         Thought Content: Thought content normal.         Judgment: Judgment normal.         Results Review:  I have reviewed the labs, radiology results, and diagnostic studies.    Laboratory Data:   Results from last 7 days   Lab Units 05/15/23  0450   WBC 10*3/mm3 8.15   HEMOGLOBIN g/dL 9.2*   HEMATOCRIT % 31.7*   PLATELETS 10*3/mm3 747*        Results from last 7 days   Lab Units 05/18/23  0358 05/15/23  0450 05/14/23  1625   SODIUM mmol/L 132* 136  --    POTASSIUM mmol/L 3.5 3.5  --    CHLORIDE mmol/L 96* 101  --    CO2 mmol/L 26.0 28.0  --    BUN mg/dL 6* 5*  --    CREATININE mg/dL 0.61* 0.63* 0.60*   CALCIUM mg/dL 8.9 8.7  --    GLUCOSE mg/dL 114* 95  --        Culture Data:   No results found for: BLOODCX,  URINECX, WOUNDCX, MRSACX, RESPCX, STOOLCX    Radiology Data:   Imaging Results (Last 24 Hours)       Procedure Component Value Units Date/Time    XR Chest 1 View [[id number]] Collected: 05/20/23 1131     Updated: 05/20/23 1135    Narrative:      Frontal supine radiograph of the chest 5/20/2023 3:30 AM CDT     History: chest tube     Comparison: 05/19/2023      Findings:   Lines and tubes are stable in position. No new opacities or  pneumothoraces are visualized in the chest. The cardiomediastinal  silhouette and pulmonary vascularity are unchanged.       No acute osseous or soft tissue abnormality is noted.        Impression:      Impression:   1. No significant interval change since previous exam.        This report was finalized on 05/20/2023 11:32 by Dr. Nathan Hayden MD.            I have reviewed the patient's current medications.     Assessment/Plan   Assessment  Active Hospital Problems    Diagnosis     **Empyema, right     Empyema lung     Recurrent right pleural effusion     Stage 2 moderate COPD by GOLD classification     Right lung mass     Tobacco abuse, in remission     GERD (gastroesophageal reflux disease)     Primary hypertension     Acquired hypothyroidism        Treatment Plan  1.  Empyema  -IV abx  -CTS following  -Intrapleural Activase/Pulmozyme    2.  Lung mass  -CTS following  -oncology consulted    3.  GERD  -Protonix    4.  HTN  -Lisinopril    5.  Hypothyroidism  -Synthroid        Medical Decision Making  Number and Complexity of problems: 5 problems, moderate complexity    Risk:  Moderate:  Prescription drug management    Differential Diagnosis: empyema confirmed    Conditions and Status        Condition is improving.     MDM Data  1:  Tests/Documents/Independent Historians: n/a  A:  Prior external notes from unique source reviewed:   B.  Review of the Results of Each Unique Test:  C.  Assessment requiring an independent Historian:    2.  Independent interpretation of Tests:n/a  A.   Radiology Interpretation:  B:  EKG/Telemetry Interpretation:    3:  Discussion of management or Test interpretation: n/a    Care Planning  Shared decision making: patient agreeable to plan  Code status and discussions: full code    Disposition  Social Determinants of Health that impact treatment or disposition: n/a  I expect the patient to be discharged to home in 3-4 days    Electronically signed by Barry Rich MD, 05/20/23, 15:09 CDT.

## 2023-05-20 NOTE — PLAN OF CARE
Goal Outcome Evaluation:  Plan of Care Reviewed With: patient        Progress: improving  Outcome Evaluation: A&O. Up ad jo. IID. IV abx. SCDs for VTE. Reg diet. Voiding per urinal. R pigtail drain to -20 cm dry suction; flushed per order. C/o pain; see MAR. Safety maintained.

## 2023-05-20 NOTE — PLAN OF CARE
Goal Outcome Evaluation:           Progress: no change  Outcome Evaluation: Pt c/o pain PRN meds given per orders, cont. IV ABX, IID, on room air, CT to 20cm dry suction, up ad jo, liz diet

## 2023-05-20 NOTE — PROGRESS NOTES
"Patient name: Castillo Trujillo  Patient : 1960  VISIT # [id number]  MR #[id number]    Procedure:  Procedure Date:   POD:* No surgery found *    Subjective   Shortness of breath    Resting in bed.  Remains on room air.  Reports pain is controlled.  Continues with intrapleural lytic therapy, day 5 of 5 planned for today.  Has been ambulating in the hallway without difficulty.  Chest tube drainage thinning.       Objective     Visit Vitals  /69 (BP Location: Right arm, Patient Position: Lying)   Pulse 83   Temp 98.4 °F (36.9 °C) (Oral)   Resp 16   Ht 188 cm (74\")   Wt 90.7 kg (200 lb)   SpO2 94%   BMI 25.68 kg/m²       Intake/Output Summary (Last 24 hours) at 2023 1141  Last data filed at 2023 1045  Gross per 24 hour   Intake --   Output 3820 ml   Net -3820 ml       Right chest tube output: 170 mL / 24 hours,  no leak, cloudy/thinning.     LABS:    NONE    IMAGES:       Imaging Results (Last 24 Hours)       Procedure Component Value Units Date/Time    XR Chest 1 View [[id number]] Collected: 23 1131     Updated: 23 1135    Narrative:      Frontal supine radiograph of the chest 2023 3:30 AM CDT     History: chest tube     Comparison: 2023      Findings:   Lines and tubes are stable in position. No new opacities or  pneumothoraces are visualized in the chest. The cardiomediastinal  silhouette and pulmonary vascularity are unchanged.       No acute osseous or soft tissue abnormality is noted.        Impression:      Impression:   1. No significant interval change since previous exam.        This report was finalized on 2023 11:32 by Dr. Nathan Hayden MD.          My image interpretation: Pigtail catheter in place.  No pneumothorax.  No changes/stable.    Pigtail catheter in place, no pneumothorax, stable from previous exam.     Physical Exam:  General: Alert, oriented. No apparent distress.  Sitting up in the bed  Cardiovascular: Regular rate and rhythm without murmur, " rubs, or gallops.    Pulmonary: Decreased breath sounds to right lung and bilateral bases.  No wheezing or rhonchi.    Chest: Right side chest tube to-20 cm H2O suction.  No air leak.  Fluid is cloudy and thinning  Abdomen: Soft, non distended, and non tender.  Extremities: Warm, moves all extremities. .   Neurologic:  Grossly intact with no focal deficits.         Impression:  Empyema, right  Right lung mass  Recurrent right pleural effusion  Tobacco abuse, in remission  Stage 2 moderate COPD by GOLD classification  Hypokalemia  Unintentional weight loss        Plan  Oncology consult   Continue intrapleural lytic therapy, day 5/5  Continue chest tube suction at -20 cm H2O  Flush chest tube with 10 mL normal saline every 8 hours to maintain patency  Chest x-ray daily  Encourage pulmonary toilet and ambulation    Discussed with patient       Landy Fontenot, TRESSA  05/20/23  11:41 CDT

## 2023-05-21 ENCOUNTER — APPOINTMENT (OUTPATIENT)
Dept: CT IMAGING | Facility: HOSPITAL | Age: 63
DRG: 178 | End: 2023-05-21
Payer: COMMERCIAL

## 2023-05-21 ENCOUNTER — APPOINTMENT (OUTPATIENT)
Dept: GENERAL RADIOLOGY | Facility: HOSPITAL | Age: 63
DRG: 178 | End: 2023-05-21
Payer: COMMERCIAL

## 2023-05-21 PROCEDURE — 94799 UNLISTED PULMONARY SVC/PX: CPT

## 2023-05-21 PROCEDURE — 71045 X-RAY EXAM CHEST 1 VIEW: CPT

## 2023-05-21 PROCEDURE — 71260 CT THORAX DX C+: CPT

## 2023-05-21 PROCEDURE — 94760 N-INVAS EAR/PLS OXIMETRY 1: CPT

## 2023-05-21 PROCEDURE — 25010000002 PIPERACILLIN SOD-TAZOBACTAM PER 1 G: Performed by: NURSE PRACTITIONER

## 2023-05-21 PROCEDURE — 25510000001 IOPAMIDOL 61 % SOLUTION: Performed by: FAMILY MEDICINE

## 2023-05-21 PROCEDURE — 99232 SBSQ HOSP IP/OBS MODERATE 35: CPT | Performed by: NURSE PRACTITIONER

## 2023-05-21 RX ADMIN — Medication 10 ML: at 09:01

## 2023-05-21 RX ADMIN — ASPIRIN 81 MG: 81 TABLET, COATED ORAL at 09:00

## 2023-05-21 RX ADMIN — LISINOPRIL 10 MG: 10 TABLET ORAL at 09:00

## 2023-05-21 RX ADMIN — IPRATROPIUM BROMIDE AND ALBUTEROL SULFATE 3 ML: 2.5; .5 SOLUTION RESPIRATORY (INHALATION) at 06:44

## 2023-05-21 RX ADMIN — IPRATROPIUM BROMIDE AND ALBUTEROL SULFATE 3 ML: 2.5; .5 SOLUTION RESPIRATORY (INHALATION) at 20:59

## 2023-05-21 RX ADMIN — Medication 10 ML: at 19:35

## 2023-05-21 RX ADMIN — PIPERACILLIN SODIUM AND TAZOBACTAM SODIUM 4.5 G: 4; .5 INJECTION, SOLUTION INTRAVENOUS at 19:35

## 2023-05-21 RX ADMIN — IOPAMIDOL 100 ML: 612 INJECTION, SOLUTION INTRAVENOUS at 10:34

## 2023-05-21 RX ADMIN — LEVOTHYROXINE SODIUM 200 MCG: 100 TABLET ORAL at 04:50

## 2023-05-21 RX ADMIN — HYDROCODONE BITARTRATE AND ACETAMINOPHEN 1 TABLET: 5; 325 TABLET ORAL at 09:00

## 2023-05-21 RX ADMIN — HYDROCODONE BITARTRATE AND ACETAMINOPHEN 1 TABLET: 5; 325 TABLET ORAL at 13:30

## 2023-05-21 RX ADMIN — HYDROCODONE BITARTRATE AND ACETAMINOPHEN 1 TABLET: 5; 325 TABLET ORAL at 23:35

## 2023-05-21 RX ADMIN — PANTOPRAZOLE SODIUM 40 MG: 40 TABLET, DELAYED RELEASE ORAL at 09:00

## 2023-05-21 RX ADMIN — PIPERACILLIN SODIUM AND TAZOBACTAM SODIUM 4.5 G: 4; .5 INJECTION, SOLUTION INTRAVENOUS at 04:49

## 2023-05-21 RX ADMIN — HYDROCODONE BITARTRATE AND ACETAMINOPHEN 1 TABLET: 5; 325 TABLET ORAL at 04:54

## 2023-05-21 RX ADMIN — IPRATROPIUM BROMIDE AND ALBUTEROL SULFATE 3 ML: 2.5; .5 SOLUTION RESPIRATORY (INHALATION) at 10:56

## 2023-05-21 RX ADMIN — HYDROCODONE BITARTRATE AND ACETAMINOPHEN 1 TABLET: 5; 325 TABLET ORAL at 19:35

## 2023-05-21 RX ADMIN — IPRATROPIUM BROMIDE AND ALBUTEROL SULFATE 3 ML: 2.5; .5 SOLUTION RESPIRATORY (INHALATION) at 14:04

## 2023-05-21 RX ADMIN — PIPERACILLIN SODIUM AND TAZOBACTAM SODIUM 4.5 G: 4; .5 INJECTION, SOLUTION INTRAVENOUS at 13:32

## 2023-05-21 NOTE — PROGRESS NOTES
"Patient name: Castillo Trujillo  Patient : 1960  VISIT # [id number]  MR #[id number]    Procedure:  Procedure Date:   POD:* No surgery found *    Subjective   Shortness of breath    Resting in bed.  Remains on room air.  No new complaints this morning. Completed 5 days of intrapleural lytic therapy yesterday, 2023.         Objective     Visit Vitals  /79 (BP Location: Right arm, Patient Position: Lying)   Pulse 76   Temp 96.6 °F (35.9 °C) (Axillary)   Resp 16   Ht 188 cm (74\")   Wt 90.7 kg (200 lb)   SpO2 98%   BMI 25.68 kg/m²       Intake/Output Summary (Last 24 hours) at 2023 0830  Last data filed at 2023 0700  Gross per 24 hour   Intake 460 ml   Output 3725 ml   Net -3265 ml       Right chest tube output: 200 mL / 24 hours,  no leak, cloudy/thinning.     LABS:    NONE    IMAGES:       Imaging Results (Last 24 Hours)       Procedure Component Value Units Date/Time    XR Chest 1 View [[id number]] Resulted: 23 0357     Updated: 23 0358    XR Chest 1 View [[id number]] Collected: 23 1131     Updated: 23 1135    Narrative:      Frontal supine radiograph of the chest 2023 3:30 AM CDT     History: chest tube     Comparison: 2023      Findings:   Lines and tubes are stable in position. No new opacities or  pneumothoraces are visualized in the chest. The cardiomediastinal  silhouette and pulmonary vascularity are unchanged.       No acute osseous or soft tissue abnormality is noted.        Impression:      Impression:   1. No significant interval change since previous exam.        This report was finalized on 2023 11:32 by Dr. Nathan Hayden MD.          My image interpretation: Pigtail catheter in place.  No pneumothorax.  Stable.        Physical Exam:  General: Alert, oriented. No apparent distress.  Sitting up in the bed  Cardiovascular: Regular rate and rhythm without murmur, rubs, or gallops.    Pulmonary: Decreased breath sounds to right lung and " bilateral bases.  No wheezing or rhonchi.    Chest: Right side chest tube to-20 cm H2O suction.  No air leak.  Fluid is cloudy and thinning  Abdomen: Soft, non distended, and non tender.  Extremities: Warm, moves all extremities. .   Neurologic:  Grossly intact with no focal deficits.         Impression:  Empyema, right  Right lung mass, inflammatory pseudotumor  Recurrent right pleural effusion  Tobacco abuse, in remission  Stage 2 moderate COPD by GOLD classification  Hypokalemia  Unintentional weight loss        Plan  Dr. Kim spoke with Dr. Burks and recommendation is referral to [place] for further recommendations related to inflammatory pseudotumor   Continue chest tube suction at -20 cm H2O  Flush chest tube with 10 mL normal saline every 8 hours to maintain patency  Chest x-ray daily  Encourage pulmonary toilet and ambulation  CT scan of the chest with contrast today    Discussed with patient and spouse.    Landy Fontenot, APRN  05/21/23  08:30 CDT

## 2023-05-21 NOTE — PLAN OF CARE
Goal Outcome Evaluation:  Plan of Care Reviewed With: patient, spouse        Progress: no change  Outcome Evaluation: A&O. Up ad jo. New IV to YANG this shift. IV abx. Room air. Reg diet. Voiding per urinal. R pigtail at -20 cm dry suction. C/o pain; see MAR. Safety maintained.

## 2023-05-21 NOTE — PROGRESS NOTES
[facility] Medicine Services  INPATIENT PROGRESS NOTE    Patient Name: Castillo Trujillo  Date of Admission: 5/9/2023  Today's Date: 05/21/23  Length of Stay: 11  Primary Care Physician: Juan Vaughn DO    Subjective   Chief Complaint: follow up empyema  HPI   Doing ok, afebrile, not in any pain  Oncology/CTS working on [place] referral.        Review of Systems   Constitutional:  Positive for fatigue. Negative for fever.   HENT:  Negative for congestion and ear pain.    Eyes:  Negative for redness and visual disturbance.   Respiratory:  Negative for cough, shortness of breath and wheezing.    Cardiovascular:  Negative for chest pain and palpitations.   Gastrointestinal:  Negative for abdominal pain, diarrhea, nausea and vomiting.   Endocrine: Negative for cold intolerance and heat intolerance.   Genitourinary:  Negative for dysuria and frequency.   Musculoskeletal:  Negative for arthralgias and back pain.   Skin:  Negative for rash and wound.   Neurological:  Negative for dizziness and headaches.   Psychiatric/Behavioral:  Negative for confusion. The patient is not nervous/anxious.         All pertinent negatives and positives are as above. All other systems have been reviewed and are negative unless otherwise stated.     Objective    Temp:  [96.6 °F (35.9 °C)-97.8 °F (36.6 °C)] 97.7 °F (36.5 °C)  Heart Rate:  [74-88] 88  Resp:  [16] 16  BP: (131-145)/(62-79) 139/63  Physical Exam  Vitals reviewed.   Constitutional:       Appearance: He is well-developed.   HENT:      Head: Normocephalic and atraumatic.      Right Ear: External ear normal.      Left Ear: External ear normal.      Nose: Nose normal.   Eyes:      General: No scleral icterus.        Right eye: No discharge.         Left eye: No discharge.      Conjunctiva/sclera: Conjunctivae normal.      Pupils: Pupils are equal, round, and reactive to light.   Neck:      Thyroid: No thyromegaly.      Trachea: No tracheal  deviation.   Cardiovascular:      Rate and Rhythm: Normal rate and regular rhythm.      Heart sounds: Normal heart sounds. No murmur heard.    No friction rub. No gallop.   Pulmonary:      Effort: Pulmonary effort is normal. No respiratory distress.      Breath sounds: Normal breath sounds. No stridor. No wheezing or rales.   Chest:      Chest wall: No tenderness.   Abdominal:      General: Bowel sounds are normal. There is no distension.      Palpations: Abdomen is soft. There is no mass.      Tenderness: There is no abdominal tenderness. There is no guarding or rebound.      Hernia: No hernia is present.   Musculoskeletal:         General: No deformity. Normal range of motion.      Cervical back: Normal range of motion and neck supple.   Lymphadenopathy:      Cervical: No cervical adenopathy.   Skin:     General: Skin is warm and dry.      Coloration: Skin is not pale.      Findings: No erythema or rash.   Neurological:      Mental Status: He is alert and oriented to person, place, and time.      Cranial Nerves: No cranial nerve deficit.      Motor: No abnormal muscle tone.      Coordination: Coordination normal.      Deep Tendon Reflexes: Reflexes are normal and symmetric. Reflexes normal.   Psychiatric:         Behavior: Behavior normal.         Thought Content: Thought content normal.         Judgment: Judgment normal.         Results Review:  I have reviewed the labs, radiology results, and diagnostic studies.    Laboratory Data:   Results from last 7 days   Lab Units 05/15/23  0450   WBC 10*3/mm3 8.15   HEMOGLOBIN g/dL 9.2*   HEMATOCRIT % 31.7*   PLATELETS 10*3/mm3 747*          Results from last 7 days   Lab Units 05/18/23  0358 05/15/23  0450 05/14/23  1625   SODIUM mmol/L 132* 136  --    POTASSIUM mmol/L 3.5 3.5  --    CHLORIDE mmol/L 96* 101  --    CO2 mmol/L 26.0 28.0  --    BUN mg/dL 6* 5*  --    CREATININE mg/dL 0.61* 0.63* 0.60*   CALCIUM mg/dL 8.9 8.7  --    GLUCOSE mg/dL 114* 95  --          Culture  Data:   No results found for: BLOODCX, URINECX, WOUNDCX, MRSACX, RESPCX, STOOLCX    Radiology Data:   Imaging Results (Last 24 Hours)       Procedure Component Value Units Date/Time    CT Chest With Contrast Diagnostic [[id number]] Collected: 05/21/23 1208     Updated: 05/21/23 1220    Narrative:      CT CHEST W CONTRAST DIAGNOSTIC- 5/21/2023 10:28 AM CDT     HISTORY: empyema, plural effusion and mass      COMPARISON: CT scan dated 05/15/2023      DOSE LENGTH PRODUCT: 212 mGy cm. Automated exposure control was also  utilized to decrease patient radiation dose.     TECHNIQUE: Serial helical tomographic images of the chest were acquired  following the intravenous infusion of contrast. Multiplanar reformatted  images were provided for review.     FINDINGS:      Visualized neck base: The imaged portion of the base of the neck appears  unremarkable.      Lungs: Left upper lobe calcified granuloma. The left lung is otherwise  clear. There is a right-sided pleural drain which is in stable position.  Diffuse right lung pleural thickening. Minimal residual effusion along  the posterior costal pleura. This is similar to the recent comparison  chest CT. Right lung is partially atelectatic, appearing stable. Airways  are clear.     Heart: The heart is normal in size. There is no pericardial effusion.      Vasculature: Thoracic aorta is normal in caliber. No dissection  identified. The pulmonary arteries are normal in appearance.     Mediastinum and lymph nodes: Remote granulomatous calcification the  mediastinum.     Skeletal and soft tissues: Chest wall soft tissues are unremarkable. No  acute bony abnormality.       Upper abdomen: The imaged portion of the upper abdomen demonstrates no  acute process. Cholelithiasis.       Impression:      1. History of right lung empyema. There is a right-sided pleural drain  which is in stable position. Diffuse right lung pleural thickening.  There is only a very minimal fluid remaining in  the right pleural space.     This report was finalized on 05/21/2023 12:17 by Dr Dejon Callaway, .    XR Chest 1 View [[id number]] Collected: 05/21/23 0850     Updated: 05/21/23 0854    Narrative:      Frontal supine radiograph of the chest 5/21/2023 3:23 AM CDT     History: chest tube     Comparison: 05/20/2023      Findings:      Right-sided pleural drain is in stable position. Residual loculated  right basilar effusion is stable. No pneumothorax. No new consolidation.  The left lung is clear. No mediastinal shift. Heart size is stable.  Pulmonary vasculature are nondilated.       Impression:      Impression:   1. No significant interval change.        This report was finalized on 05/21/2023 08:51 by Dr Dejon Callaway, .            I have reviewed the patient's current medications.     Assessment/Plan   Assessment  Active Hospital Problems    Diagnosis     **Empyema, right     Empyema lung     Recurrent right pleural effusion     Stage 2 moderate COPD by GOLD classification     Right lung mass     Tobacco abuse, in remission     GERD (gastroesophageal reflux disease)     Primary hypertension     Acquired hypothyroidism        Treatment Plan  1.  Empyema  -IV abx  -CTS following  -Intrapleural Activase/Pulmozyme completed  -Repeat CT shows improvement    2.  Inflammatory pseudotumor   -CTS following  -oncology consulted    3.  GERD  -Protonix    4.  HTN  -Lisinopril    5.  Hypothyroidism  -Synthroid        Medical Decision Making  Number and Complexity of problems: 5 problems, moderate complexity    Risk:  Moderate:  Prescription drug management    Differential Diagnosis: empyema confirmed    Conditions and Status        Condition is improving.     MDM Data  1:  Tests/Documents/Independent Historians: n/a  A:  Prior external notes from unique source reviewed:   B.  Review of the Results of Each Unique Test:  C.  Assessment requiring an independent Historian:    2.  Independent interpretation of Tests:n/a  A.   Radiology Interpretation:  B:  EKG/Telemetry Interpretation:    3:  Discussion of management or Test interpretation: n/a    Care Planning  Shared decision making: patient agreeable to plan  Code status and discussions: full code    Disposition  Social Determinants of Health that impact treatment or disposition: n/a  I expect the patient to be discharged to home in 1-2 days    Electronically signed by Barry Rich MD, 05/21/23, 14:07 CDT.

## 2023-05-21 NOTE — PLAN OF CARE
Goal Outcome Evaluation:  Plan of Care Reviewed With: patient        Progress: no change  Outcome Evaluation: Assumed care of patient at 0120; Right chest tube to 20 of dry suction; TPA administered by previous nurse at the beginning of the shift; room air; IID; up ad jo; voiding; IV ABX; resting between care; safety maintained

## 2023-05-22 ENCOUNTER — APPOINTMENT (OUTPATIENT)
Dept: GENERAL RADIOLOGY | Facility: HOSPITAL | Age: 63
DRG: 178 | End: 2023-05-22
Payer: COMMERCIAL

## 2023-05-22 PROCEDURE — 71045 X-RAY EXAM CHEST 1 VIEW: CPT

## 2023-05-22 PROCEDURE — 94799 UNLISTED PULMONARY SVC/PX: CPT

## 2023-05-22 PROCEDURE — 25010000002 PIPERACILLIN SOD-TAZOBACTAM PER 1 G: Performed by: NURSE PRACTITIONER

## 2023-05-22 PROCEDURE — 94761 N-INVAS EAR/PLS OXIMETRY MLT: CPT

## 2023-05-22 PROCEDURE — 25010000002 ALTEPLASE 2 MG RECONSTITUTED SOLUTION 1 EACH VIAL: Performed by: NURSE PRACTITIONER

## 2023-05-22 RX ADMIN — ASPIRIN 81 MG: 81 TABLET, COATED ORAL at 09:18

## 2023-05-22 RX ADMIN — SODIUM CHLORIDE 10 MG: 9 INJECTION INTRAMUSCULAR; INTRAVENOUS; SUBCUTANEOUS at 10:06

## 2023-05-22 RX ADMIN — PANTOPRAZOLE SODIUM 40 MG: 40 TABLET, DELAYED RELEASE ORAL at 09:18

## 2023-05-22 RX ADMIN — PIPERACILLIN SODIUM AND TAZOBACTAM SODIUM 4.5 G: 4; .5 INJECTION, SOLUTION INTRAVENOUS at 11:30

## 2023-05-22 RX ADMIN — HYDROCODONE BITARTRATE AND ACETAMINOPHEN 1 TABLET: 5; 325 TABLET ORAL at 13:50

## 2023-05-22 RX ADMIN — LEVOTHYROXINE SODIUM 200 MCG: 100 TABLET ORAL at 05:28

## 2023-05-22 RX ADMIN — IPRATROPIUM BROMIDE AND ALBUTEROL SULFATE 3 ML: 2.5; .5 SOLUTION RESPIRATORY (INHALATION) at 18:33

## 2023-05-22 RX ADMIN — PIPERACILLIN SODIUM AND TAZOBACTAM SODIUM 4.5 G: 4; .5 INJECTION, SOLUTION INTRAVENOUS at 20:06

## 2023-05-22 RX ADMIN — IPRATROPIUM BROMIDE AND ALBUTEROL SULFATE 3 ML: 2.5; .5 SOLUTION RESPIRATORY (INHALATION) at 06:10

## 2023-05-22 RX ADMIN — HYDROCODONE BITARTRATE AND ACETAMINOPHEN 1 TABLET: 5; 325 TABLET ORAL at 22:35

## 2023-05-22 RX ADMIN — HYDROCODONE BITARTRATE AND ACETAMINOPHEN 1 TABLET: 5; 325 TABLET ORAL at 18:11

## 2023-05-22 RX ADMIN — HYDROCODONE BITARTRATE AND ACETAMINOPHEN 1 TABLET: 5; 325 TABLET ORAL at 05:30

## 2023-05-22 RX ADMIN — DORNASE ALFA 5 MG: 1 SOLUTION RESPIRATORY (INHALATION) at 10:07

## 2023-05-22 RX ADMIN — Medication 10 ML: at 20:07

## 2023-05-22 RX ADMIN — Medication 10 ML: at 01:46

## 2023-05-22 RX ADMIN — SODIUM CHLORIDE 10 MG: 9 INJECTION INTRAMUSCULAR; INTRAVENOUS; SUBCUTANEOUS at 22:36

## 2023-05-22 RX ADMIN — LISINOPRIL 10 MG: 10 TABLET ORAL at 09:18

## 2023-05-22 RX ADMIN — DORNASE ALFA 5 MG: 1 SOLUTION RESPIRATORY (INHALATION) at 22:36

## 2023-05-22 RX ADMIN — IPRATROPIUM BROMIDE AND ALBUTEROL SULFATE 3 ML: 2.5; .5 SOLUTION RESPIRATORY (INHALATION) at 10:03

## 2023-05-22 RX ADMIN — HYDROCODONE BITARTRATE AND ACETAMINOPHEN 1 TABLET: 5; 325 TABLET ORAL at 09:33

## 2023-05-22 RX ADMIN — PIPERACILLIN SODIUM AND TAZOBACTAM SODIUM 4.5 G: 4; .5 INJECTION, SOLUTION INTRAVENOUS at 04:36

## 2023-05-22 RX ADMIN — IPRATROPIUM BROMIDE AND ALBUTEROL SULFATE 3 ML: 2.5; .5 SOLUTION RESPIRATORY (INHALATION) at 14:06

## 2023-05-22 NOTE — PLAN OF CARE
Goal Outcome Evaluation:      Pt has required 2 doses of Hydrocodone PO for pain control. TPA given through chest tube this morning. Total of 50 ml out of chest tube. Suction at -20 dry. Continuing IV antibiotic-Zosyn. Daily CXR.

## 2023-05-22 NOTE — PROGRESS NOTES
[facility] Medicine Services  INPATIENT PROGRESS NOTE    Patient Name: Castillo Trujillo  Date of Admission: 5/9/2023  Today's Date: 05/22/23  Length of Stay: 12  Primary Care Physician: Juan Vaughn DO    Subjective   Chief Complaint: Empyema.  Lung mass.  Pseudotumor.    HPI   Blood pressure stable, afebrile.  Consult  for outpatient follow-up with [facility] on pseudotumor.  Pleurx catheter in place.  Ongoing tPA treatment.    Review of Systems   Constitutional:  Positive for activity change, appetite change and fatigue. Negative for chills and fever.   HENT:  Negative for hearing loss, nosebleeds, tinnitus and trouble swallowing.    Eyes:  Negative for visual disturbance.   Respiratory:  Negative for cough, chest tightness, shortness of breath and wheezing.    Cardiovascular:  Negative for chest pain, palpitations and leg swelling.   Gastrointestinal:  Negative for abdominal distention, abdominal pain, blood in stool, constipation, diarrhea, nausea and vomiting.   Endocrine: Negative for cold intolerance, heat intolerance, polydipsia, polyphagia and polyuria.   Genitourinary:  Negative for decreased urine volume, difficulty urinating, dysuria, flank pain, frequency and hematuria.   Musculoskeletal:  Negative for arthralgias, joint swelling and myalgias.   Skin:  Negative for rash.   Allergic/Immunologic: Negative for immunocompromised state.   Neurological:  Positive for weakness. Negative for dizziness, syncope, light-headedness and headaches.   Hematological:  Negative for adenopathy. Does not bruise/bleed easily.   Psychiatric/Behavioral:  Negative for confusion and sleep disturbance. The patient is not nervous/anxious.       All pertinent negatives and positives are as above. All other systems have been reviewed and are negative unless otherwise stated.     Objective    Temp:  [97.6 °F (36.4 °C)-98 °F (36.7 °C)] 97.8 °F (36.6 °C)  Heart Rate:  [70-88]  79  Resp:  [16] 16  BP: (127-147)/(56-68) 127/60  Physical Exam  Vitals and nursing note reviewed.   HENT:      Head: Normocephalic.   Eyes:      Conjunctiva/sclera: Conjunctivae normal.      Pupils: Pupils are equal, round, and reactive to light.   Neck:      Vascular: No JVD.   Cardiovascular:      Rate and Rhythm: Normal rate and regular rhythm.      Heart sounds: Normal heart sounds.   Pulmonary:      Effort: No respiratory distress.      Breath sounds: No wheezing or rales.      Comments: Diminished breath sound bilateral, clear, on room air.  Pleurx catheter right chest.  Chest:      Chest wall: No tenderness.   Abdominal:      General: Bowel sounds are normal. There is no distension.      Palpations: Abdomen is soft.      Tenderness: There is no abdominal tenderness.   Musculoskeletal:         General: No tenderness or deformity. Normal range of motion.      Cervical back: Neck supple.   Skin:     General: Skin is warm and dry.      Capillary Refill: Capillary refill takes 2 to 3 seconds.      Findings: No rash.   Neurological:      Mental Status: He is alert and oriented to person, place, and time.      Cranial Nerves: No cranial nerve deficit.      Motor: Weakness present. No abnormal muscle tone.      Deep Tendon Reflexes: Reflexes normal.   Psychiatric:         Mood and Affect: Mood normal.         Behavior: Behavior normal.           Results Review:  I have reviewed the labs, radiology results, and diagnostic studies.    Laboratory Data:          Results from last 7 days   Lab Units 05/18/23  0358   SODIUM mmol/L 132*   POTASSIUM mmol/L 3.5   CHLORIDE mmol/L 96*   CO2 mmol/L 26.0   BUN mg/dL 6*   CREATININE mg/dL 0.61*   CALCIUM mg/dL 8.9   GLUCOSE mg/dL 114*       Culture Data:   No results found for: BLOODCX, URINECX, WOUNDCX, MRSACX, RESPCX, STOOLCX    Radiology Data:   Imaging Results (Last 24 Hours)       Procedure Component Value Units Date/Time    XR Chest 1 View [[id number]] Collected: 05/22/23  0731     Updated: 05/22/23 0735    Narrative:      EXAM/TECHNIQUE: XR CHEST 1 VW-     INDICATION: chest tube     COMPARISON: Prior day     FINDINGS:     Right-sided pleural drain is stable in position. No change in trace  residual right-sided pleural effusion. No change in RIGHT perihilar  lower lobe airspace opacity. LEFT lung and pleural space are clear. No  pneumothorax. Stable cardiac silhouette.       Impression:         No change in appearance of the chest.  This report was finalized on 05/22/2023 07:32 by Dr. Yogi Lopez MD.    CT Chest With Contrast Diagnostic [[id number]] Collected: 05/21/23 1208     Updated: 05/21/23 1220    Narrative:      CT CHEST W CONTRAST DIAGNOSTIC- 5/21/2023 10:28 AM CDT     HISTORY: empyema, plural effusion and mass      COMPARISON: CT scan dated 05/15/2023      DOSE LENGTH PRODUCT: 212 mGy cm. Automated exposure control was also  utilized to decrease patient radiation dose.     TECHNIQUE: Serial helical tomographic images of the chest were acquired  following the intravenous infusion of contrast. Multiplanar reformatted  images were provided for review.     FINDINGS:      Visualized neck base: The imaged portion of the base of the neck appears  unremarkable.      Lungs: Left upper lobe calcified granuloma. The left lung is otherwise  clear. There is a right-sided pleural drain which is in stable position.  Diffuse right lung pleural thickening. Minimal residual effusion along  the posterior costal pleura. This is similar to the recent comparison  chest CT. Right lung is partially atelectatic, appearing stable. Airways  are clear.     Heart: The heart is normal in size. There is no pericardial effusion.      Vasculature: Thoracic aorta is normal in caliber. No dissection  identified. The pulmonary arteries are normal in appearance.     Mediastinum and lymph nodes: Remote granulomatous calcification the  mediastinum.     Skeletal and soft tissues: Chest wall soft tissues are  unremarkable. No  acute bony abnormality.       Upper abdomen: The imaged portion of the upper abdomen demonstrates no  acute process. Cholelithiasis.       Impression:      1. History of right lung empyema. There is a right-sided pleural drain  which is in stable position. Diffuse right lung pleural thickening.  There is only a very minimal fluid remaining in the right pleural space.     This report was finalized on 05/21/2023 12:17 by Dr Dejon Callaway, .    XR Chest 1 View [[id number]] Collected: 05/21/23 0850     Updated: 05/21/23 0854    Narrative:      Frontal supine radiograph of the chest 5/21/2023 3:23 AM CDT     History: chest tube     Comparison: 05/20/2023      Findings:      Right-sided pleural drain is in stable position. Residual loculated  right basilar effusion is stable. No pneumothorax. No new consolidation.  The left lung is clear. No mediastinal shift. Heart size is stable.  Pulmonary vasculature are nondilated.       Impression:      Impression:   1. No significant interval change.        This report was finalized on 05/21/2023 08:51 by Dr Dejon Callaway, .            I have reviewed the patient's current medications.     Assessment/Plan   Assessment  Active Hospital Problems    Diagnosis     **Empyema, right     Empyema lung     Recurrent right pleural effusion     Stage 2 moderate COPD by GOLD classification     Right lung mass     Tobacco abuse, in remission     GERD (gastroesophageal reflux disease)     Primary hypertension     Acquired hypothyroidism        Treatment Plan  Right empyema/COPD.  Improving.  Status post 2 rounds of tPA.  Per CT surgeon.  Zosyn antibiotics.  DuoNebs.  Incentive spirometer.  Pleurx catheter right lung.  Patient is on room air.    Right lung mass.  CT surgeon consult.  Oncology consult.  Positive for inflammation pseudotumor.  Not a surgical candidate for resection.  Undergoing chemo treatment and radiation and steroid.  Consult  for referral  outpatient oncology for pseudotumor in [place]..    Reflux.  Protonix.  Zofran as needed.    Hypertension.  Lisinopril.  Aspirin.    Hypothyroidism . Synthroid.    Pain . Norco as needed.    Nutrition . Regular/house diet.    Medical Decision Making  Number and Complexity of problems.  Right empyema/COPD/right lung mass/pseudotumor/reflux/hypertension/hypothyroidism  Differential Diagnosis: None    Conditions and Status     Ongoing evaluation and treatment.     MDM Data  External documents reviewed: None.  Cardiac tracing (EKG, telemetry) interpretation: Sinus .  Radiology interpretation: None.  Labs reviewed: Laboratory .  Any tests that were considered but not ordered: Lab in AM.     Decision rules/scores evaluated (example [id number], Wells, etc): None.     Discussed with: Patient .     Care Planning  Shared decision making: Patient .  Code status and discussions: Full code.    Disposition  Social Determinants of Health that impact treatment or disposition: Unknown.  Pending CT surgeon.  Electronically signed by Cesar Harris MD, 05/22/23, 08:19 CDT.

## 2023-05-22 NOTE — PROGRESS NOTES
"Patient name: Castillo Trujillo  Patient : 1960  VISIT # [id number]  MR #[id number]    Procedure:  Procedure Date:   POD:* No surgery found *    Subjective   CC: Shortness of breath    Resting in bed.  Remains on room air.  No new complaints today with the exception of wanting to go home.     CT chest performed yesterday afternoon and shows improvement but with minimal  residual pleural fluid and space remaining.      Patient and wife have questions about pleural biopsy diagnosis.  Inquire about seeking an opinion at tertiary care center.           Objective     Visit Vitals  /60 (BP Location: Left arm, Patient Position: Lying)   Pulse 79   Temp 97.8 °F (36.6 °C) (Oral)   Resp 16   Ht 188 cm (74\")   Wt 90.7 kg (200 lb)   SpO2 95%   BMI 25.68 kg/m²       Intake/Output Summary (Last 24 hours) at 2023 0918  Last data filed at 2023 0349  Gross per 24 hour   Intake 240 ml   Output 3025 ml   Net -2785 ml     Right chest tube output: none documented   No air leak       LABS:  NONE    IMAGES:       Imaging Results (Last 24 Hours)       Procedure Component Value Units Date/Time    XR Chest 1 View [[id number]] Collected: 23 0731     Updated: 23 0735    Narrative:      EXAM/TECHNIQUE: XR CHEST 1 VW-     INDICATION: chest tube     COMPARISON: Prior day     FINDINGS:     Right-sided pleural drain is stable in position. No change in trace  residual right-sided pleural effusion. No change in RIGHT perihilar  lower lobe airspace opacity. LEFT lung and pleural space are clear. No  pneumothorax. Stable cardiac silhouette.       Impression:         No change in appearance of the chest.  This report was finalized on 2023 07:32 by Dr. Yogi Lopez MD.    CT Chest With Contrast Diagnostic [[id number]] Collected: 23 1208     Updated: 23 1220    Narrative:      CT CHEST W CONTRAST DIAGNOSTIC- 2023 10:28 AM CDT     HISTORY: empyema, plural effusion and mass      COMPARISON: CT scan " dated 05/15/2023      DOSE LENGTH PRODUCT: 212 mGy cm. Automated exposure control was also  utilized to decrease patient radiation dose.     TECHNIQUE: Serial helical tomographic images of the chest were acquired  following the intravenous infusion of contrast. Multiplanar reformatted  images were provided for review.     FINDINGS:      Visualized neck base: The imaged portion of the base of the neck appears  unremarkable.      Lungs: Left upper lobe calcified granuloma. The left lung is otherwise  clear. There is a right-sided pleural drain which is in stable position.  Diffuse right lung pleural thickening. Minimal residual effusion along  the posterior costal pleura. This is similar to the recent comparison  chest CT. Right lung is partially atelectatic, appearing stable. Airways  are clear.     Heart: The heart is normal in size. There is no pericardial effusion.      Vasculature: Thoracic aorta is normal in caliber. No dissection  identified. The pulmonary arteries are normal in appearance.     Mediastinum and lymph nodes: Remote granulomatous calcification the  mediastinum.     Skeletal and soft tissues: Chest wall soft tissues are unremarkable. No  acute bony abnormality.       Upper abdomen: The imaged portion of the upper abdomen demonstrates no  acute process. Cholelithiasis.       Impression:      1. History of right lung empyema. There is a right-sided pleural drain  which is in stable position. Diffuse right lung pleural thickening.  There is only a very minimal fluid remaining in the right pleural space.     This report was finalized on 05/21/2023 12:17 by Dr Dejon Callaway, .          Chest x-ray today shows right pigtail chest tube in place, no pneumothorax, residual right effusion    Final Diagnosis   Lung, right upper lobe, fine-needle core biopsies and touch preparations:  A.  Benign lung parenchyma with features of inflammatory pseudotumor.  B.  No acid-fast bacilli or fungal organisms identified  utilizing Kinyoun and GMS stains, respectively.   Electronically signed by Tamia Ernandez MD on 5/11/2023 at 1314   Clinical Information    Right upper lobe lung mass (3.8 x 6.3 x 15 cm)   Comment    The fine-needle core biopsies essentially demonstrate the same histologic changes as those seen in the prior fine-needle core biopsies of this mass ([id number]).  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  Paraffin-embedded tissue will be submitted to [facility] to determine if there is still no ALK1 staining to mitigate against the inflammatory myofibroblastic variant of inflammatory pseudotumor.  These results will be reported as an addendum upon receipt from [facility].         Physical Exam:  General: Alert, oriented. No apparent distress.  Sitting up in the bed  Cardiovascular: Regular rate and rhythm without murmur, rubs, or gallops.    Pulmonary: Decreased breath sounds to right lung and bilateral bases.  No wheezing or rhonchi.    Chest: Right side chest tube to-20 cm H2O suction.  No air leak.  Fluid is cloudy and thinning  Abdomen: Soft, non distended, and non tender.  Extremities: Warm, moves all extremities. .   Neurologic:  Grossly intact with no focal deficits.         Impression:  Empyema, right  Right lung mass, inflammatory pseudotumor  Recurrent right pleural effusion  Tobacco abuse, in remission  Stage 2 moderate COPD by GOLD classification  Hypokalemia  Unintentional weight loss        Plan  Based upon CT scan of the chest findings, recommend to proceed with third course of intrapleural tPA/DNase therapy.  Patient and wife agreeable to this although frustrated patient is not able to discharge home today.    Await [facility] report. Talked with Tj in pathology and she relays tissue was sent out 5/11 and may take up to  14 days. She will call for an update.   Dr. Kim spoke with Dr. Burks and recommendation is referral to [facility] for further recommendations related to inflammatory pseudotumor. Patient and family agreeable.   Continue chest tube suction at -20 cm H2O  Flush chest tube with 10 mL normal saline every 8 hours to maintain patency  Chest x-ray daily  Encourage pulmonary toilet and ambulation  DW nursing       Barbara May, TRESSA  05/22/23  09:18 CDT

## 2023-05-22 NOTE — PAYOR COMM NOTE
"5/20 THRU 5/22 CLINICAL  STILL HAS CHEST TUBE  [id number]    [id number]    Castillo Trujillo (63 y.o. Male)       Date of Birth   1960    Social Security Number       Address   [address] [place] [address] [place] [address]    Home Phone   [phone]    MRN   [id number]       Adventism   [facility]    Marital Status                               Admission Date   5/9/23    Admission Type   Urgent    Admitting Provider   Cesar Harris MD    Attending Provider   Cesar Harris MD    Department, Room/Bed   [facility] 3C, 364/1       Discharge Date       Discharge Disposition       Discharge Destination                                 Attending Provider: Cesar Harris MD    Allergies: No Known Allergies    Isolation: None   Infection: None   Code Status: CPR    Ht: 188 cm (74\")   Wt: 90.7 kg (200 lb)    Admission Cmt: None   Principal Problem: Empyema, right [J86.9]                   Active Insurance as of 5/9/2023       Primary Coverage       Payor Plan Insurance Group Employer/Plan Group    ANTHEM BLUE CROSS ANTHEM BLUE CROSS BLUE SHIELD PPO [id number]       Payor Plan Address Payor Plan Phone Number Payor Plan Fax Number Effective Dates    [address] [phone]  8/1/2018 - None Entered    [facility] [id number]         Subscriber Name Subscriber Birth Date Member ID       CASTILLO TRUJILLO 1960 [id number]               Secondary Coverage       Payor Plan Insurance Group Employer/Plan Group    MEDICARE MEDICARE A & B        Payor Plan Address Payor Plan Phone Number Payor Plan Fax Number Effective Dates    [address] [phone]  3/1/2023 - None Entered    [facility], [facility] within [facility] [address]         Subscriber Name Subscriber Birth Date Member ID       CASTILLO TRUJILLO 1960 [id number]                     Emergency Contacts        (Rel.) Home Phone Work Phone Mobile Phone    VALENTIN TRUJILLO (Spouse) [phone] -- --              Lines, Drains & Airways       Active LDAs       Name " Placement date Placement time Site Days    Peripheral IV 05/21/23 1502 Anterior;Distal;Right;Upper Arm 05/21/23  1502  Arm  less than 1    Chest Tube Right Midaxillary --  --  Midaxillary  --                  Current Facility-Administered Medications   Medication Dose Route Frequency Provider Last Rate Last Admin    acetaminophen (TYLENOL) tablet 650 mg  650 mg Oral Q4H PRN Carolynn Pryor APRN        Or    acetaminophen (TYLENOL) 160 MG/5ML solution 650 mg  650 mg Oral Q4H PRN Carolynn Pryor APRN        Or    acetaminophen (TYLENOL) suppository 650 mg  650 mg Rectal Q4H PRN Carolynn Pryor APRN        alteplase (ACTIVASE) 10 mg in sodium chloride 0.9 % 30 mL Syringe  10 mg Intrapleural BID Barbara May APRN   10 mg at 05/22/23 1006    And    dornase alpha (PULMOZYME) 5 mg in sterile water (preservative free) 30 mL  5 mg Intrapleural BID Barbara Mya APRN   5 mg at 05/22/23 1007    aspirin EC tablet 81 mg  81 mg Oral Daily Carolynn Pryor APRN   81 mg at 05/22/23 0918    HYDROcodone-acetaminophen (NORCO) 5-325 MG per tablet 1 tablet  1 tablet Oral Q4H PRN Benny Kim MD   1 tablet at 05/22/23 0933    ipratropium-albuterol (DUO-NEB) nebulizer solution 3 mL  3 mL Nebulization 4x Daily - RT Osvaldo Pryor MD   3 mL at 05/22/23 1003    levothyroxine (SYNTHROID, LEVOTHROID) tablet 200 mcg  200 mcg Oral Q AM Carolynn Pryor APRN   200 mcg at 05/22/23 0528    lidocaine (XYLOCAINE) 1 % injection 10 mL  10 mL Infiltration Once Benny Kim MD        lisinopril (PRINIVIL,ZESTRIL) tablet 10 mg  10 mg Oral Daily Carolynn Pryor APRN   10 mg at 05/22/23 0918    ondansetron (ZOFRAN) tablet 4 mg  4 mg Oral Q6H PRN Carolynn Pryor APRN        Or    ondansetron (ZOFRAN) injection 4 mg  4 mg Intravenous Q6H PRN Carolynn Pryor APRN        pantoprazole (PROTONIX) EC tablet 40 mg  40 mg Oral Daily Carolynn Pryor APRN   40 mg at 05/22/23 0918    Pharmacy to Dose Zosyn   Does  not apply Continuous PRN Vannesa Yadav, TRESSA        piperacillin-tazobactam (ZOSYN) 4.5 g in iso-osmotic dextrose 100 mL IVPB (premix)  4.5 g Intravenous Q8H Vannesa Yadav APRN   4.5 g at 05/22/23 0436    sodium chloride 0.9 % flush 10 mL  10 mL Intravenous Q12H Carolynn Pryor APRN   10 mL at 05/22/23 0146    sodium chloride 0.9 % flush 10 mL  10 mL Intravenous PRN Carolynn Pryor APRN        sodium chloride 0.9 % infusion 40 mL  40 mL Intravenous PRN Carolynn Pryor, TRESSA         Orders (last 24 hrs)        Start     Ordered    05/22/23 1015  alteplase (ACTIVASE) 10 mg in sodium chloride 0.9 % 30 mL Syringe  2 Times Daily        See Hyperspace for full Linked Orders Report.    05/22/23 0917    05/22/23 1015  dornase alpha (PULMOZYME) 5 mg in sterile water (preservative free) 30 mL  2 Times Daily        See Hyperspace for full Linked Orders Report.    05/22/23 0917    05/22/23 0917  Keep CT clamped.  Monitor for signs of respiratory distress every 15 minutes.  Please turn patient every 30 minutes.  Left side down first then reposition to supine then reposition to right side down and finally return to supine position.  Total t...  Once        Comments: Keep CT clamped.  Monitor for signs of respiratory distress every 15 minutes.  Please turn patient every 30 minutes.  Left side down first then reposition to supine then reposition to right side down and finally return to supine position.  Total time is 2 hours.  After 2 hours, release chest tube clamp and reconnect back to suction at -20 cm H2O    05/22/23 0917    05/21/23 1115  iopamidol (ISOVUE-300) 61 % injection 100 mL  Once in Imaging         05/21/23 1029    05/16/23 2000  piperacillin-tazobactam (ZOSYN) 4.5 g in iso-osmotic dextrose 100 mL IVPB (premix)  Every 8 Hours         05/16/23 1141    05/16/23 1137  Pharmacy to Dose Zosyn  Continuous PRN         05/16/23 1138    05/11/23 1645  HYDROcodone-acetaminophen (NORCO) 5-325 MG per tablet 1 tablet  Every 4  Hours PRN         05/11/23 1635    05/10/23 0845  lidocaine (XYLOCAINE) 1 % injection 10 mL  Once         05/10/23 0758    05/10/23 0600  levothyroxine (SYNTHROID, LEVOTHROID) tablet 200 mcg  Every Early Morning         05/09/23 1631    05/09/23 2100  sodium chloride 0.9 % flush 10 mL  Every 12 Hours Scheduled         05/09/23 1542    05/09/23 2030  ipratropium-albuterol (DUO-NEB) nebulizer solution 3 mL  4 Times Daily - RT         05/09/23 1626    05/09/23 1800  Incentive Spirometry  Every 4 Hours While Awake       05/09/23 1542    05/09/23 1800  aspirin EC tablet 81 mg  Daily         05/09/23 1631    05/09/23 1800  lisinopril (PRINIVIL,ZESTRIL) tablet 10 mg  Daily         05/09/23 1631    05/09/23 1800  pantoprazole (PROTONIX) EC tablet 40 mg  Daily         05/09/23 1631    05/09/23 1600  Vital Signs  Every 4 Hours       05/09/23 1542    05/09/23 1600  Oral Care  Every 4 Hours       05/09/23 1542    05/09/23 1547  XR Chest 1 View  Daily       05/09/23 1546    05/09/23 1542  ondansetron (ZOFRAN) tablet 4 mg  Every 6 Hours PRN        See Hyperspace for full Linked Orders Report.    05/09/23 1542    05/09/23 1542  ondansetron (ZOFRAN) injection 4 mg  Every 6 Hours PRN        See Hyperspace for full Linked Orders Report.    05/09/23 1542    05/09/23 1542  acetaminophen (TYLENOL) tablet 650 mg  Every 4 Hours PRN        See Hyperspace for full Linked Orders Report.    05/09/23 1542    05/09/23 1542  acetaminophen (TYLENOL) 160 MG/5ML solution 650 mg  Every 4 Hours PRN        See Hyperspace for full Linked Orders Report.    05/09/23 1542    05/09/23 1542  acetaminophen (TYLENOL) suppository 650 mg  Every 4 Hours PRN        See Hyperspace for full Linked Orders Report.    05/09/23 1542    05/09/23 1538  Intake & Output  Every Shift       05/09/23 1542    05/09/23 1537  sodium chloride 0.9 % flush 10 mL  As Needed         05/09/23 1542    05/09/23 1537  sodium chloride 0.9 % infusion 40 mL  As Needed         05/09/23 1542  "   Unscheduled  Up With Assistance  As Needed       23 1542    --  ibuprofen (ADVIL,MOTRIN) 800 MG tablet  3 Times Daily PRN         05/10/23 0959                  Ventilator/Non-Invasive Ventilation Settings (From admission, onward)      None          Operative/Procedure Notes (all)    No notes of this type exist for this encounter.          Physician Progress Notes (last 48 hours)        Barbara May, APRN at 23 0917          Patient name: Castillo Trujillo  Patient : 1960  VISIT # [id number]  MR #[id number]    Procedure:  Procedure Date:   POD:* No surgery found *    Subjective   CC: Shortness of breath    Resting in bed.  Remains on room air.  No new complaints today with the exception of wanting to go home.     CT chest performed yesterday afternoon and shows improvement but with minimal  residual pleural fluid and space remaining.      Patient and wife have questions about pleural biopsy diagnosis.  Inquire about seeking an opinion at tertiary care center.          Objective     Visit Vitals  /60 (BP Location: Left arm, Patient Position: Lying)   Pulse 79   Temp 97.8 °F (36.6 °C) (Oral)   Resp 16   Ht 188 cm (74\")   Wt 90.7 kg (200 lb)   SpO2 95%   BMI 25.68 kg/m²       Intake/Output Summary (Last 24 hours) at 2023 09  Last data filed at 2023 0349  Gross per 24 hour   Intake 240 ml   Output 3025 ml   Net -2785 ml     Right chest tube output: none documented   No air leak       LABS:  NONE    IMAGES:       Imaging Results (Last 24 Hours)       Procedure Component Value Units Date/Time    XR Chest 1 View [[id number]] Collected: 23     Updated: 23    Narrative:      EXAM/TECHNIQUE: XR CHEST 1 VW-     INDICATION: chest tube     COMPARISON: Prior day     FINDINGS:     Right-sided pleural drain is stable in position. No change in trace  residual right-sided pleural effusion. No change in RIGHT perihilar  lower lobe airspace opacity. LEFT lung and pleural " space are clear. No  pneumothorax. Stable cardiac silhouette.       Impression:         No change in appearance of the chest.  This report was finalized on 05/22/2023 07:32 by Dr. Yogi Lopez MD.    CT Chest With Contrast Diagnostic [[id number]] Collected: 05/21/23 1208     Updated: 05/21/23 1220    Narrative:      CT CHEST W CONTRAST DIAGNOSTIC- 5/21/2023 10:28 AM CDT     HISTORY: empyema, plural effusion and mass      COMPARISON: CT scan dated 05/15/2023      DOSE LENGTH PRODUCT: 212 mGy cm. Automated exposure control was also  utilized to decrease patient radiation dose.     TECHNIQUE: Serial helical tomographic images of the chest were acquired  following the intravenous infusion of contrast. Multiplanar reformatted  images were provided for review.     FINDINGS:      Visualized neck base: The imaged portion of the base of the neck appears  unremarkable.      Lungs: Left upper lobe calcified granuloma. The left lung is otherwise  clear. There is a right-sided pleural drain which is in stable position.  Diffuse right lung pleural thickening. Minimal residual effusion along  the posterior costal pleura. This is similar to the recent comparison  chest CT. Right lung is partially atelectatic, appearing stable. Airways  are clear.     Heart: The heart is normal in size. There is no pericardial effusion.      Vasculature: Thoracic aorta is normal in caliber. No dissection  identified. The pulmonary arteries are normal in appearance.     Mediastinum and lymph nodes: Remote granulomatous calcification the  mediastinum.     Skeletal and soft tissues: Chest wall soft tissues are unremarkable. No  acute bony abnormality.       Upper abdomen: The imaged portion of the upper abdomen demonstrates no  acute process. Cholelithiasis.       Impression:      1. History of right lung empyema. There is a right-sided pleural drain  which is in stable position. Diffuse right lung pleural thickening.  There is only a very minimal  fluid remaining in the right pleural space.     This report was finalized on 05/21/2023 12:17 by Dr Dejon Callaway, .          Chest x-ray today shows right pigtail chest tube in place, no pneumothorax, residual right effusion    Final Diagnosis   Lung, right upper lobe, fine-needle core biopsies and touch preparations:  A.  Benign lung parenchyma with features of inflammatory pseudotumor.  B.  No acid-fast bacilli or fungal organisms identified utilizing Kinyoun and GMS stains, respectively.   Electronically signed by Tamia Ernandez MD on 5/11/2023 at 1314   Clinical Information    Right upper lobe lung mass (3.8 x 6.3 x 15 cm)   Comment    The fine-needle core biopsies essentially demonstrate the same histologic changes as those seen in the prior fine-needle core biopsies of this mass ([id number]).  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  Paraffin-embedded tissue will be submitted to [facility] to determine if there is still no ALK1 staining to mitigate against the inflammatory myofibroblastic variant of inflammatory pseudotumor.  These results will be reported as an addendum upon receipt from [facility].         Physical Exam:  General: Alert, oriented. No apparent distress.  Sitting up in the bed  Cardiovascular: Regular rate and rhythm without murmur, rubs, or gallops.    Pulmonary: Decreased breath sounds to right lung and bilateral bases.  No wheezing or rhonchi.    Chest: Right side chest tube to-20 cm H2O suction.  No air leak.  Fluid is cloudy and thinning  Abdomen: Soft, non distended, and non tender.  Extremities: Warm, moves all extremities. .   Neurologic:  Grossly intact with no focal deficits.         Impression:  Empyema, right  Right lung mass, inflammatory pseudotumor  Recurrent right pleural effusion  Tobacco abuse, in  remission  Stage 2 moderate COPD by GOLD classification  Hypokalemia  Unintentional weight loss        Plan  Based upon CT scan of the chest findings, recommend to proceed with third course of intrapleural tPA/DNase therapy.  Patient and wife agreeable to this although frustrated patient is not able to discharge home today.    Await [facility] report. Talked with Tj in pathology and she relays tissue was sent out 5/11 and may take up to 14 days. She will call for an update.   Dr. Kim spoke with Dr. Burks and recommendation is referral to [facility] for further recommendations related to inflammatory pseudotumor. Patient and family agreeable.   Continue chest tube suction at -20 cm H2O  Flush chest tube with 10 mL normal saline every 8 hours to maintain patency  Chest x-ray daily  Encourage pulmonary toilet and ambulation  DW nursing       TRESSA Garcia  05/22/23  09:18 CDT      Electronically signed by Barbara May APRN at 05/22/23 0945       Barry Rich MD at 05/21/23 1407              [facility] Medicine Services  INPATIENT PROGRESS NOTE    Patient Name: Castillo Trujillo  Date of Admission: 5/9/2023  Today's Date: 05/21/23  Length of Stay: 11  Primary Care Physician: Juan Vaughn DO    Subjective   Chief Complaint: follow up empyema  HPI   Doing ok, afebrile, not in any pain  Oncology/CTS working on [facility] referral.        Review of Systems   Constitutional:  Positive for fatigue. Negative for fever.   HENT:  Negative for congestion and ear pain.    Eyes:  Negative for redness and visual disturbance.   Respiratory:  Negative for cough, shortness of breath and wheezing.    Cardiovascular:  Negative for chest pain and palpitations.   Gastrointestinal:  Negative for abdominal pain, diarrhea, nausea and vomiting.   Endocrine: Negative for cold intolerance and heat intolerance.   Genitourinary:  Negative for dysuria and frequency.    Musculoskeletal:  Negative for arthralgias and back pain.   Skin:  Negative for rash and wound.   Neurological:  Negative for dizziness and headaches.   Psychiatric/Behavioral:  Negative for confusion. The patient is not nervous/anxious.         All pertinent negatives and positives are as above. All other systems have been reviewed and are negative unless otherwise stated.     Objective    Temp:  [96.6 °F (35.9 °C)-97.8 °F (36.6 °C)] 97.7 °F (36.5 °C)  Heart Rate:  [74-88] 88  Resp:  [16] 16  BP: (131-145)/(62-79) 139/63  Physical Exam  Vitals reviewed.   Constitutional:       Appearance: He is well-developed.   HENT:      Head: Normocephalic and atraumatic.      Right Ear: External ear normal.      Left Ear: External ear normal.      Nose: Nose normal.   Eyes:      General: No scleral icterus.        Right eye: No discharge.         Left eye: No discharge.      Conjunctiva/sclera: Conjunctivae normal.      Pupils: Pupils are equal, round, and reactive to light.   Neck:      Thyroid: No thyromegaly.      Trachea: No tracheal deviation.   Cardiovascular:      Rate and Rhythm: Normal rate and regular rhythm.      Heart sounds: Normal heart sounds. No murmur heard.    No friction rub. No gallop.   Pulmonary:      Effort: Pulmonary effort is normal. No respiratory distress.      Breath sounds: Normal breath sounds. No stridor. No wheezing or rales.   Chest:      Chest wall: No tenderness.   Abdominal:      General: Bowel sounds are normal. There is no distension.      Palpations: Abdomen is soft. There is no mass.      Tenderness: There is no abdominal tenderness. There is no guarding or rebound.      Hernia: No hernia is present.   Musculoskeletal:         General: No deformity. Normal range of motion.      Cervical back: Normal range of motion and neck supple.   Lymphadenopathy:      Cervical: No cervical adenopathy.   Skin:     General: Skin is warm and dry.      Coloration: Skin is not pale.      Findings: No  erythema or rash.   Neurological:      Mental Status: He is alert and oriented to person, place, and time.      Cranial Nerves: No cranial nerve deficit.      Motor: No abnormal muscle tone.      Coordination: Coordination normal.      Deep Tendon Reflexes: Reflexes are normal and symmetric. Reflexes normal.   Psychiatric:         Behavior: Behavior normal.         Thought Content: Thought content normal.         Judgment: Judgment normal.         Results Review:  I have reviewed the labs, radiology results, and diagnostic studies.    Laboratory Data:   Results from last 7 days   Lab Units 05/15/23  0450   WBC 10*3/mm3 8.15   HEMOGLOBIN g/dL 9.2*   HEMATOCRIT % 31.7*   PLATELETS 10*3/mm3 747*          Results from last 7 days   Lab Units 05/18/23  0358 05/15/23  0450 05/14/23  1625   SODIUM mmol/L 132* 136  --    POTASSIUM mmol/L 3.5 3.5  --    CHLORIDE mmol/L 96* 101  --    CO2 mmol/L 26.0 28.0  --    BUN mg/dL 6* 5*  --    CREATININE mg/dL 0.61* 0.63* 0.60*   CALCIUM mg/dL 8.9 8.7  --    GLUCOSE mg/dL 114* 95  --          Culture Data:   No results found for: BLOODCX, URINECX, WOUNDCX, MRSACX, RESPCX, STOOLCX    Radiology Data:   Imaging Results (Last 24 Hours)       Procedure Component Value Units Date/Time    CT Chest With Contrast Diagnostic [[id number]] Collected: 05/21/23 1208     Updated: 05/21/23 1220    Narrative:      CT CHEST W CONTRAST DIAGNOSTIC- 5/21/2023 10:28 AM CDT     HISTORY: empyema, plural effusion and mass      COMPARISON: CT scan dated 05/15/2023      DOSE LENGTH PRODUCT: 212 mGy cm. Automated exposure control was also  utilized to decrease patient radiation dose.     TECHNIQUE: Serial helical tomographic images of the chest were acquired  following the intravenous infusion of contrast. Multiplanar reformatted  images were provided for review.     FINDINGS:      Visualized neck base: The imaged portion of the base of the neck appears  unremarkable.      Lungs: Left upper lobe calcified  granuloma. The left lung is otherwise  clear. There is a right-sided pleural drain which is in stable position.  Diffuse right lung pleural thickening. Minimal residual effusion along  the posterior costal pleura. This is similar to the recent comparison  chest CT. Right lung is partially atelectatic, appearing stable. Airways  are clear.     Heart: The heart is normal in size. There is no pericardial effusion.      Vasculature: Thoracic aorta is normal in caliber. No dissection  identified. The pulmonary arteries are normal in appearance.     Mediastinum and lymph nodes: Remote granulomatous calcification the  mediastinum.     Skeletal and soft tissues: Chest wall soft tissues are unremarkable. No  acute bony abnormality.       Upper abdomen: The imaged portion of the upper abdomen demonstrates no  acute process. Cholelithiasis.       Impression:      1. History of right lung empyema. There is a right-sided pleural drain  which is in stable position. Diffuse right lung pleural thickening.  There is only a very minimal fluid remaining in the right pleural space.     This report was finalized on 05/21/2023 12:17 by Dr Dejon Callaway, .    XR Chest 1 View [[id number]] Collected: 05/21/23 0850     Updated: 05/21/23 0854    Narrative:      Frontal supine radiograph of the chest 5/21/2023 3:23 AM CDT     History: chest tube     Comparison: 05/20/2023      Findings:      Right-sided pleural drain is in stable position. Residual loculated  right basilar effusion is stable. No pneumothorax. No new consolidation.  The left lung is clear. No mediastinal shift. Heart size is stable.  Pulmonary vasculature are nondilated.       Impression:      Impression:   1. No significant interval change.        This report was finalized on 05/21/2023 08:51 by Dr Dejon Callaway, .            I have reviewed the patient's current medications.     Assessment/Plan   Assessment  Active Hospital Problems    Diagnosis     **Empyema, right      Empyema lung     Recurrent right pleural effusion     Stage 2 moderate COPD by GOLD classification     Right lung mass     Tobacco abuse, in remission     GERD (gastroesophageal reflux disease)     Primary hypertension     Acquired hypothyroidism        Treatment Plan  1.  Empyema  -IV abx  -CTS following  -Intrapleural Activase/Pulmozyme completed  -Repeat CT shows improvement    2.  Inflammatory pseudotumor   -CTS following  -oncology consulted    3.  GERD  -Protonix    4.  HTN  -Lisinopril    5.  Hypothyroidism  -Synthroid        Medical Decision Making  Number and Complexity of problems: 5 problems, moderate complexity    Risk:  Moderate:  Prescription drug management    Differential Diagnosis: empyema confirmed    Conditions and Status        Condition is improving.     MDM Data  1:  Tests/Documents/Independent Historians: n/a  A:  Prior external notes from unique source reviewed:   B.  Review of the Results of Each Unique Test:  C.  Assessment requiring an independent Historian:    2.  Independent interpretation of Tests:n/a  A.  Radiology Interpretation:  B:  EKG/Telemetry Interpretation:    3:  Discussion of management or Test interpretation: n/a    Care Planning  Shared decision making: patient agreeable to plan  Code status and discussions: full code    Disposition  Social Determinants of Health that impact treatment or disposition: n/a  I expect the patient to be discharged to home in 1-2 days    Electronically signed by Barry Rich MD, 05/21/23, 14:07 CDT.      Electronically signed by Barry Rich MD at 05/21/23 1408       Landy Fontenot, TRESSA at 05/21/23 0830       Attestation signed by Benny Kim MD at 05/21/23 1445    I have reviewed this documentation and agree.  I have personally seen and examined Castillo Trujillo and reviewed the record. Agree with the aforementioned plan rendered jointly with Landy Fontenot.  Lytic therapy has been completed.  We will obtain a CT scan of the chest.  I  "have texted Dr. Burks in an effort to discuss case.  Further recommendations will be forthcoming.  Discussed with the patient and his wife current thoughts and consideration.  Keep chest tube at this time to 20 cm of water suction.                Patient name: Castillo Trujillo  Patient : 1960  VISIT # [id number]  MR #[id number]    Procedure:  Procedure Date:   POD:* No surgery found *    Subjective   Shortness of breath    Resting in bed.  Remains on room air.  No new complaints this morning. Completed 5 days of intrapleural lytic therapy yesterday, 2023.        Objective     Visit Vitals  /79 (BP Location: Right arm, Patient Position: Lying)   Pulse 76   Temp 96.6 °F (35.9 °C) (Axillary)   Resp 16   Ht 188 cm (74\")   Wt 90.7 kg (200 lb)   SpO2 98%   BMI 25.68 kg/m²       Intake/Output Summary (Last 24 hours) at 2023 0830  Last data filed at 2023 0700  Gross per 24 hour   Intake 460 ml   Output 3725 ml   Net -3265 ml       Right chest tube output: 200 mL / 24 hours,  no leak, cloudy/thinning.     LABS:    NONE    IMAGES:       Imaging Results (Last 24 Hours)       Procedure Component Value Units Date/Time    XR Chest 1 View [[id number]] Resulted: 23 0357     Updated: 23 0358    XR Chest 1 View [[id number]] Collected: 23 1131     Updated: 23 1135    Narrative:      Frontal supine radiograph of the chest 2023 3:30 AM CDT     History: chest tube     Comparison: 2023      Findings:   Lines and tubes are stable in position. No new opacities or  pneumothoraces are visualized in the chest. The cardiomediastinal  silhouette and pulmonary vascularity are unchanged.       No acute osseous or soft tissue abnormality is noted.        Impression:      Impression:   1. No significant interval change since previous exam.        This report was finalized on 2023 11:32 by Dr. Nathan Hayden MD.          My image interpretation: Pigtail catheter in place.  No " pneumothorax.  Stable.        Physical Exam:  General: Alert, oriented. No apparent distress.  Sitting up in the bed  Cardiovascular: Regular rate and rhythm without murmur, rubs, or gallops.    Pulmonary: Decreased breath sounds to right lung and bilateral bases.  No wheezing or rhonchi.    Chest: Right side chest tube to-20 cm H2O suction.  No air leak.  Fluid is cloudy and thinning  Abdomen: Soft, non distended, and non tender.  Extremities: Warm, moves all extremities. .   Neurologic:  Grossly intact with no focal deficits.         Impression:  Empyema, right  Right lung mass, inflammatory pseudotumor  Recurrent right pleural effusion  Tobacco abuse, in remission  Stage 2 moderate COPD by GOLD classification  Hypokalemia  Unintentional weight loss        Plan  Dr. Kim spoke with Dr. Burks and recommendation is referral to [place] for further recommendations related to inflammatory pseudotumor   Continue chest tube suction at -20 cm H2O  Flush chest tube with 10 mL normal saline every 8 hours to maintain patency  Chest x-ray daily  Encourage pulmonary toilet and ambulation  CT scan of the chest with contrast today    Discussed with patient and spouse.    Landy Fontenot, APRN  05/21/23  08:30 CDT      Electronically signed by Benny Kim MD at 05/21/23 1445       Barry Rich MD at 05/20/23 1509              [facility] Medicine Services  INPATIENT PROGRESS NOTE    Patient Name: Castillo Trujillo  Date of Admission: 5/9/2023  Today's Date: 05/20/23  Length of Stay: 10  Primary Care Physician: Juan Vaughn DO    Subjective   Chief Complaint: follow up empyema  HPI   Doing ok, afebrile, no SOA, denies pain        Review of Systems   Constitutional:  Positive for fatigue. Negative for fever.   HENT:  Negative for congestion and ear pain.    Eyes:  Negative for redness and visual disturbance.   Respiratory:  Negative for cough, shortness of breath and wheezing.     Cardiovascular:  Negative for chest pain and palpitations.   Gastrointestinal:  Negative for abdominal pain, diarrhea, nausea and vomiting.   Endocrine: Negative for cold intolerance and heat intolerance.   Genitourinary:  Negative for dysuria and frequency.   Musculoskeletal:  Negative for arthralgias and back pain.   Skin:  Negative for rash and wound.   Neurological:  Negative for dizziness and headaches.   Psychiatric/Behavioral:  Negative for confusion. The patient is not nervous/anxious.         All pertinent negatives and positives are as above. All other systems have been reviewed and are negative unless otherwise stated.     Objective    Temp:  [97.5 °F (36.4 °C)-98.4 °F (36.9 °C)] 97.7 °F (36.5 °C)  Heart Rate:  [63-85] 85  Resp:  [16] 16  BP: (121-152)/(56-76) 145/62  Physical Exam  Vitals reviewed.   Constitutional:       Appearance: He is well-developed.   HENT:      Head: Normocephalic and atraumatic.      Right Ear: External ear normal.      Left Ear: External ear normal.      Nose: Nose normal.   Eyes:      General: No scleral icterus.        Right eye: No discharge.         Left eye: No discharge.      Conjunctiva/sclera: Conjunctivae normal.      Pupils: Pupils are equal, round, and reactive to light.   Neck:      Thyroid: No thyromegaly.      Trachea: No tracheal deviation.   Cardiovascular:      Rate and Rhythm: Normal rate and regular rhythm.      Heart sounds: Normal heart sounds. No murmur heard.    No friction rub. No gallop.   Pulmonary:      Effort: Pulmonary effort is normal. No respiratory distress.      Breath sounds: Normal breath sounds. No stridor. No wheezing or rales.   Chest:      Chest wall: No tenderness.   Abdominal:      General: Bowel sounds are normal. There is no distension.      Palpations: Abdomen is soft. There is no mass.      Tenderness: There is no abdominal tenderness. There is no guarding or rebound.      Hernia: No hernia is present.   Musculoskeletal:          General: No deformity. Normal range of motion.      Cervical back: Normal range of motion and neck supple.   Lymphadenopathy:      Cervical: No cervical adenopathy.   Skin:     General: Skin is warm and dry.      Coloration: Skin is not pale.      Findings: No erythema or rash.   Neurological:      Mental Status: He is alert and oriented to person, place, and time.      Cranial Nerves: No cranial nerve deficit.      Motor: No abnormal muscle tone.      Coordination: Coordination normal.      Deep Tendon Reflexes: Reflexes are normal and symmetric. Reflexes normal.   Psychiatric:         Behavior: Behavior normal.         Thought Content: Thought content normal.         Judgment: Judgment normal.         Results Review:  I have reviewed the labs, radiology results, and diagnostic studies.    Laboratory Data:   Results from last 7 days   Lab Units 05/15/23  0450   WBC 10*3/mm3 8.15   HEMOGLOBIN g/dL 9.2*   HEMATOCRIT % 31.7*   PLATELETS 10*3/mm3 747*        Results from last 7 days   Lab Units 05/18/23  0358 05/15/23  0450 05/14/23  1625   SODIUM mmol/L 132* 136  --    POTASSIUM mmol/L 3.5 3.5  --    CHLORIDE mmol/L 96* 101  --    CO2 mmol/L 26.0 28.0  --    BUN mg/dL 6* 5*  --    CREATININE mg/dL 0.61* 0.63* 0.60*   CALCIUM mg/dL 8.9 8.7  --    GLUCOSE mg/dL 114* 95  --        Culture Data:   No results found for: BLOODCX, URINECX, WOUNDCX, MRSACX, RESPCX, STOOLCX    Radiology Data:   Imaging Results (Last 24 Hours)       Procedure Component Value Units Date/Time    XR Chest 1 View [[id number]] Collected: 05/20/23 1131     Updated: 05/20/23 1135    Narrative:      Frontal supine radiograph of the chest 5/20/2023 3:30 AM CDT     History: chest tube     Comparison: 05/19/2023      Findings:   Lines and tubes are stable in position. No new opacities or  pneumothoraces are visualized in the chest. The cardiomediastinal  silhouette and pulmonary vascularity are unchanged.       No acute osseous or soft tissue abnormality  is noted.        Impression:      Impression:   1. No significant interval change since previous exam.        This report was finalized on 05/20/2023 11:32 by Dr. Nathan Hayden MD.            I have reviewed the patient's current medications.     Assessment/Plan   Assessment  Active Hospital Problems    Diagnosis     **Empyema, right     Empyema lung     Recurrent right pleural effusion     Stage 2 moderate COPD by GOLD classification     Right lung mass     Tobacco abuse, in remission     GERD (gastroesophageal reflux disease)     Primary hypertension     Acquired hypothyroidism        Treatment Plan  1.  Empyema  -IV abx  -CTS following  -Intrapleural Activase/Pulmozyme    2.  Lung mass  -CTS following  -oncology consulted    3.  GERD  -Protonix    4.  HTN  -Lisinopril    5.  Hypothyroidism  -Synthroid        Medical Decision Making  Number and Complexity of problems: 5 problems, moderate complexity    Risk:  Moderate:  Prescription drug management    Differential Diagnosis: empyema confirmed    Conditions and Status        Condition is improving.     MDM Data  1:  Tests/Documents/Independent Historians: n/a  A:  Prior external notes from unique source reviewed:   B.  Review of the Results of Each Unique Test:  C.  Assessment requiring an independent Historian:    2.  Independent interpretation of Tests:n/a  A.  Radiology Interpretation:  B:  EKG/Telemetry Interpretation:    3:  Discussion of management or Test interpretation: n/a    Care Planning  Shared decision making: patient agreeable to plan  Code status and discussions: full code    Disposition  Social Determinants of Health that impact treatment or disposition: n/a  I expect the patient to be discharged to home in 3-4 days    Electronically signed by Barry Rich MD, 05/20/23, 15:09 CDT.      Electronically signed by Barry Rich MD at 05/20/23 1512       Landy Fontenot, TRESSA at 05/20/23 1141       Attestation signed by Benny Kim MD  "at 23    I have reviewed this documentation and agree.  I have personally seen and examined Castillo Trujillo and reviewed the record. Agree with the aforementioned plan rendered jointly with Landy Fontenot.  Continue to await oncology opinion.  I will contact Dr. Burks to discuss case as he was the on-call physician on day the consult was made.  We will complete his second intrapleural lytic therapy.  Plan for CT chest with contrast tomorrow.  Further recommendations are forthcoming.                Patient name: Castillo Trujillo  Patient : 1960  VISIT # [id number]  MR #[id number]    Procedure:  Procedure Date:   POD:* No surgery found *    Subjective   Shortness of breath    Resting in bed.  Remains on room air.  Reports pain is controlled.  Continues with intrapleural lytic therapy, day 5 of 5 planned for today.  Has been ambulating in the hallway without difficulty.  Chest tube drainage thinning.      Objective     Visit Vitals  /69 (BP Location: Right arm, Patient Position: Lying)   Pulse 83   Temp 98.4 °F (36.9 °C) (Oral)   Resp 16   Ht 188 cm (74\")   Wt 90.7 kg (200 lb)   SpO2 94%   BMI 25.68 kg/m²       Intake/Output Summary (Last 24 hours) at 2023 1141  Last data filed at 2023 1045  Gross per 24 hour   Intake --   Output 3820 ml   Net -3820 ml       Right chest tube output: 170 mL / 24 hours,  no leak, cloudy/thinning.     LABS:    NONE    IMAGES:       Imaging Results (Last 24 Hours)       Procedure Component Value Units Date/Time    XR Chest 1 View [[id number]] Collected: 23 1131     Updated: 23 1135    Narrative:      Frontal supine radiograph of the chest 2023 3:30 AM CDT     History: chest tube     Comparison: 2023      Findings:   Lines and tubes are stable in position. No new opacities or  pneumothoraces are visualized in the chest. The cardiomediastinal  silhouette and pulmonary vascularity are unchanged.       No acute osseous or soft tissue abnormality is " noted.        Impression:      Impression:   1. No significant interval change since previous exam.        This report was finalized on 05/20/2023 11:32 by Dr. Nathan Hayden MD.          My image interpretation: Pigtail catheter in place.  No pneumothorax.  No changes/stable.    Pigtail catheter in place, no pneumothorax, stable from previous exam.     Physical Exam:  General: Alert, oriented. No apparent distress.  Sitting up in the bed  Cardiovascular: Regular rate and rhythm without murmur, rubs, or gallops.    Pulmonary: Decreased breath sounds to right lung and bilateral bases.  No wheezing or rhonchi.    Chest: Right side chest tube to-20 cm H2O suction.  No air leak.  Fluid is cloudy and thinning  Abdomen: Soft, non distended, and non tender.  Extremities: Warm, moves all extremities. .   Neurologic:  Grossly intact with no focal deficits.         Impression:  Empyema, right  Right lung mass  Recurrent right pleural effusion  Tobacco abuse, in remission  Stage 2 moderate COPD by GOLD classification  Hypokalemia  Unintentional weight loss        Plan  Oncology consult   Continue intrapleural lytic therapy, day 5/5  Continue chest tube suction at -20 cm H2O  Flush chest tube with 10 mL normal saline every 8 hours to maintain patency  Chest x-ray daily  Encourage pulmonary toilet and ambulation    Discussed with patient       Landy Fontenot, TRESSA  05/20/23  11:41 CDT      Electronically signed by Benny Kim MD at 05/20/23 6179       Consult Notes (last 48 hours)  Notes from 05/20/23 1052 through 05/22/23 1052   No notes of this type exist for this encounter.

## 2023-05-22 NOTE — CASE MANAGEMENT/SOCIAL WORK
Continued Stay Note  CIELO Portillo     Patient Name: Castillo Trujillo  MRN: [id number]  Today's Date: 5/22/2023    Admit Date: 5/9/2023    Plan: Home   Discharge Plan       Row Name 05/22/23 1615       Plan    Plan Home    Patient/Family in Agreement with Plan yes    Plan Comments Pt still has a chest tube. Rec'd a referral to arrange outpatient oncology appointment at [facility]. Informed pt's nurse and charge nurse.                   Discharge Codes    No documentation.                 Expected Discharge Date and Time       Expected Discharge Date Expected Discharge Time    May 23, 2023               JOHN Marcus

## 2023-05-22 NOTE — PLAN OF CARE
Goal Outcome Evaluation:  Plan of Care Reviewed With: patient           Outcome Evaluation: PT up ad jo, using urinal. PT with intermittent pain complaint. RT chest tube at -20 dry suction.

## 2023-05-23 ENCOUNTER — APPOINTMENT (OUTPATIENT)
Dept: GENERAL RADIOLOGY | Facility: HOSPITAL | Age: 63
DRG: 178 | End: 2023-05-23
Payer: COMMERCIAL

## 2023-05-23 DIAGNOSIS — J98.4: Primary | ICD-10-CM

## 2023-05-23 LAB
ALBUMIN SERPL-MCNC: 3.3 G/DL (ref 3.5–5.2)
ALBUMIN/GLOB SERPL: 0.8 G/DL
ALP SERPL-CCNC: 204 U/L (ref 39–117)
ALT SERPL W P-5'-P-CCNC: 26 U/L (ref 1–41)
ANION GAP SERPL CALCULATED.3IONS-SCNC: 9 MMOL/L (ref 5–15)
AST SERPL-CCNC: 19 U/L (ref 1–40)
BASOPHILS # BLD AUTO: 0.12 10*3/MM3 (ref 0–0.2)
BASOPHILS NFR BLD AUTO: 1.4 % (ref 0–1.5)
BEAKER LAB AP INTRAOPERATIVE CONSULTATION: NORMAL
BILIRUB SERPL-MCNC: 0.2 MG/DL (ref 0–1.2)
BUN SERPL-MCNC: 8 MG/DL (ref 8–23)
BUN/CREAT SERPL: 11.3 (ref 7–25)
CALCIUM SPEC-SCNC: 9.5 MG/DL (ref 8.6–10.5)
CHLORIDE SERPL-SCNC: 101 MMOL/L (ref 98–107)
CO2 SERPL-SCNC: 26 MMOL/L (ref 22–29)
CREAT SERPL-MCNC: 0.71 MG/DL (ref 0.76–1.27)
CYTO UR: NORMAL
DEPRECATED RDW RBC AUTO: 50.2 FL (ref 37–54)
EGFRCR SERPLBLD CKD-EPI 2021: 101.8 ML/MIN/1.73
EOSINOPHIL # BLD AUTO: 0.34 10*3/MM3 (ref 0–0.4)
EOSINOPHIL NFR BLD AUTO: 3.9 % (ref 0.3–6.2)
ERYTHROCYTE [DISTWIDTH] IN BLOOD BY AUTOMATED COUNT: 17.4 % (ref 12.3–15.4)
FUNGUS WND CULT: NORMAL
GLOBULIN UR ELPH-MCNC: 3.9 GM/DL
GLUCOSE SERPL-MCNC: 91 MG/DL (ref 65–99)
HCT VFR BLD AUTO: 33.2 % (ref 37.5–51)
HGB BLD-MCNC: 9.8 G/DL (ref 13–17.7)
IMM GRANULOCYTES # BLD AUTO: 0.06 10*3/MM3 (ref 0–0.05)
IMM GRANULOCYTES NFR BLD AUTO: 0.7 % (ref 0–0.5)
LAB AP CASE REPORT: NORMAL
LAB AP CLINICAL INFORMATION: NORMAL
LAB AP DIAGNOSIS COMMENT: NORMAL
LYMPHOCYTES # BLD AUTO: 1.94 10*3/MM3 (ref 0.7–3.1)
LYMPHOCYTES NFR BLD AUTO: 22.4 % (ref 19.6–45.3)
Lab: NORMAL
MCH RBC QN AUTO: 24.1 PG (ref 26.6–33)
MCHC RBC AUTO-ENTMCNC: 29.5 G/DL (ref 31.5–35.7)
MCV RBC AUTO: 81.6 FL (ref 79–97)
MONOCYTES # BLD AUTO: 0.64 10*3/MM3 (ref 0.1–0.9)
MONOCYTES NFR BLD AUTO: 7.4 % (ref 5–12)
MYCOBACTERIUM SPEC CULT: NORMAL
NEUTROPHILS NFR BLD AUTO: 5.55 10*3/MM3 (ref 1.7–7)
NEUTROPHILS NFR BLD AUTO: 64.2 % (ref 42.7–76)
NIGHT BLUE STAIN TISS: NORMAL
NIGHT BLUE STAIN TISS: NORMAL
NRBC BLD AUTO-RTO: 0 /100 WBC (ref 0–0.2)
PATH REPORT.FINAL DX SPEC: NORMAL
PATH REPORT.GROSS SPEC: NORMAL
PLATELET # BLD AUTO: 645 10*3/MM3 (ref 140–450)
PMV BLD AUTO: 8.4 FL (ref 6–12)
POTASSIUM SERPL-SCNC: 4.1 MMOL/L (ref 3.5–5.2)
PROT SERPL-MCNC: 7.2 G/DL (ref 6–8.5)
RBC # BLD AUTO: 4.07 10*6/MM3 (ref 4.14–5.8)
SODIUM SERPL-SCNC: 136 MMOL/L (ref 136–145)
WBC NRBC COR # BLD: 8.65 10*3/MM3 (ref 3.4–10.8)

## 2023-05-23 PROCEDURE — 25010000002 ALTEPLASE 2 MG RECONSTITUTED SOLUTION 1 EACH VIAL: Performed by: NURSE PRACTITIONER

## 2023-05-23 PROCEDURE — 71045 X-RAY EXAM CHEST 1 VIEW: CPT

## 2023-05-23 PROCEDURE — 25010000002 PIPERACILLIN SOD-TAZOBACTAM PER 1 G: Performed by: NURSE PRACTITIONER

## 2023-05-23 PROCEDURE — 94799 UNLISTED PULMONARY SVC/PX: CPT

## 2023-05-23 PROCEDURE — 80053 COMPREHEN METABOLIC PANEL: CPT | Performed by: FAMILY MEDICINE

## 2023-05-23 PROCEDURE — 94664 DEMO&/EVAL PT USE INHALER: CPT

## 2023-05-23 PROCEDURE — 85025 COMPLETE CBC W/AUTO DIFF WBC: CPT | Performed by: FAMILY MEDICINE

## 2023-05-23 RX ADMIN — IPRATROPIUM BROMIDE AND ALBUTEROL SULFATE 3 ML: 2.5; .5 SOLUTION RESPIRATORY (INHALATION) at 05:57

## 2023-05-23 RX ADMIN — DORNASE ALFA 5 MG: 1 SOLUTION RESPIRATORY (INHALATION) at 09:43

## 2023-05-23 RX ADMIN — Medication 10 ML: at 19:35

## 2023-05-23 RX ADMIN — HYDROCODONE BITARTRATE AND ACETAMINOPHEN 1 TABLET: 5; 325 TABLET ORAL at 06:50

## 2023-05-23 RX ADMIN — SODIUM CHLORIDE 10 MG: 9 INJECTION INTRAMUSCULAR; INTRAVENOUS; SUBCUTANEOUS at 09:42

## 2023-05-23 RX ADMIN — HYDROCODONE BITARTRATE AND ACETAMINOPHEN 1 TABLET: 5; 325 TABLET ORAL at 10:52

## 2023-05-23 RX ADMIN — ASPIRIN 81 MG: 81 TABLET, COATED ORAL at 08:45

## 2023-05-23 RX ADMIN — PANTOPRAZOLE SODIUM 40 MG: 40 TABLET, DELAYED RELEASE ORAL at 08:45

## 2023-05-23 RX ADMIN — PIPERACILLIN SODIUM AND TAZOBACTAM SODIUM 4.5 G: 4; .5 INJECTION, SOLUTION INTRAVENOUS at 11:19

## 2023-05-23 RX ADMIN — IPRATROPIUM BROMIDE AND ALBUTEROL SULFATE 3 ML: 2.5; .5 SOLUTION RESPIRATORY (INHALATION) at 10:02

## 2023-05-23 RX ADMIN — HYDROCODONE BITARTRATE AND ACETAMINOPHEN 1 TABLET: 5; 325 TABLET ORAL at 02:41

## 2023-05-23 RX ADMIN — PIPERACILLIN SODIUM AND TAZOBACTAM SODIUM 4.5 G: 4; .5 INJECTION, SOLUTION INTRAVENOUS at 04:58

## 2023-05-23 RX ADMIN — LISINOPRIL 10 MG: 10 TABLET ORAL at 08:45

## 2023-05-23 RX ADMIN — HYDROCODONE BITARTRATE AND ACETAMINOPHEN 1 TABLET: 5; 325 TABLET ORAL at 15:31

## 2023-05-23 RX ADMIN — LEVOTHYROXINE SODIUM 200 MCG: 100 TABLET ORAL at 04:58

## 2023-05-23 RX ADMIN — IPRATROPIUM BROMIDE AND ALBUTEROL SULFATE 3 ML: 2.5; .5 SOLUTION RESPIRATORY (INHALATION) at 14:02

## 2023-05-23 RX ADMIN — IPRATROPIUM BROMIDE AND ALBUTEROL SULFATE 3 ML: 2.5; .5 SOLUTION RESPIRATORY (INHALATION) at 18:25

## 2023-05-23 RX ADMIN — HYDROCODONE BITARTRATE AND ACETAMINOPHEN 1 TABLET: 5; 325 TABLET ORAL at 19:35

## 2023-05-23 RX ADMIN — HYDROCODONE BITARTRATE AND ACETAMINOPHEN 1 TABLET: 5; 325 TABLET ORAL at 23:47

## 2023-05-23 NOTE — PLAN OF CARE
Goal Outcome Evaluation:  Plan of Care Reviewed With: patient        Progress: no change  Outcome Evaluation: Ntn follow up. Pt cont on a regular diet. He has a good appetite. He has consumed 85% avg of the last 5 meals. No sig weight changes noted. He is aware of alternate food selections if needed. Cont to follow for nutrition needs.

## 2023-05-23 NOTE — PROGRESS NOTES
[facility] Medicine Services  INPATIENT PROGRESS NOTE    Patient Name: Castillo Trujillo  Date of Admission: 5/9/2023  Today's Date: 05/23/23  Length of Stay: 13  Primary Care Physician: Juan Vaughn DO    Subjective   Chief Complaint: Empyema. Lung mass. Pseudotumor.     HPI   Blood pressure stable, afebrile.  Ongoing third round of tPA.  Hemoglobin stable.  Thrombocytosis improving.  Patient is on room air.    Review of Systems   Constitutional:  Positive for activity change, appetite change and fatigue. Negative for chills and fever.   HENT:  Negative for hearing loss, nosebleeds, tinnitus and trouble swallowing.    Eyes:  Negative for visual disturbance.   Respiratory:  Negative for cough, chest tightness, shortness of breath and wheezing.    Cardiovascular:  Negative for chest pain, palpitations and leg swelling.   Gastrointestinal:  Negative for abdominal distention, abdominal pain, blood in stool, constipation, diarrhea, nausea and vomiting.   Endocrine: Negative for cold intolerance, heat intolerance, polydipsia, polyphagia and polyuria.   Genitourinary:  Negative for decreased urine volume, difficulty urinating, dysuria, flank pain, frequency and hematuria.   Musculoskeletal:  Negative for arthralgias, joint swelling and myalgias.   Skin:  Negative for rash.   Allergic/Immunologic: Negative for immunocompromised state.   Neurological:  Positive for weakness. Negative for dizziness, syncope, light-headedness and headaches.   Hematological:  Negative for adenopathy. Does not bruise/bleed easily.   Psychiatric/Behavioral:  Negative for confusion and sleep disturbance. The patient is not nervous/anxious.     All pertinent negatives and positives are as above. All other systems have been reviewed and are negative unless otherwise stated.     Objective    Temp:  [97.4 °F (36.3 °C)-98 °F (36.7 °C)] 97.7 °F (36.5 °C)  Heart Rate:  [78-88] 82  Resp:  [16-18] 16  BP:  (105-157)/(49-91) 145/65  Physical Exam  Vitals and nursing note reviewed.   HENT:      Head: Normocephalic.   Eyes:      Conjunctiva/sclera: Conjunctivae normal.      Pupils: Pupils are equal, round, and reactive to light.   Neck:      Vascular: No JVD.   Cardiovascular:      Rate and Rhythm: Normal rate and regular rhythm.      Heart sounds: Normal heart sounds.   Pulmonary:      Effort: No respiratory distress.      Breath sounds: No wheezing or rales.      Comments: Diminished breath sound bilateral, clear, on room air.  Pleurx catheter right chest.  Chest:      Chest wall: No tenderness.   Abdominal:      General: Bowel sounds are normal. There is no distension.      Palpations: Abdomen is soft.      Tenderness: There is no abdominal tenderness.   Musculoskeletal:         General: No tenderness or deformity. Normal range of motion.      Cervical back: Neck supple.   Skin:     General: Skin is warm and dry.      Capillary Refill: Capillary refill takes 2 to 3 seconds.      Findings: No rash.   Neurological:      Mental Status: He is alert and oriented to person, place, and time.      Cranial Nerves: No cranial nerve deficit.      Motor: Weakness present. No abnormal muscle tone.      Deep Tendon Reflexes: Reflexes normal.   Psychiatric:         Mood and Affect: Mood normal.         Behavior: Behavior normal.       Results Review:  I have reviewed the labs, radiology results, and diagnostic studies.    Laboratory Data:   Results from last 7 days   Lab Units 05/23/23  0547   WBC 10*3/mm3 8.65   HEMOGLOBIN g/dL 9.8*   HEMATOCRIT % 33.2*   PLATELETS 10*3/mm3 645*        Results from last 7 days   Lab Units 05/23/23  0547 05/18/23  0358   SODIUM mmol/L 136 132*   POTASSIUM mmol/L 4.1 3.5   CHLORIDE mmol/L 101 96*   CO2 mmol/L 26.0 26.0   BUN mg/dL 8 6*   CREATININE mg/dL 0.71* 0.61*   CALCIUM mg/dL 9.5 8.9   BILIRUBIN mg/dL 0.2  --    ALK PHOS U/L 204*  --    ALT (SGPT) U/L 26  --    AST (SGOT) U/L 19  --    GLUCOSE  mg/dL 91 114*       Culture Data:   No results found for: BLOODCX, URINECX, WOUNDCX, MRSACX, RESPCX, STOOLCX    Radiology Data:   Imaging Results (Last 24 Hours)       Procedure Component Value Units Date/Time    XR Chest 1 View [[id number]] Collected: 05/23/23 0827     Updated: 05/23/23 0831    Narrative:      EXAMINATION:  XR CHEST 1 VW-  5/23/2023 3:15 AM CDT     HISTORY: Right chest tube. Right pleural effusion.     COMPARISON: 05/22/2023.     TECHNIQUE: Single view AP image.     FINDINGS:  A right chest tube remains in place. There is a small pleural  effusion on the right. There is infiltrate in the right perihilar region  and right lung base, stable. The left lung is clear. There is bronchial  wall thickening. Heart size is within normal limits. There are  degenerative changes of the spine.          Impression:      1. Stable small right pleural effusion with right chest tube in place.  2. Stable infiltrate in the right perihilar region and right lung base.        This report was finalized on 05/23/2023 08:28 by Dr. Dariel Beach MD.            I have reviewed the patient's current medications.     Assessment/Plan   Assessment  Active Hospital Problems    Diagnosis     **Empyema, right     Empyema lung     Recurrent right pleural effusion     Stage 2 moderate COPD by GOLD classification     Right lung mass     Tobacco abuse, in remission     GERD (gastroesophageal reflux disease)     Primary hypertension     Acquired hypothyroidism        Treatment Plan  Right empyema/COPD.  Improving.  Status post 2 rounds of tPA.  Ongoing 3rd round.  Per CT surgeon.  Zosyn antibiotics.  DuoNebs.  Incentive spirometer.  White blood cells-normal.  Pleurx catheter right lung.  Chest x-ray-Stable small right pleural effusion with right chest tube in place, Stable infiltrate in the right perihilar region and right lung base.  Patient is on room air.     Right lung mass.  CT surgeon consult.  Oncology consult.  Positive for  inflammation pseudotumor.  Not a surgical candidate for resection.  Undergoing chemo treatment and radiation and steroid.  Consult  for referral outpatient oncology for pseudotumor in [place]..    Anemia.  Hemoglobin stable.  No sign of acute bleed.    Thrombocytosis.  Improving.  Will follow.     Reflux.  Protonix.  Zofran as needed.     Hypertension.  Lisinopril.  Aspirin.     Hypothyroidism . Synthroid.     Pain . Norco as needed.     Nutrition . Regular/house diet.     Medical Decision Making  Number and Complexity of problems.  Right empyema/COPD/right lung mass/pseudotumor/reflux/hypertension/hypothyroidism  Differential Diagnosis: None     Conditions and Status     Ongoing evaluation and treatment.     MDM Data  External documents reviewed: None.  Cardiac tracing (EKG, telemetry) interpretation: Sinus .  Radiology interpretation: None.  Labs reviewed: Laboratory .  Any tests that were considered but not ordered: Lab in AM.     Decision rules/scores evaluated (example [id number], Wells, etc): None.     Discussed with: Patient .     Care Planning  Shared decision making: Patient .  Code status and discussions: Full code.     Disposition  Social Determinants of Health that impact treatment or disposition: Unknown.  Pending CT surgeon.    Electronically signed by Cesar Harris MD, 05/23/23, 11:29 CDT.

## 2023-05-23 NOTE — PAYOR COMM NOTE
"AUTH: [id number]     Castillo Trujillo (63 y.o. Male)       Date of Birth   1960    Social Security Number       Address   [address] [place] [address] [place] [address]    Home Phone   [phone]    MRN   [id number]       [facility]    Marital Status                               Admission Date   5/9/23    Admission Type   Urgent    Admitting Provider   Cesar Harris MD    Attending Provider   Cesar Harris MD    Department, Room/Bed   [facility] 3C, 364/1       Discharge Date       Discharge Disposition       Discharge Destination                                 Attending Provider: Cesar Harris MD    Allergies: No Known Allergies    Isolation: None   Infection: None   Code Status: CPR    Ht: 188 cm (74\")   Wt: 90.7 kg (200 lb)    Admission Cmt: None   Principal Problem: Empyema, right [J86.9]                   Active Insurance as of 5/9/2023       Primary Coverage       Payor Plan Insurance Group Employer/Plan Group    [employer]HEM BLUE CROSS ANTHEM BLUE CROSS BLUE SHIELD PPO [id number]       Payor Plan Address Payor Plan Phone Number Payor Plan Fax Number Effective Dates    [address] [phone]  8/1/2018 - None Entered    [facility] [id number]         Subscriber Name Subscriber Birth Date Member ID       CASTILLO TRUJILLO 1960 [id number]               Secondary Coverage       Payor Plan Insurance Group Employer/Plan Group    MEDICARE MEDICARE A & B        Payor Plan Address Payor Plan Phone Number Payor Plan Fax Number Effective Dates    [address] [phone]  3/1/2023 - None Entered    [place] [id number]         Subscriber Name Subscriber Birth Date Member ID       CASTILLO TRUJILLO 1960 [id number]                     Emergency Contacts        (Rel.) Home Phone Work Phone Mobile Phone    VALENTIN TRUJILLO (Spouse) [phone] -- --              Vital Signs (last day)       Date/Time Temp Temp src Pulse Resp BP Patient Position SpO2    05/23/23 1009 -- -- 82 16 " -- -- 100    05/23/23 1002 -- -- 80 16 -- -- 96    05/23/23 0740 97.7 (36.5) Oral 78 18 145/65 Sitting 95    05/23/23 0603 -- -- 81 18 -- -- 100    05/23/23 0557 -- -- 80 16 -- -- 96    05/23/23 0335 97.4 (36.3) Oral 78 16 115/91 Lying 94    05/22/23 2341 98 (36.7) Oral 78 16 107/49 Lying 94    05/22/23 1954 97.6 (36.4) Oral 84 16 105/86 Sitting 95    05/22/23 1839 -- -- 83 16 -- -- 96    05/22/23 1833 -- -- 82 16 -- -- 96    05/22/23 1529 97.6 (36.4) Oral 83 16 157/69 Sitting 96    05/22/23 1415 -- -- 88 -- -- -- --    05/22/23 1406 -- -- 85 16 -- -- 95    05/22/23 1052 97.8 (36.6) Oral 80 16 126/53 Lying 95    05/22/23 1012 -- -- 81 -- -- -- --    05/22/23 1003 -- -- 80 16 -- -- 99    05/22/23 0728 97.8 (36.6) Oral 79 16 127/60 Lying 95    05/22/23 0619 -- -- 72 -- -- -- --    05/22/23 0610 -- -- 71 16 -- -- 100    05/22/23 0349 97.6 (36.4) Axillary 70 16 133/59 Lying 96    05/22/23 0008 97.8 (36.6) Oral 79 16 138/68 Lying 96          Oxygen Therapy (last day)       Date/Time SpO2 Device (Oxygen Therapy) Flow (L/min) Oxygen Concentration (%) ETCO2 (mmHg)    05/23/23 1009 100 room air -- -- --    05/23/23 1002 96 room air -- -- --    05/23/23 0740 95 room air -- -- --    05/23/23 0733 -- room air -- -- --    05/23/23 0603 100 room air -- -- --    05/23/23 0557 96 room air -- -- --    05/23/23 0335 94 room air -- -- --    05/22/23 2341 94 room air -- -- --    05/22/23 1954 95 room air -- -- --    05/22/23 1839 96 -- -- -- --    05/22/23 1833 96 room air -- -- --    05/22/23 1529 96 room air -- -- --    05/22/23 1406 95 room air -- -- --    05/22/23 1052 95 room air -- -- --    05/22/23 1003 99 room air -- -- --    05/22/23 0800 -- room air -- -- --    05/22/23 0728 95 room air -- -- --    05/22/23 0610 100 room air -- -- --    05/22/23 0349 96 room air -- -- --    05/22/23 0008 96 room air -- -- --          Lab Results (last 24 hours)       Procedure Component Value Units Date/Time    Comprehensive Metabolic Panel  [[id number]]  (Abnormal) Collected: 05/23/23 0547    Specimen: Blood Updated: 05/23/23 0659     Glucose 91 mg/dL      BUN 8 mg/dL      Creatinine 0.71 mg/dL      Sodium 136 mmol/L      Potassium 4.1 mmol/L      Chloride 101 mmol/L      CO2 26.0 mmol/L      Calcium 9.5 mg/dL      Total Protein 7.2 g/dL      Albumin 3.3 g/dL      ALT (SGPT) 26 U/L      AST (SGOT) 19 U/L      Alkaline Phosphatase 204 U/L      Total Bilirubin 0.2 mg/dL      Globulin 3.9 gm/dL      A/G Ratio 0.8 g/dL      BUN/Creatinine Ratio 11.3     Anion Gap 9.0 mmol/L      eGFR 101.8 mL/min/1.73     Narrative:      GFR Normal >60  Chronic Kidney Disease <60  Kidney Failure <15      CBC & Differential [[id number]]  (Abnormal) Collected: 05/23/23 0547    Specimen: Blood Updated: 05/23/23 0641    Narrative:      The following orders were created for panel order CBC & Differential.  Procedure                               Abnormality         Status                     ---------                               -----------         ------                     CBC Auto Differential[[id number]]        Abnormal            Final result                 Please view results for these tests on the individual orders.    CBC Auto Differential [[id number]]  (Abnormal) Collected: 05/23/23 0547    Specimen: Blood Updated: 05/23/23 0641     WBC 8.65 10*3/mm3      RBC 4.07 10*6/mm3      Hemoglobin 9.8 g/dL      Hematocrit 33.2 %      MCV 81.6 fL      MCH 24.1 pg      MCHC 29.5 g/dL      RDW 17.4 %      RDW-SD 50.2 fl      MPV 8.4 fL      Platelets 645 10*3/mm3      Neutrophil % 64.2 %      Lymphocyte % 22.4 %      Monocyte % 7.4 %      Eosinophil % 3.9 %      Basophil % 1.4 %      Immature Grans % 0.7 %      Neutrophils, Absolute 5.55 10*3/mm3      Lymphocytes, Absolute 1.94 10*3/mm3      Monocytes, Absolute 0.64 10*3/mm3      Eosinophils, Absolute 0.34 10*3/mm3      Basophils, Absolute 0.12 10*3/mm3      Immature Grans, Absolute 0.06 10*3/mm3      nRBC 0.0 /100 WBC            Imaging Results (Last 24 Hours)       Procedure Component Value Units Date/Time    XR Chest 1 View [[id number]] Collected: 05/23/23 0827     Updated: 05/23/23 0831    Narrative:      EXAMINATION:  XR CHEST 1 VW-  5/23/2023 3:15 AM CDT     HISTORY: Right chest tube. Right pleural effusion.     COMPARISON: 05/22/2023.     TECHNIQUE: Single view AP image.     FINDINGS:  A right chest tube remains in place. There is a small pleural  effusion on the right. There is infiltrate in the right perihilar region  and right lung base, stable. The left lung is clear. There is bronchial  wall thickening. Heart size is within normal limits. There are  degenerative changes of the spine.          Impression:      1. Stable small right pleural effusion with right chest tube in place.  2. Stable infiltrate in the right perihilar region and right lung base.        This report was finalized on 05/23/2023 08:28 by Dr. Dariel Beach MD.          ECG/EMG Results (last 24 hours)       ** No results found for the last 24 hours. **          Orders (last 24 hrs)        Start     Ordered    05/24/23 0000  CBC (No Diff)  Every Other Day       05/23/23 1132    05/24/23 0000  Basic Metabolic Panel  Every Other Day       05/23/23 1132    05/23/23 0600  Comprehensive Metabolic Panel  Morning Draw         05/22/23 1155    05/23/23 0600  CBC & Differential  Morning Draw         05/22/23 1155    05/23/23 0600  CBC Auto Differential  PROCEDURE ONCE         05/22/23 2202    05/22/23 1015  alteplase (ACTIVASE) 10 mg in sodium chloride 0.9 % 30 mL Syringe  2 Times Daily        See Hyperspace for full Linked Orders Report.    05/22/23 0917    05/22/23 1015  dornase alpha (PULMOZYME) 5 mg in sterile water (preservative free) 30 mL  2 Times Daily        See Hyperspace for full Linked Orders Report.    05/22/23 0917    05/16/23 2000  piperacillin-tazobactam (ZOSYN) 4.5 g in iso-osmotic dextrose 100 mL IVPB (premix)  Every 8 Hours         05/16/23 1141     05/16/23 1137  Pharmacy to Dose Zosyn  Continuous PRN         05/16/23 1138    05/11/23 1645  HYDROcodone-acetaminophen (NORCO) 5-325 MG per tablet 1 tablet  Every 4 Hours PRN         05/11/23 1635    05/10/23 0845  lidocaine (XYLOCAINE) 1 % injection 10 mL  Once         05/10/23 0758    05/10/23 0600  levothyroxine (SYNTHROID, LEVOTHROID) tablet 200 mcg  Every Early Morning         05/09/23 1631    05/09/23 2100  sodium chloride 0.9 % flush 10 mL  Every 12 Hours Scheduled         05/09/23 1542    05/09/23 2030  ipratropium-albuterol (DUO-NEB) nebulizer solution 3 mL  4 Times Daily - RT         05/09/23 1626    05/09/23 1800  Incentive Spirometry  Every 4 Hours While Awake       05/09/23 1542    05/09/23 1800  aspirin EC tablet 81 mg  Daily         05/09/23 1631    05/09/23 1800  lisinopril (PRINIVIL,ZESTRIL) tablet 10 mg  Daily         05/09/23 1631    05/09/23 1800  pantoprazole (PROTONIX) EC tablet 40 mg  Daily         05/09/23 1631    05/09/23 1600  Vital Signs  Every 4 Hours       05/09/23 1542    05/09/23 1600  Oral Care  Every 4 Hours       05/09/23 1542    05/09/23 1547  XR Chest 1 View  Daily       05/09/23 1546    05/09/23 1542  ondansetron (ZOFRAN) tablet 4 mg  Every 6 Hours PRN        See Hyperspace for full Linked Orders Report.    05/09/23 1542    05/09/23 1542  ondansetron (ZOFRAN) injection 4 mg  Every 6 Hours PRN        See Hyperspace for full Linked Orders Report.    05/09/23 1542    05/09/23 1542  acetaminophen (TYLENOL) tablet 650 mg  Every 4 Hours PRN        See Hyperspace for full Linked Orders Report.    05/09/23 1542    05/09/23 1542  acetaminophen (TYLENOL) 160 MG/5ML solution 650 mg  Every 4 Hours PRN        See Hyperspace for full Linked Orders Report.    05/09/23 1542    05/09/23 1542  acetaminophen (TYLENOL) suppository 650 mg  Every 4 Hours PRN        See Hyperspace for full Linked Orders Report.    05/09/23 1542    05/09/23 1538  Intake & Output  Every Shift       05/09/23 1543     "23 1537  sodium chloride 0.9 % flush 10 mL  As Needed         23 1542    23 1537  sodium chloride 0.9 % infusion 40 mL  As Needed         23 1542    Unscheduled  Up With Assistance  As Needed       23 1542    --  ibuprofen (ADVIL,MOTRIN) 800 MG tablet  3 Times Daily PRN         05/10/23 0959                     Physician Progress Notes (last 24 hours)        Barbara Garrido, APRN at 23 09          Patient name: Castillo Trujillo  Patient : 1960  VISIT # [id number]  MR #[id number]    Procedure:  Procedure Date:   POD:* No surgery found *    Subjective   CC: Shortness of breath    Resting in bed.  Remains on room air. No overnight events reported. Continues on intrapleural lytic therapy, 3rd round, day 2.      Patient and wife have questions about patient being transferred to [place].           Objective     Visit Vitals  /65 (BP Location: Left arm, Patient Position: Sitting)   Pulse 78   Temp 97.7 °F (36.5 °C) (Oral)   Resp 18   Ht 188 cm (74\")   Wt 90.7 kg (200 lb)   SpO2 95%   BMI 25.68 kg/m²       Intake/Output Summary (Last 24 hours) at 2023 09  Last data filed at 2023 0800  Gross per 24 hour   Intake 740 ml   Output 4610 ml   Net -3870 ml     Right chest tube output: 160 ml/ 24 hr  No air leak       LABS:  NONE    IMAGES:       Imaging Results (Last 24 Hours)       Procedure Component Value Units Date/Time    XR Chest 1 View [[id number]] Collected: 23     Updated: 23    Narrative:      EXAMINATION:  XR CHEST 1 VW-  2023 3:15 AM CDT     HISTORY: Right chest tube. Right pleural effusion.     COMPARISON: 2023.     TECHNIQUE: Single view AP image.     FINDINGS:  A right chest tube remains in place. There is a small pleural  effusion on the right. There is infiltrate in the right perihilar region  and right lung base, stable. The left lung is clear. There is bronchial  wall thickening. Heart size is within normal limits. " There are  degenerative changes of the spine.          Impression:      1. Stable small right pleural effusion with right chest tube in place.  2. Stable infiltrate in the right perihilar region and right lung base.        This report was finalized on 05/23/2023 08:28 by Dr. Dariel Beach MD.          Chest x-ray today shows right pigtail chest tube in place, no pneumothorax, residual right effusion    Final Diagnosis   Lung, right upper lobe, fine-needle core biopsies and touch preparations:  A.  Benign lung parenchyma with features of inflammatory pseudotumor.  B.  No acid-fast bacilli or fungal organisms identified utilizing Kinyoun and GMS stains, respectively.   Electronically signed by Tamia Ernandez MD on 5/11/2023 at 1314   Clinical Information    Right upper lobe lung mass (3.8 x 6.3 x 15 cm)   Comment    The fine-needle core biopsies essentially demonstrate the same histologic changes as those seen in the prior fine-needle core biopsies of this mass ([id number]).  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  Paraffin-embedded tissue will be submitted to [facility] to determine if there is still no ALK1 staining to mitigate against the inflammatory myofibroblastic variant of inflammatory pseudotumor.  These results will be reported as an addendum upon receipt from [facility].         Physical Exam:  General: Alert, oriented. No apparent distress.  Sitting up in the bed  Cardiovascular: Regular rate and rhythm without murmur, rubs, or gallops.    Pulmonary: Decreased breath sounds to right lung and bilateral bases.  No wheezing or rhonchi.    Chest: Right side chest tube to-20 cm H2O suction.  No air leak.  Fluid is cloudy and thinning  Abdomen: Soft, non distended, and non tender.  Extremities: Warm, moves all extremities. .    Neurologic:  Grossly intact with no focal deficits.         Impression:  Empyema, right  Right lung mass, inflammatory pseudotumor  Recurrent right pleural effusion  Tobacco abuse, in remission  Stage 2 moderate COPD by GOLD classification  Hypokalemia  Unintentional weight loss        Plan:  Continue 3rd round of intrapleural lytic therapy, day 2/5  Await integrated oncology report.  Patient and wife have questions about being transferred to [place], will discuss further with Dr. Kim.    Continue chest tube suction at -20 cm H2O  Flush chest tube with 10 mL normal saline every 8 hours to maintain patency  Chest x-ray daily  Encourage pulmonary toilet and ambulation  DW nursing       TRESSA Howard  05/23/23  09:17 CDT      Electronically signed by Barbara Garrido APRN at 05/23/23 0908       Consult Notes (last 24 hours)  Notes from 05/22/23 1150 through 05/23/23 1150   No notes of this type exist for this encounter.

## 2023-05-23 NOTE — PLAN OF CARE
Goal Outcome Evaluation:           Progress: no change  Outcome Evaluation: Pt c/o pain PRN meds given per orders, chest tube to -20cm dry suction with serrous drainage, TPA completed per orders, up ad jo, ambulating in room and cantor, liz diet

## 2023-05-23 NOTE — PROGRESS NOTES
"Patient name: Castillo Trujillo  Patient : 1960  VISIT # [id number]  MR #[id number]    Procedure:  Procedure Date:   POD:* No surgery found *    Subjective   CC: Shortness of breath    Resting in bed.  Remains on room air. Pain is well controlled.No overnight events reported. Continues on intrapleural lytic therapy, 3rd round, day 2/5.           Objective     Visit Vitals  /65 (BP Location: Left arm, Patient Position: Sitting)   Pulse 78   Temp 97.7 °F (36.5 °C) (Oral)   Resp 18   Ht 188 cm (74\")   Wt 90.7 kg (200 lb)   SpO2 95%   BMI 25.68 kg/m²       Intake/Output Summary (Last 24 hours) at 2023 0917  Last data filed at 2023 0800  Gross per 24 hour   Intake 740 ml   Output 4610 ml   Net -3870 ml     Right chest tube output: 160 ml/ 24 hr  No air leak       LABS:  NONE    IMAGES:       Imaging Results (Last 24 Hours)       Procedure Component Value Units Date/Time    XR Chest 1 View [[id number]] Collected: 23     Updated: 23    Narrative:      EXAMINATION:  XR CHEST 1 VW-  2023 3:15 AM CDT     HISTORY: Right chest tube. Right pleural effusion.     COMPARISON: 2023.     TECHNIQUE: Single view AP image.     FINDINGS:  A right chest tube remains in place. There is a small pleural  effusion on the right. There is infiltrate in the right perihilar region  and right lung base, stable. The left lung is clear. There is bronchial  wall thickening. Heart size is within normal limits. There are  degenerative changes of the spine.          Impression:      1. Stable small right pleural effusion with right chest tube in place.  2. Stable infiltrate in the right perihilar region and right lung base.        This report was finalized on 2023 08:28 by Dr. Dariel Beach MD.          Chest x-ray today shows right pigtail chest tube in place, no pneumothorax, residual right effusion    Final Diagnosis   Lung, right upper lobe, fine-needle core biopsies and touch " preparations:  A.  Benign lung parenchyma with features of inflammatory pseudotumor.  B.  No acid-fast bacilli or fungal organisms identified utilizing Kinyoun and GMS stains, respectively.   Electronically signed by Tamia Ernandez MD on 5/11/2023 at 1314   Clinical Information    Right upper lobe lung mass (3.8 x 6.3 x 15 cm)   Comment    The fine-needle core biopsies essentially demonstrate the same histologic changes as those seen in the prior fine-needle core biopsies of this mass ([id number]).  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  Paraffin-embedded tissue will be submitted to [facility] to determine if there is still no ALK1 staining to mitigate against the inflammatory myofibroblastic variant of inflammatory pseudotumor.  These results will be reported as an addendum upon receipt from [facility].         Physical Exam:  General: Alert, oriented. No apparent distress.  Sitting up in the bed  Cardiovascular: Regular rate and rhythm without murmur, rubs, or gallops.    Pulmonary: Decreased breath sounds to right lung and bilateral bases.  No wheezing or rhonchi.    Chest: Right side chest tube to-20 cm H2O suction.  No air leak.  Fluid is cloudy and thinning  Abdomen: Soft, non distended, and non tender.  Extremities: Warm, moves all extremities. .   Neurologic:  Grossly intact with no focal deficits.         Impression:  Empyema, right  Right lung mass, inflammatory pseudotumor  Recurrent right pleural effusion  Tobacco abuse, in remission  Stage 2 moderate COPD by GOLD classification  Hypokalemia  Unintentional weight loss        Plan:  Continue 3rd round of intrapleural lytic therapy, day 2/5  Await integrated oncology report.  Continue chest tube suction at -20 cm H2O  Flush chest tube with 10 mL normal saline every 8 hours to  maintain patency  Chest x-ray daily  Encourage pulmonary toilet and ambulation  DW nursing       Barbara Garrido, APRN  05/23/23  09:17 CDT

## 2023-05-23 NOTE — PLAN OF CARE
Goal Outcome Evaluation:      Pt has required 1 dose of Hydrocodone PO for pain control today. TPA through chest tube this morning. Continuing IV antibiotic- Zosyn.

## 2023-05-24 ENCOUNTER — APPOINTMENT (OUTPATIENT)
Dept: GENERAL RADIOLOGY | Facility: HOSPITAL | Age: 63
DRG: 178 | End: 2023-05-24
Payer: COMMERCIAL

## 2023-05-24 LAB
ANION GAP SERPL CALCULATED.3IONS-SCNC: 11 MMOL/L (ref 5–15)
BUN SERPL-MCNC: 9 MG/DL (ref 8–23)
BUN/CREAT SERPL: 10.3 (ref 7–25)
CALCIUM SPEC-SCNC: 9.3 MG/DL (ref 8.6–10.5)
CHLORIDE SERPL-SCNC: 101 MMOL/L (ref 98–107)
CO2 SERPL-SCNC: 26 MMOL/L (ref 22–29)
CREAT SERPL-MCNC: 0.87 MG/DL (ref 0.76–1.27)
DEPRECATED RDW RBC AUTO: 50.8 FL (ref 37–54)
EGFRCR SERPLBLD CKD-EPI 2021: 95.8 ML/MIN/1.73
ERYTHROCYTE [DISTWIDTH] IN BLOOD BY AUTOMATED COUNT: 17.5 % (ref 12.3–15.4)
GLUCOSE SERPL-MCNC: 103 MG/DL (ref 65–99)
HCT VFR BLD AUTO: 32 % (ref 37.5–51)
HGB BLD-MCNC: 9.3 G/DL (ref 13–17.7)
MCH RBC QN AUTO: 23.8 PG (ref 26.6–33)
MCHC RBC AUTO-ENTMCNC: 29.1 G/DL (ref 31.5–35.7)
MCV RBC AUTO: 82.1 FL (ref 79–97)
PLATELET # BLD AUTO: 573 10*3/MM3 (ref 140–450)
PMV BLD AUTO: 8.3 FL (ref 6–12)
POTASSIUM SERPL-SCNC: 3.9 MMOL/L (ref 3.5–5.2)
RBC # BLD AUTO: 3.9 10*6/MM3 (ref 4.14–5.8)
SODIUM SERPL-SCNC: 138 MMOL/L (ref 136–145)
WBC NRBC COR # BLD: 7.22 10*3/MM3 (ref 3.4–10.8)

## 2023-05-24 PROCEDURE — 80048 BASIC METABOLIC PNL TOTAL CA: CPT | Performed by: FAMILY MEDICINE

## 2023-05-24 PROCEDURE — 94799 UNLISTED PULMONARY SVC/PX: CPT

## 2023-05-24 PROCEDURE — 94664 DEMO&/EVAL PT USE INHALER: CPT

## 2023-05-24 PROCEDURE — 85027 COMPLETE CBC AUTOMATED: CPT | Performed by: FAMILY MEDICINE

## 2023-05-24 PROCEDURE — 71045 X-RAY EXAM CHEST 1 VIEW: CPT

## 2023-05-24 RX ADMIN — HYDROCODONE BITARTRATE AND ACETAMINOPHEN 1 TABLET: 5; 325 TABLET ORAL at 21:41

## 2023-05-24 RX ADMIN — IPRATROPIUM BROMIDE AND ALBUTEROL SULFATE 3 ML: 2.5; .5 SOLUTION RESPIRATORY (INHALATION) at 10:14

## 2023-05-24 RX ADMIN — IPRATROPIUM BROMIDE AND ALBUTEROL SULFATE 3 ML: 2.5; .5 SOLUTION RESPIRATORY (INHALATION) at 19:50

## 2023-05-24 RX ADMIN — HYDROCODONE BITARTRATE AND ACETAMINOPHEN 1 TABLET: 5; 325 TABLET ORAL at 03:53

## 2023-05-24 RX ADMIN — LISINOPRIL 10 MG: 10 TABLET ORAL at 08:55

## 2023-05-24 RX ADMIN — HYDROCODONE BITARTRATE AND ACETAMINOPHEN 1 TABLET: 5; 325 TABLET ORAL at 08:59

## 2023-05-24 RX ADMIN — ASPIRIN 81 MG: 81 TABLET, COATED ORAL at 08:55

## 2023-05-24 RX ADMIN — Medication 10 ML: at 20:33

## 2023-05-24 RX ADMIN — HYDROCODONE BITARTRATE AND ACETAMINOPHEN 1 TABLET: 5; 325 TABLET ORAL at 17:36

## 2023-05-24 RX ADMIN — PANTOPRAZOLE SODIUM 40 MG: 40 TABLET, DELAYED RELEASE ORAL at 08:55

## 2023-05-24 RX ADMIN — LEVOTHYROXINE SODIUM 200 MCG: 100 TABLET ORAL at 05:20

## 2023-05-24 RX ADMIN — IPRATROPIUM BROMIDE AND ALBUTEROL SULFATE 3 ML: 2.5; .5 SOLUTION RESPIRATORY (INHALATION) at 05:54

## 2023-05-24 RX ADMIN — IPRATROPIUM BROMIDE AND ALBUTEROL SULFATE 3 ML: 2.5; .5 SOLUTION RESPIRATORY (INHALATION) at 14:23

## 2023-05-24 RX ADMIN — HYDROCODONE BITARTRATE AND ACETAMINOPHEN 1 TABLET: 5; 325 TABLET ORAL at 13:25

## 2023-05-24 RX ADMIN — Medication 10 ML: at 08:55

## 2023-05-24 NOTE — CASE MANAGEMENT/SOCIAL WORK
Continued Stay Note  CIELO Portillo     Patient Name: Castillo Trujillo  MRN: [id number]  Today's Date: 5/24/2023    Admit Date: 5/9/2023    Plan: Home   Discharge Plan       Row Name 05/24/23 1450       Plan    Plan Home    Patient/Family in Agreement with Plan yes    Plan Comments Pt will go home at d/c. Dr. Vaughn sent a secure chat last night that he has put an order in for his office staff to arrange an appointment at [place].                   Discharge Codes    No documentation.                       JOHN Marcus     1

## 2023-05-24 NOTE — PROGRESS NOTES
[facility] Medicine Services  INPATIENT PROGRESS NOTE    Patient Name: Castillo Trujillo  Date of Admission: 5/9/2023  Today's Date: 05/24/23  Length of Stay: 14  Primary Care Physician: Juan Vaughn DO    Subjective   Chief Complaint: Airleak.  Empyema. Lung mass. Pseudotumor.    HPI   Stop tPA, patient has finished 2 days of third round.  On wall suction at this time.  Patient is on room air.  Blood pressure stable, afebrile.  Hemoglobin stable.  Thrombocytosis improving.  Chest x-ray stable.    Review of Systems   Constitutional:  Positive for activity change, appetite change and fatigue. Negative for chills and fever.   HENT:  Negative for hearing loss, nosebleeds, tinnitus and trouble swallowing.    Eyes:  Negative for visual disturbance.   Respiratory:  Negative for cough, chest tightness, shortness of breath and wheezing.    Cardiovascular:  Negative for chest pain, palpitations and leg swelling.   Gastrointestinal:  Negative for abdominal distention, abdominal pain, blood in stool, constipation, diarrhea, nausea and vomiting.   Endocrine: Negative for cold intolerance, heat intolerance, polydipsia, polyphagia and polyuria.   Genitourinary:  Negative for decreased urine volume, difficulty urinating, dysuria, flank pain, frequency and hematuria.   Musculoskeletal:  Negative for arthralgias, joint swelling and myalgias.   Skin:  Negative for rash.   Allergic/Immunologic: Negative for immunocompromised state.   Neurological:  Positive for weakness. Negative for dizziness, syncope, light-headedness and headaches.   Hematological:  Negative for adenopathy. Does not bruise/bleed easily.   Psychiatric/Behavioral:  Negative for confusion and sleep disturbance. The patient is not nervous/anxious.     All pertinent negatives and positives are as above. All other systems have been reviewed and are negative unless otherwise stated.     Objective    Temp:  [97.4 °F (36.3 °C)-98.5  °F (36.9 °C)] 98.5 °F (36.9 °C)  Heart Rate:  [64-94] 94  Resp:  [16-18] 16  BP: (121-150)/(46-73) 139/71  Physical Exam  Vitals and nursing note reviewed.   HENT:      Head: Normocephalic.   Eyes:      Conjunctiva/sclera: Conjunctivae normal.      Pupils: Pupils are equal, round, and reactive to light.   Neck:      Vascular: No JVD.   Cardiovascular:      Rate and Rhythm: Normal rate and regular rhythm.      Heart sounds: Normal heart sounds.   Pulmonary:      Effort: No respiratory distress.      Breath sounds: No wheezing or rales.      Comments: Diminished breath sound bilateral, clear, on room air.  Pleurx catheter right chest.  Chest:      Chest wall: No tenderness.   Abdominal:      General: Bowel sounds are normal. There is no distension.      Palpations: Abdomen is soft.      Tenderness: There is no abdominal tenderness.   Musculoskeletal:         General: No tenderness or deformity. Normal range of motion.      Cervical back: Neck supple.   Skin:     General: Skin is warm and dry.      Capillary Refill: Capillary refill takes 2 to 3 seconds.      Findings: No rash.   Neurological:      Mental Status: He is alert and oriented to person, place, and time.      Cranial Nerves: No cranial nerve deficit.      Motor: Weakness present. No abnormal muscle tone.      Deep Tendon Reflexes: Reflexes normal.   Psychiatric:         Mood and Affect: Mood normal.         Behavior: Behavior normal.       Results Review:  I have reviewed the labs, radiology results, and diagnostic studies.    Laboratory Data:   Results from last 7 days   Lab Units 05/24/23  0036 05/23/23  0547   WBC 10*3/mm3 7.22 8.65   HEMOGLOBIN g/dL 9.3* 9.8*   HEMATOCRIT % 32.0* 33.2*   PLATELETS 10*3/mm3 573* 645*        Results from last 7 days   Lab Units 05/24/23  0036 05/23/23  0547 05/18/23  0358   SODIUM mmol/L 138 136 132*   POTASSIUM mmol/L 3.9 4.1 3.5   CHLORIDE mmol/L 101 101 96*   CO2 mmol/L 26.0 26.0 26.0   BUN mg/dL 9 8 6*   CREATININE  mg/dL 0.87 0.71* 0.61*   CALCIUM mg/dL 9.3 9.5 8.9   BILIRUBIN mg/dL  --  0.2  --    ALK PHOS U/L  --  204*  --    ALT (SGPT) U/L  --  26  --    AST (SGOT) U/L  --  19  --    GLUCOSE mg/dL 103* 91 114*       Culture Data:   No results found for: BLOODCX, URINECX, WOUNDCX, MRSACX, RESPCX, STOOLCX    Radiology Data:   Imaging Results (Last 24 Hours)       Procedure Component Value Units Date/Time    XR Chest 1 View [[id number]] Collected: 05/24/23 0658     Updated: 05/24/23 0702    Narrative:      EXAMINATION:  XR CHEST 1 VW-  5/24/2023 3:40 AM CDT     HISTORY: Right chest tube with pleural effusion.     COMPARISON: 05/23/2023.     TECHNIQUE: Single view AP image.     FINDINGS:  A right chest tube remains in place. There is a small right  pleural effusion. Opacity in the mid and lower lung zone on the right is  unchanged. The left lung is clear. The heart is normal in size.          Impression:      Stable chest x-ray. No significant change.        This report was finalized on 05/24/2023 06:59 by Dr. Dariel Beach MD.            I have reviewed the patient's current medications.     Assessment/Plan   Assessment  Active Hospital Problems    Diagnosis     **Empyema, right     Empyema lung     Recurrent right pleural effusion     Stage 2 moderate COPD by GOLD classification     Right lung mass     Tobacco abuse, in remission     GERD (gastroesophageal reflux disease)     Primary hypertension     Acquired hypothyroidism        Treatment Plan  Airleak/right empyema/COPD.  Improving.  Status post 2 rounds of tPA.  Stop third round of tPA 5/24/2023 due to air leak.  Per CT surgeon.  Zosyn antibiotics.  DuoNebs.  Incentive spirometer.  White blood cells-normal.  Pleurx catheter right lung.  Chest x-ray-Stable chest x-ray- No significant change.   Patient is on room air.  Chest tube to suction.     Right lung mass.  CT surgeon consult.  Oncology consult.  Positive for inflammation pseudotumor.  Not a surgical candidate for  resection.  Undergoing chemo treatment and radiation and steroid.  Consult  for referral outpatient oncology for pseudotumor in [place]..  Dr. Vaughn's office has sent pt's information to [facility] to arrange the outpatient appointment. It will be reviewed and then they will decide if they will accept the pt and then the appointment will be made. This may take a few days so the office will contact pt because he may be d/c'ed home by the time they make their decision. Dr. Vaughn says his office will follow up and make sure everything gets arranged.      Anemia.  Hemoglobin stable.  No sign of acute bleed.     Thrombocytosis.  Improving.  Will follow.     Reflux.  Protonix.  Zofran as needed.     Hypertension.  Lisinopril.  Aspirin.     Hypothyroidism . Synthroid.     Pain . Norco as needed.     Nutrition . Regular/house diet.     Medical Decision Making  Number and Complexity of problems.  Right empyema/COPD/right lung mass/pseudotumor/reflux/hypertension/hypothyroidism  Differential Diagnosis: None     Conditions and Status     Ongoing evaluation and treatment.     MDM Data  External documents reviewed: None.  Cardiac tracing (EKG, telemetry) interpretation: Sinus .  Radiology interpretation: None.  Labs reviewed: Laboratory .  Any tests that were considered but not ordered: Lab in AM.     Decision rules/scores evaluated (example [id number], Wells, etc): None.     Discussed with: Patient .     Care Planning  Shared decision making: Patient .  Code status and discussions: Full code.     Disposition  Social Determinants of Health that impact treatment or disposition: Unknown.  Pending CT surgeon.    Electronically signed by Cesar Harris MD, 05/24/23, 09:57 CDT.

## 2023-05-24 NOTE — PROGRESS NOTES
"Patient name: Castillo Trujillo  Patient : 1960  VISIT # [id number]  MR #[id number]    Procedure:  Procedure Date:   POD:* No surgery found *    Subjective   CC: Shortness of breath    Resting in bed.  Remains on room air. Pain is well controlled. 1/5 air leak noted. Intrapleural lytic therapy discontinued.        Objective     Visit Vitals  /71 (BP Location: Left arm, Patient Position: Sitting)   Pulse 94   Temp 98.5 °F (36.9 °C) (Oral)   Resp 16   Ht 188 cm (74\")   Wt 90.7 kg (200 lb)   SpO2 96%   BMI 25.68 kg/m²       Intake/Output Summary (Last 24 hours) at 2023 0850  Last data filed at 2023 0355  Gross per 24 hour   Intake 525 ml   Output 1900 ml   Net -1375 ml     Right chest tube output: 200 ml/ 24 hr, 1/5 air leak       LABS:  NONE    IMAGES:       Imaging Results (Last 24 Hours)       Procedure Component Value Units Date/Time    XR Chest 1 View [[id number]] Collected: 23     Updated: 23    Narrative:      EXAMINATION:  XR CHEST 1 VW-  2023 3:40 AM CDT     HISTORY: Right chest tube with pleural effusion.     COMPARISON: 2023.     TECHNIQUE: Single view AP image.     FINDINGS:  A right chest tube remains in place. There is a small right  pleural effusion. Opacity in the mid and lower lung zone on the right is  unchanged. The left lung is clear. The heart is normal in size.          Impression:      Stable chest x-ray. No significant change.        This report was finalized on 2023 06:59 by Dr. Dariel Beach MD.          Chest x-ray today shows right pigtail chest tube in place, no pneumothorax, residual right effusion    Final Diagnosis   Lung, right upper lobe, fine-needle core biopsies and touch preparations:  A.  Benign lung parenchyma with features of inflammatory pseudotumor.  B.  No acid-fast bacilli or fungal organisms identified utilizing Kinyoun and GMS stains, respectively.   Electronically signed by Tamia Ernandez MD on 2023 at " 1314   Clinical Information    Right upper lobe lung mass (3.8 x 6.3 x 15 cm)   Comment    The fine-needle core biopsies essentially demonstrate the same histologic changes as those seen in the prior fine-needle core biopsies of this mass ([id number]).  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  Paraffin-embedded tissue will be submitted to [facility] to determine if there is still no ALK1 staining to mitigate against the inflammatory myofibroblastic variant of inflammatory pseudotumor.  These results will be reported as an addendum upon receipt from [facility].         Physical Exam:  General: Alert, oriented. No apparent distress.  Sitting up in the bed  Cardiovascular: Regular rate and rhythm without murmur, rubs, or gallops.    Pulmonary: Decreased breath sounds to right lung and bilateral bases.  No wheezing or rhonchi.    Chest: Right side chest tube to-20 cm H2O suction.  No air leak.  Fluid is cloudy and thinning  Abdomen: Soft, non distended, and non tender.  Extremities: Warm, moves all extremities. .   Neurologic:  Grossly intact with no focal deficits.         Impression:  Empyema, right  Right lung mass, inflammatory pseudotumor  Recurrent right pleural effusion  Tobacco abuse, in remission  Stage 2 moderate COPD by GOLD classification  Hypokalemia  Unintentional weight loss        Plan:  Discontinue intrapleural lytic therapy   Received [facility] pathology report, once reviewed social work to work on referral to [place].   Continue chest tube suction at -20 cm H2O  Flush chest tube with 10 mL normal saline every 8 hours to maintain patency  Chest x-ray daily  Encourage pulmonary toilet and ambulation   nursing       Barbara Garrido, TRESSA  05/24/23  08:50 CDT

## 2023-05-24 NOTE — PAYOR COMM NOTE
"5/24 CLINICAL  [id number]   STILL WITH CHEST TUBE  UR  [phone]      Castillo Trujillo (63 y.o. Male)       Date of Birth   1960    Social Security Number       Address   [address] [place] [address] [place] [address]    Home Phone   [phone]    MRN   [id number]       Scientologist   Druze    Marital Status                               Admission Date   5/9/23    Admission Type   Urgent    Admitting Provider   Cesar Harris MD    Attending Provider   Cesar Harris MD    Department, Room/Bed   [facility] 3C, 364/1       Discharge Date       Discharge Disposition       Discharge Destination                                 Attending Provider: Cesar Harris MD    Allergies: No Known Allergies    Isolation: None   Infection: None   Code Status: CPR    Ht: 188 cm (74\")   Wt: 90.7 kg (200 lb)    Admission Cmt: None   Principal Problem: Empyema, right [J86.9]                   Active Insurance as of 5/9/2023       Primary Coverage       Payor Plan Insurance Group Employer/Plan Group    ANTHEM BLUE CROSS ANTHEM BLUE CROSS BLUE SHIELD PPO [id number]       Payor Plan Address Payor Plan Phone Number Payor Plan Fax Number Effective Dates    [address] [phone]  8/1/2018 - None Entered    [facility] [id number]         Subscriber Name Subscriber Birth Date Member ID       CASTILLO TRUJILLO 1960 [id number]               Secondary Coverage       Payor Plan Insurance Group Employer/Plan Group    MEDICARE MEDICARE A & B        Payor Plan Address Payor Plan Phone Number Payor Plan Fax Number Effective Dates    [address] [phone]  3/1/2023 - None Entered    [facility] [id number]         Subscriber Name Subscriber Birth Date Member ID       CASTILLO TRUJILLO 1960 [id number]                     Emergency Contacts        (Rel.) Home Phone Work Phone Mobile Phone    VALENTIN TRUJILLO (Spouse) [phone] -- --              Lines, Drains & Airways       Active LDAs       Name Placement " date Placement time Site Days    Peripheral IV 05/21/23 1502 Anterior;Distal;Right;Upper Arm 05/21/23  1502  Arm  2    Chest Tube Right Midaxillary --  --  Midaxillary  --                  Current Facility-Administered Medications   Medication Dose Route Frequency Provider Last Rate Last Admin    acetaminophen (TYLENOL) tablet 650 mg  650 mg Oral Q4H PRN Carolynn Pryor APRN        Or    acetaminophen (TYLENOL) 160 MG/5ML solution 650 mg  650 mg Oral Q4H PRN Carolynn Pryor APRN        Or    acetaminophen (TYLENOL) suppository 650 mg  650 mg Rectal Q4H PRN Carolynn Pryor APRN        aspirin EC tablet 81 mg  81 mg Oral Daily Carolynn Pryor APRN   81 mg at 05/24/23 0855    HYDROcodone-acetaminophen (NORCO) 5-325 MG per tablet 1 tablet  1 tablet Oral Q4H PRN Benny Kim MD   1 tablet at 05/24/23 0859    ipratropium-albuterol (DUO-NEB) nebulizer solution 3 mL  3 mL Nebulization 4x Daily - RT Osvaldo Pryor MD   3 mL at 05/24/23 0554    levothyroxine (SYNTHROID, LEVOTHROID) tablet 200 mcg  200 mcg Oral Q AM Carolynn Pryor APRN   200 mcg at 05/24/23 0520    lidocaine (XYLOCAINE) 1 % injection 10 mL  10 mL Infiltration Once Benny Kim MD        lisinopril (PRINIVIL,ZESTRIL) tablet 10 mg  10 mg Oral Daily Carolynn Pryor APRN   10 mg at 05/24/23 0855    ondansetron (ZOFRAN) tablet 4 mg  4 mg Oral Q6H PRN Carolynn Pryor APRN        Or    ondansetron (ZOFRAN) injection 4 mg  4 mg Intravenous Q6H PRN Carolynn Pryor APRN        pantoprazole (PROTONIX) EC tablet 40 mg  40 mg Oral Daily Carolynn Pryor APRN   40 mg at 05/24/23 0855    sodium chloride 0.9 % flush 10 mL  10 mL Intravenous Q12H Carolynn Pryor APRN   10 mL at 05/24/23 0855    sodium chloride 0.9 % flush 10 mL  10 mL Intravenous PRN Carolynn Pryor APRN        sodium chloride 0.9 % infusion 40 mL  40 mL Intravenous PRN Carolynn Pryor APRCONRADO         Orders (last 24 hrs)        Start     Ordered    05/24/23  0000  CBC (No Diff)  Every Other Day       05/23/23 1132    05/24/23 0000  Basic Metabolic Panel  Every Other Day       05/23/23 1132    05/22/23 1015  alteplase (ACTIVASE) 10 mg in sodium chloride 0.9 % 30 mL Syringe  2 Times Daily,   Status:  Discontinued        See Hyperspace for full Linked Orders Report.    05/22/23 0917    05/22/23 1015  dornase alpha (PULMOZYME) 5 mg in sterile water (preservative free) 30 mL  2 Times Daily,   Status:  Discontinued        See Hyperspace for full Linked Orders Report.    05/22/23 0917    05/16/23 2000  piperacillin-tazobactam (ZOSYN) 4.5 g in iso-osmotic dextrose 100 mL IVPB (premix)  Every 8 Hours         05/16/23 1141    05/16/23 1137  Pharmacy to Dose Zosyn  Continuous PRN,   Status:  Discontinued         05/16/23 1138    05/11/23 1645  HYDROcodone-acetaminophen (NORCO) 5-325 MG per tablet 1 tablet  Every 4 Hours PRN         05/11/23 1635    05/10/23 0845  lidocaine (XYLOCAINE) 1 % injection 10 mL  Once         05/10/23 0758    05/10/23 0600  levothyroxine (SYNTHROID, LEVOTHROID) tablet 200 mcg  Every Early Morning         05/09/23 1631    05/09/23 2100  sodium chloride 0.9 % flush 10 mL  Every 12 Hours Scheduled         05/09/23 1542    05/09/23 2030  ipratropium-albuterol (DUO-NEB) nebulizer solution 3 mL  4 Times Daily - RT         05/09/23 1626    05/09/23 1800  Incentive Spirometry  Every 4 Hours While Awake       05/09/23 1542    05/09/23 1800  aspirin EC tablet 81 mg  Daily         05/09/23 1631    05/09/23 1800  lisinopril (PRINIVIL,ZESTRIL) tablet 10 mg  Daily         05/09/23 1631    05/09/23 1800  pantoprazole (PROTONIX) EC tablet 40 mg  Daily         05/09/23 1631    05/09/23 1600  Vital Signs  Every 4 Hours       05/09/23 1542    05/09/23 1600  Oral Care  Every 4 Hours       05/09/23 1542    05/09/23 1547  XR Chest 1 View  Daily       05/09/23 1546    05/09/23 1542  ondansetron (ZOFRAN) tablet 4 mg  Every 6 Hours PRN        See Hyperspace for full Linked Orders  Report.    05/09/23 1542    05/09/23 1542  ondansetron (ZOFRAN) injection 4 mg  Every 6 Hours PRN        See Hyperspace for full Linked Orders Report.    05/09/23 1542    05/09/23 1542  acetaminophen (TYLENOL) tablet 650 mg  Every 4 Hours PRN        See Hyperspace for full Linked Orders Report.    05/09/23 1542    05/09/23 1542  acetaminophen (TYLENOL) 160 MG/5ML solution 650 mg  Every 4 Hours PRN        See Hyperspace for full Linked Orders Report.    05/09/23 1542    05/09/23 1542  acetaminophen (TYLENOL) suppository 650 mg  Every 4 Hours PRN        See Hyperspace for full Linked Orders Report.    05/09/23 1542    05/09/23 1538  Intake & Output  Every Shift       05/09/23 1542    05/09/23 1537  sodium chloride 0.9 % flush 10 mL  As Needed         05/09/23 1542    05/09/23 1537  sodium chloride 0.9 % infusion 40 mL  As Needed         05/09/23 1542    Unscheduled  Up With Assistance  As Needed       05/09/23 1542    --  ibuprofen (ADVIL,MOTRIN) 800 MG tablet  3 Times Daily PRN         05/10/23 0959                     Physician Progress Notes (last 24 hours)        Cesar Harris MD at 05/24/23 0957              [facility] Medicine Services  INPATIENT PROGRESS NOTE    Patient Name: Castillo Trujillo  Date of Admission: 5/9/2023  Today's Date: 05/24/23  Length of Stay: 14  Primary Care Physician: Juan Vaughn DO    Subjective   Chief Complaint: Airleak.  Empyema. Lung mass. Pseudotumor.    HPI   Stop tPA, patient has finished 2 days of third round.  On wall suction at this time.  Patient is on room air.  Blood pressure stable, afebrile.  Hemoglobin stable.  Thrombocytosis improving.  Chest x-ray stable.    Review of Systems   Constitutional:  Positive for activity change, appetite change and fatigue. Negative for chills and fever.   HENT:  Negative for hearing loss, nosebleeds, tinnitus and trouble swallowing.    Eyes:  Negative for visual disturbance.   Respiratory:  Negative  for cough, chest tightness, shortness of breath and wheezing.    Cardiovascular:  Negative for chest pain, palpitations and leg swelling.   Gastrointestinal:  Negative for abdominal distention, abdominal pain, blood in stool, constipation, diarrhea, nausea and vomiting.   Endocrine: Negative for cold intolerance, heat intolerance, polydipsia, polyphagia and polyuria.   Genitourinary:  Negative for decreased urine volume, difficulty urinating, dysuria, flank pain, frequency and hematuria.   Musculoskeletal:  Negative for arthralgias, joint swelling and myalgias.   Skin:  Negative for rash.   Allergic/Immunologic: Negative for immunocompromised state.   Neurological:  Positive for weakness. Negative for dizziness, syncope, light-headedness and headaches.   Hematological:  Negative for adenopathy. Does not bruise/bleed easily.   Psychiatric/Behavioral:  Negative for confusion and sleep disturbance. The patient is not nervous/anxious.     All pertinent negatives and positives are as above. All other systems have been reviewed and are negative unless otherwise stated.     Objective    Temp:  [97.4 °F (36.3 °C)-98.5 °F (36.9 °C)] 98.5 °F (36.9 °C)  Heart Rate:  [64-94] 94  Resp:  [16-18] 16  BP: (121-150)/(46-73) 139/71  Physical Exam  Vitals and nursing note reviewed.   HENT:      Head: Normocephalic.   Eyes:      Conjunctiva/sclera: Conjunctivae normal.      Pupils: Pupils are equal, round, and reactive to light.   Neck:      Vascular: No JVD.   Cardiovascular:      Rate and Rhythm: Normal rate and regular rhythm.      Heart sounds: Normal heart sounds.   Pulmonary:      Effort: No respiratory distress.      Breath sounds: No wheezing or rales.      Comments: Diminished breath sound bilateral, clear, on room air.  Pleurx catheter right chest.  Chest:      Chest wall: No tenderness.   Abdominal:      General: Bowel sounds are normal. There is no distension.      Palpations: Abdomen is soft.      Tenderness: There is no  abdominal tenderness.   Musculoskeletal:         General: No tenderness or deformity. Normal range of motion.      Cervical back: Neck supple.   Skin:     General: Skin is warm and dry.      Capillary Refill: Capillary refill takes 2 to 3 seconds.      Findings: No rash.   Neurological:      Mental Status: He is alert and oriented to person, place, and time.      Cranial Nerves: No cranial nerve deficit.      Motor: Weakness present. No abnormal muscle tone.      Deep Tendon Reflexes: Reflexes normal.   Psychiatric:         Mood and Affect: Mood normal.         Behavior: Behavior normal.       Results Review:  I have reviewed the labs, radiology results, and diagnostic studies.    Laboratory Data:   Results from last 7 days   Lab Units 05/24/23  0036 05/23/23  0547   WBC 10*3/mm3 7.22 8.65   HEMOGLOBIN g/dL 9.3* 9.8*   HEMATOCRIT % 32.0* 33.2*   PLATELETS 10*3/mm3 573* 645*        Results from last 7 days   Lab Units 05/24/23  0036 05/23/23  0547 05/18/23  0358   SODIUM mmol/L 138 136 132*   POTASSIUM mmol/L 3.9 4.1 3.5   CHLORIDE mmol/L 101 101 96*   CO2 mmol/L 26.0 26.0 26.0   BUN mg/dL 9 8 6*   CREATININE mg/dL 0.87 0.71* 0.61*   CALCIUM mg/dL 9.3 9.5 8.9   BILIRUBIN mg/dL  --  0.2  --    ALK PHOS U/L  --  204*  --    ALT (SGPT) U/L  --  26  --    AST (SGOT) U/L  --  19  --    GLUCOSE mg/dL 103* 91 114*       Culture Data:   No results found for: BLOODCX, URINECX, WOUNDCX, MRSACX, RESPCX, STOOLCX    Radiology Data:   Imaging Results (Last 24 Hours)       Procedure Component Value Units Date/Time    XR Chest 1 View [[id number]] Collected: 05/24/23 0658     Updated: 05/24/23 0702    Narrative:      EXAMINATION:  XR CHEST 1 VW-  5/24/2023 3:40 AM CDT     HISTORY: Right chest tube with pleural effusion.     COMPARISON: 05/23/2023.     TECHNIQUE: Single view AP image.     FINDINGS:  A right chest tube remains in place. There is a small right  pleural effusion. Opacity in the mid and lower lung zone on the right  is  unchanged. The left lung is clear. The heart is normal in size.          Impression:      Stable chest x-ray. No significant change.        This report was finalized on 05/24/2023 06:59 by Dr. Dariel Beach MD.            I have reviewed the patient's current medications.     Assessment/Plan   Assessment  Active Hospital Problems    Diagnosis     **Empyema, right     Empyema lung     Recurrent right pleural effusion     Stage 2 moderate COPD by GOLD classification     Right lung mass     Tobacco abuse, in remission     GERD (gastroesophageal reflux disease)     Primary hypertension     Acquired hypothyroidism        Treatment Plan  Airleak/right empyema/COPD.  Improving.  Status post 2 rounds of tPA.  Stop third round of tPA 5/24/2023 due to air leak.  Per CT surgeon.  Zosyn antibiotics.  DuoNebs.  Incentive spirometer.  White blood cells-normal.  Pleurx catheter right lung.  Chest x-ray-Stable chest x-ray- No significant change.   Patient is on room air.  Chest tube to suction.     Right lung mass.  CT surgeon consult.  Oncology consult.  Positive for inflammation pseudotumor.  Not a surgical candidate for resection.  Undergoing chemo treatment and radiation and steroid.  Consult  for referral outpatient oncology for pseudotumor in [facility]..     Anemia.  Hemoglobin stable.  No sign of acute bleed.     Thrombocytosis.  Improving.  Will follow.     Reflux.  Protonix.  Zofran as needed.     Hypertension.  Lisinopril.  Aspirin.     Hypothyroidism . Synthroid.     Pain . Norco as needed.     Nutrition . Regular/house diet.     Medical Decision Making  Number and Complexity of problems.  Right empyema/COPD/right lung mass/pseudotumor/reflux/hypertension/hypothyroidism  Differential Diagnosis: None     Conditions and Status     Ongoing evaluation and treatment.     [facility] Data  External documents reviewed: None.  Cardiac tracing (EKG, telemetry) interpretation: Sinus .  Radiology interpretation: None.  Labs  "reviewed: Laboratory .  Any tests that were considered but not ordered: Lab in AM.     Decision rules/scores evaluated (example [id number], Wells, etc): None.     Discussed with: Patient .     Care Planning  Shared decision making: Patient .  Code status and discussions: Full code.     Disposition  Social Determinants of Health that impact treatment or disposition: Unknown.  Pending CT surgeon.    Electronically signed by Cesar Harris MD, 23, 09:57 CDT.    Electronically signed by Cesar Harris MD at 23 1008       Barbara Garrido APRN at 23 0850          Patient name: Castillo Trujillo  Patient : 1960  VISIT # [id number]  MR #[id number]    Procedure:  Procedure Date:   POD:* No surgery found *    Subjective   CC: Shortness of breath    Resting in bed.  Remains on room air. Pain is well controlled. 1/5 air leak noted. Intrapleural lytic therapy discontinued.       Objective     Visit Vitals  /71 (BP Location: Left arm, Patient Position: Sitting)   Pulse 94   Temp 98.5 °F (36.9 °C) (Oral)   Resp 16   Ht 188 cm (74\")   Wt 90.7 kg (200 lb)   SpO2 96%   BMI 25.68 kg/m²       Intake/Output Summary (Last 24 hours) at 2023 0850  Last data filed at 2023 0355  Gross per 24 hour   Intake 525 ml   Output 1900 ml   Net -1375 ml     Right chest tube output: 200 ml/ 24 hr, 1/5 air leak       LABS:  NONE    IMAGES:       Imaging Results (Last 24 Hours)       Procedure Component Value Units Date/Time    XR Chest 1 View [[id number]] Collected: 23     Updated: 23 0702    Narrative:      EXAMINATION:  XR CHEST 1 VW-  2023 3:40 AM CDT     HISTORY: Right chest tube with pleural effusion.     COMPARISON: 2023.     TECHNIQUE: Single view AP image.     FINDINGS:  A right chest tube remains in place. There is a small right  pleural effusion. Opacity in the mid and lower lung zone on the right is  unchanged. The left lung is clear. The heart is normal in size.          " Impression:      Stable chest x-ray. No significant change.        This report was finalized on 05/24/2023 06:59 by Dr. Dariel Beach MD.          Chest x-ray today shows right pigtail chest tube in place, no pneumothorax, residual right effusion    Final Diagnosis   Lung, right upper lobe, fine-needle core biopsies and touch preparations:  A.  Benign lung parenchyma with features of inflammatory pseudotumor.  B.  No acid-fast bacilli or fungal organisms identified utilizing Kinyoun and GMS stains, respectively.   Electronically signed by Tamia Ernandez MD on 5/11/2023 at 1314   Clinical Information    Right upper lobe lung mass (3.8 x 6.3 x 15 cm)   Comment    The fine-needle core biopsies essentially demonstrate the same histologic changes as those seen in the prior fine-needle core biopsies of this mass ([id number]).  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  Paraffin-embedded tissue will be submitted to [facility] to determine if there is still no ALK1 staining to mitigate against the inflammatory myofibroblastic variant of inflammatory pseudotumor.  These results will be reported as an addendum upon receipt from [facility].         Physical Exam:  General: Alert, oriented. No apparent distress.  Sitting up in the bed  Cardiovascular: Regular rate and rhythm without murmur, rubs, or gallops.    Pulmonary: Decreased breath sounds to right lung and bilateral bases.  No wheezing or rhonchi.    Chest: Right side chest tube to-20 cm H2O suction.  No air leak.  Fluid is cloudy and thinning  Abdomen: Soft, non distended, and non tender.  Extremities: Warm, moves all extremities. .   Neurologic:  Grossly intact with no focal deficits.         Impression:  Empyema, right  Right lung mass, inflammatory pseudotumor  Recurrent right pleural  effusion  Tobacco abuse, in remission  Stage 2 moderate COPD by GOLD classification  Hypokalemia  Unintentional weight loss        Plan:  Discontinue intrapleural lytic therapy   Received [facility] pathology report, once reviewed social work to work on referral to [place].   Continue chest tube suction at -20 cm H2O  Flush chest tube with 10 mL normal saline every 8 hours to maintain patency  Chest x-ray daily  Encourage pulmonary toilet and ambulation  DW nursing       TRESSA Howard  05/24/23  08:50 CDT      Electronically signed by Barbara Garrido APRN at 05/24/23 0917       Cesar Harris MD at 05/23/23 1128              [facility] Medicine Services  INPATIENT PROGRESS NOTE    Patient Name: Castillo Trujillo  Date of Admission: 5/9/2023  Today's Date: 05/23/23  Length of Stay: 13  Primary Care Physician: Juan Vaughn DO    Subjective   Chief Complaint: Empyema. Lung mass. Pseudotumor.     HPI   Blood pressure stable, afebrile.  Ongoing third round of tPA.  Hemoglobin stable.  Thrombocytosis improving.  Patient is on room air.    Review of Systems   Constitutional:  Positive for activity change, appetite change and fatigue. Negative for chills and fever.   HENT:  Negative for hearing loss, nosebleeds, tinnitus and trouble swallowing.    Eyes:  Negative for visual disturbance.   Respiratory:  Negative for cough, chest tightness, shortness of breath and wheezing.    Cardiovascular:  Negative for chest pain, palpitations and leg swelling.   Gastrointestinal:  Negative for abdominal distention, abdominal pain, blood in stool, constipation, diarrhea, nausea and vomiting.   Endocrine: Negative for cold intolerance, heat intolerance, polydipsia, polyphagia and polyuria.   Genitourinary:  Negative for decreased urine volume, difficulty urinating, dysuria, flank pain, frequency and hematuria.   Musculoskeletal:  Negative for arthralgias, joint swelling and  myalgias.   Skin:  Negative for rash.   Allergic/Immunologic: Negative for immunocompromised state.   Neurological:  Positive for weakness. Negative for dizziness, syncope, light-headedness and headaches.   Hematological:  Negative for adenopathy. Does not bruise/bleed easily.   Psychiatric/Behavioral:  Negative for confusion and sleep disturbance. The patient is not nervous/anxious.     All pertinent negatives and positives are as above. All other systems have been reviewed and are negative unless otherwise stated.     Objective    Temp:  [97.4 °F (36.3 °C)-98 °F (36.7 °C)] 97.7 °F (36.5 °C)  Heart Rate:  [78-88] 82  Resp:  [16-18] 16  BP: (105-157)/(49-91) 145/65  Physical Exam  Vitals and nursing note reviewed.   HENT:      Head: Normocephalic.   Eyes:      Conjunctiva/sclera: Conjunctivae normal.      Pupils: Pupils are equal, round, and reactive to light.   Neck:      Vascular: No JVD.   Cardiovascular:      Rate and Rhythm: Normal rate and regular rhythm.      Heart sounds: Normal heart sounds.   Pulmonary:      Effort: No respiratory distress.      Breath sounds: No wheezing or rales.      Comments: Diminished breath sound bilateral, clear, on room air.  Pleurx catheter right chest.  Chest:      Chest wall: No tenderness.   Abdominal:      General: Bowel sounds are normal. There is no distension.      Palpations: Abdomen is soft.      Tenderness: There is no abdominal tenderness.   Musculoskeletal:         General: No tenderness or deformity. Normal range of motion.      Cervical back: Neck supple.   Skin:     General: Skin is warm and dry.      Capillary Refill: Capillary refill takes 2 to 3 seconds.      Findings: No rash.   Neurological:      Mental Status: He is alert and oriented to person, place, and time.      Cranial Nerves: No cranial nerve deficit.      Motor: Weakness present. No abnormal muscle tone.      Deep Tendon Reflexes: Reflexes normal.   Psychiatric:         Mood and Affect: Mood normal.          Behavior: Behavior normal.       Results Review:  I have reviewed the labs, radiology results, and diagnostic studies.    Laboratory Data:   Results from last 7 days   Lab Units 05/23/23  0547   WBC 10*3/mm3 8.65   HEMOGLOBIN g/dL 9.8*   HEMATOCRIT % 33.2*   PLATELETS 10*3/mm3 645*        Results from last 7 days   Lab Units 05/23/23  0547 05/18/23  0358   SODIUM mmol/L 136 132*   POTASSIUM mmol/L 4.1 3.5   CHLORIDE mmol/L 101 96*   CO2 mmol/L 26.0 26.0   BUN mg/dL 8 6*   CREATININE mg/dL 0.71* 0.61*   CALCIUM mg/dL 9.5 8.9   BILIRUBIN mg/dL 0.2  --    ALK PHOS U/L 204*  --    ALT (SGPT) U/L 26  --    AST (SGOT) U/L 19  --    GLUCOSE mg/dL 91 114*       Culture Data:   No results found for: BLOODCX, URINECX, WOUNDCX, MRSACX, RESPCX, STOOLCX    Radiology Data:   Imaging Results (Last 24 Hours)       Procedure Component Value Units Date/Time    XR Chest 1 View [[id number]] Collected: 05/23/23 0827     Updated: 05/23/23 0831    Narrative:      EXAMINATION:  XR CHEST 1 VW-  5/23/2023 3:15 AM CDT     HISTORY: Right chest tube. Right pleural effusion.     COMPARISON: 05/22/2023.     TECHNIQUE: Single view AP image.     FINDINGS:  A right chest tube remains in place. There is a small pleural  effusion on the right. There is infiltrate in the right perihilar region  and right lung base, stable. The left lung is clear. There is bronchial  wall thickening. Heart size is within normal limits. There are  degenerative changes of the spine.          Impression:      1. Stable small right pleural effusion with right chest tube in place.  2. Stable infiltrate in the right perihilar region and right lung base.        This report was finalized on 05/23/2023 08:28 by Dr. Dariel Beach MD.            I have reviewed the patient's current medications.     Assessment/Plan   Assessment  Active Hospital Problems    Diagnosis     **Empyema, right     Empyema lung     Recurrent right pleural effusion     Stage 2 moderate COPD by GOLD  classification     Right lung mass     Tobacco abuse, in remission     GERD (gastroesophageal reflux disease)     Primary hypertension     Acquired hypothyroidism        Treatment Plan  Right empyema/COPD.  Improving.  Status post 2 rounds of tPA.  Ongoing 3rd round.  Per CT surgeon.  Zosyn antibiotics.  DuoNebs.  Incentive spirometer.  White blood cells-normal.  Pleurx catheter right lung.  Chest x-ray-Stable small right pleural effusion with right chest tube in place, Stable infiltrate in the right perihilar region and right lung base.  Patient is on room air.     Right lung mass.  CT surgeon consult.  Oncology consult.  Positive for inflammation pseudotumor.  Not a surgical candidate for resection.  Undergoing chemo treatment and radiation and steroid.  Consult  for referral outpatient oncology for pseudotumor in [facility]..    Anemia.  Hemoglobin stable.  No sign of acute bleed.    Thrombocytosis.  Improving.  Will follow.     Reflux.  Protonix.  Zofran as needed.     Hypertension.  Lisinopril.  Aspirin.     Hypothyroidism . Synthroid.     Pain . Norco as needed.     Nutrition . Regular/house diet.     Medical Decision Making  Number and Complexity of problems.  Right empyema/COPD/right lung mass/pseudotumor/reflux/hypertension/hypothyroidism  Differential Diagnosis: None     Conditions and Status     Ongoing evaluation and treatment.     MDM Data  External documents reviewed: None.  Cardiac tracing (EKG, telemetry) interpretation: Sinus .  Radiology interpretation: None.  Labs reviewed: Laboratory .  Any tests that were considered but not ordered: Lab in AM.     Decision rules/scores evaluated (example [id number], Wells, etc): None.     Discussed with: Patient .     Care Planning  Shared decision making: Patient .  Code status and discussions: Full code.     Disposition  Social Determinants of Health that impact treatment or disposition: Unknown.  Pending CT surgeon.    Electronically signed by Cesar  REN Harris MD, 05/23/23, 11:29 CDT.    Electronically signed by Cesar Harris MD at 05/23/23 1204       Consult Notes (last 24 hours)  Notes from 05/23/23 1012 through 05/24/23 1012   No notes of this type exist for this encounter.

## 2023-05-24 NOTE — PLAN OF CARE
Goal Outcome Evaluation:           Progress: no change  Outcome Evaluation: Pt c/o pain PRN meds given per orders, up ad jo, ambulating in cantor and room, CT to suction with serosang output, liz diet

## 2023-05-24 NOTE — PLAN OF CARE
Goal Outcome Evaluation:         Pt has required 2 doses of Hydrocodone PO for pain control. Chest tube remains in place. Discontinued tPA this morning. Daily CXR. Will continue to monitor pt.

## 2023-05-25 ENCOUNTER — APPOINTMENT (OUTPATIENT)
Dept: GENERAL RADIOLOGY | Facility: HOSPITAL | Age: 63
DRG: 178 | End: 2023-05-25
Payer: COMMERCIAL

## 2023-05-25 PROCEDURE — 94799 UNLISTED PULMONARY SVC/PX: CPT

## 2023-05-25 PROCEDURE — 94760 N-INVAS EAR/PLS OXIMETRY 1: CPT

## 2023-05-25 PROCEDURE — 71045 X-RAY EXAM CHEST 1 VIEW: CPT

## 2023-05-25 RX ADMIN — HYDROCODONE BITARTRATE AND ACETAMINOPHEN 1 TABLET: 5; 325 TABLET ORAL at 21:28

## 2023-05-25 RX ADMIN — IPRATROPIUM BROMIDE AND ALBUTEROL SULFATE 3 ML: 2.5; .5 SOLUTION RESPIRATORY (INHALATION) at 14:20

## 2023-05-25 RX ADMIN — HYDROCODONE BITARTRATE AND ACETAMINOPHEN 1 TABLET: 5; 325 TABLET ORAL at 02:42

## 2023-05-25 RX ADMIN — ASPIRIN 81 MG: 81 TABLET, COATED ORAL at 09:25

## 2023-05-25 RX ADMIN — HYDROCODONE BITARTRATE AND ACETAMINOPHEN 1 TABLET: 5; 325 TABLET ORAL at 09:25

## 2023-05-25 RX ADMIN — IPRATROPIUM BROMIDE AND ALBUTEROL SULFATE 3 ML: 2.5; .5 SOLUTION RESPIRATORY (INHALATION) at 06:18

## 2023-05-25 RX ADMIN — IPRATROPIUM BROMIDE AND ALBUTEROL SULFATE 3 ML: 2.5; .5 SOLUTION RESPIRATORY (INHALATION) at 18:33

## 2023-05-25 RX ADMIN — LEVOTHYROXINE SODIUM 200 MCG: 100 TABLET ORAL at 05:41

## 2023-05-25 RX ADMIN — Medication 10 ML: at 21:28

## 2023-05-25 RX ADMIN — LISINOPRIL 10 MG: 10 TABLET ORAL at 09:25

## 2023-05-25 RX ADMIN — PANTOPRAZOLE SODIUM 40 MG: 40 TABLET, DELAYED RELEASE ORAL at 09:25

## 2023-05-25 RX ADMIN — IPRATROPIUM BROMIDE AND ALBUTEROL SULFATE 3 ML: 2.5; .5 SOLUTION RESPIRATORY (INHALATION) at 10:02

## 2023-05-25 RX ADMIN — HYDROCODONE BITARTRATE AND ACETAMINOPHEN 1 TABLET: 5; 325 TABLET ORAL at 14:28

## 2023-05-25 NOTE — CASE MANAGEMENT/SOCIAL WORK
Continued Stay Note   [facility]     Patient Name: Castillo Trujillo  MRN: [id number]  Today's Date: 5/25/2023    Admit Date: 5/9/2023    Plan: Home   Discharge Plan       Row Name 05/25/23 1516       Plan    Plan Home    Patient/Family in Agreement with Plan yes    Plan Comments Spoke with Dr. Vaughn by secure chat and his office has sent pt's information to [facility] for the outpatient oncology appointment. They will review the information and then make the decision if they will take the pt. An appointment will then be made. This may be after pt's d/c from the hospital and Dr. Vaughn says his office will make sure the arrangements are made. Updated pt and Dr. Harris.                   Discharge Codes    No documentation.                       JOHN Marcus

## 2023-05-25 NOTE — PLAN OF CARE
Goal Outcome Evaluation:  Plan of Care Reviewed With: patient        Progress: no change  Outcome Evaluation: PRN pain meds given as ordered with good results. VSS. Pt up ad jo in room. R CT intact with scant amount of yellow drainage noted.

## 2023-05-25 NOTE — PLAN OF CARE
Goal Outcome Evaluation:  A&O. RA. Pt has been resting. Ambulated in hallway. Complained of right lower back pain, pain medication per chart was given. No complaints of SOB, no fever noted. Chest tube intact, with 20 cc of drainage noted, wall suction. Tolerated meals. Urinal in use. Family was at bedside. VSS.

## 2023-05-25 NOTE — PROGRESS NOTES
"Patient name: Castillo Trujillo  Patient : 1960  VISIT # [id number]  MR #[id number]      Subjective   CC: Shortness of breath    Resting in bed.  Remains on room air. Pain is well controlled.   IP TPA discontinued yesterday due to development of new air leak. This appears slightly improved today.   Oncology appt at [place] is being set up by patient's PCP Dr. Vaughn office staff.   No family present.        Objective     Visit Vitals  /60 (BP Location: Left arm, Patient Position: Lying)   Pulse 71   Temp 97.4 °F (36.3 °C) (Oral)   Resp 16   Ht 188 cm (74\")   Wt 90.7 kg (200 lb)   SpO2 96%   BMI 25.68 kg/m²       Intake/Output Summary (Last 24 hours) at 2023 09  Last data filed at 2023 1659  Gross per 24 hour   Intake 480 ml   Output 20 ml   Net 460 ml     Right chest tube output: 90 ml/ 24 hr, 1/5 air leak with 2 out of 6 coughing attempts      LABS:none today      IMAGES:       Imaging Results (Last 24 Hours)       Procedure Component Value Units Date/Time    XR Chest 1 View [[id number]] Collected: 2353     Updated: 23    Narrative:      EXAMINATION:  XR CHEST 1 VW-  2023 3:30 AM CDT     HISTORY: Right chest tube. Right pleural effusion and infiltrate.     COMPARISON: 2023.     TECHNIQUE: Single view AP image.     FINDINGS:  A right chest tube remains in place. There is a small right  pleural effusion. There is patchy infiltrate in the right perihilar  region and right lung base. The left lung is clear. The heart is normal  in size. There are degenerative changes of the spine.          Impression:      Infiltrate and pleural effusion on the right are unchanged.  Right chest tube remains in place.        This report was finalized on 2023 06:54 by Dr. Dariel Beach MD.          Chest x-ray today shows right pigtail chest tube in place, no pneumothorax, residual right effusion slowly improving, right lung infiltrate     Final Diagnosis   Lung, right upper " lobe, fine-needle core biopsies and touch preparations:  A.  Benign lung parenchyma with features of inflammatory pseudotumor.  B.  No acid-fast bacilli or fungal organisms identified utilizing Kinyoun and GMS stains, respectively.   Electronically signed by Tamia Ernandez MD on 5/11/2023 at 1314   Clinical Information    Right upper lobe lung mass (3.8 x 6.3 x 15 cm)   Comment    The fine-needle core biopsies essentially demonstrate the same histologic changes as those seen in the prior fine-needle core biopsies of this mass ([id number]).  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  Paraffin-embedded tissue will be submitted to [facility] to determine if there is still no ALK1 staining to mitigate against the inflammatory myofibroblastic variant of inflammatory pseudotumor.  These results will be reported as an addendum upon receipt from [facility].     [facility] report available under media       Physical Exam:  General: Alert, oriented. No apparent distress.  Sitting up in the bed  Cardiovascular: Regular rate and rhythm without murmur, rubs, or gallops.    Pulmonary: Decreased breath sounds to right lung and bilateral bases.  No wheezing or rhonchi.    Chest: Right side chest tube to-20 cm H2O suction.  (+) air leak present but slightly improved.  Abdomen: Soft, non distended, and non tender.  Extremities: Warm, moves all extremities.  Neurologic:  Grossly intact with no focal deficits.         Impression:  Empyema, right  Right lung mass, inflammatory pseudotumor  Recurrent right pleural effusion  Tobacco abuse, in remission  Stage 2 moderate COPD by GOLD classification  Unintentional weight loss        Plan:  Continue chest tube suction at -20 cm H2O suction.   Flush chest tube with 10 mL normal saline every 8 hours to maintain  patency  Chest x-ray daily  Encourage pulmonary toilet and ambulation  DW nursing   [place] oncology appt being set up by Dr. Vaughn office.       Barbara May, APRN  05/25/23  09:05 CDT

## 2023-05-25 NOTE — PROGRESS NOTES
[facility] Medicine Services  INPATIENT PROGRESS NOTE    Patient Name: Castillo Trujillo  Date of Admission: 5/9/2023  Today's Date: 05/25/23  Length of Stay: 15  Primary Care Physician: Juan Vaughn DO    Subjective   Chief Complaint: Airleak.  Empyema. Lung mass. Pseudotumor.     HPI   Patient is on room air.  Blood pressure stable, afebrile.  Discussed with patient about Dr. Vaughn will make arrangement and follow-up outpatient follow-up with oncology at [place].  Hemoglobin stable.  Thrombocytosis improving.    Review of Systems   Constitutional:  Positive for activity change, appetite change and fatigue. Negative for chills and fever.   HENT:  Negative for hearing loss, nosebleeds, tinnitus and trouble swallowing.    Eyes:  Negative for visual disturbance.   Respiratory:  Negative for cough, chest tightness, shortness of breath and wheezing.    Cardiovascular:  Negative for chest pain, palpitations and leg swelling.   Gastrointestinal:  Negative for abdominal distention, abdominal pain, blood in stool, constipation, diarrhea, nausea and vomiting.   Endocrine: Negative for cold intolerance, heat intolerance, polydipsia, polyphagia and polyuria.   Genitourinary:  Negative for decreased urine volume, difficulty urinating, dysuria, flank pain, frequency and hematuria.   Musculoskeletal:  Negative for arthralgias, joint swelling and myalgias.   Skin:  Negative for rash.   Allergic/Immunologic: Negative for immunocompromised state.   Neurological:  Positive for weakness. Negative for dizziness, syncope, light-headedness and headaches.   Hematological:  Negative for adenopathy. Does not bruise/bleed easily.   Psychiatric/Behavioral:  Negative for confusion and sleep disturbance. The patient is not nervous/anxious.     All pertinent negatives and positives are as above. All other systems have been reviewed and are negative unless otherwise stated.     Objective    Temp:   [97.4 °F (36.3 °C)-98.8 °F (37.1 °C)] 97.5 °F (36.4 °C)  Heart Rate:  [64-87] 76  Resp:  [16-18] 16  BP: (128-141)/(53-70) 128/53  Physical Exam  Vitals and nursing note reviewed.   HENT:      Head: Normocephalic.   Eyes:      Conjunctiva/sclera: Conjunctivae normal.      Pupils: Pupils are equal, round, and reactive to light.   Neck:      Vascular: No JVD.   Cardiovascular:      Rate and Rhythm: Normal rate and regular rhythm.      Heart sounds: Normal heart sounds.   Pulmonary:      Effort: No respiratory distress.      Breath sounds: No wheezing or rales.      Comments: Diminished breath sound bilateral, clear, on room air.  Pleurx catheter right chest.  Chest:      Chest wall: No tenderness.   Abdominal:      General: Bowel sounds are normal. There is no distension.      Palpations: Abdomen is soft.      Tenderness: There is no abdominal tenderness.   Musculoskeletal:         General: No tenderness or deformity. Normal range of motion.      Cervical back: Neck supple.   Skin:     General: Skin is warm and dry.      Capillary Refill: Capillary refill takes 2 to 3 seconds.      Findings: No rash.   Neurological:      Mental Status: He is alert and oriented to person, place, and time.      Cranial Nerves: No cranial nerve deficit.      Motor: Weakness present. No abnormal muscle tone.      Deep Tendon Reflexes: Reflexes normal.   Psychiatric:         Mood and Affect: Mood normal.         Behavior: Behavior normal.          Results Review:  I have reviewed the labs, radiology results, and diagnostic studies.    Laboratory Data:   Results from last 7 days   Lab Units 05/24/23  0036 05/23/23  0547   WBC 10*3/mm3 7.22 8.65   HEMOGLOBIN g/dL 9.3* 9.8*   HEMATOCRIT % 32.0* 33.2*   PLATELETS 10*3/mm3 573* 645*        Results from last 7 days   Lab Units 05/24/23  0036 05/23/23  0547   SODIUM mmol/L 138 136   POTASSIUM mmol/L 3.9 4.1   CHLORIDE mmol/L 101 101   CO2 mmol/L 26.0 26.0   BUN mg/dL 9 8   CREATININE mg/dL 0.87  0.71*   CALCIUM mg/dL 9.3 9.5   BILIRUBIN mg/dL  --  0.2   ALK PHOS U/L  --  204*   ALT (SGPT) U/L  --  26   AST (SGOT) U/L  --  19   GLUCOSE mg/dL 103* 91       Culture Data:   No results found for: BLOODCX, URINECX, WOUNDCX, MRSACX, RESPCX, STOOLCX    Radiology Data:   Imaging Results (Last 24 Hours)       Procedure Component Value Units Date/Time    XR Chest 1 View [[id number]] Collected: 05/25/23 0653     Updated: 05/25/23 0657    Narrative:      EXAMINATION:  XR CHEST 1 VW-  5/25/2023 3:30 AM CDT     HISTORY: Right chest tube. Right pleural effusion and infiltrate.     COMPARISON: 05/24/2023.     TECHNIQUE: Single view AP image.     FINDINGS:  A right chest tube remains in place. There is a small right  pleural effusion. There is patchy infiltrate in the right perihilar  region and right lung base. The left lung is clear. The heart is normal  in size. There are degenerative changes of the spine.          Impression:      Infiltrate and pleural effusion on the right are unchanged.  Right chest tube remains in place.        This report was finalized on 05/25/2023 06:54 by Dr. Dariel Beach MD.            I have reviewed the patient's current medications.     Assessment/Plan   Assessment  Active Hospital Problems    Diagnosis     **Empyema, right     Empyema lung     Recurrent right pleural effusion     Stage 2 moderate COPD by GOLD classification     Right lung mass     Tobacco abuse, in remission     GERD (gastroesophageal reflux disease)     Primary hypertension     Acquired hypothyroidism        Treatment Plan  Airleak/right empyema/COPD.  Improving.  Status post 2 rounds of tPA.  Stop third round of tPA after 2 days 5/24/2023 due to air leak.  Per CT surgeon.  Zosyn antibiotics.  DuoNebs.  Incentive spirometer.  White blood cells-normal.  Pleurx catheter right lung.  Chest x-ray-Infiltrate and pleural effusion on the right are unchanged, Right chest tube remains in place.  Patient is on room air.  Chest tube  to suction.     Right lung mass/pseudotumor.  CT surgeon consult.  Oncology consult.  Positive for inflammation pseudotumor.  Not a surgical candidate for resection.  Undergoing chemo treatment and radiation and steroid.  Consult  for referral outpatient oncology for pseudotumor in [place]..  Dr. Vaughn's office has sent pt's information to [facility] to arrange the outpatient appointment. It will be reviewed and then they will decide if they will accept the pt and then the appointment will be made. This may take a few days so the office will contact pt because he may be d/c'ed home by the time they make their decision. Dr. Vaughn says his office will follow up and make sure everything gets arranged.      Anemia.  Hemoglobin stable.  No sign of acute bleed.     Thrombocytosis.  Improving.  Will follow.     Reflux.  Protonix.  Zofran as needed.     Hypertension.  Lisinopril.  Aspirin.     Hypothyroidism . Synthroid.     Pain . Norco as needed.     Nutrition . Regular/house diet.     Medical Decision Making  Number and Complexity of problems.  Right empyema/COPD/right lung mass/pseudotumor/reflux/hypertension/hypothyroidism  Differential Diagnosis: None     Conditions and Status     Ongoing evaluation and treatment.     MDM Data  External documents reviewed: None.  Cardiac tracing (EKG, telemetry) interpretation: Sinus .  Radiology interpretation: None.  Labs reviewed: Laboratory .  Any tests that were considered but not ordered: Lab in AM.     Decision rules/scores evaluated (example [id number], Wells, etc): None.     Discussed with: Patient .     Care Planning  Shared decision making: Patient .  Code status and discussions: Full code.     Disposition  Social Determinants of Health that impact treatment or disposition: Unknown.  Pending CT surgeon.    Electronically signed by Cesar Harris MD, 05/25/23, 14:25 CDT.

## 2023-05-26 ENCOUNTER — APPOINTMENT (OUTPATIENT)
Dept: GENERAL RADIOLOGY | Facility: HOSPITAL | Age: 63
DRG: 178 | End: 2023-05-26
Payer: COMMERCIAL

## 2023-05-26 LAB
ANION GAP SERPL CALCULATED.3IONS-SCNC: 11 MMOL/L (ref 5–15)
BUN SERPL-MCNC: 11 MG/DL (ref 8–23)
BUN/CREAT SERPL: 14.7 (ref 7–25)
CALCIUM SPEC-SCNC: 9.9 MG/DL (ref 8.6–10.5)
CHLORIDE SERPL-SCNC: 104 MMOL/L (ref 98–107)
CO2 SERPL-SCNC: 27 MMOL/L (ref 22–29)
CREAT SERPL-MCNC: 0.75 MG/DL (ref 0.76–1.27)
DEPRECATED RDW RBC AUTO: 52.4 FL (ref 37–54)
EGFRCR SERPLBLD CKD-EPI 2021: 100.1 ML/MIN/1.73
ERYTHROCYTE [DISTWIDTH] IN BLOOD BY AUTOMATED COUNT: 17.6 % (ref 12.3–15.4)
GLUCOSE SERPL-MCNC: 103 MG/DL (ref 65–99)
HCT VFR BLD AUTO: 35.3 % (ref 37.5–51)
HGB BLD-MCNC: 10.1 G/DL (ref 13–17.7)
MCH RBC QN AUTO: 23.8 PG (ref 26.6–33)
MCHC RBC AUTO-ENTMCNC: 28.6 G/DL (ref 31.5–35.7)
MCV RBC AUTO: 83.3 FL (ref 79–97)
PLATELET # BLD AUTO: 517 10*3/MM3 (ref 140–450)
PMV BLD AUTO: 8.5 FL (ref 6–12)
POTASSIUM SERPL-SCNC: 4 MMOL/L (ref 3.5–5.2)
RBC # BLD AUTO: 4.24 10*6/MM3 (ref 4.14–5.8)
SODIUM SERPL-SCNC: 142 MMOL/L (ref 136–145)
WBC NRBC COR # BLD: 6.27 10*3/MM3 (ref 3.4–10.8)

## 2023-05-26 PROCEDURE — 94799 UNLISTED PULMONARY SVC/PX: CPT

## 2023-05-26 PROCEDURE — 71045 X-RAY EXAM CHEST 1 VIEW: CPT

## 2023-05-26 PROCEDURE — 94664 DEMO&/EVAL PT USE INHALER: CPT

## 2023-05-26 PROCEDURE — 85027 COMPLETE CBC AUTOMATED: CPT | Performed by: FAMILY MEDICINE

## 2023-05-26 PROCEDURE — 80048 BASIC METABOLIC PNL TOTAL CA: CPT | Performed by: FAMILY MEDICINE

## 2023-05-26 RX ADMIN — LEVOTHYROXINE SODIUM 200 MCG: 100 TABLET ORAL at 06:15

## 2023-05-26 RX ADMIN — HYDROCODONE BITARTRATE AND ACETAMINOPHEN 1 TABLET: 5; 325 TABLET ORAL at 19:17

## 2023-05-26 RX ADMIN — Medication 10 ML: at 08:50

## 2023-05-26 RX ADMIN — IPRATROPIUM BROMIDE AND ALBUTEROL SULFATE 3 ML: 2.5; .5 SOLUTION RESPIRATORY (INHALATION) at 18:07

## 2023-05-26 RX ADMIN — ASPIRIN 81 MG: 81 TABLET, COATED ORAL at 08:49

## 2023-05-26 RX ADMIN — HYDROCODONE BITARTRATE AND ACETAMINOPHEN 1 TABLET: 5; 325 TABLET ORAL at 14:48

## 2023-05-26 RX ADMIN — HYDROCODONE BITARTRATE AND ACETAMINOPHEN 1 TABLET: 5; 325 TABLET ORAL at 06:15

## 2023-05-26 RX ADMIN — IPRATROPIUM BROMIDE AND ALBUTEROL SULFATE 3 ML: 2.5; .5 SOLUTION RESPIRATORY (INHALATION) at 10:38

## 2023-05-26 RX ADMIN — Medication 10 ML: at 21:11

## 2023-05-26 RX ADMIN — LISINOPRIL 10 MG: 10 TABLET ORAL at 08:49

## 2023-05-26 RX ADMIN — IPRATROPIUM BROMIDE AND ALBUTEROL SULFATE 3 ML: 2.5; .5 SOLUTION RESPIRATORY (INHALATION) at 14:27

## 2023-05-26 RX ADMIN — PANTOPRAZOLE SODIUM 40 MG: 40 TABLET, DELAYED RELEASE ORAL at 08:49

## 2023-05-26 RX ADMIN — IPRATROPIUM BROMIDE AND ALBUTEROL SULFATE 3 ML: 2.5; .5 SOLUTION RESPIRATORY (INHALATION) at 06:53

## 2023-05-26 RX ADMIN — HYDROCODONE BITARTRATE AND ACETAMINOPHEN 1 TABLET: 5; 325 TABLET ORAL at 10:26

## 2023-05-26 NOTE — PLAN OF CARE
Goal Outcome Evaluation:   A&O. RA. Pt rested today. Ambulated in hallway multiple times. Complained of right lower back pain, pain medication per chart given. Chest tube attached with atrium, continuous suction, pt is to be discharge with mini atrium, awaiting for other options due to mini atrium is on back order. No complaints of SOB, no fever noted. Pt ambulates to bathroom. Family was at bedside. VSS.

## 2023-05-26 NOTE — CASE MANAGEMENT/SOCIAL WORK
Continued Stay Note  CIELO Portillo     Patient Name: Castillo Trujillo  MRN: [id number]  Today's Date: 5/26/2023    Admit Date: 5/9/2023    Plan: Home   Discharge Plan       Row Name 05/26/23 1343       Plan    Plan Home    Patient/Family in Agreement with Plan yes    Plan Comments Plan will be for pt to return home with family at d/c. Dr. Vaughn's office will arrange the outpatient appointment at [place].                   Discharge Codes    No documentation.                       JOHN Marcus

## 2023-05-26 NOTE — PROGRESS NOTES
[facility] Medicine Services  INPATIENT PROGRESS NOTE    Patient Name: Castillo Trujillo  Date of Admission: 5/9/2023  Today's Date: 05/26/23  Length of Stay: 16  Primary Care Physician: Juan Vaughn DO    Subjective   Chief Complaint: Airleak.  Empyema. Lung mass. Pseudotumor   HPI   Patient is on room air.  Blood pressure stable, afebrile.  Hemoglobin increased.  Thrombocytosis improving.    Review of Systems   Constitutional:  Positive for activity change, appetite change and fatigue. Negative for chills and fever.   HENT:  Negative for hearing loss, nosebleeds, tinnitus and trouble swallowing.    Eyes:  Negative for visual disturbance.   Respiratory:  Negative for cough, chest tightness, shortness of breath and wheezing.    Cardiovascular:  Negative for chest pain, palpitations and leg swelling.   Gastrointestinal:  Negative for abdominal distention, abdominal pain, blood in stool, constipation, diarrhea, nausea and vomiting.   Endocrine: Negative for cold intolerance, heat intolerance, polydipsia, polyphagia and polyuria.   Genitourinary:  Negative for decreased urine volume, difficulty urinating, dysuria, flank pain, frequency and hematuria.   Musculoskeletal:  Negative for arthralgias, joint swelling and myalgias.   Skin:  Negative for rash.   Allergic/Immunologic: Negative for immunocompromised state.   Neurological:  Positive for weakness. Negative for dizziness, syncope, light-headedness and headaches.   Hematological:  Negative for adenopathy. Does not bruise/bleed easily.   Psychiatric/Behavioral:  Negative for confusion and sleep disturbance. The patient is not nervous/anxious.   All pertinent negatives and positives are as above. All other systems have been reviewed and are negative unless otherwise stated.     Objective    Temp:  [97.4 °F (36.3 °C)-98.8 °F (37.1 °C)] 98.8 °F (37.1 °C)  Heart Rate:  [67-96] 84  Resp:  [16] 16  BP: (129-143)/(53-77)  129/57  Physical Exam  Vitals and nursing note reviewed.   HENT:      Head: Normocephalic.   Eyes:      Conjunctiva/sclera: Conjunctivae normal.      Pupils: Pupils are equal, round, and reactive to light.   Neck:      Vascular: No JVD.   Cardiovascular:      Rate and Rhythm: Normal rate and regular rhythm.      Heart sounds: Normal heart sounds.   Pulmonary:      Effort: No respiratory distress.      Breath sounds: No wheezing or rales.      Comments: Diminished breath sound bilateral, clear, on room air.  Pleurx catheter right chest.  Chest:      Chest wall: No tenderness.   Abdominal:      General: Bowel sounds are normal. There is no distension.      Palpations: Abdomen is soft.      Tenderness: There is no abdominal tenderness.   Musculoskeletal:         General: No tenderness or deformity. Normal range of motion.      Cervical back: Neck supple.   Skin:     General: Skin is warm and dry.      Capillary Refill: Capillary refill takes 2 to 3 seconds.      Findings: No rash.   Neurological:      Mental Status: He is alert and oriented to person, place, and time.      Cranial Nerves: No cranial nerve deficit.      Motor: Weakness present. No abnormal muscle tone.      Deep Tendon Reflexes: Reflexes normal.   Psychiatric:         Mood and Affect: Mood normal.         Behavior: Behavior normal.          Results Review:  I have reviewed the labs, radiology results, and diagnostic studies.    Laboratory Data:   Results from last 7 days   Lab Units 05/26/23  0004 05/24/23  0036 05/23/23  0547   WBC 10*3/mm3 6.27 7.22 8.65   HEMOGLOBIN g/dL 10.1* 9.3* 9.8*   HEMATOCRIT % 35.3* 32.0* 33.2*   PLATELETS 10*3/mm3 517* 573* 645*        Results from last 7 days   Lab Units 05/26/23  0004 05/24/23  0036 05/23/23  0547   SODIUM mmol/L 142 138 136   POTASSIUM mmol/L 4.0 3.9 4.1   CHLORIDE mmol/L 104 101 101   CO2 mmol/L 27.0 26.0 26.0   BUN mg/dL 11 9 8   CREATININE mg/dL 0.75* 0.87 0.71*   CALCIUM mg/dL 9.9 9.3 9.5   BILIRUBIN  mg/dL  --   --  0.2   ALK PHOS U/L  --   --  204*   ALT (SGPT) U/L  --   --  26   AST (SGOT) U/L  --   --  19   GLUCOSE mg/dL 103* 103* 91       Culture Data:   No results found for: BLOODCX, URINECX, WOUNDCX, MRSACX, RESPCX, STOOLCX    Radiology Data:   Imaging Results (Last 24 Hours)       Procedure Component Value Units Date/Time    XR Chest 1 View [[id number]] Collected: 05/26/23 0755     Updated: 05/26/23 0759    Narrative:      EXAM/TECHNIQUE: XR CHEST 1 VW-     INDICATION: chest tube     COMPARISON: 5/25/2023     FINDINGS:     Right-sided chest tube is stable in position. No change in small  residual right-sided pleural effusion. No change in RIGHT mid-lower lung  zone airspace opacity. LEFT lung and pleural space are clear. Cardiac  silhouette is stable. No visible pneumothorax.       Impression:         No change in appearance of the chest.  This report was finalized on 05/26/2023 07:56 by Dr. Yogi Lopez MD.            I have reviewed the patient's current medications.     Assessment/Plan   Assessment  Active Hospital Problems    Diagnosis     **Empyema, right     Empyema lung     Recurrent right pleural effusion     Stage 2 moderate COPD by GOLD classification     Right lung mass     Tobacco abuse, in remission     GERD (gastroesophageal reflux disease)     Primary hypertension     Acquired hypothyroidism        Treatment Plan  Airleak/right empyema/COPD.  Improving.  Status post 2 rounds of tPA.  Stop third round of tPA after 2 days 5/24/2023 due to air leak.  Per CT surgeon.  Zosyn antibiotics.  DuoNebs.  Incentive spirometer.  White blood cells-normal.  Pleurx catheter right lung.  Chest x-ray-Infiltrate and pleural effusion on the right are unchanged, Right chest tube remains in place.  Patient is on room air.  Chest tube to suction/atrium     Right lung mass/pseudotumor.  CT surgeon consult.  Oncology consult.  Positive for inflammation pseudotumor.  Not a surgical candidate for resection.   Undergoing chemo treatment and radiation and steroid.  Consult  for referral outpatient oncology for pseudotumor in [facility]  Dr. Vaughn's office has sent pt's information to [facility] to arrange the outpatient appointment. It will be reviewed and then they will decide if they will accept the pt and then the appointment will be made. This may take a few days so the office will contact pt because he may be d/c'ed home by the time they make their decision. Dr. Vaughn says his office will follow up and make sure everything gets arranged.      Anemia.  Hemoglobin stable.  No sign of acute bleed.     Thrombocytosis.  Improving.  Will follow.     Reflux.  Protonix.  Zofran as needed.     Hypertension.  Lisinopril.  Aspirin.     Hypothyroidism . Synthroid.     Pain . Norco as needed.     Nutrition . Regular/house diet.     Medical Decision Making  Number and Complexity of problems.  Right empyema/COPD/right lung mass/pseudotumor/reflux/hypertension/hypothyroidism  Differential Diagnosis: None     Conditions and Status     Ongoing evaluation and treatment.     MDM Data  External documents reviewed: None.  Cardiac tracing (EKG, telemetry) interpretation: Sinus .  Radiology interpretation: None.  Labs reviewed: Laboratory .  Any tests that were considered but not ordered: Lab in AM.     Decision rules/scores evaluated (example [id number], Wells, etc): None.     Discussed with: Patient .     Care Planning  Shared decision making: Patient .  Code status and discussions: Full code.     Disposition  Social Determinants of Health that impact treatment or disposition: Unknown.  Pending CT surgeon.       Electronically signed by Cesar Harris MD, 05/26/23, 12:53 CDT.

## 2023-05-26 NOTE — PLAN OF CARE
Goal Outcome Evaluation:  Plan of Care Reviewed With: patient        Progress: improving  Outcome Evaluation: Ntn follow up. Pt cont to have a great appetite, consuming 100% of meals. He cont on a regular diet. Has R CT with improving air leak. Per MD notes, will go home with CT once air leak resolved. No new weight since admission, have requested/ordered updated weight. He declines nutrition supplements, but he is aware of alternate selections as needed. Cont to follow per protocol.

## 2023-05-26 NOTE — PLAN OF CARE
Goal Outcome Evaluation:  Plan of Care Reviewed With: patient        Progress: no change  Outcome Evaluation: PRN pain meds as ordered with good relief, scant amount of yellow drainage noted in chest tube. VSS. Up ad jo, safety maintained. Pt hoping for d/c home soon.

## 2023-05-26 NOTE — PROGRESS NOTES
"Patient name: Castillo Trujillo  Patient : 1960  VISIT # [id number]  MR #[id number]      Subjective   CC: Shortness of breath    Resting in bed.  Remains on room air. Pain is well controlled.   Right chest tube in place with improving air leak.   No family present.        Objective     Visit Vitals  /77 (BP Location: Left arm, Patient Position: Sitting)   Pulse 73   Temp 98.2 °F (36.8 °C) (Oral)   Resp 16   Ht 188 cm (74\")   Wt 90.7 kg (200 lb)   SpO2 98%   BMI 25.68 kg/m²       Intake/Output Summary (Last 24 hours) at 2023 09  Last data filed at 2023 0921  Gross per 24 hour   Intake 840 ml   Output 40 ml   Net 800 ml     Right chest tube output: 40 ml/ 24 hr, 1/5 air leak but improving     LABS:        IMAGES:       Imaging Results (Last 24 Hours)       Procedure Component Value Units Date/Time    XR Chest 1 View [[id number]] Collected: 23     Updated: 23    Narrative:      EXAM/TECHNIQUE: XR CHEST 1 VW-     INDICATION: chest tube     COMPARISON: 2023     FINDINGS:     Right-sided chest tube is stable in position. No change in small  residual right-sided pleural effusion. No change in RIGHT mid-lower lung  zone airspace opacity. LEFT lung and pleural space are clear. Cardiac  silhouette is stable. No visible pneumothorax.       Impression:         No change in appearance of the chest.  This report was finalized on 2023 07:56 by Dr. Yogi Lopez MD.          Chest x-ray today shows right pigtail chest tube in place, no pneumothorax, residual right effusion slowly improving, right lung infiltrate     Final Diagnosis   Lung, right upper lobe, fine-needle core biopsies and touch preparations:  A.  Benign lung parenchyma with features of inflammatory pseudotumor.  B.  No acid-fast bacilli or fungal organisms identified utilizing Kinyoun and GMS stains, respectively.   Electronically signed by Tamia Ernandez MD on 2023 at 1314   Clinical Information  "   Right upper lobe lung mass (3.8 x 6.3 x 15 cm)   Comment    The fine-needle core biopsies essentially demonstrate the same histologic changes as those seen in the prior fine-needle core biopsies of this mass ([id number]).  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  Paraffin-embedded tissue will be submitted to [facility] to determine if there is still no ALK1 staining to mitigate against the inflammatory myofibroblastic variant of inflammatory pseudotumor.  These results will be reported as an addendum upon receipt from [facility].     [facility] report available under media       Physical Exam:  General: Alert, oriented. No apparent distress.  Sitting up in the bed  Cardiovascular: Regular rate and rhythm without murmur, rubs, or gallops.    Pulmonary: Decreased breath sounds to right lung and bilateral bases.  No wheezing or rhonchi.    Chest: Right side chest tube to-20 cm H2O suction.  (+) air leak present but slightly improved.  Abdomen: Soft, non distended, and non tender.  Extremities: Warm, moves all extremities.  Neurologic:  Grossly intact with no focal deficits.         Impression:  Empyema, right  Right lung mass, inflammatory pseudotumor  Recurrent right pleural effusion  Tobacco abuse, in remission  Stage 2 moderate COPD by GOLD classification  Unintentional weight loss        Plan:  Continue chest tube suction at -20 cm H2O suction.  When airleak resolves, anticipate ready for discharge home from CT surgery standpoint.  He will discharge home with a chest tube in place.  Discussed with nursing staff to work on obtaining a mini atrium if available or empyema tube drain to have at bedside.  Flush chest tube with 10 mL normal saline every 8 hours to maintain patency  Chest x-ray daily  Encourage pulmonary toilet and  ambulation  DW nursing   [place] oncology appt being set up by Dr. Vaughn office.   Follow-up with myself in 1 week with chest x-ray if ready to discharge home over the weekend.      Barbara May, APRN  05/26/23  09:26 CDT

## 2023-05-27 ENCOUNTER — APPOINTMENT (OUTPATIENT)
Dept: GENERAL RADIOLOGY | Facility: HOSPITAL | Age: 63
DRG: 178 | End: 2023-05-27
Payer: COMMERCIAL

## 2023-05-27 LAB
DEPRECATED RDW RBC AUTO: 52.4 FL (ref 37–54)
ERYTHROCYTE [DISTWIDTH] IN BLOOD BY AUTOMATED COUNT: 17.9 % (ref 12.3–15.4)
HCT VFR BLD AUTO: 34.3 % (ref 37.5–51)
HGB BLD-MCNC: 10 G/DL (ref 13–17.7)
MCH RBC QN AUTO: 24.1 PG (ref 26.6–33)
MCHC RBC AUTO-ENTMCNC: 29.2 G/DL (ref 31.5–35.7)
MCV RBC AUTO: 82.7 FL (ref 79–97)
PLATELET # BLD AUTO: 423 10*3/MM3 (ref 140–450)
PMV BLD AUTO: 8.8 FL (ref 6–12)
RBC # BLD AUTO: 4.15 10*6/MM3 (ref 4.14–5.8)
WBC NRBC COR # BLD: 7.19 10*3/MM3 (ref 3.4–10.8)

## 2023-05-27 PROCEDURE — 94799 UNLISTED PULMONARY SVC/PX: CPT

## 2023-05-27 PROCEDURE — 94761 N-INVAS EAR/PLS OXIMETRY MLT: CPT

## 2023-05-27 PROCEDURE — 99232 SBSQ HOSP IP/OBS MODERATE 35: CPT | Performed by: SURGERY

## 2023-05-27 PROCEDURE — 83735 ASSAY OF MAGNESIUM: CPT | Performed by: FAMILY MEDICINE

## 2023-05-27 PROCEDURE — 94664 DEMO&/EVAL PT USE INHALER: CPT

## 2023-05-27 PROCEDURE — 85027 COMPLETE CBC AUTOMATED: CPT | Performed by: FAMILY MEDICINE

## 2023-05-27 PROCEDURE — 80048 BASIC METABOLIC PNL TOTAL CA: CPT | Performed by: FAMILY MEDICINE

## 2023-05-27 PROCEDURE — 71045 X-RAY EXAM CHEST 1 VIEW: CPT

## 2023-05-27 RX ADMIN — Medication 10 ML: at 08:35

## 2023-05-27 RX ADMIN — PANTOPRAZOLE SODIUM 40 MG: 40 TABLET, DELAYED RELEASE ORAL at 08:34

## 2023-05-27 RX ADMIN — HYDROCODONE BITARTRATE AND ACETAMINOPHEN 1 TABLET: 5; 325 TABLET ORAL at 21:38

## 2023-05-27 RX ADMIN — IPRATROPIUM BROMIDE AND ALBUTEROL SULFATE 3 ML: 2.5; .5 SOLUTION RESPIRATORY (INHALATION) at 10:08

## 2023-05-27 RX ADMIN — HYDROCODONE BITARTRATE AND ACETAMINOPHEN 1 TABLET: 5; 325 TABLET ORAL at 08:34

## 2023-05-27 RX ADMIN — HYDROCODONE BITARTRATE AND ACETAMINOPHEN 1 TABLET: 5; 325 TABLET ORAL at 04:09

## 2023-05-27 RX ADMIN — HYDROCODONE BITARTRATE AND ACETAMINOPHEN 1 TABLET: 5; 325 TABLET ORAL at 17:11

## 2023-05-27 RX ADMIN — IPRATROPIUM BROMIDE AND ALBUTEROL SULFATE 3 ML: 2.5; .5 SOLUTION RESPIRATORY (INHALATION) at 06:09

## 2023-05-27 RX ADMIN — LISINOPRIL 10 MG: 10 TABLET ORAL at 08:34

## 2023-05-27 RX ADMIN — HYDROCODONE BITARTRATE AND ACETAMINOPHEN 1 TABLET: 5; 325 TABLET ORAL at 00:14

## 2023-05-27 RX ADMIN — IPRATROPIUM BROMIDE AND ALBUTEROL SULFATE 3 ML: 2.5; .5 SOLUTION RESPIRATORY (INHALATION) at 19:17

## 2023-05-27 RX ADMIN — LEVOTHYROXINE SODIUM 200 MCG: 100 TABLET ORAL at 06:04

## 2023-05-27 RX ADMIN — ASPIRIN 81 MG: 81 TABLET, COATED ORAL at 08:34

## 2023-05-27 RX ADMIN — IPRATROPIUM BROMIDE AND ALBUTEROL SULFATE 3 ML: 2.5; .5 SOLUTION RESPIRATORY (INHALATION) at 14:11

## 2023-05-27 RX ADMIN — Medication 10 ML: at 20:48

## 2023-05-27 NOTE — PLAN OF CARE
Goal Outcome Evaluation:  Plan of Care Reviewed With: patient           Outcome Evaluation: pt c/o pain see MAR; CT to suction; up ad jo and ambulating in halls; small air leak; safety maintained

## 2023-05-27 NOTE — PROGRESS NOTES
"    [facility] Cardiothoracic Surgery  PROGRESS NOTE   CC: Right empyema    Subjective:  Resting comfortably today, hemodynamically stable.  Pain is well controlled.    ROS: No fevers or chills, hemodynamically stable    Objective:      /58 (BP Location: Left arm, Patient Position: Lying)   Pulse 75   Temp 97.6 °F (36.4 °C) (Oral)   Resp 16   Ht 188 cm (74\")   Wt 94.3 kg (207 lb 14.4 oz)   SpO2 98%   BMI 26.69 kg/m²       Intake/Output Summary (Last 24 hours) at 5/27/2023 1306  Last data filed at 5/27/2023 0600  Gross per 24 hour   Intake 140 ml   Output 30 ml   Net 110 ml       CT Output: 30 cc, 1 out of 5 chamber air leak with cough only    PE:  Vitals:    05/27/23 1108   BP: 141/58   Pulse: 75   Resp: 16   Temp: 97.6 °F (36.4 °C)   SpO2: 98%     GENERAL: NAD, resting comfortably, normal color  CARDIOVASCULAR: regular, regular rate, sinus  PULMONARY: Normal bilateral breath sounds, no labored breathing, right chest tube in place with serous output airleak as noted above  ABDOMEN: soft, nontender/nondistended  EXTREMITIES: mild peripheral edema, normal pulses, normal ROM        Lab Results (last 72 hours)       Procedure Component Value Units Date/Time    Basic Metabolic Panel [[id number]]  (Abnormal) Collected: 05/26/23 0004    Specimen: Blood Updated: 05/26/23 0030     Glucose 103 mg/dL      BUN 11 mg/dL      Creatinine 0.75 mg/dL      Sodium 142 mmol/L      Potassium 4.0 mmol/L      Chloride 104 mmol/L      CO2 27.0 mmol/L      Calcium 9.9 mg/dL      BUN/Creatinine Ratio 14.7     Anion Gap 11.0 mmol/L      eGFR 100.1 mL/min/1.73     Narrative:      GFR Normal >60  Chronic Kidney Disease <60  Kidney Failure <15      CBC (No Diff) [[id number]]  (Abnormal) Collected: 05/26/23 0004    Specimen: Blood Updated: 05/26/23 0011     WBC 6.27 10*3/mm3      RBC 4.24 10*6/mm3      Hemoglobin 10.1 g/dL      Hematocrit 35.3 %      MCV 83.3 fL      MCH 23.8 pg      MCHC 28.6 g/dL      RDW 17.6 %     "  RDW-SD 52.4 fl      MPV 8.5 fL      Platelets 517 10*3/mm3                 CXR: Stable appearance from yesterday no significant change    Assessment & Plan     63-year-old male who presented with right empyema has undergone intrapleural lytics but now has developed air leak.  His airleak is improving and is currently 1 out of 5 with chest tube in place.  His x-ray is stable and much improved from original presentation.    If airleak continues to improve likely waterseal tomorrow  We will move towards home with Heimlich valve Monday or Tuesday if continues to improve.    Femi Street MD  Cardiothoracic Surgeon

## 2023-05-27 NOTE — PROGRESS NOTES
[facility] Medicine Services  INPATIENT PROGRESS NOTE    Patient Name: Castillo Trujillo  Date of Admission: 5/9/2023  Today's Date: 05/27/23  Length of Stay: 17  Primary Care Physician: Juan Vaughn DO    Subjective   Chief Complaint:  Airleak.  Empyema. Lung mass. Pseudotumor    HPI   Patient is on room air.  Blood pressure stable, afebrile.  No new labs today.  Patient airleak is improving, air leaks mainly with cough.    Review of Systems   Constitutional:  Positive for activity change, appetite change and fatigue. Negative for chills and fever.   HENT:  Negative for hearing loss, nosebleeds, tinnitus and trouble swallowing.    Eyes:  Negative for visual disturbance.   Respiratory:  Negative for cough, chest tightness, shortness of breath and wheezing.    Cardiovascular:  Negative for chest pain, palpitations and leg swelling.   Gastrointestinal:  Negative for abdominal distention, abdominal pain, blood in stool, constipation, diarrhea, nausea and vomiting.   Endocrine: Negative for cold intolerance, heat intolerance, polydipsia, polyphagia and polyuria.   Genitourinary:  Negative for decreased urine volume, difficulty urinating, dysuria, flank pain, frequency and hematuria.   Musculoskeletal:  Negative for arthralgias, joint swelling and myalgias.   Skin:  Negative for rash.   Allergic/Immunologic: Negative for immunocompromised state.   Neurological:  Positive for weakness. Negative for dizziness, syncope, light-headedness and headaches.   Hematological:  Negative for adenopathy. Does not bruise/bleed easily.   Psychiatric/Behavioral:  Negative for confusion and sleep disturbance. The patient is not nervous/anxious.   All pertinent negatives and positives are as above. All other systems have been reviewed and are negative unless otherwise stated.   All pertinent negatives and positives are as above. All other systems have been reviewed and are negative unless otherwise  stated.     Objective    Temp:  [97.4 °F (36.3 °C)-98.8 °F (37.1 °C)] 97.6 °F (36.4 °C)  Heart Rate:  [66-85] 72  Resp:  [16] 16  BP: (128-142)/(57-74) 138/74  Physical Exam  Vitals and nursing note reviewed.   HENT:      Head: Normocephalic.   Eyes:      Conjunctiva/sclera: Conjunctivae normal.      Pupils: Pupils are equal, round, and reactive to light.   Neck:      Vascular: No JVD.   Cardiovascular:      Rate and Rhythm: Normal rate and regular rhythm.      Heart sounds: Normal heart sounds.   Pulmonary:      Effort: No respiratory distress.      Breath sounds: No wheezing or rales.      Comments: Diminished breath sound bilateral, clear, on room air.  Pleurx catheter right chest.  Chest:      Chest wall: No tenderness.   Abdominal:      General: Bowel sounds are normal. There is no distension.      Palpations: Abdomen is soft.      Tenderness: There is no abdominal tenderness.   Musculoskeletal:         General: No tenderness or deformity. Normal range of motion.      Cervical back: Neck supple.   Skin:     General: Skin is warm and dry.      Capillary Refill: Capillary refill takes 2 to 3 seconds.      Findings: No rash.   Neurological:      Mental Status: He is alert and oriented to person, place, and time.      Cranial Nerves: No cranial nerve deficit.      Motor: Weakness present. No abnormal muscle tone.      Deep Tendon Reflexes: Reflexes normal.   Psychiatric:         Mood and Affect: Mood normal.         Behavior: Behavior normal.          Results Review:  I have reviewed the labs, radiology results, and diagnostic studies.    Laboratory Data:   Results from last 7 days   Lab Units 05/26/23  0004 05/24/23  0036 05/23/23  0547   WBC 10*3/mm3 6.27 7.22 8.65   HEMOGLOBIN g/dL 10.1* 9.3* 9.8*   HEMATOCRIT % 35.3* 32.0* 33.2*   PLATELETS 10*3/mm3 517* 573* 645*        Results from last 7 days   Lab Units 05/26/23  0004 05/24/23  0036 05/23/23  0547   SODIUM mmol/L 142 138 136   POTASSIUM mmol/L 4.0 3.9 4.1    CHLORIDE mmol/L 104 101 101   CO2 mmol/L 27.0 26.0 26.0   BUN mg/dL 11 9 8   CREATININE mg/dL 0.75* 0.87 0.71*   CALCIUM mg/dL 9.9 9.3 9.5   BILIRUBIN mg/dL  --   --  0.2   ALK PHOS U/L  --   --  204*   ALT (SGPT) U/L  --   --  26   AST (SGOT) U/L  --   --  19   GLUCOSE mg/dL 103* 103* 91       Culture Data:   No results found for: BLOODCX, URINECX, WOUNDCX, MRSACX, RESPCX, STOOLCX    Radiology Data:   Imaging Results (Last 24 Hours)       Procedure Component Value Units Date/Time    XR Chest 1 View [[id number]] Collected: 05/27/23 0503     Updated: 05/27/23 0507    Narrative:      EXAM/TECHNIQUE: XR CHEST 1 VW-     INDICATION: chest tube; J86.9-Pyothorax without fistula     COMPARISON: Prior     FINDINGS:     Right-sided pleural drain in stable position. No change in trace  residual right-sided pleural effusion. No change in RIGHT perihilar and  lower lobe opacity. LEFT lung and pleural space are clear. No visible  pneumothorax. Stable cardiac silhouette.       Impression:         No change in appearance of the chest.  This report was finalized on 05/27/2023 05:04 by Dr. Yogi Lopez MD.            I have reviewed the patient's current medications.     Assessment/Plan   Assessment  Active Hospital Problems    Diagnosis     **Empyema, right     Empyema lung     Recurrent right pleural effusion     Stage 2 moderate COPD by GOLD classification     Right lung mass     Tobacco abuse, in remission     GERD (gastroesophageal reflux disease)     Primary hypertension     Acquired hypothyroidism        Treatment Plan  Airleak/right empyema/COPD.  Improving.  Status post 2 rounds of tPA.  Stop third round of tPA after 2 days 5/24/2023 due to air leak.  Per CT surgeon.  Zosyn antibiotics.  DuoNebs.  Incentive spirometer.  White blood cells-normal.  Pleurx catheter right lung.  Chest x-ray-Infiltrate and pleural effusion on the right are unchanged, Right chest tube remains in place.  Patient is on room air.  Chest tube to  suction/atrium     Right lung mass/pseudotumor.  CT surgeon consult.  Oncology consult.  Positive for inflammation pseudotumor.  Not a surgical candidate for resection.  Undergoing chemo treatment and radiation and steroid.  Consult  for referral outpatient oncology for pseudotumor in [place]..  Dr. Vaughn's office has sent pt's information to [facility] to arrange the outpatient appointment. It will be reviewed and then they will decide if they will accept the pt and then the appointment will be made. This may take a few days so the office will contact pt because he may be d/c'ed home by the time they make their decision. Dr. Vaughn says his office will follow up and make sure everything gets arranged.      Anemia.  Hemoglobin stable.  No sign of acute bleed.     Thrombocytosis.  Improving.  Will follow.     Reflux.  Protonix.  Zofran as needed.     Hypertension.  Lisinopril.  Aspirin.     Hypothyroidism . Synthroid.     Pain . Norco as needed.     Nutrition . Regular/house diet.     Medical Decision Making  Number and Complexity of problems.  Right empyema/COPD/right lung mass/pseudotumor/reflux/hypertension/hypothyroidism  Differential Diagnosis: None     Conditions and Status     Ongoing evaluation and treatment.     MDM Data  External documents reviewed: None.  Cardiac tracing (EKG, telemetry) interpretation: Sinus .  Radiology interpretation: None.  Labs reviewed: Laboratory .  Any tests that were considered but not ordered: Lab in AM.     Decision rules/scores evaluated (example [id number], [place], etc): None.     Discussed with: Patient .     Care Planning  Shared decision making: Patient .  Code status and discussions: Full code.     Disposition  Social Determinants of Health that impact treatment or disposition: Unknown.  Pending CT surgeon.    Electronically signed by Cesar Harris MD, 05/27/23, 08:33 CDT.

## 2023-05-28 ENCOUNTER — APPOINTMENT (OUTPATIENT)
Dept: GENERAL RADIOLOGY | Facility: HOSPITAL | Age: 63
DRG: 178 | End: 2023-05-28
Payer: COMMERCIAL

## 2023-05-28 LAB
ANION GAP SERPL CALCULATED.3IONS-SCNC: 11 MMOL/L (ref 5–15)
BUN SERPL-MCNC: 9 MG/DL (ref 8–23)
BUN/CREAT SERPL: 11.1 (ref 7–25)
CALCIUM SPEC-SCNC: 9.4 MG/DL (ref 8.6–10.5)
CHLORIDE SERPL-SCNC: 102 MMOL/L (ref 98–107)
CO2 SERPL-SCNC: 27 MMOL/L (ref 22–29)
CREAT SERPL-MCNC: 0.81 MG/DL (ref 0.76–1.27)
EGFRCR SERPLBLD CKD-EPI 2021: 97.8 ML/MIN/1.73
GLUCOSE SERPL-MCNC: 112 MG/DL (ref 65–99)
MAGNESIUM SERPL-MCNC: 2 MG/DL (ref 1.6–2.4)
POTASSIUM SERPL-SCNC: 3.3 MMOL/L (ref 3.5–5.2)
SODIUM SERPL-SCNC: 140 MMOL/L (ref 136–145)

## 2023-05-28 PROCEDURE — 94799 UNLISTED PULMONARY SVC/PX: CPT

## 2023-05-28 PROCEDURE — 99232 SBSQ HOSP IP/OBS MODERATE 35: CPT | Performed by: SURGERY

## 2023-05-28 PROCEDURE — 71045 X-RAY EXAM CHEST 1 VIEW: CPT

## 2023-05-28 RX ORDER — POTASSIUM CHLORIDE 750 MG/1
40 CAPSULE, EXTENDED RELEASE ORAL 2 TIMES DAILY
Status: DISCONTINUED | OUTPATIENT
Start: 2023-05-28 | End: 2023-05-28

## 2023-05-28 RX ADMIN — PANTOPRAZOLE SODIUM 40 MG: 40 TABLET, DELAYED RELEASE ORAL at 08:31

## 2023-05-28 RX ADMIN — HYDROCODONE BITARTRATE AND ACETAMINOPHEN 1 TABLET: 5; 325 TABLET ORAL at 19:41

## 2023-05-28 RX ADMIN — LISINOPRIL 10 MG: 10 TABLET ORAL at 08:31

## 2023-05-28 RX ADMIN — HYDROCODONE BITARTRATE AND ACETAMINOPHEN 1 TABLET: 5; 325 TABLET ORAL at 10:28

## 2023-05-28 RX ADMIN — LEVOTHYROXINE SODIUM 200 MCG: 100 TABLET ORAL at 06:06

## 2023-05-28 RX ADMIN — Medication 10 ML: at 08:31

## 2023-05-28 RX ADMIN — IPRATROPIUM BROMIDE AND ALBUTEROL SULFATE 3 ML: 2.5; .5 SOLUTION RESPIRATORY (INHALATION) at 15:41

## 2023-05-28 RX ADMIN — HYDROCODONE BITARTRATE AND ACETAMINOPHEN 1 TABLET: 5; 325 TABLET ORAL at 23:44

## 2023-05-28 RX ADMIN — HYDROCODONE BITARTRATE AND ACETAMINOPHEN 1 TABLET: 5; 325 TABLET ORAL at 15:01

## 2023-05-28 RX ADMIN — HYDROCODONE BITARTRATE AND ACETAMINOPHEN 1 TABLET: 5; 325 TABLET ORAL at 01:43

## 2023-05-28 RX ADMIN — HYDROCODONE BITARTRATE AND ACETAMINOPHEN 1 TABLET: 5; 325 TABLET ORAL at 06:06

## 2023-05-28 RX ADMIN — POTASSIUM CHLORIDE 40 MEQ: 10 CAPSULE, COATED, EXTENDED RELEASE ORAL at 08:31

## 2023-05-28 RX ADMIN — IPRATROPIUM BROMIDE AND ALBUTEROL SULFATE 3 ML: 2.5; .5 SOLUTION RESPIRATORY (INHALATION) at 19:44

## 2023-05-28 RX ADMIN — IPRATROPIUM BROMIDE AND ALBUTEROL SULFATE 3 ML: 2.5; .5 SOLUTION RESPIRATORY (INHALATION) at 10:20

## 2023-05-28 RX ADMIN — ASPIRIN 81 MG: 81 TABLET, COATED ORAL at 08:31

## 2023-05-28 RX ADMIN — IPRATROPIUM BROMIDE AND ALBUTEROL SULFATE 3 ML: 2.5; .5 SOLUTION RESPIRATORY (INHALATION) at 06:34

## 2023-05-28 NOTE — PROGRESS NOTES
"    [facility] Cardiothoracic Surgery  PROGRESS NOTE   CC: Right empyema    Subjective:  Doing well today, pain is well controlled, headache is better he has been hemodynamically stable    ROS: No fevers or chills hemodynamically stable    Objective:      /58 (BP Location: Left arm, Patient Position: Sitting)   Pulse 80   Temp 98.8 °F (37.1 °C) (Oral)   Resp 16   Ht 188 cm (74\")   Wt 94.3 kg (207 lb 14.4 oz)   SpO2 96%   BMI 26.69 kg/m²       Intake/Output Summary (Last 24 hours) at 5/28/2023 1310  Last data filed at 5/28/2023 0800  Gross per 24 hour   Intake 240 ml   Output 20 ml   Net 220 ml       CT Output: 20 cc airleak improving with just 1 small bubble with forced cough    PE:  Vitals:    05/28/23 1108   BP: 115/58   Pulse: 80   Resp: 16   Temp: 98.8 °F (37.1 °C)   SpO2: 96%     GENERAL: NAD, resting comfortably, normal color  CARDIOVASCULAR: regular, regular rate, sinus  PULMONARY: Normal bilateral breath sounds, no labored breathing, right chest tube in place with minimal output and very small air leak with forced expiration  ABDOMEN: soft, nontender/nondistended  EXTREMITIES: mild peripheral edema, normal pulses, normal ROM        Lab Results (last 72 hours)       Procedure Component Value Units Date/Time    Magnesium [[id number]]  (Normal) Collected: 05/27/23 2326    Specimen: Blood Updated: 05/28/23 0745     Magnesium 2.0 mg/dL     Basic Metabolic Panel [[id number]]  (Abnormal) Collected: 05/27/23 2326    Specimen: Blood Updated: 05/28/23 0002     Glucose 112 mg/dL      BUN 9 mg/dL      Creatinine 0.81 mg/dL      Sodium 140 mmol/L      Potassium 3.3 mmol/L      Comment: Slight hemolysis detected by analyzer. Results may be affected.        Chloride 102 mmol/L      CO2 27.0 mmol/L      Calcium 9.4 mg/dL      BUN/Creatinine Ratio 11.1     Anion Gap 11.0 mmol/L      eGFR 97.8 mL/min/1.73     Narrative:      GFR Normal >60  Chronic Kidney Disease <60  Kidney Failure <15      CBC " (No Diff) [[id number]]  (Abnormal) Collected: 05/27/23 2326    Specimen: Blood Updated: 05/27/23 2337     WBC 7.19 10*3/mm3      RBC 4.15 10*6/mm3      Hemoglobin 10.0 g/dL      Hematocrit 34.3 %      MCV 82.7 fL      MCH 24.1 pg      MCHC 29.2 g/dL      RDW 17.9 %      RDW-SD 52.4 fl      MPV 8.8 fL      Platelets 423 10*3/mm3     Basic Metabolic Panel [[id number]]  (Abnormal) Collected: 05/26/23 0004    Specimen: Blood Updated: 05/26/23 0030     Glucose 103 mg/dL      BUN 11 mg/dL      Creatinine 0.75 mg/dL      Sodium 142 mmol/L      Potassium 4.0 mmol/L      Chloride 104 mmol/L      CO2 27.0 mmol/L      Calcium 9.9 mg/dL      BUN/Creatinine Ratio 14.7     Anion Gap 11.0 mmol/L      eGFR 100.1 mL/min/1.73     Narrative:      GFR Normal >60  Chronic Kidney Disease <60  Kidney Failure <15      CBC (No Diff) [[id number]]  (Abnormal) Collected: 05/26/23 0004    Specimen: Blood Updated: 05/26/23 0011     WBC 6.27 10*3/mm3      RBC 4.24 10*6/mm3      Hemoglobin 10.1 g/dL      Hematocrit 35.3 %      MCV 83.3 fL      MCH 23.8 pg      MCHC 28.6 g/dL      RDW 17.6 %      RDW-SD 52.4 fl      MPV 8.5 fL      Platelets 517 10*3/mm3                 CXR: Stable without significant change    Assessment & Plan     63-year-old male who presented with right empyema has undergone intrapleural lytics but now has developed air leak. His airleak is improving and is currently 1 out of 5 with chest tube in place and airleak is improved from yesterday. His x-ray is stable and much improved from original presentation.     Waterseal today, plan on transition to Heimlich valve tomorrow and possibly home Tuesday    Femi Street MD  Cardiothoracic Surgeon

## 2023-05-28 NOTE — PLAN OF CARE
Goal Outcome Evaluation:  Plan of Care Reviewed With: patient           Outcome Evaluation: c/o pain see MAR; up ad jo; CT to waterseal per MD; VSS; safety maintained

## 2023-05-28 NOTE — PLAN OF CARE
Goal Outcome Evaluation:  Plan of Care Reviewed With: patient        Progress: improving  Outcome Evaluation: c/o pain with intervention see MAR; up ad jo; no acute distress

## 2023-05-28 NOTE — PROGRESS NOTES
[facility] Medicine Services  INPATIENT PROGRESS NOTE    Patient Name: Castillo Trujillo  Date of Admission: 5/9/2023  Today's Date: 05/28/23  Length of Stay: 18  Primary Care Physician: Juan Vaughn DO    Subjective   Chief Complaint: Airleak.  Empyema. Lung mass. Pseudotumor       HPI   Patient is on room air.  Blood pressure stable, afebrile.  Headache is improving per patient.  Patient received p.o. potassium this morning, lab in a.m. discussed with family.    Review of Systems   Constitutional:  Positive for activity change, appetite change and fatigue. Negative for chills and fever.   HENT:  Negative for hearing loss, nosebleeds, tinnitus and trouble swallowing.    Eyes:  Negative for visual disturbance.   Respiratory:  Negative for cough, chest tightness, shortness of breath and wheezing.    Cardiovascular:  Negative for chest pain, palpitations and leg swelling.   Gastrointestinal:  Negative for abdominal distention, abdominal pain, blood in stool, constipation, diarrhea, nausea and vomiting.   Endocrine: Negative for cold intolerance, heat intolerance, polydipsia, polyphagia and polyuria.   Genitourinary:  Negative for decreased urine volume, difficulty urinating, dysuria, flank pain, frequency and hematuria.   Musculoskeletal:  Negative for arthralgias, joint swelling and myalgias.   Skin:  Negative for rash.   Allergic/Immunologic: Negative for immunocompromised state.   Neurological:  Positive for weakness. Negative for dizziness, syncope, light-headedness and headaches.   Hematological:  Negative for adenopathy. Does not bruise/bleed easily.   Psychiatric/Behavioral:  Negative for confusion and sleep disturbance. The patient is not nervous/anxious.   All pertinent negatives and positives are as above. All other systems have been reviewed and are negative unless otherwise stated.     Objective    Temp:  [97.5 °F (36.4 °C)-98.5 °F (36.9 °C)] 97.9 °F (36.6  °C)  Heart Rate:  [72-85] 76  Resp:  [16] 16  BP: (120-141)/(54-62) 120/56  Physical Exam  Vitals and nursing note reviewed.   HENT:      Head: Normocephalic.   Eyes:      Conjunctiva/sclera: Conjunctivae normal.      Pupils: Pupils are equal, round, and reactive to light.   Neck:      Vascular: No JVD.   Cardiovascular:      Rate and Rhythm: Normal rate and regular rhythm.      Heart sounds: Normal heart sounds.   Pulmonary:      Effort: No respiratory distress.      Breath sounds: No wheezing or rales.      Comments: Diminished breath sound bilateral, clear, on room air.  Pleurx catheter right chest.  Chest:      Chest wall: No tenderness.   Abdominal:      General: Bowel sounds are normal. There is no distension.      Palpations: Abdomen is soft.      Tenderness: There is no abdominal tenderness.   Musculoskeletal:         General: No tenderness or deformity. Normal range of motion.      Cervical back: Neck supple.   Skin:     General: Skin is warm and dry.      Capillary Refill: Capillary refill takes 2 to 3 seconds.      Findings: No rash.   Neurological:      Mental Status: He is alert and oriented to person, place, and time.      Cranial Nerves: No cranial nerve deficit.      Motor: Weakness present. No abnormal muscle tone.      Deep Tendon Reflexes: Reflexes normal.   Psychiatric:         Mood and Affect: Mood normal.         Behavior: Behavior normal.       Results Review:  I have reviewed the labs, radiology results, and diagnostic studies.    Laboratory Data:   Results from last 7 days   Lab Units 05/27/23 2326 05/26/23 0004 05/24/23  0036   WBC 10*3/mm3 7.19 6.27 7.22   HEMOGLOBIN g/dL 10.0* 10.1* 9.3*   HEMATOCRIT % 34.3* 35.3* 32.0*   PLATELETS 10*3/mm3 423 517* 573*        Results from last 7 days   Lab Units 05/27/23 2326 05/26/23  0004 05/24/23  0036 05/23/23  0547   SODIUM mmol/L 140 142 138 136   POTASSIUM mmol/L 3.3* 4.0 3.9 4.1   CHLORIDE mmol/L 102 104 101 101   CO2 mmol/L 27.0 27.0 26.0  26.0   BUN mg/dL 9 11 9 8   CREATININE mg/dL 0.81 0.75* 0.87 0.71*   CALCIUM mg/dL 9.4 9.9 9.3 9.5   BILIRUBIN mg/dL  --   --   --  0.2   ALK PHOS U/L  --   --   --  204*   ALT (SGPT) U/L  --   --   --  26   AST (SGOT) U/L  --   --   --  19   GLUCOSE mg/dL 112* 103* 103* 91       Culture Data:   No results found for: BLOODCX, URINECX, WOUNDCX, MRSACX, RESPCX, STOOLCX    Radiology Data:   Imaging Results (Last 24 Hours)       Procedure Component Value Units Date/Time    XR Chest 1 View [[id number]] Collected: 05/28/23 0425     Updated: 05/28/23 0429    Narrative:      EXAM/TECHNIQUE: XR CHEST 1 VW-     INDICATION: chest tube; J86.9-Pyothorax without fistula     COMPARISON: Prior day     FINDINGS:     Right-sided pleural drain is stable in position. No change in small  residual right-sided pleural fluid. No change in RIGHT perihilar and  lower lobe consolidation. LEFT lung and pleural space are clear. No  pneumothorax. Cardiac silhouette is normal size.       Impression:         No change in appearance of the chest.  This report was finalized on 05/28/2023 04:26 by Dr. Yogi Lopez MD.            I have reviewed the patient's current medications.     Assessment/Plan   Assessment  Active Hospital Problems    Diagnosis     **Empyema, right     Empyema lung     Recurrent right pleural effusion     Stage 2 moderate COPD by GOLD classification     Right lung mass     Tobacco abuse, in remission     GERD (gastroesophageal reflux disease)     Primary hypertension     Acquired hypothyroidism        Treatment Plan  Airleak/right empyema/COPD.  Improving.  Status post 2 rounds of tPA.  Stop third round of tPA after 2 days 5/24/2023 due to air leak.  Per CT surgeon.  Zosyn antibiotics.  DuoNebs.  Incentive spirometer.  White blood cells-normal.  Pleurx catheter right lung.  Chest x-ray-Infiltrate and pleural effusion on the right are unchanged, Right chest tube remains in place.  Patient is on room air.  Chest tube to  suction/atrium     Right lung mass/pseudotumor.  CT surgeon consult.  Oncology consult.  Positive for inflammation pseudotumor.  Not a surgical candidate for resection.  Undergoing chemo treatment and radiation and steroid.  Consult  for referral outpatient oncology for pseudotumor in [facility]..  Dr. Vaughn's office has sent pt's information to [place] to arrange the outpatient appointment. It will be reviewed and then they will decide if they will accept the pt and then the appointment will be made. This may take a few days so the office will contact pt because he may be d/c'ed home by the time they make their decision. Dr. Vaughn says his office will follow up and make sure everything gets arranged.     Hypokalemia . P.o. potassium yesterday.  Lab in AM.     Anemia.  Hemoglobin stable.  No sign of acute bleed.     Thrombocytosis.  Improving.  Will follow.     Reflux.  Protonix.  Zofran as needed.     Hypertension.  Lisinopril.  Aspirin.     Hypothyroidism . Synthroid.     Pain . Norco as needed.     Nutrition . Regular/house diet.     Medical Decision Making  Number and Complexity of problems.  Right empyema/COPD/right lung mass/pseudotumor/reflux/hypertension/hypothyroidism  Differential Diagnosis: None     Conditions and Status     Ongoing evaluation and treatment.     MDM Data  External documents reviewed: None.  Cardiac tracing (EKG, telemetry) interpretation: Sinus .  Radiology interpretation: None.  Labs reviewed: Laboratory .  Any tests that were considered but not ordered: Lab in AM.     Decision rules/scores evaluated (example [id number], [place], etc): None.     Discussed with: Patient .     Care Planning  Shared decision making: Patient .  Code status and discussions: Full code.     Disposition  Social Determinants of Health that impact treatment or disposition: Unknown.  Pending CT surgeon.    Electronically signed by Cesar Harris MD, 05/28/23, 10:09 CDT.

## 2023-05-29 ENCOUNTER — APPOINTMENT (OUTPATIENT)
Dept: GENERAL RADIOLOGY | Facility: HOSPITAL | Age: 63
DRG: 178 | End: 2023-05-29
Payer: COMMERCIAL

## 2023-05-29 PROCEDURE — 94799 UNLISTED PULMONARY SVC/PX: CPT

## 2023-05-29 PROCEDURE — 85027 COMPLETE CBC AUTOMATED: CPT | Performed by: FAMILY MEDICINE

## 2023-05-29 PROCEDURE — 80048 BASIC METABOLIC PNL TOTAL CA: CPT | Performed by: FAMILY MEDICINE

## 2023-05-29 PROCEDURE — 99232 SBSQ HOSP IP/OBS MODERATE 35: CPT | Performed by: SURGERY

## 2023-05-29 PROCEDURE — 71045 X-RAY EXAM CHEST 1 VIEW: CPT

## 2023-05-29 PROCEDURE — 94664 DEMO&/EVAL PT USE INHALER: CPT

## 2023-05-29 RX ADMIN — IPRATROPIUM BROMIDE AND ALBUTEROL SULFATE 3 ML: 2.5; .5 SOLUTION RESPIRATORY (INHALATION) at 21:03

## 2023-05-29 RX ADMIN — PANTOPRAZOLE SODIUM 40 MG: 40 TABLET, DELAYED RELEASE ORAL at 08:38

## 2023-05-29 RX ADMIN — LEVOTHYROXINE SODIUM 200 MCG: 100 TABLET ORAL at 05:47

## 2023-05-29 RX ADMIN — HYDROCODONE BITARTRATE AND ACETAMINOPHEN 1 TABLET: 5; 325 TABLET ORAL at 21:22

## 2023-05-29 RX ADMIN — HYDROCODONE BITARTRATE AND ACETAMINOPHEN 1 TABLET: 5; 325 TABLET ORAL at 16:57

## 2023-05-29 RX ADMIN — ASPIRIN 81 MG: 81 TABLET, COATED ORAL at 08:38

## 2023-05-29 RX ADMIN — HYDROCODONE BITARTRATE AND ACETAMINOPHEN 1 TABLET: 5; 325 TABLET ORAL at 04:01

## 2023-05-29 RX ADMIN — IPRATROPIUM BROMIDE AND ALBUTEROL SULFATE 3 ML: 2.5; .5 SOLUTION RESPIRATORY (INHALATION) at 06:39

## 2023-05-29 RX ADMIN — IPRATROPIUM BROMIDE AND ALBUTEROL SULFATE 3 ML: 2.5; .5 SOLUTION RESPIRATORY (INHALATION) at 14:25

## 2023-05-29 RX ADMIN — IPRATROPIUM BROMIDE AND ALBUTEROL SULFATE 3 ML: 2.5; .5 SOLUTION RESPIRATORY (INHALATION) at 10:12

## 2023-05-29 RX ADMIN — HYDROCODONE BITARTRATE AND ACETAMINOPHEN 1 TABLET: 5; 325 TABLET ORAL at 12:45

## 2023-05-29 RX ADMIN — HYDROCODONE BITARTRATE AND ACETAMINOPHEN 1 TABLET: 5; 325 TABLET ORAL at 08:38

## 2023-05-29 RX ADMIN — LISINOPRIL 10 MG: 10 TABLET ORAL at 08:38

## 2023-05-29 RX ADMIN — Medication 10 ML: at 08:39

## 2023-05-29 RX ADMIN — Medication 10 ML: at 21:19

## 2023-05-29 NOTE — PLAN OF CARE
Goal Outcome Evaluation:           Progress: improving              Pt medicated for pain x3, using po pain meds. Pt up ad jo. No distress noted.

## 2023-05-29 NOTE — PLAN OF CARE
Goal Outcome Evaluation:  Plan of Care Reviewed With: patient           Outcome Evaluation: CT changed to hemlich valve per MD request, tolerating well; up ad jo; VSS; safety maintained

## 2023-05-29 NOTE — PROGRESS NOTES
[facility] Medicine Services  INPATIENT PROGRESS NOTE    Patient Name: Castillo Trujillo  Date of Admission: 5/9/2023  Today's Date: 05/29/23  Length of Stay: 19  Primary Care Physician: Juan Vaughn DO    Subjective   Chief Complaint: follow up empyema  HPI   Doing ok, afebrile, no pain.  No SOA.        Review of Systems   Constitutional:  Positive for fatigue. Negative for fever.   HENT:  Negative for congestion and ear pain.    Eyes:  Negative for redness and visual disturbance.   Respiratory:  Negative for cough, shortness of breath and wheezing.    Cardiovascular:  Negative for chest pain and palpitations.   Gastrointestinal:  Negative for abdominal pain, diarrhea, nausea and vomiting.   Endocrine: Negative for cold intolerance and heat intolerance.   Genitourinary:  Negative for dysuria and frequency.   Musculoskeletal:  Negative for arthralgias and back pain.   Skin:  Negative for rash and wound.   Neurological:  Negative for dizziness and headaches.   Psychiatric/Behavioral:  Negative for confusion. The patient is not nervous/anxious.         All pertinent negatives and positives are as above. All other systems have been reviewed and are negative unless otherwise stated.     Objective    Temp:  [97.5 °F (36.4 °C)-98.9 °F (37.2 °C)] 97.5 °F (36.4 °C)  Heart Rate:  [50-82] 77  Resp:  [16-18] 18  BP: (111-136)/(53-70) 126/61  Physical Exam  Vitals reviewed.   Constitutional:       Appearance: He is well-developed.   HENT:      Head: Normocephalic and atraumatic.      Right Ear: External ear normal.      Left Ear: External ear normal.      Nose: Nose normal.   Eyes:      General: No scleral icterus.        Right eye: No discharge.         Left eye: No discharge.      Conjunctiva/sclera: Conjunctivae normal.      Pupils: Pupils are equal, round, and reactive to light.   Neck:      Thyroid: No thyromegaly.      Trachea: No tracheal deviation.   Cardiovascular:      Rate and  Rhythm: Normal rate and regular rhythm.      Heart sounds: Normal heart sounds. No murmur heard.    No friction rub. No gallop.   Pulmonary:      Effort: Pulmonary effort is normal. No respiratory distress.      Breath sounds: Normal breath sounds. No stridor. No wheezing or rales.   Chest:      Chest wall: No tenderness.   Abdominal:      General: Bowel sounds are normal. There is no distension.      Palpations: Abdomen is soft. There is no mass.      Tenderness: There is no abdominal tenderness. There is no guarding or rebound.      Hernia: No hernia is present.   Musculoskeletal:         General: No deformity. Normal range of motion.      Cervical back: Normal range of motion and neck supple.   Lymphadenopathy:      Cervical: No cervical adenopathy.   Skin:     General: Skin is warm and dry.      Coloration: Skin is not pale.      Findings: No erythema or rash.   Neurological:      Mental Status: He is alert and oriented to person, place, and time.      Cranial Nerves: No cranial nerve deficit.      Motor: No abnormal muscle tone.      Coordination: Coordination normal.      Deep Tendon Reflexes: Reflexes are normal and symmetric. Reflexes normal.   Psychiatric:         Behavior: Behavior normal.         Thought Content: Thought content normal.         Judgment: Judgment normal.         Results Review:  I have reviewed the labs, radiology results, and diagnostic studies.    Laboratory Data:   Results from last 7 days   Lab Units 05/27/23 2326 05/26/23  0004 05/24/23  0036   WBC 10*3/mm3 7.19 6.27 7.22   HEMOGLOBIN g/dL 10.0* 10.1* 9.3*   HEMATOCRIT % 34.3* 35.3* 32.0*   PLATELETS 10*3/mm3 423 517* 573*          Results from last 7 days   Lab Units 05/27/23 2326 05/26/23  0004 05/24/23  0036 05/23/23  0547   SODIUM mmol/L 140 142 138 136   POTASSIUM mmol/L 3.3* 4.0 3.9 4.1   CHLORIDE mmol/L 102 104 101 101   CO2 mmol/L 27.0 27.0 26.0 26.0   BUN mg/dL 9 11 9 8   CREATININE mg/dL 0.81 0.75* 0.87 0.71*   CALCIUM  mg/dL 9.4 9.9 9.3 9.5   BILIRUBIN mg/dL  --   --   --  0.2   ALK PHOS U/L  --   --   --  204*   ALT (SGPT) U/L  --   --   --  26   AST (SGOT) U/L  --   --   --  19   GLUCOSE mg/dL 112* 103* 103* 91         Culture Data:   No results found for: BLOODCX, URINECX, WOUNDCX, MRSACX, RESPCX, STOOLCX    Radiology Data:   Imaging Results (Last 24 Hours)       Procedure Component Value Units Date/Time    XR Chest 1 View [[id number]] Collected: 05/29/23 0723     Updated: 05/29/23 0729    Narrative:      Frontal supine radiograph of the chest 5/29/2023 3:25 AM CDT     History: chest tube; J86.9-Pyothorax without fistula     Comparison: 05/28/2023      Findings:      Right-sided pleural drain is in stable position. No pneumothorax.  Residual pleural thickening and perhaps small subpulmonic effusion. This  is stable. Left lung is clear. Heart size is normal. Pulmonary  vasculature are nondilated. Bony elements are unremarkable.       Impression:      Impression:   1. No significant interval change.  2. Right-sided pleural drain and pleural thickening is stable. No  pneumothorax.        This report was finalized on 05/29/2023 07:26 by Dr Dejon Callaway, .            I have reviewed the patient's current medications.     Assessment/Plan   Assessment  Active Hospital Problems    Diagnosis     **Empyema, right     Empyema lung     Recurrent right pleural effusion     Stage 2 moderate COPD by GOLD classification     Right lung mass     Tobacco abuse, in remission     GERD (gastroesophageal reflux disease)     Primary hypertension     Acquired hypothyroidism        Treatment Plan  1.  Empyema  -IV abx completed  -CTS following    2.  Inflammatory pseudotumor   -CTS following  -oncology consulted    3.  GERD  -Protonix    4.  HTN  -Lisinopril    5.  Hypothyroidism  -Synthroid        Medical Decision Making  Number and Complexity of problems: 5 problems, moderate complexity    Risk:  Moderate:  Prescription drug management    Differential  Diagnosis: empyema confirmed    Conditions and Status        Condition is improving.     MDM Data  1:  Tests/Documents/Independent Historians: n/a  A:  Prior external notes from unique source reviewed:   B.  Review of the Results of Each Unique Test:  C.  Assessment requiring an independent Historian:    2.  Independent interpretation of Tests:n/a  A.  Radiology Interpretation:  B:  EKG/Telemetry Interpretation:    3:  Discussion of management or Test interpretation: n/a    Care Planning  Shared decision making: patient agreeable to plan  Code status and discussions: full code    Disposition  Social Determinants of Health that impact treatment or disposition: n/a  I expect the patient to be discharged to home in 1-2 days    Electronically signed by Barry Rich MD, 05/29/23, 14:25 CDT.

## 2023-05-29 NOTE — PAYOR COMM NOTE
"5/28 CLINICAL  [id number]    [phone]    Castillo Trujillo (63 y.o. Male)       Date of Birth   1960    Social Security Number       Address   [address] [place] [address] [place] [address]    Home Phone   [phone]    MRN   [id number]       [facility]    Marital Status                               Admission Date   5/9/23    Admission Type   Urgent    Admitting Provider   Barry Rich MD    Attending Provider   Barry Rich MD    Department, Room/Bed   [facility] 3C, 364/1       Discharge Date       Discharge Disposition       Discharge Destination                                 Attending Provider: Barry Rich MD    Allergies: No Known Allergies    Isolation: None   Infection: None   Code Status: CPR    Ht: 188 cm (74\")   Wt: 94.9 kg (209 lb 4.8 oz)    Admission Cmt: None   Principal Problem: Empyema, right [J86.9]                   Active Insurance as of 5/9/2023       Primary Coverage       Payor Plan Insurance Group Employer/Plan Group    ANTH BLUE CROSS ANTH BLUE CROSS BLUE SHIELD PPO [id number]       Payor Plan Address Payor Plan Phone Number Payor Plan Fax Number Effective Dates    [address] [phone]  8/1/2018 - None Entered    [facility] [id number]         Subscriber Name Subscriber Birth Date Member ID       CASTILLO TRUJILLO 1960 [id number]               Secondary Coverage       Payor Plan Insurance Group Employer/Plan Group    MEDICARE MEDICARE A & B        Payor Plan Address Payor Plan Phone Number Payor Plan Fax Number Effective Dates    [address] [phone]  3/1/2023 - None Entered    [facility] [address]         Subscriber Name Subscriber Birth Date Member ID       CASTILLO TRUJILLO 1960 [id number]                     Emergency Contacts        (Rel.) Home Phone Work Phone Mobile Phone    VALENTIN TRUJILLO (Spouse) [phone] -- --              Lines, Drains & Airways       Active LDAs       Name Placement " date Placement time Site Days    Peripheral IV 05/21/23 1502 Anterior;Distal;Right;Upper Arm 05/21/23  1502  Arm  7    Chest Tube Right Midaxillary --  --  Midaxillary  --                  Current Facility-Administered Medications   Medication Dose Route Frequency Provider Last Rate Last Admin    acetaminophen (TYLENOL) tablet 650 mg  650 mg Oral Q4H PRN Carolynn Pryor APRN        Or    acetaminophen (TYLENOL) 160 MG/5ML solution 650 mg  650 mg Oral Q4H PRN Carolynn Pryor APRN        Or    acetaminophen (TYLENOL) suppository 650 mg  650 mg Rectal Q4H PRN Carolynn Pryor APRN        aspirin EC tablet 81 mg  81 mg Oral Daily Carolynn Pryor APRN   81 mg at 05/28/23 0831    HYDROcodone-acetaminophen (NORCO) 5-325 MG per tablet 1 tablet  1 tablet Oral Q4H PRN Benny Kim MD   1 tablet at 05/29/23 0401    ipratropium-albuterol (DUO-NEB) nebulizer solution 3 mL  3 mL Nebulization 4x Daily - RT Osvaldo Pryor MD   3 mL at 05/28/23 1944    levothyroxine (SYNTHROID, LEVOTHROID) tablet 200 mcg  200 mcg Oral Q AM Carolynn Pryor APRN   200 mcg at 05/29/23 0547    lidocaine (XYLOCAINE) 1 % injection 10 mL  10 mL Infiltration Once Benny Kim MD        lisinopril (PRINIVIL,ZESTRIL) tablet 10 mg  10 mg Oral Daily Carolynn Pryor APRN   10 mg at 05/28/23 0831    ondansetron (ZOFRAN) tablet 4 mg  4 mg Oral Q6H PRN Carolynn Pryor APRN        Or    ondansetron (ZOFRAN) injection 4 mg  4 mg Intravenous Q6H PRN Carolynn Pryor APRN        pantoprazole (PROTONIX) EC tablet 40 mg  40 mg Oral Daily Carolynn Pryor APRN   40 mg at 05/28/23 0831    sodium chloride 0.9 % flush 10 mL  10 mL Intravenous Q12H Carolynn Pryor APRN   10 mL at 05/28/23 0831    sodium chloride 0.9 % flush 10 mL  10 mL Intravenous PRN Carolynn Pryor APRN        sodium chloride 0.9 % infusion 40 mL  40 mL Intravenous PRN Carolynn Pryor APRCONRADO         Orders (last 24 hrs)        Start     Ordered    05/28/23  0900  potassium chloride (MICRO-K) CR capsule 40 mEq  2 Times Daily,   Status:  Discontinued         05/28/23 0718    05/28/23 0719  Magnesium  Once         05/28/23 0718    05/24/23 0000  CBC (No Diff)  Every Other Day       05/23/23 1132    05/24/23 0000  Basic Metabolic Panel  Every Other Day       05/23/23 1132    05/11/23 1645  HYDROcodone-acetaminophen (NORCO) 5-325 MG per tablet 1 tablet  Every 4 Hours PRN         05/11/23 1635    05/10/23 0845  lidocaine (XYLOCAINE) 1 % injection 10 mL  Once         05/10/23 0758    05/10/23 0600  levothyroxine (SYNTHROID, LEVOTHROID) tablet 200 mcg  Every Early Morning         05/09/23 1631    05/09/23 2100  sodium chloride 0.9 % flush 10 mL  Every 12 Hours Scheduled         05/09/23 1542    05/09/23 2030  ipratropium-albuterol (DUO-NEB) nebulizer solution 3 mL  4 Times Daily - RT         05/09/23 1626    05/09/23 1800  Incentive Spirometry  Every 4 Hours While Awake       05/09/23 1542    05/09/23 1800  aspirin EC tablet 81 mg  Daily         05/09/23 1631    05/09/23 1800  lisinopril (PRINIVIL,ZESTRIL) tablet 10 mg  Daily         05/09/23 1631    05/09/23 1800  pantoprazole (PROTONIX) EC tablet 40 mg  Daily         05/09/23 1631    05/09/23 1600  Vital Signs  Every 4 Hours       05/09/23 1542    05/09/23 1600  Oral Care  Every 4 Hours       05/09/23 1542    05/09/23 1547  XR Chest 1 View  Daily       05/09/23 1546    05/09/23 1542  ondansetron (ZOFRAN) tablet 4 mg  Every 6 Hours PRN        See Hyperspace for full Linked Orders Report.    05/09/23 1542    05/09/23 1542  ondansetron (ZOFRAN) injection 4 mg  Every 6 Hours PRN        See Hyperspace for full Linked Orders Report.    05/09/23 1542    05/09/23 1542  acetaminophen (TYLENOL) tablet 650 mg  Every 4 Hours PRN        See Hyperspace for full Linked Orders Report.    05/09/23 1542    05/09/23 1542  acetaminophen (TYLENOL) 160 MG/5ML solution 650 mg  Every 4 Hours PRN        See Hyperspace for full Linked Orders Report.  "   05/09/23 1542    05/09/23 1542  acetaminophen (TYLENOL) suppository 650 mg  Every 4 Hours PRN        See Hyperspace for full Linked Orders Report.    05/09/23 1542    05/09/23 1538  Intake & Output  Every Shift       05/09/23 1542    05/09/23 1537  sodium chloride 0.9 % flush 10 mL  As Needed         05/09/23 1542    05/09/23 1537  sodium chloride 0.9 % infusion 40 mL  As Needed         05/09/23 1542    Unscheduled  Up With Assistance  As Needed       05/09/23 1542    --  ibuprofen (ADVIL,MOTRIN) 800 MG tablet  3 Times Daily PRN         05/10/23 0959                  Operative/Procedure Notes (last 72 hours)  Notes from 05/26/23 0559 through 05/29/23 0559   No notes of this type exist for this encounter.          Physician Progress Notes (last 24 hours)        Jad, Femi CAMP MD at 05/28/23 1310              [facility] Cardiothoracic Surgery  PROGRESS NOTE   CC: Right empyema    Subjective:  Doing well today, pain is well controlled, headache is better he has been hemodynamically stable    ROS: No fevers or chills hemodynamically stable    Objective:      /58 (BP Location: Left arm, Patient Position: Sitting)   Pulse 80   Temp 98.8 °F (37.1 °C) (Oral)   Resp 16   Ht 188 cm (74\")   Wt 94.3 kg (207 lb 14.4 oz)   SpO2 96%   BMI 26.69 kg/m²       Intake/Output Summary (Last 24 hours) at 5/28/2023 1310  Last data filed at 5/28/2023 0800  Gross per 24 hour   Intake 240 ml   Output 20 ml   Net 220 ml       CT Output: 20 cc airleak improving with just 1 small bubble with forced cough    PE:  Vitals:    05/28/23 1108   BP: 115/58   Pulse: 80   Resp: 16   Temp: 98.8 °F (37.1 °C)   SpO2: 96%     GENERAL: NAD, resting comfortably, normal color  CARDIOVASCULAR: regular, regular rate, sinus  PULMONARY: Normal bilateral breath sounds, no labored breathing, right chest tube in place with minimal output and very small air leak with forced expiration  ABDOMEN: soft, " nontender/nondistended  EXTREMITIES: mild peripheral edema, normal pulses, normal ROM        Lab Results (last 72 hours)       Procedure Component Value Units Date/Time    Magnesium [[id number]]  (Normal) Collected: 05/27/23 2326    Specimen: Blood Updated: 05/28/23 0745     Magnesium 2.0 mg/dL     Basic Metabolic Panel [[id number]]  (Abnormal) Collected: 05/27/23 2326    Specimen: Blood Updated: 05/28/23 0002     Glucose 112 mg/dL      BUN 9 mg/dL      Creatinine 0.81 mg/dL      Sodium 140 mmol/L      Potassium 3.3 mmol/L      Comment: Slight hemolysis detected by analyzer. Results may be affected.        Chloride 102 mmol/L      CO2 27.0 mmol/L      Calcium 9.4 mg/dL      BUN/Creatinine Ratio 11.1     Anion Gap 11.0 mmol/L      eGFR 97.8 mL/min/1.73     Narrative:      GFR Normal >60  Chronic Kidney Disease <60  Kidney Failure <15      CBC (No Diff) [[id number]]  (Abnormal) Collected: 05/27/23 2326    Specimen: Blood Updated: 05/27/23 2337     WBC 7.19 10*3/mm3      RBC 4.15 10*6/mm3      Hemoglobin 10.0 g/dL      Hematocrit 34.3 %      MCV 82.7 fL      MCH 24.1 pg      MCHC 29.2 g/dL      RDW 17.9 %      RDW-SD 52.4 fl      MPV 8.8 fL      Platelets 423 10*3/mm3     Basic Metabolic Panel [[id number]]  (Abnormal) Collected: 05/26/23 0004    Specimen: Blood Updated: 05/26/23 0030     Glucose 103 mg/dL      BUN 11 mg/dL      Creatinine 0.75 mg/dL      Sodium 142 mmol/L      Potassium 4.0 mmol/L      Chloride 104 mmol/L      CO2 27.0 mmol/L      Calcium 9.9 mg/dL      BUN/Creatinine Ratio 14.7     Anion Gap 11.0 mmol/L      eGFR 100.1 mL/min/1.73     Narrative:      GFR Normal >60  Chronic Kidney Disease <60  Kidney Failure <15      CBC (No Diff) [[id number]]  (Abnormal) Collected: 05/26/23 0004    Specimen: Blood Updated: 05/26/23 0011     WBC 6.27 10*3/mm3      RBC 4.24 10*6/mm3      Hemoglobin 10.1 g/dL      Hematocrit 35.3 %      MCV 83.3 fL      MCH 23.8 pg      MCHC 28.6 g/dL      RDW 17.6 %      RDW-SD 52.4 fl       MPV 8.5 fL      Platelets 517 10*3/mm3                 CXR: Stable without significant change    Assessment & Plan     63-year-old male who presented with right empyema has undergone intrapleural lytics but now has developed air leak. His airleak is improving and is currently 1 out of 5 with chest tube in place and airleak is improved from yesterday. His x-ray is stable and much improved from original presentation.     Waterseal today, plan on transition to Heimlich valve tomorrow and possibly home Tuesday    Femi Street MD  Cardiothoracic Surgeon      Electronically signed by Femi Street MD at 05/28/23 1311       Cesar Harris MD at 05/28/23 1009              [facility] Medicine Services  INPATIENT PROGRESS NOTE    Patient Name: Castillo Trujillo  Date of Admission: 5/9/2023  Today's Date: 05/28/23  Length of Stay: 18  Primary Care Physician: Juan Vaughn DO    Subjective   Chief Complaint: Airleak.  Empyema. Lung mass. Pseudotumor       HPI   Patient is on room air.  Blood pressure stable, afebrile.  Headache is improving per patient.  Patient received p.o. potassium this morning, lab in a.m. discussed with family.    Review of Systems   Constitutional:  Positive for activity change, appetite change and fatigue. Negative for chills and fever.   HENT:  Negative for hearing loss, nosebleeds, tinnitus and trouble swallowing.    Eyes:  Negative for visual disturbance.   Respiratory:  Negative for cough, chest tightness, shortness of breath and wheezing.    Cardiovascular:  Negative for chest pain, palpitations and leg swelling.   Gastrointestinal:  Negative for abdominal distention, abdominal pain, blood in stool, constipation, diarrhea, nausea and vomiting.   Endocrine: Negative for cold intolerance, heat intolerance, polydipsia, polyphagia and polyuria.   Genitourinary:  Negative for decreased urine volume, difficulty urinating, dysuria, flank pain, frequency and  hematuria.   Musculoskeletal:  Negative for arthralgias, joint swelling and myalgias.   Skin:  Negative for rash.   Allergic/Immunologic: Negative for immunocompromised state.   Neurological:  Positive for weakness. Negative for dizziness, syncope, light-headedness and headaches.   Hematological:  Negative for adenopathy. Does not bruise/bleed easily.   Psychiatric/Behavioral:  Negative for confusion and sleep disturbance. The patient is not nervous/anxious.   All pertinent negatives and positives are as above. All other systems have been reviewed and are negative unless otherwise stated.     Objective    Temp:  [97.5 °F (36.4 °C)-98.5 °F (36.9 °C)] 97.9 °F (36.6 °C)  Heart Rate:  [72-85] 76  Resp:  [16] 16  BP: (120-141)/(54-62) 120/56  Physical Exam  Vitals and nursing note reviewed.   HENT:      Head: Normocephalic.   Eyes:      Conjunctiva/sclera: Conjunctivae normal.      Pupils: Pupils are equal, round, and reactive to light.   Neck:      Vascular: No JVD.   Cardiovascular:      Rate and Rhythm: Normal rate and regular rhythm.      Heart sounds: Normal heart sounds.   Pulmonary:      Effort: No respiratory distress.      Breath sounds: No wheezing or rales.      Comments: Diminished breath sound bilateral, clear, on room air.  Pleurx catheter right chest.  Chest:      Chest wall: No tenderness.   Abdominal:      General: Bowel sounds are normal. There is no distension.      Palpations: Abdomen is soft.      Tenderness: There is no abdominal tenderness.   Musculoskeletal:         General: No tenderness or deformity. Normal range of motion.      Cervical back: Neck supple.   Skin:     General: Skin is warm and dry.      Capillary Refill: Capillary refill takes 2 to 3 seconds.      Findings: No rash.   Neurological:      Mental Status: He is alert and oriented to person, place, and time.      Cranial Nerves: No cranial nerve deficit.      Motor: Weakness present. No abnormal muscle tone.      Deep Tendon  Reflexes: Reflexes normal.   Psychiatric:         Mood and Affect: Mood normal.         Behavior: Behavior normal.       Results Review:  I have reviewed the labs, radiology results, and diagnostic studies.    Laboratory Data:   Results from last 7 days   Lab Units 05/27/23 2326 05/26/23 0004 05/24/23  0036   WBC 10*3/mm3 7.19 6.27 7.22   HEMOGLOBIN g/dL 10.0* 10.1* 9.3*   HEMATOCRIT % 34.3* 35.3* 32.0*   PLATELETS 10*3/mm3 423 517* 573*        Results from last 7 days   Lab Units 05/27/23 2326 05/26/23  0004 05/24/23 0036 05/23/23  0547   SODIUM mmol/L 140 142 138 136   POTASSIUM mmol/L 3.3* 4.0 3.9 4.1   CHLORIDE mmol/L 102 104 101 101   CO2 mmol/L 27.0 27.0 26.0 26.0   BUN mg/dL 9 11 9 8   CREATININE mg/dL 0.81 0.75* 0.87 0.71*   CALCIUM mg/dL 9.4 9.9 9.3 9.5   BILIRUBIN mg/dL  --   --   --  0.2   ALK PHOS U/L  --   --   --  204*   ALT (SGPT) U/L  --   --   --  26   AST (SGOT) U/L  --   --   --  19   GLUCOSE mg/dL 112* 103* 103* 91       Culture Data:   No results found for: BLOODCX, URINECX, WOUNDCX, MRSACX, RESPCX, STOOLCX    Radiology Data:   Imaging Results (Last 24 Hours)       Procedure Component Value Units Date/Time    XR Chest 1 View [[id number]] Collected: 05/28/23 0425     Updated: 05/28/23 0429    Narrative:      EXAM/TECHNIQUE: XR CHEST 1 VW-     INDICATION: chest tube; J86.9-Pyothorax without fistula     COMPARISON: Prior day     FINDINGS:     Right-sided pleural drain is stable in position. No change in small  residual right-sided pleural fluid. No change in RIGHT perihilar and  lower lobe consolidation. LEFT lung and pleural space are clear. No  pneumothorax. Cardiac silhouette is normal size.       Impression:         No change in appearance of the chest.  This report was finalized on 05/28/2023 04:26 by Dr. Yogi Lopez MD.            I have reviewed the patient's current medications.     Assessment/Plan   Assessment  Active Hospital Problems    Diagnosis     **Empyema, right     Empyema  lung     Recurrent right pleural effusion     Stage 2 moderate COPD by GOLD classification     Right lung mass     Tobacco abuse, in remission     GERD (gastroesophageal reflux disease)     Primary hypertension     Acquired hypothyroidism        Treatment Plan  Airleak/right empyema/COPD.  Improving.  Status post 2 rounds of tPA.  Stop third round of tPA after 2 days 5/24/2023 due to air leak.  Per CT surgeon.  Zosyn antibiotics.  DuoNebs.  Incentive spirometer.  White blood cells-normal.  Pleurx catheter right lung.  Chest x-ray-Infiltrate and pleural effusion on the right are unchanged, Right chest tube remains in place.  Patient is on room air.  Chest tube to suction/atrium     Right lung mass/pseudotumor.  CT surgeon consult.  Oncology consult.  Positive for inflammation pseudotumor.  Not a surgical candidate for resection.  Undergoing chemo treatment and radiation and steroid.  Consult  for referral outpatient oncology for pseudotumor in [facility]  Dr. Vaughn's office has sent pt's information to [facility] to arrange the outpatient appointment. It will be reviewed and then they will decide if they will accept the pt and then the appointment will be made. This may take a few days so the office will contact pt because he may be d/c'ed home by the time they make their decision. Dr. Vaughn says his office will follow up and make sure everything gets arranged.     Hypokalemia . P.o. potassium yesterday.  Lab in AM.     Anemia.  Hemoglobin stable.  No sign of acute bleed.     Thrombocytosis.  Improving.  Will follow.     Reflux.  Protonix.  Zofran as needed.     Hypertension.  Lisinopril.  Aspirin.     Hypothyroidism . Synthroid.     Pain . Norco as needed.     Nutrition . Regular/house diet.     Medical Decision Making  Number and Complexity of problems.  Right empyema/COPD/right lung mass/pseudotumor/reflux/hypertension/hypothyroidism  Differential Diagnosis: None     Conditions and Status     Ongoing  evaluation and treatment.     [facility] Data  External documents reviewed: None.  Cardiac tracing (EKG, telemetry) interpretation: Sinus .  Radiology interpretation: None.  Labs reviewed: Laboratory .  Any tests that were considered but not ordered: Lab in AM.     Decision rules/scores evaluated (example [id number], Wells, etc): None.     Discussed with: Patient .     Care Planning  Shared decision making: Patient .  Code status and discussions: Full code.     Disposition  Social Determinants of Health that impact treatment or disposition: Unknown.  Pending CT surgeon.    Electronically signed by Cesar Harris MD, 05/28/23, 10:09 CDT.    Electronically signed by Cesar Harris MD at 05/28/23 1014       Consult Notes (last 24 hours)  Notes from 05/28/23 0559 through 05/29/23 0559   No notes of this type exist for this encounter.

## 2023-05-30 ENCOUNTER — READMISSION MANAGEMENT (OUTPATIENT)
Dept: CALL CENTER | Facility: HOSPITAL | Age: 63
End: 2023-05-30

## 2023-05-30 ENCOUNTER — TELEPHONE (OUTPATIENT)
Dept: INTERNAL MEDICINE | Facility: CLINIC | Age: 63
End: 2023-05-30

## 2023-05-30 ENCOUNTER — APPOINTMENT (OUTPATIENT)
Dept: GENERAL RADIOLOGY | Facility: HOSPITAL | Age: 63
DRG: 178 | End: 2023-05-30
Payer: COMMERCIAL

## 2023-05-30 VITALS
TEMPERATURE: 97.6 F | HEIGHT: 74 IN | WEIGHT: 209.3 LBS | RESPIRATION RATE: 18 BRPM | HEART RATE: 76 BPM | SYSTOLIC BLOOD PRESSURE: 141 MMHG | BODY MASS INDEX: 26.86 KG/M2 | OXYGEN SATURATION: 100 % | DIASTOLIC BLOOD PRESSURE: 64 MMHG

## 2023-05-30 LAB
ANION GAP SERPL CALCULATED.3IONS-SCNC: 12 MMOL/L (ref 5–15)
BUN SERPL-MCNC: 12 MG/DL (ref 8–23)
BUN/CREAT SERPL: 15.8 (ref 7–25)
CALCIUM SPEC-SCNC: 9.8 MG/DL (ref 8.6–10.5)
CHLORIDE SERPL-SCNC: 104 MMOL/L (ref 98–107)
CO2 SERPL-SCNC: 27 MMOL/L (ref 22–29)
CREAT SERPL-MCNC: 0.76 MG/DL (ref 0.76–1.27)
DEPRECATED RDW RBC AUTO: 55.3 FL (ref 37–54)
EGFRCR SERPLBLD CKD-EPI 2021: 99.7 ML/MIN/1.73
ERYTHROCYTE [DISTWIDTH] IN BLOOD BY AUTOMATED COUNT: 18.5 % (ref 12.3–15.4)
FUNGUS WND CULT: NORMAL
GLUCOSE SERPL-MCNC: 136 MG/DL (ref 65–99)
HCT VFR BLD AUTO: 35.8 % (ref 37.5–51)
HGB BLD-MCNC: 10.2 G/DL (ref 13–17.7)
MCH RBC QN AUTO: 23.9 PG (ref 26.6–33)
MCHC RBC AUTO-ENTMCNC: 28.5 G/DL (ref 31.5–35.7)
MCV RBC AUTO: 84 FL (ref 79–97)
MYCOBACTERIUM SPEC CULT: NORMAL
NIGHT BLUE STAIN TISS: NORMAL
NIGHT BLUE STAIN TISS: NORMAL
PLATELET # BLD AUTO: 387 10*3/MM3 (ref 140–450)
PMV BLD AUTO: 9.2 FL (ref 6–12)
POTASSIUM SERPL-SCNC: 3.7 MMOL/L (ref 3.5–5.2)
RBC # BLD AUTO: 4.26 10*6/MM3 (ref 4.14–5.8)
SODIUM SERPL-SCNC: 143 MMOL/L (ref 136–145)
WBC NRBC COR # BLD: 6.1 10*3/MM3 (ref 3.4–10.8)

## 2023-05-30 PROCEDURE — 94799 UNLISTED PULMONARY SVC/PX: CPT

## 2023-05-30 PROCEDURE — 71045 X-RAY EXAM CHEST 1 VIEW: CPT

## 2023-05-30 RX ORDER — SODIUM CHLORIDE 0.9 % (FLUSH) 0.9 %
10 SYRINGE (ML) INJECTION EVERY 8 HOURS SCHEDULED
Qty: 450 ML | Refills: 0 | Status: SHIPPED | OUTPATIENT
Start: 2023-05-30 | End: 2023-06-14

## 2023-05-30 RX ADMIN — ASPIRIN 81 MG: 81 TABLET, COATED ORAL at 08:08

## 2023-05-30 RX ADMIN — PANTOPRAZOLE SODIUM 40 MG: 40 TABLET, DELAYED RELEASE ORAL at 08:08

## 2023-05-30 RX ADMIN — LISINOPRIL 10 MG: 10 TABLET ORAL at 08:08

## 2023-05-30 RX ADMIN — Medication 10 ML: at 08:09

## 2023-05-30 RX ADMIN — HYDROCODONE BITARTRATE AND ACETAMINOPHEN 1 TABLET: 5; 325 TABLET ORAL at 08:08

## 2023-05-30 RX ADMIN — LEVOTHYROXINE SODIUM 200 MCG: 100 TABLET ORAL at 05:31

## 2023-05-30 RX ADMIN — IPRATROPIUM BROMIDE AND ALBUTEROL SULFATE 3 ML: 2.5; .5 SOLUTION RESPIRATORY (INHALATION) at 07:27

## 2023-05-30 RX ADMIN — IPRATROPIUM BROMIDE AND ALBUTEROL SULFATE 3 ML: 2.5; .5 SOLUTION RESPIRATORY (INHALATION) at 11:10

## 2023-05-30 RX ADMIN — HYDROCODONE BITARTRATE AND ACETAMINOPHEN 1 TABLET: 5; 325 TABLET ORAL at 03:39

## 2023-05-30 NOTE — DISCHARGE SUMMARY
[facility]  DISCHARGE SUMMARY       Date of Admission: 5/9/2023  Date of Discharge:  5/30/2023  Primary Care Physician: Juan Vaughn DO    Presenting Problem/History of Present Illness:  Empyema    Final Discharge Diagnoses:  Active Hospital Problems    Diagnosis     **Empyema, right     Empyema lung     Recurrent right pleural effusion     Stage 2 moderate COPD by GOLD classification     Right lung mass     Tobacco abuse, in remission     GERD (gastroesophageal reflux disease)     Primary hypertension     Acquired hypothyroidism    1.  Empyema  -IV abx completed  -CTS following     2.  Inflammatory pseudotumor   -CTS following  -oncology consulted     3.  GERD  -Protonix     4.  HTN  -Lisinopril     5.  Hypothyroidism  -Synthroid       Consults:   1.  CT Surgery    Procedures Performed:   N/a    Pertinent Test Results:       Imaging Results (All)       Procedure Component Value Units Date/Time    XR Chest 1 View [[id number]] Collected: 05/30/23 0713     Updated: 05/30/23 0717    Narrative:      Frontal supine radiograph of the chest 5/30/2023 3:50 AM CDT     History: chest tube; J86.9-Pyothorax without fistula     Comparison: 05/29/2023      Findings:      Right-sided pleural drain is in stable position. No pneumothorax.  Residual pleural thickening on the right and likely small residual  subpulmonic effusion. This is stable. Left lung is clear. Heart size is  normal. Pulmonary vasculature are nondilated. Bony elements are  unremarkable.       Impression:      Impression:   1. No significant interval change.        This report was finalized on 05/30/2023 07:14 by Dr Dejon Callaway, .    XR Chest 1 View [[id number]] Collected: 05/29/23 0723     Updated: 05/29/23 0729    Narrative:      Frontal supine radiograph of the chest 5/29/2023 3:25 AM CDT     History: chest tube; J86.9-Pyothorax without fistula     Comparison: 05/28/2023      Findings:       Right-sided pleural drain is in stable position. No pneumothorax.  Residual pleural thickening and perhaps small subpulmonic effusion. This  is stable. Left lung is clear. Heart size is normal. Pulmonary  vasculature are nondilated. Bony elements are unremarkable.       Impression:      Impression:   1. No significant interval change.  2. Right-sided pleural drain and pleural thickening is stable. No  pneumothorax.        This report was finalized on 05/29/2023 07:26 by Dr Dejon Callaway, .    XR Chest 1 View [[id number]] Collected: 05/28/23 0425     Updated: 05/28/23 0429    Narrative:      EXAM/TECHNIQUE: XR CHEST 1 VW-     INDICATION: chest tube; J86.9-Pyothorax without fistula     COMPARISON: Prior day     FINDINGS:     Right-sided pleural drain is stable in position. No change in small  residual right-sided pleural fluid. No change in RIGHT perihilar and  lower lobe consolidation. LEFT lung and pleural space are clear. No  pneumothorax. Cardiac silhouette is normal size.       Impression:         No change in appearance of the chest.  This report was finalized on 05/28/2023 04:26 by Dr. Yogi Lopez MD.    XR Chest 1 View [[id number]] Collected: 05/27/23 0503     Updated: 05/27/23 0507    Narrative:      EXAM/TECHNIQUE: XR CHEST 1 VW-     INDICATION: chest tube; J86.9-Pyothorax without fistula     COMPARISON: Prior     FINDINGS:     Right-sided pleural drain in stable position. No change in trace  residual right-sided pleural effusion. No change in RIGHT perihilar and  lower lobe opacity. LEFT lung and pleural space are clear. No visible  pneumothorax. Stable cardiac silhouette.       Impression:         No change in appearance of the chest.  This report was finalized on 05/27/2023 05:04 by Dr. Yogi Lopez MD.    XR Chest 1 View [[id number]] Collected: 05/26/23 0755     Updated: 05/26/23 0759    Narrative:      EXAM/TECHNIQUE: XR CHEST 1 VW-     INDICATION: chest tube     COMPARISON: 5/25/2023      FINDINGS:     Right-sided chest tube is stable in position. No change in small  residual right-sided pleural effusion. No change in RIGHT mid-lower lung  zone airspace opacity. LEFT lung and pleural space are clear. Cardiac  silhouette is stable. No visible pneumothorax.       Impression:         No change in appearance of the chest.  This report was finalized on 05/26/2023 07:56 by Dr. Yogi Lopez MD.    XR Chest 1 View [[id number]] Collected: 05/25/23 0653     Updated: 05/25/23 0657    Narrative:      EXAMINATION:  XR CHEST 1 VW-  5/25/2023 3:30 AM CDT     HISTORY: Right chest tube. Right pleural effusion and infiltrate.     COMPARISON: 05/24/2023.     TECHNIQUE: Single view AP image.     FINDINGS:  A right chest tube remains in place. There is a small right  pleural effusion. There is patchy infiltrate in the right perihilar  region and right lung base. The left lung is clear. The heart is normal  in size. There are degenerative changes of the spine.          Impression:      Infiltrate and pleural effusion on the right are unchanged.  Right chest tube remains in place.        This report was finalized on 05/25/2023 06:54 by Dr. Dariel Beach MD.    XR Chest 1 View [[id number]] Collected: 05/24/23 0658     Updated: 05/24/23 0702    Narrative:      EXAMINATION:  XR CHEST 1 VW-  5/24/2023 3:40 AM CDT     HISTORY: Right chest tube with pleural effusion.     COMPARISON: 05/23/2023.     TECHNIQUE: Single view AP image.     FINDINGS:  A right chest tube remains in place. There is a small right  pleural effusion. Opacity in the mid and lower lung zone on the right is  unchanged. The left lung is clear. The heart is normal in size.          Impression:      Stable chest x-ray. No significant change.        This report was finalized on 05/24/2023 06:59 by Dr. Dariel Beach MD.    XR Chest 1 View [[id number]] Collected: 05/23/23 0827     Updated: 05/23/23 0831    Narrative:      EXAMINATION:  XR CHEST 1 VW-  5/23/2023  3:15 AM CDT     HISTORY: Right chest tube. Right pleural effusion.     COMPARISON: 05/22/2023.     TECHNIQUE: Single view AP image.     FINDINGS:  A right chest tube remains in place. There is a small pleural  effusion on the right. There is infiltrate in the right perihilar region  and right lung base, stable. The left lung is clear. There is bronchial  wall thickening. Heart size is within normal limits. There are  degenerative changes of the spine.          Impression:      1. Stable small right pleural effusion with right chest tube in place.  2. Stable infiltrate in the right perihilar region and right lung base.        This report was finalized on 05/23/2023 08:28 by Dr. Dariel Beach MD.    XR Chest 1 View [[id number]] Collected: 05/22/23 0731     Updated: 05/22/23 0735    Narrative:      EXAM/TECHNIQUE: XR CHEST 1 VW-     INDICATION: chest tube     COMPARISON: Prior day     FINDINGS:     Right-sided pleural drain is stable in position. No change in trace  residual right-sided pleural effusion. No change in RIGHT perihilar  lower lobe airspace opacity. LEFT lung and pleural space are clear. No  pneumothorax. Stable cardiac silhouette.       Impression:         No change in appearance of the chest.  This report was finalized on 05/22/2023 07:32 by Dr. Yogi Lopez MD.    CT Chest With Contrast Diagnostic [[id number]] Collected: 05/21/23 1208     Updated: 05/21/23 1220    Narrative:      CT CHEST W CONTRAST DIAGNOSTIC- 5/21/2023 10:28 AM CDT     HISTORY: empyema, plural effusion and mass      COMPARISON: CT scan dated 05/15/2023      DOSE LENGTH PRODUCT: 212 mGy cm. Automated exposure control was also  utilized to decrease patient radiation dose.     TECHNIQUE: Serial helical tomographic images of the chest were acquired  following the intravenous infusion of contrast. Multiplanar reformatted  images were provided for review.     FINDINGS:      Visualized neck base: The imaged portion of the base of the neck  appears  unremarkable.      Lungs: Left upper lobe calcified granuloma. The left lung is otherwise  clear. There is a right-sided pleural drain which is in stable position.  Diffuse right lung pleural thickening. Minimal residual effusion along  the posterior costal pleura. This is similar to the recent comparison  chest CT. Right lung is partially atelectatic, appearing stable. Airways  are clear.     Heart: The heart is normal in size. There is no pericardial effusion.      Vasculature: Thoracic aorta is normal in caliber. No dissection  identified. The pulmonary arteries are normal in appearance.     Mediastinum and lymph nodes: Remote granulomatous calcification the  mediastinum.     Skeletal and soft tissues: Chest wall soft tissues are unremarkable. No  acute bony abnormality.       Upper abdomen: The imaged portion of the upper abdomen demonstrates no  acute process. Cholelithiasis.       Impression:      1. History of right lung empyema. There is a right-sided pleural drain  which is in stable position. Diffuse right lung pleural thickening.  There is only a very minimal fluid remaining in the right pleural space.     This report was finalized on 05/21/2023 12:17 by Dr Dejon Callaway, .    XR Chest 1 View [[id number]] Collected: 05/21/23 0850     Updated: 05/21/23 0854    Narrative:      Frontal supine radiograph of the chest 5/21/2023 3:23 AM CDT     History: chest tube     Comparison: 05/20/2023      Findings:      Right-sided pleural drain is in stable position. Residual loculated  right basilar effusion is stable. No pneumothorax. No new consolidation.  The left lung is clear. No mediastinal shift. Heart size is stable.  Pulmonary vasculature are nondilated.       Impression:      Impression:   1. No significant interval change.        This report was finalized on 05/21/2023 08:51 by Dr Dejon Callaway, .    XR Chest 1 View [[id number]] Collected: 05/20/23 1131     Updated: 05/20/23 1135    Narrative:       Frontal supine radiograph of the chest 5/20/2023 3:30 AM CDT     History: chest tube     Comparison: 05/19/2023      Findings:   Lines and tubes are stable in position. No new opacities or  pneumothoraces are visualized in the chest. The cardiomediastinal  silhouette and pulmonary vascularity are unchanged.       No acute osseous or soft tissue abnormality is noted.        Impression:      Impression:   1. No significant interval change since previous exam.        This report was finalized on 05/20/2023 11:32 by Dr. Nathan Hayden MD.    XR Chest 1 View [[id number]] Collected: 05/19/23 0736     Updated: 05/19/23 0739    Narrative:      Frontal supine radiograph of the chest 5/19/2023 3:50 AM CDT     History: chest tube     Comparison: 05/18/2023      Findings:   Lines and tubes are stable in position. No new opacities or  pneumothoraces are visualized in the chest. The cardiomediastinal  silhouette and pulmonary vascularity are unchanged.       No acute osseous or soft tissue abnormality is noted.        Impression:      Impression:   1. No significant interval change since previous exam.        This report was finalized on 05/19/2023 07:36 by Dr. Nathan Hayden MD.    XR Chest 1 View [[id number]] Collected: 05/18/23 0719     Updated: 05/18/23 0723    Narrative:      EXAM/TECHNIQUE: XR CHEST 1 VW-     INDICATION: chest tube     COMPARISON: 05/17/2023     FINDINGS:     Right-sided pleural drain is stable in position. No change in small  residual RIGHT pleural effusion. No change in RIGHT perihilar and lower  lobe consolidation. LEFT lung and pleural space appear clear. Cardiac  silhouette is stable. Calcified granuloma in the LEFT upper lobe. No  visible pneumothorax.       Impression:         No change in small right-sided pleural effusion and RIGHT mid-lower lung  zone consolidation, with right-sided pleural drain in stable position.  This report was finalized on 05/18/2023 07:20 by Dr. Yogi Lopez MD.    XR  Chest 1 View [[id number]] Collected: 05/17/23 0731     Updated: 05/17/23 0734    Narrative:      EXAM/TECHNIQUE: XR CHEST 1 VW-     INDICATION: chest tube     COMPARISON: Prior to     FINDINGS:     Right-sided pleural drain is stable in position. No change in residual  small RIGHT pleural effusion. No change in RIGHT perihilar and lower  lobe consolidation. LEFT lung and pleural space are clear. No visible  pneumothorax. Cardiac silhouette is stable.       Impression:         No change in appearance of the chest.  This report was finalized on 05/17/2023 07:31 by Dr. Yogi Lopez MD.    XR Chest 1 View [[id number]] Collected: 05/16/23 0722     Updated: 05/16/23 0726    Narrative:      EXAM/TECHNIQUE: XR CHEST 1 VW-     INDICATION: chest tube     COMPARISON: Prior day     FINDINGS:     Right-sided pleural drain is stable in position. No change in small  residual right-sided pleural effusion with overlying atelectasis. No  change in RIGHT mid and lower lung zone parenchymal opacity. LEFT lung  and pleural space are clear. No visible pneumothorax. Cardiac silhouette  is normal size and stable.       Impression:         No change in appearance of the chest.  This report was finalized on 05/16/2023 07:23 by Dr. Yogi Lopez MD.    CT Chest With Contrast Diagnostic [[id number]] Collected: 05/15/23 1458     Updated: 05/15/23 1517    Narrative:      EXAMINATION:  CT CHEST W CONTRAST DIAGNOSTIC-  5/15/2023 9:41 AM CDT     HISTORY: Right pleural effusion.     COMPARISON : 03/30/2023.     DLP: 246 mGy-cm. Automated dosage reduction technique was utilized to  reduce patient dosage.     TECHNIQUE: CT was performed of the chest with IV contrast. Sagittal and  coronal images were reconstructed.     MEDIASTINUM, HEART AND VASCULAR STRUCTURES: There is mild atheromatous  disease of the thoracic aorta. There is mild coronary artery  calcification. There is moderate cardiomegaly. A small amount of  pericardial fluid is probably  physiologic. There is a proximal right  hilar lymph node short axis diameter of 1.4 cm. There are several  subcentimeter right paratracheal lymph nodes.     LUNGS: There is a small pleural effusion on the left, new compared to  the prior study. There is simple appearing pleural effusion on the left.  There is a very mild degree of left lower lobe atelectasis. There is a  chest tube on the right side. There is a smaller amount of residual  pleural fluid on the right. There is also a small amount of air in the  pleural space in the right lung base posteriorly around the chest tube.  There is some loculated pleural fluid located laterally and anteriorly  in the lung base. There is thickening of the pleura on the right side.  There is right upper lobe, right middle lobe and right lower lobe volume  loss. In the right upper lobe, there is somewhat linear opacity located  anteriorly with some air bronchograms likely representing atelectatic  lung. There is severe right middle lobe volume loss with consolidation  and air bronchograms likely related to atelectasis. There are dependent  opacities in the right lower lobe likely representing atelectasis.     UPPER ABDOMEN: There is a small hiatal hernia. Images of the upper  abdomen are otherwise unremarkable.     BONES: There are degenerative changes of the spine.          Impression:      1. The overall amount of pleural fluid on the right side is small when  compared to the prior study. However, pleural thickening is new. The  pleural thickening is greatest in the right lung base. A right chest  tube is in place in the right lung base. There is a small amount of air  in the right pleural space located in the region of the chest tube.  Residual empyema and this area is considered. The fluid is also somewhat  loculated in the lung base laterally and anteriorly where the fluid is  lower in density and more simple.  2. There is volume loss in the right lung. There are opacities  in the  right lung likely related to atelectasis. Pneumonia is also considered.  3. There is a new small left pleural effusion that has a simple layering  appearance. There is mild left lower lobe atelectasis.  4. There is a mildly enlarged right hilar lymph node, likely reactive.  There are small right paratracheal lymph nodes.  5. Mild atheromatous disease of the thoracic aorta and coronary arteries  is moderate cardiomegaly.  6. Small hiatal hernia.          This report was finalized on 05/15/2023 15:14 by Dr. Dariel Beach MD.    XR Chest 1 View [[id number]] Collected: 05/15/23 0716     Updated: 05/15/23 0720    Narrative:      EXAMINATION: XR CHEST 1 VW-  5/15/2023 7:16 AM CDT 1 view     HISTORY: chest tube     COMPARISON: 05/14/2023.     FINDINGS:   Pigtail catheter projects over the RIGHT lower chest. Overall, similar  appearance of the RIGHT lung. The LEFT lung is clear. The  cardiomediastinal silhouette and pulmonary vascularity are within normal  limits.      The osseous structures and surrounding soft tissues demonstrate no acute  abnormality.          Impression:         1.  Small chest tube at the RIGHT lung base redemonstrated. Persistent  perihilar and infrahilar opacity on the RIGHT. Persistent pleural fluid  on the RIGHT. Overall, very similar to the prior exam.        This report was finalized on 05/15/2023 07:17 by Dr. Bo Pastor MD.    XR Chest 1 View [[id number]] Collected: 05/14/23 0500     Updated: 05/14/23 0505    Narrative:      EXAMINATION: XR CHEST 1 VW-     5/14/2023 3:50 AM CDT     HISTORY: Pneumonia and empyema. Right-sided chest tube.     One view chest x-ray.     Comparison is made with yesterday at 4:24 AM.     Stable heart and mediastinum.     Partial clearing of retrocardiac left lower lobe  consolidation/atelectasis.  The upper left lung is clear.     Unchanged right-sided infiltrate and small amount of loculated pleural  fluid.  Unchanged right basilar chest tube.  No  pneumothorax.     Relative sparing of the upper right lung.     Summary:  1. Other than partial clearing of left basilar consolidation the chest  x-ray is stable.           This report was finalized on 05/14/2023 05:02 by Dr. Raman Polo MD.    XR Chest 1 View [[id number]] Collected: 05/13/23 0655     Updated: 05/13/23 0701    Narrative:      EXAMINATION: XR CHEST 1 VW-     5/13/2023 4:40 AM CDT     HISTORY: Right empyema. Chest tube placement.     One view chest x-ray.     Comparison is made with yesterday's of 4:12 AM.     Pigtail pleural space catheter still present within the lower right  chest.     Unchanged right basilar pneumonia and fluid.  The right apex is clear.     There is increased consolidation/atelectasis of the retrocardiac left  lower lobe.  The left upper lung is clear.     Heart size is stable.     No pneumothorax or heart failure.     Summary:  1. Pigtail pleural space catheter unchanged in position.  2. Unchanged right lower lobe pneumonia.  3. Increased left basilar consolidation/atelectasis.     This report was finalized on 05/13/2023 06:58 by Dr. Raman Polo MD.    XR Chest 1 View [[id number]] Collected: 05/12/23 0738     Updated: 05/12/23 0742    Narrative:      EXAM/TECHNIQUE: XR CHEST 1 VW-     INDICATION: chest tube     COMPARISON: 05/11/2023     FINDINGS:     RIGHT basilar pleural drain is stable in position. No change in small  residual right-sided pleural effusion. No change in RIGHT perihilar and  lower lobe consolidation. The LEFT lung and pleural space appear clear.  No visible pneumothorax. Cardiac silhouette is stable.       Impression:         Stable exam. No change in residual small RIGHT pleural effusion with  pleural drain in place. No change in RIGHT perihilar and lower lobe  consolidation.  This report was finalized on 05/12/2023 07:39 by Dr. Yogi Lopez MD.    XR Chest 1 View [[id number]] Collected: 05/11/23 0720     Updated: 05/11/23 0724    Narrative:       EXAM/TECHNIQUE: XR CHEST 1 VW-     INDICATION: chest tube     COMPARISON: 05/10/2023     FINDINGS:     Right-sided pleural drain is stable in position. No change in small  residual right-sided pleural effusion. No significant change in RIGHT  mid to lower lung zone consolidation. LEFT lung and pleural space appear  clear. No pneumothorax. Stable cardiac silhouette.       Impression:         No change in appearance of the chest.  This report was finalized on 05/11/2023 07:20 by Dr. Yogi Lopez MD.    XR Chest 1 View [[id number]] Collected: 05/10/23 0803     Updated: 05/10/23 0807    Narrative:      EXAM/TECHNIQUE: XR CHEST 1 VW-     INDICATION: chest tube     COMPARISON: 05/09/2023     FINDINGS:     RIGHT posterior pleural drain in stable position. No change in small  residual right-sided pleural effusion. No change in RIGHT lung  consolidation. No change in mild LEFT basilar atelectasis. No new  airspace opacities. No visible pneumothorax. Cardiac silhouette is  stable.       Impression:         No change in appearance of the chest.  This report was finalized on 05/10/2023 08:04 by Dr. Yogi Lopez MD.    XR Chest 1 View [[id number]] Collected: 05/09/23 1924     Updated: 05/09/23 1929    Narrative:      EXAMINATION:  XR CHEST 1 VW-  5/9/2023 7:20 PM CDT     HISTORY: Chest tube placement on the right.     COMPARISON: 05/02/2023.     TECHNIQUE: Single view AP image.     FINDINGS:  There is a new chest tube overlying the right lung base. The  pleural effusion on the right is smaller. There is no measurable  pneumothorax. There are patchy infiltrates in the mid and lower lung  zone on the right. There is minimal infiltrate at the left base. The  inspiration is not as deep on the current study. There are calcified  granulomas bilaterally. Heart size is upper limits of normal.          Impression:      1. New right chest tube. Pleural effusion on the right is smaller. No  evidence of pneumothorax.  2. Patchy  consolidation in the mid and lower lung zone on the right may  represent pneumonia.  3. Minimal infiltrate left lung base likely related to a decreased level  of inspiration and atelectasis.        This report was finalized on 05/09/2023 19:26 by Dr. Dariel Beach MD.          LAB RESULTS:      Lab 05/29/23 2340 05/27/23 2326 05/26/23  0004 05/24/23  0036   WBC 6.10 7.19 6.27 7.22   HEMOGLOBIN 10.2* 10.0* 10.1* 9.3*   HEMATOCRIT 35.8* 34.3* 35.3* 32.0*   PLATELETS 387 423 517* 573*   MCV 84.0 82.7 83.3 82.1         Lab 05/29/23 2340 05/27/23 2326 05/26/23  0004 05/24/23  0036   SODIUM 143 140 142 138   POTASSIUM 3.7 3.3* 4.0 3.9   CHLORIDE 104 102 104 101   CO2 27.0 27.0 27.0 26.0   ANION GAP 12.0 11.0 11.0 11.0   BUN 12 9 11 9   CREATININE 0.76 0.81 0.75* 0.87   EGFR 99.7 97.8 100.1 95.8   GLUCOSE 136* 112* 103* 103*   CALCIUM 9.8 9.4 9.9 9.3   MAGNESIUM  --  2.0  --   --                          Brief Urine Lab Results       None          Microbiology Results (last 10 days)       ** No results found for the last 240 hours. **            Hospital Course:   Mr. Trujillo presented for an outpatient thoracentesis on 5/9/23.  Drainage was purulent.  He was felt to have an empyema.  A chest tube was placed by Radiology.  He was admitted to the hospital.    He was started empiric IV abx.  CT surgery was consulted.  Cultures were obtained.  These have remained negative.  He has been continued on empiric IV abx.      He was started on intrapleural tPA/DNAse.  He has completed 3 rounds of this.  He has also completed antibiotics.  He has been afebrile throughout the hospitalization.  He has not had SOA.  He has not required oxygen.  He has not required surgical treatment.    He has been transitioned to a heimlich valve.  CT Surgery has cleared him for discharge home.  He will follow up with them in the office in 1 week.      He had recently undergone lung biopsy.  This came back as Inflammatory Pseudotumor.  Dr. Kim  "discussed the Pathology results with Oncology.  They are coordinating with his PCP to arrange follow up at [place].  Mr. Trujillo understands the need to follow up with them.    Physical Exam on Discharge:  /64 (BP Location: Left arm, Patient Position: Sitting)   Pulse 76   Temp 97.6 °F (36.4 °C) (Oral)   Resp 18   Ht 188 cm (74\")   Wt 94.9 kg (209 lb 4.8 oz)   SpO2 100%   BMI 26.87 kg/m²   Physical Exam  Vitals reviewed.   Constitutional:       Appearance: He is well-developed.   HENT:      Head: Normocephalic and atraumatic.      Right Ear: External ear normal.      Left Ear: External ear normal.      Nose: Nose normal.   Eyes:      General: No scleral icterus.        Right eye: No discharge.         Left eye: No discharge.      Conjunctiva/sclera: Conjunctivae normal.      Pupils: Pupils are equal, round, and reactive to light.   Neck:      Thyroid: No thyromegaly.      Trachea: No tracheal deviation.   Cardiovascular:      Rate and Rhythm: Normal rate and regular rhythm.      Heart sounds: Normal heart sounds. No murmur heard.    No friction rub. No gallop.   Pulmonary:      Effort: Pulmonary effort is normal. No respiratory distress.      Breath sounds: Normal breath sounds. No stridor. No wheezing or rales.   Chest:      Chest wall: No tenderness.   Abdominal:      General: Bowel sounds are normal. There is no distension.      Palpations: Abdomen is soft. There is no mass.      Tenderness: There is no abdominal tenderness. There is no guarding or rebound.      Hernia: No hernia is present.   Musculoskeletal:         General: No deformity. Normal range of motion.      Cervical back: Normal range of motion and neck supple.   Lymphadenopathy:      Cervical: No cervical adenopathy.   Skin:     General: Skin is warm and dry.      Coloration: Skin is not pale.      Findings: No erythema or rash.   Neurological:      Mental Status: He is alert and oriented to person, place, and time.      Cranial " Nerves: No cranial nerve deficit.      Motor: No abnormal muscle tone.      Coordination: Coordination normal.      Deep Tendon Reflexes: Reflexes are normal and symmetric. Reflexes normal.   Psychiatric:         Behavior: Behavior normal.         Thought Content: Thought content normal.         Judgment: Judgment normal.         Condition on Discharge: stable    Discharge Disposition:  Home or Self Care    Discharge Medications:     Discharge Medications        New Medications        Instructions Start Date   sodium chloride 0.9 % flush 0.9 % solution   10 mL, Intravenous, Every 8 Hours Scheduled, Flush chest tube with 10 mL normal saline flush three times per day to maintain chest tube patency. This is not IV solution.             Continue These Medications        Instructions Start Date   albuterol sulfate  (90 Base) MCG/ACT inhaler  Commonly known as: PROVENTIL HFA;VENTOLIN HFA;PROAIR HFA   2 puffs, Inhalation, Every 4 Hours PRN      aspirin 81 MG EC tablet   81 mg, Oral, Daily      ibuprofen 800 MG tablet  Commonly known as: ADVIL,MOTRIN   800 mg, Oral, 3 Times Daily PRN      levothyroxine 200 MCG tablet  Commonly known as: SYNTHROID, LEVOTHROID   200 mcg, Oral, Daily      lisinopril 10 MG tablet  Commonly known as: PRINIVIL,ZESTRIL   10 mg, Oral, Daily      pantoprazole 40 MG EC tablet  Commonly known as: PROTONIX   40 mg, Oral, Daily      Stiolto Respimat 2.5-2.5 MCG/ACT aerosol solution inhaler  Generic drug: tiotropium bromide-olodaterol   2 puffs, Inhalation, Daily - RT      vitamin D 1.25 MG (20682 UT) capsule capsule  Commonly known as: ERGOCALCIFEROL   50,000 Units, Oral, Weekly, MONDAY                 Discharge Diet: regular    Activity at Discharge: as tolerated    Follow-up Appointments:   Future Appointments   Date Time Provider Department Center   6/5/2023 11:30 AM Lotus Nielsen APRN [facility] P[facility] PAD   6/5/2023  2:00 PM Barbara May APRN [facility]  PAD PAD   8/8/2023  9:00 AM  Juan Vaughn, DO [facility]       Test Results Pending at Discharge: n/a    Electronically signed by Barry Rich MD, 05/30/23, 15:14 CDT.    Time: 38 minutes.

## 2023-05-30 NOTE — OUTREACH NOTE
Prep Survey    Flowsheet Row Responses   [facility] patient discharged from? [facility]   Is LACE score < 7 ? No   Eligibility [facility]   Date of Admission 05/09/23   Date of Discharge 05/30/23   Discharge Disposition Home or Self Care   Discharge diagnosis Empyema lung   Does the patient have one of the following disease processes/diagnoses(primary or secondary)? Other   Does the patient have Home health ordered? No   Is there a DME ordered? No   Prep survey completed? Yes          Barbara DOWNS - Registered Nurse

## 2023-05-30 NOTE — PROGRESS NOTES
"    [facility] Cardiothoracic Surgery  PROGRESS NOTE   CC: Right Empyema     Subjective:  Doing well today, no airleak, stable    ROS: No fevers or chills, hemodynamically stable    Objective:      /58 (BP Location: Left arm, Patient Position: Sitting)   Pulse 82   Temp 98 °F (36.7 °C) (Oral)   Resp 18   Ht 188 cm (74\")   Wt 94.9 kg (209 lb 4.8 oz)   SpO2 99%   BMI 26.87 kg/m²       Intake/Output Summary (Last 24 hours) at 5/29/2023 2131  Last data filed at 5/29/2023 0838  Gross per 24 hour   Intake --   Output 0 ml   Net 0 ml       CT Output: minimal, no airleak on waterseal today    PE:  Vitals:    05/29/23 2108   BP:    Pulse: 82   Resp: 18   Temp:    SpO2: 99%     GENERAL: NAD, resting comfortably, normal color  CARDIOVASCULAR: regular, regular rate, sinus  PULMONARY: Normal bilateral breath sounds, no labored breathing, right chest tube in place with minimal output and no air leak with forced expiration   ABDOMEN: soft, nontender/nondistended  EXTREMITIES: mild peripheral edema, normal pulses, normal ROM        Lab Results (last 72 hours)       Procedure Component Value Units Date/Time    Magnesium [[id number]]  (Normal) Collected: 05/27/23 2326    Specimen: Blood Updated: 05/28/23 0745     Magnesium 2.0 mg/dL     Basic Metabolic Panel [[id number]]  (Abnormal) Collected: 05/27/23 2326    Specimen: Blood Updated: 05/28/23 0002     Glucose 112 mg/dL      BUN 9 mg/dL      Creatinine 0.81 mg/dL      Sodium 140 mmol/L      Potassium 3.3 mmol/L      Comment: Slight hemolysis detected by analyzer. Results may be affected.        Chloride 102 mmol/L      CO2 27.0 mmol/L      Calcium 9.4 mg/dL      BUN/Creatinine Ratio 11.1     Anion Gap 11.0 mmol/L      eGFR 97.8 mL/min/1.73     Narrative:      GFR Normal >60  Chronic Kidney Disease <60  Kidney Failure <15      CBC (No Diff) [[id number]]  (Abnormal) Collected: 05/27/23 2326    Specimen: Blood Updated: 05/27/23 2337     WBC 7.19 10*3/mm3      " RBC 4.15 10*6/mm3      Hemoglobin 10.0 g/dL      Hematocrit 34.3 %      MCV 82.7 fL      MCH 24.1 pg      MCHC 29.2 g/dL      RDW 17.9 %      RDW-SD 52.4 fl      MPV 8.8 fL      Platelets 423 10*3/mm3                 CXR: stable, no significant change    Assessment & Plan     63-year-old male who presented with right empyema has undergone intrapleural lytics but now has developed air leak. His airleak is improving and is currently no airleak and is improved from yesterday. His x-ray is stable and much improved from original presentation.     Heimlich valve today, repeat imaging tmr and likely home later tomorrow       Femi Street MD  Cardiothoracic Surgeon

## 2023-05-30 NOTE — PROGRESS NOTES
"Patient name: Castillo Trujillo  Patient : 1960  VISIT # [id number]  MR #[id number]    Subjective   CC: Shortness of breath    Resting in bed.  Remains on room air. Right chest tube in place to heimlich valve. Wife present.   Eager for discharge home today.  They have vacation planned next week to [place] for the [employer] festival.       Objective     Visit Vitals  /64 (BP Location: Left arm, Patient Position: Sitting)   Pulse 76   Temp 97.6 °F (36.4 °C) (Oral)   Resp 18   Ht 188 cm (74\")   Wt 94.9 kg (209 lb 4.8 oz)   SpO2 100%   BMI 26.87 kg/m²     No intake or output data in the 24 hours ending 23 1130    Right chest tube output: minimal     LABS:        IMAGES:       Imaging Results (Last 24 Hours)       Procedure Component Value Units Date/Time    XR Chest 1 View [[id number]] Collected: 23     Updated: 23    Narrative:      Frontal supine radiograph of the chest 2023 3:50 AM CDT     History: chest tube; J86.9-Pyothorax without fistula     Comparison: 2023      Findings:      Right-sided pleural drain is in stable position. No pneumothorax.  Residual pleural thickening on the right and likely small residual  subpulmonic effusion. This is stable. Left lung is clear. Heart size is  normal. Pulmonary vasculature are nondilated. Bony elements are  unremarkable.       Impression:      Impression:   1. No significant interval change.        This report was finalized on 2023 07:14 by Dr Dejon Callaway, .          Chest x-ray today shows right pigtail chest tube in place, no pneumothorax, pleural thickening       Final Diagnosis   Lung, right upper lobe, fine-needle core biopsies and touch preparations:  A.  Benign lung parenchyma with features of inflammatory pseudotumor.  B.  No acid-fast bacilli or fungal organisms identified utilizing Kinyoun and GMS stains, respectively.   Electronically signed by Tamia Ernandez MD on 2023 at 1314   Clinical " Information    Right upper lobe lung mass (3.8 x 6.3 x 15 cm)   Comment    The fine-needle core biopsies essentially demonstrate the same histologic changes as those seen in the prior fine-needle core biopsies of this mass ([id number]).  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  Paraffin-embedded tissue will be submitted to [facility] to determine if there is still no ALK1 staining to mitigate against the inflammatory myofibroblastic variant of inflammatory pseudotumor.  These results will be reported as an addendum upon receipt from [facility].     [facility] report available under media       Physical Exam:  General: Alert, oriented. No apparent distress.  Sitting up in the bed  Cardiovascular: Regular rate and rhythm without murmur, rubs, or gallops.    Pulmonary: Clear breath sounds bilaterally.  On room air.  No wheezing or rhonchi.    Chest: Right side chest tube to Heimlich valve.  Abdomen: Soft, non distended, and non tender.  Extremities: Warm, moves all extremities.  Neurologic:  Grossly intact with no focal deficits.         Impression:  Empyema, right  Right lung mass, inflammatory pseudotumor  Recurrent right pleural effusion  Tobacco abuse, in remission  Stage 2 moderate COPD by GOLD classification  Unintentional weight loss        Plan:  Okay to DC home from CT surgery standpoint after patient and wife received education regarding chest tube flushing and maintenance.    He will need to flush the chest tube with 10 mL normal saline 3 times per day at home.  Prescription sent to [facility].  I called and discussed with them.  Unfortunately, patient did not meet criteria for home health services as relayed by social work.  Follow-up next Monday, June 5 at 2 PM with CT scan of the chest.  Encourage pulmonary toilet and  ambulation  Discussed with patient, wife and nursing   [facility] appt being set up by Dr. Vaughn office.     Barbara May, APRN  05/30/23  11:30 CDT

## 2023-05-30 NOTE — TELEPHONE ENCOUNTER
If needing something for pain regarding recent procedure, would recommend reaching out to CT surgery. He may also take tylenol and ibuprofen as needed.

## 2023-05-30 NOTE — PLAN OF CARE
Goal Outcome Evaluation:   Alert and oriented. Rt Heimlich valve Pleural tube patent. Lung sounds clear. Room air. Afebrile, VSS. Medicated x 2 for complaint of rt posterior thoracic pain.   Problem: Adult Inpatient Plan of Care  Goal: Plan of Care Review  Outcome: Ongoing, Progressing  Goal: Patient-Specific Goal (Individualized)  Outcome: Ongoing, Progressing  Goal: Absence of Hospital-Acquired Illness or Injury  Outcome: Ongoing, Progressing  Intervention: Identify and Manage Fall Risk  Recent Flowsheet Documentation  Taken 5/30/2023 0409 by Marsha Borrego RN  Safety Promotion/Fall Prevention:   fall prevention program maintained   safety round/check completed  Taken 5/30/2023 0200 by Marsha Borrego RN  Safety Promotion/Fall Prevention:   fall prevention program maintained   safety round/check completed  Taken 5/30/2023 0000 by Marsha Borrego RN  Safety Promotion/Fall Prevention:   fall prevention program maintained   safety round/check completed  Taken 5/29/2023 2200 by Marsha Borrego RN  Safety Promotion/Fall Prevention:   fall prevention program maintained   safety round/check completed  Taken 5/29/2023 2115 by Marsha Borrego RN  Safety Promotion/Fall Prevention:   fall prevention program maintained   safety round/check completed  Taken 5/29/2023 2000 by Marsha Borrego RN  Safety Promotion/Fall Prevention:   fall prevention program maintained   safety round/check completed  Taken 5/29/2023 1900 by Marsha Borrego RN  Safety Promotion/Fall Prevention:   fall prevention program maintained   safety round/check completed  Intervention: Prevent Skin Injury  Recent Flowsheet Documentation  Taken 5/30/2023 0409 by Marsha Borrego RN  Body Position: position changed independently  Taken 5/30/2023 0200 by Marsha Borrego RN  Body Position: position changed independently  Taken 5/30/2023 0000 by Marsha Borrego RN  Body Position: position changed independently  Taken 5/29/2023 2200 by Marsha Borrego  RN  Body Position: position changed independently  Taken 5/29/2023 2115 by Marsha Borrego RN  Body Position: position changed independently  Taken 5/29/2023 2000 by Marsha Borrego RN  Body Position: position changed independently  Taken 5/29/2023 1900 by Marsha Borrego RN  Body Position: position changed independently  Intervention: Prevent and Manage VTE (Venous Thromboembolism) Risk  Recent Flowsheet Documentation  Taken 5/30/2023 0409 by Marsha Borrego RN  Activity Management: up ad jo  Taken 5/30/2023 0200 by Marsha Borrego RN  Activity Management: up ad jo  Taken 5/30/2023 0000 by Marsha Borrego RN  Activity Management: up ad jo  Taken 5/29/2023 2200 by Marsha Borrego RN  Activity Management: up ad jo  Taken 5/29/2023 2115 by Marsha Borrego RN  Activity Management: up ad jo  Taken 5/29/2023 2000 by Marsha Borrego RN  VTE Prevention/Management:   bilateral   sequential compression devices on  Range of Motion: active ROM (range of motion) encouraged  Taken 5/29/2023 1900 by Marsha Borrego RN  Activity Management: up ad jo  Goal: Optimal Comfort and Wellbeing  Outcome: Ongoing, Progressing  Goal: Readiness for Transition of Care  Outcome: Ongoing, Progressing     Problem: Pain Acute  Goal: Acceptable Pain Control and Functional Ability  Outcome: Ongoing, Progressing

## 2023-05-30 NOTE — CASE MANAGEMENT/SOCIAL WORK
Continued Stay Note   [facility]     Patient Name: Castillo Trujillo  MRN: [id number]  Today's Date: 5/30/2023    Admit Date: 5/9/2023    Plan: Home   Discharge Plan       Row Name 05/30/23 1302       Plan    Plan Home    Patient/Family in Agreement with Plan yes    Final Discharge Disposition Code 01 - home or self-care    Final Note Home health was mentioned but apparently is not homebound. He actually plans to go to a festival next week. Checked with local pharmacies and dme agencies to find out where to get saline flushes and the only place found was [facility].                   Discharge Codes    No documentation.                 Expected Discharge Date and Time       Expected Discharge Date Expected Discharge Time    May 30, 2023               JOHN Marcus

## 2023-05-30 NOTE — PAYOR COMM NOTE
"Castillo Trujillo (63 y.o. Male) [id number]   D/C  today 5/30  [facility] phone    fax           Date of Birth   1960    Social Security Number       Address   [address] [place] [address] [place] [address]    Home Phone   [phone]    MRN   [id number]       Episcopal   Zoroastrianism    Marital Status                               Admission Date   5/9/23    Admission Type   Urgent    Admitting Provider   Barry Rich MD    Attending Provider       Department, Room/Bed   [facility] 3C, 364/1       Discharge Date   5/30/2023    Discharge Disposition   Home or Self Care    Discharge Destination                                 Attending Provider: (none)   Allergies: No Known Allergies    Isolation: None   Infection: None   Code Status: CPR    Ht: 188 cm (74\")   Wt: 94.9 kg (209 lb 4.8 oz)    Admission Cmt: None   Principal Problem: Empyema, right [J86.9]                   Active Insurance as of 5/9/2023       Primary Coverage       Payor Plan Insurance Group Employer/Plan Group    ANTHEM BLUE CROSS ANTH BLUE CROSS BLUE SHIELD PPO [id number]       Payor Plan Address Payor Plan Phone Number Payor Plan Fax Number Effective Dates    [address] [phone]  8/1/2018 - None Entered    [facility] [id number]         Subscriber Name Subscriber Birth Date Member ID       CASTILLO TRUJILLO 1960 [id number]               Secondary Coverage       Payor Plan Insurance Group Employer/Plan Group    MEDICARE MEDICARE A & B        Payor Plan Address Payor Plan Phone Number Payor Plan Fax Number Effective Dates    [address] [phone]  3/1/2023 - None Entered    [facility] [address]         Subscriber Name Subscriber Birth Date Member ID       CASTILLO TRUJILLO 1960 [id number]                     Emergency Contacts        (Rel.) Home Phone Work Phone Mobile Phone    TRUJILLOVALENTIN (Spouse) [phone] -- --              Discharge Summary    No " notes of this type exist for this encounter.       Discharge Order (From admission, onward)       Start     Ordered    05/30/23 0849  Discharge patient  Once        Expected Discharge Date: 05/30/23    Discharge Disposition: Home or Self Care    Physician of Record for Attribution - Please select from Treatment Team: KRISTINA LANGLEY [[id number]]    Review needed by CMO to determine Physician of Record: No       Question Answer Comment   Physician of Record for Attribution - Please select from Treatment Team KRISTINA LANGLEY    Review needed by CMO to determine Physician of Record No        05/30/23 0848

## 2023-05-30 NOTE — NURSING NOTE
Bedside shift change report given to Samreen George (oncoming nurse) by Wilfredo Negron (offgoing nurse). Report included the following information SBAR. Patient discharged home. CT supplies sent with patient. Educated patient/spouse on flushing/CT maintenance  - teach back complete.

## 2023-05-31 ENCOUNTER — TRANSITIONAL CARE MANAGEMENT TELEPHONE ENCOUNTER (OUTPATIENT)
Dept: CALL CENTER | Facility: HOSPITAL | Age: 63
End: 2023-05-31

## 2023-05-31 NOTE — OUTREACH NOTE
Call Center TCM Note    Flowsheet Row Responses   [facility] patient discharged from? [facility]   Does the patient have one of the following disease processes/diagnoses(primary or secondary)? Other   TCM attempt successful? Yes   Call start time 1400   Call end time 1401   Discharge diagnosis Empyema lung   Meds reviewed with patient/caregiver? Yes   Is the patient having any side effects they believe may be caused by any medication additions or changes? No   Does the patient have all medications ordered at discharge? Yes   Is the patient taking all medications as directed (includes completed medication regime)? Yes   Comments Hosp dc fu apt on 6/5/23 ,  6/5/23 CT surgery apt   Does the patient have an appointment with their PCP within 7 days of discharge? Yes   Has home health visited the patient within 72 hours of discharge? N/A   Psychosocial issues? No   Did the patient receive a copy of their discharge instructions? Yes   Nursing interventions Reviewed instructions with patient   What is the patient's perception of their health status since discharge? Improving   Is the patient/caregiver able to teach back signs and symptoms related to disease process for when to call PCP? Yes   Is the patient/caregiver able to teach back signs and symptoms related to disease process for when to call 911? Yes   Is the patient/caregiver able to teach back the hierarchy of who to call/visit for symptoms/problems? PCP, Specialist, Home health nurse, Urgent Care, ED, 911 Yes   If the patient is a current smoker, are they able to teach back resources for cessation? Not a smoker   TCM call completed? Yes   Call end time 1401          Corry Hightower RN    5/31/2023, 14:01 CDT

## 2023-06-05 ENCOUNTER — OFFICE VISIT (OUTPATIENT)
Dept: INTERNAL MEDICINE | Facility: CLINIC | Age: 63
End: 2023-06-05
Payer: MEDICARE

## 2023-06-05 ENCOUNTER — HOSPITAL ENCOUNTER (OUTPATIENT)
Dept: CT IMAGING | Facility: HOSPITAL | Age: 63
Discharge: HOME | End: 2023-06-05
Admitting: NURSE PRACTITIONER
Payer: COMMERCIAL

## 2023-06-05 ENCOUNTER — OFFICE VISIT (OUTPATIENT)
Dept: CARDIAC SURGERY | Facility: CLINIC | Age: 63
End: 2023-06-05
Payer: COMMERCIAL

## 2023-06-05 VITALS
WEIGHT: 206.3 LBS | HEIGHT: 74 IN | DIASTOLIC BLOOD PRESSURE: 76 MMHG | TEMPERATURE: 97.7 F | SYSTOLIC BLOOD PRESSURE: 128 MMHG | HEART RATE: 70 BPM | OXYGEN SATURATION: 98 % | RESPIRATION RATE: 16 BRPM | BODY MASS INDEX: 26.48 KG/M2

## 2023-06-05 VITALS
SYSTOLIC BLOOD PRESSURE: 142 MMHG | HEART RATE: 88 BPM | BODY MASS INDEX: 26.56 KG/M2 | OXYGEN SATURATION: 97 % | HEIGHT: 74 IN | WEIGHT: 207 LBS | DIASTOLIC BLOOD PRESSURE: 74 MMHG

## 2023-06-05 DIAGNOSIS — J86.9 EMPYEMA, RIGHT: Primary | ICD-10-CM

## 2023-06-05 DIAGNOSIS — M19.019 ARTHRITIS OF SHOULDER: ICD-10-CM

## 2023-06-05 DIAGNOSIS — R91.8 MASS OF RIGHT LUNG: ICD-10-CM

## 2023-06-05 DIAGNOSIS — J86.9 EMPYEMA, RIGHT: ICD-10-CM

## 2023-06-05 DIAGNOSIS — J98.4: Primary | ICD-10-CM

## 2023-06-05 PROCEDURE — 3077F SYST BP >= 140 MM HG: CPT | Performed by: NURSE PRACTITIONER

## 2023-06-05 PROCEDURE — 3078F DIAST BP <80 MM HG: CPT | Performed by: NURSE PRACTITIONER

## 2023-06-05 PROCEDURE — 25510000001 IOPAMIDOL 61 % SOLUTION: Performed by: NURSE PRACTITIONER

## 2023-06-05 PROCEDURE — 71260 CT THORAX DX C+: CPT

## 2023-06-05 PROCEDURE — 99214 OFFICE O/P EST MOD 30 MIN: CPT | Performed by: NURSE PRACTITIONER

## 2023-06-05 RX ORDER — LIDOCAINE 50 MG/G
1 PATCH TOPICAL EVERY 24 HOURS
Qty: 30 EACH | Refills: 1 | Status: SHIPPED | OUTPATIENT
Start: 2023-06-05

## 2023-06-05 RX ORDER — ASPIRIN 81 MG/1
1 TABLET, CHEWABLE ORAL DAILY
COMMUNITY
Start: 2023-05-30 | End: 2023-06-12 | Stop reason: SDUPTHER

## 2023-06-05 RX ADMIN — IOPAMIDOL 100 ML: 612 INJECTION, SOLUTION INTRAVENOUS at 12:39

## 2023-06-05 NOTE — PROGRESS NOTES
Transitional Care Follow Up Visit  Subjective     Castillo Trujillo is a 63 y.o. male who presents for a transitional care management visit.    Within 48 business hours after discharge our office contacted him via telephone to coordinate his care and needs.      I reviewed and discussed the details of that call along with the discharge summary, hospital problems, inpatient lab results, inpatient diagnostic studies, and consultation reports with Castillo.     Current outpatient and discharge medications have been reconciled for the patient.  Reviewed by: TRESSA Pitt          5/30/2023     4:16 PM   Date of TCM Phone Call   [facility]   Date of Admission 5/9/2023   Date of Discharge 5/30/2023   Discharge Disposition Home or Self Care     Risk for Readmission (LACE) Score: 12 (5/30/2023  5:00 AM)      History of Present Illness   Course During Hospital Stay:  Mr. Trujillo received outpatient thoracentesis on 5/9/23. Drainage was purulent, a chest tube was placed and he was admitted to the hospital. IV antibiotics were started and CT surgery was consulted. He received intrapleural tPA/DNAse. He did not require oxygen during stay and is on room air now.    Recent lung biopsy revealed inflammatory pseudotumor. He was referred to [facility] for further recommendations. He is scheduled to see them this week.       The following portions of the patient's history were reviewed and updated as appropriate: allergies, current medications, past family history, past medical history, past social history, past surgical history, and problem list.    Review of Systems   Constitutional:  Negative for fever.   Respiratory: Negative.     Cardiovascular: Negative.    Musculoskeletal:  Positive for arthralgias.     Objective   Physical Exam  Constitutional:       General: He is not in acute distress.  Cardiovascular:      Rate and Rhythm: Normal rate and regular rhythm.      Heart sounds: Normal heart  sounds.   Pulmonary:      Effort: Pulmonary effort is normal.      Breath sounds: No wheezing or rales.      Comments: Decreased to right lung fields   Skin:     Comments: Chest tube in place, dressing clean dry and intact   Psychiatric:         Mood and Affect: Mood normal.         Behavior: Behavior normal.         Thought Content: Thought content normal.         Judgment: Judgment normal.       Assessment & Plan   Diagnoses and all orders for this visit:    1. Inflammatory pseudotumor of lung (Primary)  He has follow up with CT surgery today with imaging. He is scheduled with Oncology at [facility] tomorrow to determine treatment options for Pseudotumor.     2. Arthritis of shoulder  -     lidocaine (LIDODERM) 5 %; Place 1 patch on the skin as directed by provider Daily. Remove & Discard patch within 12 hours or as directed by MD  Dispense: 30 each; Refill: 1  He continues ibuprofen for arthritis of bilateral shoulders. He has taken meloxicam in the past without improvement as well as voltaren. Discussed obtaining x-rays and referring to orthopedics however he declines at this time and would like to focus on his lung treatments for now.

## 2023-06-05 NOTE — PROGRESS NOTES
"Subjective   Chief Complaint   Patient presents with    Post-op Follow-up     Pt had chest tube and is here for 1 week post-op       Patient ID: Castillo Trujillo is a 63 y.o. male who is here for follow-up having had chest tube placement for empyema during recent hospitalization.          History of Present Illness  He was admitted for a pleural effusion concerning for empyema. Chest tube was placed. Pleural culture fluid was positive for Peptostreptococcus anaerobius.  He was treated with IV antibiotics and chest tube drainage.  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  Outpatient referral to oncology at [facility] has been arranged by his PCP.  He was discharged home with a right chest tube in place to Heimlich valve.  He returns today for follow-up with a CT scan of the chest. Joined by his wife. No issues since discharge home. No shortness of breath. No chest pain. No further smoking. Has appt with [facility] oncology tomorrow.        The following portions of the patient's history were reviewed and updated as appropriate: allergies, current medications, past family history, past medical history, past social history, past surgical history and problem list.    Review of Systems   Constitutional:  Negative for chills, diaphoresis and fever.   Respiratory:  Negative for shortness of breath and wheezing.    Cardiovascular:  Negative for chest pain and leg swelling.     Objective   Visit Vitals  /74 (BP Location: Right arm, Patient Position: Sitting, Cuff Size: Adult)   Pulse 88   Ht 188 cm (74\")   Wt 93.9 kg (207 lb)   SpO2 97%   BMI 26.58 kg/m²       Physical Exam  Vitals reviewed.   Constitutional:       General: He is not in acute distress.     Appearance: He is well-developed. He is not diaphoretic.   HENT:      Head: Normocephalic and atraumatic.   Eyes:      Pupils: Pupils are equal, round, and reactive to light.   Cardiovascular:      Rate and " Rhythm: Normal rate and regular rhythm.      Heart sounds: Normal heart sounds. No murmur heard.    No friction rub.   Pulmonary:      Effort: Pulmonary effort is normal. No respiratory distress.      Breath sounds: Normal breath sounds. No wheezing or rales.   Abdominal:      General: There is no distension.      Palpations: Abdomen is soft.      Tenderness: There is no abdominal tenderness. There is no guarding.   Musculoskeletal:      Cervical back: Normal range of motion and neck supple.      Right lower leg: No edema.      Left lower leg: No edema.   Skin:     General: Skin is warm and dry.      Coloration: Skin is not pale.      Findings: No rash.      Comments: Right posterior chest tube in place, to heimlich valve. Good skin purchase with existing suture and secured well into place.    Neurological:      Mental Status: He is alert and oriented to person, place, and time.   Psychiatric:         Behavior: Behavior normal.       CT Chest With Contrast Diagnostic    Result Date: 6/5/2023  Narrative: CT CHEST W CONTRAST DIAGNOSTIC- 6/5/2023 12:35 PM CDT  HISTORY: empyema, chest tube in place; J86.9-Pyothorax without fistula  COMPARISON: 05/21/2023  DOSE LENGTH PRODUCT: 179 mGy cm. Automated exposure control was also utilized to decrease patient radiation dose.  TECHNIQUE: Axial images of the chest are performed following IV contrast  FINDINGS:  Partially calcified mediastinal and bilateral hilar lymph nodes. No increasing intrathoracic or axillary lymphadenopathy. No thoracic aortic aneurysm or dissection. Heart appears normal in size.  A posterior right pigtail pleural catheter remains appropriate in position within the dependent posterior right pleural space. Stable small right hydropneumothorax, unchanged from 05/21/2023. No left pleural or pericardial effusion.  Small hiatal hernia. Stable 2 cm left adrenal nodule. Gallstones.  Similar volume loss right upper and middle lobes with atelectatic changes  suspected along the medial anterior right upper and middle lobes. There is a 2 cm rounded opacity of the anterior inferior right middle lobe which may represent an atelectatic lung or residual tumor. Continued follow-up recommended. Right lower lobe atelectasis no suspicious left-sided pulmonary nodule. Left upper lobe granuloma.  No aggressive regional bony lesions.      Impression: 1. Stable positioning of the right posterior pleural drain with similar small right hydropneumothorax, unchanged from 05/21/2023. 2. Atelectatic changes suspected of the right lung parenchyma with a 2 cm rounded opacity of the inferior right middle lobe seen on axial image 108 which may relate to collapsed lung versus residual tumor. Continued follow-up recommended. 3. Cholelithiasis. 4. Small hiatal hernia. This report was finalized on 06/05/2023 16:43 by Dr. Qian Jang MD.    CT Chest With Contrast Diagnostic    Result Date: 5/21/2023  Narrative: CT CHEST W CONTRAST DIAGNOSTIC- 5/21/2023 10:28 AM CDT  HISTORY: empyema, plural effusion and mass  COMPARISON: CT scan dated 05/15/2023  DOSE LENGTH PRODUCT: 212 mGy cm. Automated exposure control was also utilized to decrease patient radiation dose.  TECHNIQUE: Serial helical tomographic images of the chest were acquired following the intravenous infusion of contrast. Multiplanar reformatted images were provided for review.  FINDINGS:  Visualized neck base: The imaged portion of the base of the neck appears unremarkable.  Lungs: Left upper lobe calcified granuloma. The left lung is otherwise clear. There is a right-sided pleural drain which is in stable position. Diffuse right lung pleural thickening. Minimal residual effusion along the posterior costal pleura. This is similar to the recent comparison chest CT. Right lung is partially atelectatic, appearing stable. Airways are clear.  Heart: The heart is normal in size. There is no pericardial effusion.  Vasculature: Thoracic aorta is  normal in caliber. No dissection identified. The pulmonary arteries are normal in appearance.  Mediastinum and lymph nodes: Remote granulomatous calcification the mediastinum.  Skeletal and soft tissues: Chest wall soft tissues are unremarkable. No acute bony abnormality.   Upper abdomen: The imaged portion of the upper abdomen demonstrates no acute process. Cholelithiasis.      Impression: 1. History of right lung empyema. There is a right-sided pleural drain which is in stable position. Diffuse right lung pleural thickening. There is only a very minimal fluid remaining in the right pleural space.  This report was finalized on 05/21/2023 12:17 by Dr Dejon Callaway, .    CT Chest With Contrast Diagnostic    Result Date: 5/15/2023  Narrative: EXAMINATION:  CT CHEST W CONTRAST DIAGNOSTIC-  5/15/2023 9:41 AM CDT  HISTORY: Right pleural effusion.  COMPARISON : 03/30/2023.  DLP: 246 mGy-cm. Automated dosage reduction technique was utilized to reduce patient dosage.  TECHNIQUE: CT was performed of the chest with IV contrast. Sagittal and coronal images were reconstructed.  MEDIASTINUM, HEART AND VASCULAR STRUCTURES: There is mild atheromatous disease of the thoracic aorta. There is mild coronary artery calcification. There is moderate cardiomegaly. A small amount of pericardial fluid is probably physiologic. There is a proximal right hilar lymph node short axis diameter of 1.4 cm. There are several subcentimeter right paratracheal lymph nodes.  LUNGS: There is a small pleural effusion on the left, new compared to the prior study. There is simple appearing pleural effusion on the left. There is a very mild degree of left lower lobe atelectasis. There is a chest tube on the right side. There is a smaller amount of residual pleural fluid on the right. There is also a small amount of air in the pleural space in the right lung base posteriorly around the chest tube. There is some loculated pleural fluid located laterally and  anteriorly in the lung base. There is thickening of the pleura on the right side. There is right upper lobe, right middle lobe and right lower lobe volume loss. In the right upper lobe, there is somewhat linear opacity located anteriorly with some air bronchograms likely representing atelectatic lung. There is severe right middle lobe volume loss with consolidation and air bronchograms likely related to atelectasis. There are dependent opacities in the right lower lobe likely representing atelectasis.  UPPER ABDOMEN: There is a small hiatal hernia. Images of the upper abdomen are otherwise unremarkable.  BONES: There are degenerative changes of the spine.       Impression: 1. The overall amount of pleural fluid on the right side is small when compared to the prior study. However, pleural thickening is new. The pleural thickening is greatest in the right lung base. A right chest tube is in place in the right lung base. There is a small amount of air in the right pleural space located in the region of the chest tube. Residual empyema and this area is considered. The fluid is also somewhat loculated in the lung base laterally and anteriorly where the fluid is lower in density and more simple. 2. There is volume loss in the right lung. There are opacities in the right lung likely related to atelectasis. Pneumonia is also considered. 3. There is a new small left pleural effusion that has a simple layering appearance. There is mild left lower lobe atelectasis. 4. There is a mildly enlarged right hilar lymph node, likely reactive. There are small right paratracheal lymph nodes. 5. Mild atheromatous disease of the thoracic aorta and coronary arteries is moderate cardiomegaly. 6. Small hiatal hernia.    This report was finalized on 05/15/2023 15:14 by Dr. Dariel Beach MD.    XR Chest 1 View    Result Date: 5/30/2023  Narrative: Frontal supine radiograph of the chest 5/30/2023 3:50 AM CDT  History: chest tube;  J86.9-Pyothorax without fistula  Comparison: 05/29/2023  Findings:  Right-sided pleural drain is in stable position. No pneumothorax. Residual pleural thickening on the right and likely small residual subpulmonic effusion. This is stable. Left lung is clear. Heart size is normal. Pulmonary vasculature are nondilated. Bony elements are unremarkable.      Impression: Impression: 1. No significant interval change.   This report was finalized on 05/30/2023 07:14 by Dr Dejon Callaway, .    XR Chest 1 View    Result Date: 5/29/2023  Narrative: Frontal supine radiograph of the chest 5/29/2023 3:25 AM CDT  History: chest tube; J86.9-Pyothorax without fistula  Comparison: 05/28/2023  Findings:  Right-sided pleural drain is in stable position. No pneumothorax. Residual pleural thickening and perhaps small subpulmonic effusion. This is stable. Left lung is clear. Heart size is normal. Pulmonary vasculature are nondilated. Bony elements are unremarkable.      Impression: Impression: 1. No significant interval change. 2. Right-sided pleural drain and pleural thickening is stable. No pneumothorax.   This report was finalized on 05/29/2023 07:26 by Dr Dejon Callaway, .    XR Chest 1 View    Result Date: 5/28/2023  Narrative: EXAM/TECHNIQUE: XR CHEST 1 VW-  INDICATION: chest tube; J86.9-Pyothorax without fistula  COMPARISON: Prior day  FINDINGS:  Right-sided pleural drain is stable in position. No change in small residual right-sided pleural fluid. No change in RIGHT perihilar and lower lobe consolidation. LEFT lung and pleural space are clear. No pneumothorax. Cardiac silhouette is normal size.      Impression:  No change in appearance of the chest. This report was finalized on 05/28/2023 04:26 by Dr. Yogi Lopez MD.    XR Chest 1 View    Result Date: 5/27/2023  Narrative: EXAM/TECHNIQUE: XR CHEST 1 VW-  INDICATION: chest tube; J86.9-Pyothorax without fistula  COMPARISON: Prior  FINDINGS:  Right-sided pleural drain in stable  position. No change in trace residual right-sided pleural effusion. No change in RIGHT perihilar and lower lobe opacity. LEFT lung and pleural space are clear. No visible pneumothorax. Stable cardiac silhouette.      Impression:  No change in appearance of the chest. This report was finalized on 05/27/2023 05:04 by Dr. Yogi Lopez MD.    XR Chest 1 View    Result Date: 5/26/2023  Narrative: EXAM/TECHNIQUE: XR CHEST 1 VW-  INDICATION: chest tube  COMPARISON: 5/25/2023  FINDINGS:  Right-sided chest tube is stable in position. No change in small residual right-sided pleural effusion. No change in RIGHT mid-lower lung zone airspace opacity. LEFT lung and pleural space are clear. Cardiac silhouette is stable. No visible pneumothorax.      Impression:  No change in appearance of the chest. This report was finalized on 05/26/2023 07:56 by Dr. Yogi Lopez MD.    XR Chest 1 View    Result Date: 5/25/2023  Narrative: EXAMINATION:  XR CHEST 1 VW-  5/25/2023 3:30 AM CDT  HISTORY: Right chest tube. Right pleural effusion and infiltrate.  COMPARISON: 05/24/2023.  TECHNIQUE: Single view AP image.  FINDINGS:  A right chest tube remains in place. There is a small right pleural effusion. There is patchy infiltrate in the right perihilar region and right lung base. The left lung is clear. The heart is normal in size. There are degenerative changes of the spine.       Impression: Infiltrate and pleural effusion on the right are unchanged. Right chest tube remains in place.   This report was finalized on 05/25/2023 06:54 by Dr. Dariel Beach MD.    XR Chest 1 View    Result Date: 5/24/2023  Narrative: EXAMINATION:  XR CHEST 1 VW-  5/24/2023 3:40 AM CDT  HISTORY: Right chest tube with pleural effusion.  COMPARISON: 05/23/2023.  TECHNIQUE: Single view AP image.  FINDINGS:  A right chest tube remains in place. There is a small right pleural effusion. Opacity in the mid and lower lung zone on the right is unchanged. The left lung is  clear. The heart is normal in size.       Impression: Stable chest x-ray. No significant change.   This report was finalized on 05/24/2023 06:59 by Dr. Dariel Beach MD.    XR Chest 1 View    Result Date: 5/23/2023  Narrative: EXAMINATION:  XR CHEST 1 VW-  5/23/2023 3:15 AM CDT  HISTORY: Right chest tube. Right pleural effusion.  COMPARISON: 05/22/2023.  TECHNIQUE: Single view AP image.  FINDINGS:  A right chest tube remains in place. There is a small pleural effusion on the right. There is infiltrate in the right perihilar region and right lung base, stable. The left lung is clear. There is bronchial wall thickening. Heart size is within normal limits. There are degenerative changes of the spine.       Impression: 1. Stable small right pleural effusion with right chest tube in place. 2. Stable infiltrate in the right perihilar region and right lung base.   This report was finalized on 05/23/2023 08:28 by Dr. Dariel Beach MD.    XR Chest 1 View    Result Date: 5/22/2023  Narrative: EXAM/TECHNIQUE: XR CHEST 1 VW-  INDICATION: chest tube  COMPARISON: Prior day  FINDINGS:  Right-sided pleural drain is stable in position. No change in trace residual right-sided pleural effusion. No change in RIGHT perihilar lower lobe airspace opacity. LEFT lung and pleural space are clear. No pneumothorax. Stable cardiac silhouette.      Impression:  No change in appearance of the chest. This report was finalized on 05/22/2023 07:32 by Dr. Yogi Lopez MD.    XR Chest 1 View    Result Date: 5/21/2023  Narrative: Frontal supine radiograph of the chest 5/21/2023 3:23 AM CDT  History: chest tube  Comparison: 05/20/2023  Findings:  Right-sided pleural drain is in stable position. Residual loculated right basilar effusion is stable. No pneumothorax. No new consolidation. The left lung is clear. No mediastinal shift. Heart size is stable. Pulmonary vasculature are nondilated.      Impression: Impression: 1. No significant interval  change.   This report was finalized on 05/21/2023 08:51 by Dr Dejon Callaway, .    XR Chest 1 View    Result Date: 5/20/2023  Narrative: Frontal supine radiograph of the chest 5/20/2023 3:30 AM CDT  History: chest tube  Comparison: 05/19/2023  Findings: Lines and tubes are stable in position. No new opacities or pneumothoraces are visualized in the chest. The cardiomediastinal silhouette and pulmonary vascularity are unchanged.   No acute osseous or soft tissue abnormality is noted.      Impression: Impression: 1. No significant interval change since previous exam.   This report was finalized on 05/20/2023 11:32 by Dr. Nathan Hayden MD.    XR Chest 1 View    Result Date: 5/19/2023  Narrative: Frontal supine radiograph of the chest 5/19/2023 3:50 AM CDT  History: chest tube  Comparison: 05/18/2023  Findings: Lines and tubes are stable in position. No new opacities or pneumothoraces are visualized in the chest. The cardiomediastinal silhouette and pulmonary vascularity are unchanged.   No acute osseous or soft tissue abnormality is noted.      Impression: Impression: 1. No significant interval change since previous exam.   This report was finalized on 05/19/2023 07:36 by Dr. Nathan Hayden MD.    XR Chest 1 View    Result Date: 5/18/2023  Narrative: EXAM/TECHNIQUE: XR CHEST 1 VW-  INDICATION: chest tube  COMPARISON: 05/17/2023  FINDINGS:  Right-sided pleural drain is stable in position. No change in small residual RIGHT pleural effusion. No change in RIGHT perihilar and lower lobe consolidation. LEFT lung and pleural space appear clear. Cardiac silhouette is stable. Calcified granuloma in the LEFT upper lobe. No visible pneumothorax.      Impression:  No change in small right-sided pleural effusion and RIGHT mid-lower lung zone consolidation, with right-sided pleural drain in stable position. This report was finalized on 05/18/2023 07:20 by Dr. Yogi Lopez MD.    XR Chest 1 View    Result Date:  5/17/2023  Narrative: EXAM/TECHNIQUE: XR CHEST 1 VW-  INDICATION: chest tube  COMPARISON: Prior to  FINDINGS:  Right-sided pleural drain is stable in position. No change in residual small RIGHT pleural effusion. No change in RIGHT perihilar and lower lobe consolidation. LEFT lung and pleural space are clear. No visible pneumothorax. Cardiac silhouette is stable.      Impression:  No change in appearance of the chest. This report was finalized on 05/17/2023 07:31 by Dr. Yogi Lopez MD.    XR Chest 1 View    Result Date: 5/16/2023  Narrative: EXAM/TECHNIQUE: XR CHEST 1 VW-  INDICATION: chest tube  COMPARISON: Prior day  FINDINGS:  Right-sided pleural drain is stable in position. No change in small residual right-sided pleural effusion with overlying atelectasis. No change in RIGHT mid and lower lung zone parenchymal opacity. LEFT lung and pleural space are clear. No visible pneumothorax. Cardiac silhouette is normal size and stable.      Impression:  No change in appearance of the chest. This report was finalized on 05/16/2023 07:23 by Dr. Yogi Lopez MD.    XR Chest 1 View    Result Date: 5/15/2023  Narrative: EXAMINATION: XR CHEST 1 VW-  5/15/2023 7:16 AM CDT 1 view  HISTORY: chest tube  COMPARISON: 05/14/2023.  FINDINGS: Pigtail catheter projects over the RIGHT lower chest. Overall, similar appearance of the RIGHT lung. The LEFT lung is clear. The cardiomediastinal silhouette and pulmonary vascularity are within normal limits.  The osseous structures and surrounding soft tissues demonstrate no acute abnormality.       Impression:  1.  Small chest tube at the RIGHT lung base redemonstrated. Persistent perihilar and infrahilar opacity on the RIGHT. Persistent pleural fluid on the RIGHT. Overall, very similar to the prior exam.   This report was finalized on 05/15/2023 07:17 by Dr. Bo Pastor MD.    XR Chest 1 View    Result Date: 5/14/2023  Narrative: EXAMINATION: XR CHEST 1 VW-  5/14/2023 3:50 AM CDT   HISTORY: Pneumonia and empyema. Right-sided chest tube.  One view chest x-ray.  Comparison is made with yesterday at 4:24 AM.  Stable heart and mediastinum.  Partial clearing of retrocardiac left lower lobe consolidation/atelectasis. The upper left lung is clear.  Unchanged right-sided infiltrate and small amount of loculated pleural fluid. Unchanged right basilar chest tube. No pneumothorax.  Relative sparing of the upper right lung.  Summary: 1. Other than partial clearing of left basilar consolidation the chest x-ray is stable.    This report was finalized on 05/14/2023 05:02 by Dr. Raman Polo MD.    XR Chest 1 View    Result Date: 5/13/2023  Narrative: EXAMINATION: XR CHEST 1 VW-  5/13/2023 4:40 AM CDT  HISTORY: Right empyema. Chest tube placement.  One view chest x-ray.  Comparison is made with yesterday's of 4:12 AM.  Pigtail pleural space catheter still present within the lower right chest.  Unchanged right basilar pneumonia and fluid. The right apex is clear.  There is increased consolidation/atelectasis of the retrocardiac left lower lobe. The left upper lung is clear.  Heart size is stable.  No pneumothorax or heart failure.  Summary: 1. Pigtail pleural space catheter unchanged in position. 2. Unchanged right lower lobe pneumonia. 3. Increased left basilar consolidation/atelectasis.  This report was finalized on 05/13/2023 06:58 by Dr. Raman Polo MD.    XR Chest 1 View    Result Date: 5/12/2023  Narrative: EXAM/TECHNIQUE: XR CHEST 1 VW-  INDICATION: chest tube  COMPARISON: 05/11/2023  FINDINGS:  RIGHT basilar pleural drain is stable in position. No change in small residual right-sided pleural effusion. No change in RIGHT perihilar and lower lobe consolidation. The LEFT lung and pleural space appear clear. No visible pneumothorax. Cardiac silhouette is stable.      Impression:  Stable exam. No change in residual small RIGHT pleural effusion with pleural drain in place. No change in RIGHT perihilar and  lower lobe consolidation. This report was finalized on 05/12/2023 07:39 by Dr. Yogi Lopez MD.    XR Chest 1 View    Result Date: 5/11/2023  Narrative: EXAM/TECHNIQUE: XR CHEST 1 VW-  INDICATION: chest tube  COMPARISON: 05/10/2023  FINDINGS:  Right-sided pleural drain is stable in position. No change in small residual right-sided pleural effusion. No significant change in RIGHT mid to lower lung zone consolidation. LEFT lung and pleural space appear clear. No pneumothorax. Stable cardiac silhouette.      Impression:  No change in appearance of the chest. This report was finalized on 05/11/2023 07:20 by Dr. Yogi Lopez MD.    XR Chest 1 View    Result Date: 5/10/2023  Narrative: EXAM/TECHNIQUE: XR CHEST 1 VW-  INDICATION: chest tube  COMPARISON: 05/09/2023  FINDINGS:  RIGHT posterior pleural drain in stable position. No change in small residual right-sided pleural effusion. No change in RIGHT lung consolidation. No change in mild LEFT basilar atelectasis. No new airspace opacities. No visible pneumothorax. Cardiac silhouette is stable.      Impression:  No change in appearance of the chest. This report was finalized on 05/10/2023 08:04 by Dr. Yogi Lopez MD.    XR Chest 1 View    Result Date: 5/9/2023  Narrative: EXAMINATION:  XR CHEST 1 VW-  5/9/2023 7:20 PM CDT  HISTORY: Chest tube placement on the right.  COMPARISON: 05/02/2023.  TECHNIQUE: Single view AP image.  FINDINGS:  There is a new chest tube overlying the right lung base. The pleural effusion on the right is smaller. There is no measurable pneumothorax. There are patchy infiltrates in the mid and lower lung zone on the right. There is minimal infiltrate at the left base. The inspiration is not as deep on the current study. There are calcified granulomas bilaterally. Heart size is upper limits of normal.       Impression: 1. New right chest tube. Pleural effusion on the right is smaller. No evidence of pneumothorax. 2. Patchy consolidation in the  mid and lower lung zone on the right may represent pneumonia. 3. Minimal infiltrate left lung base likely related to a decreased level of inspiration and atelectasis.   This report was finalized on 05/09/2023 19:26 by Dr. Dariel Beach MD.    CT Guided Chest Tube    Result Date: 5/9/2023  Narrative: CT-GUIDED right CHEST TUBE PLACEMENT, 05/09/2023  HISTORY: Male who is 63 years-old, with concern for malignancy and now with a loculated right effusion  DOSE LENGTH PRODUCT: 3065 mGy cm. Automated exposure control was also utilized to decrease patient radiation dose.  The procedure, including but not limited to the risk of hemorrhage, infection, lung injury was discussed with the patient prior to examination, informed consent was obtained.  PROCEDURE: Multiple nonenhanced axial images were obtained for localization purposes with the patient in the left lateral decubitus position. Radiation dose reduction techniques were utilized, including automated exposure control and exposure modulation based on body size. Skin site over the right posterior chest wall was selected and marked. The skin was prepped with chlorhexidine solution and sterilely draped. Following adequate local anesthesia with 1% lidocaine, dermatotomy was made and a 5F trocar guided Yueh was advanced into the pleural space to a predetermined depth. Trocar was removed with catheter position confirmed by cloudy fluid return. A Bentson wire was advanced through the catheter with subsequent discontinuation of the Yueh catheter over the wire. Serial dilation of the soft tissues performed utilizing 8, 9 and 11 Citizen of [place] soft tissue dilators. A 12 Citizen of [place] locking pigtail catheter was then advanced into the pleural space over the Bentson wire. Bentson wire and stiffener were then removed with position of the pigtail confirmed by repeat CT. Pigtail was locked and the catheter was secured to the skin using the provided device. Additional cloudy yellow fluid obtained by  syringe. Collection bag was secured to the catheter. Patient tolerated the procedure well. There were no immediate complications noted on the postprocedure repeat CT.      Impression: 1. Successful CT-guided right chest tube placement, 12  Kinyarwanda locking pigtail catheter. 2. No immediate complication. 3. Patient to return to preoperative holding, awaiting admission for antibiotics in the setting of presumed empyema.  SEDATION: Conscious sedation was provided with supervision by myself and a dedicated certified nurse observer with 0.5 mg IV Versed and 25 mcg IV fentanyl. Continuous monitoring of heart rhythm, blood pressure, and pulse oximetry was performed throughout the procedure. The first dose of sedation was administered at 1103 hours, and my personal supervision of the patient was completed at time of the procedure completion at 1149 hours. Total intraprocedure time was 46 minutes. This report was finalized on 05/09/2023 14:10 by Dr Dejon Callaway, .    CT Needle Biopsy Lung    Result Date: 5/9/2023  Narrative: PROCEDURE: CT NEEDLE BIOPSY LUNG-  HISTORY: right lung mass, Dr. Kim discussed with Dr. Jackson and plan to repeat biopsy to ensure accurate diagnosis; R91.8-Other nonspecific abnormal finding of lung field  COMPLICATIONS: None.  DOSE LENGTH PRODUCT: 3065 mGy cm. Automated exposure control was also utilized to decrease patient radiation dose.  DESCRIPTION:  The procedural risks, benefits, and alternatives were discussed with the patient.  The patient was apprised of the procedural risks including potential bleeding, infection, injury to the lung or surrounding structures, and pneumothorax possibly requiring chest tube placement. All questions were satisfactorily answered prior to the procedure. Verbal and written informed consent was then obtained from the patient.    Medications: Versed 0.5 mg IV, Fentanyl 25 mcg IV.   A formal time-out was taken correctly identifying the patient's name, patient's date  of birth, patient's medical record number, procedure type, and procedure site.  The patient was placed in the supine position on the CT procedure table.  Prior to sterile preparation and local anesthetic, noninfused axial CT scans were obtained. This redemonstrated an area of consolidation in the anterior/juxtapleural right upper lobe. This is similar to the recent comparison CT scans although the loculated right pleural effusion has increased, with the effusion overlying the intended course of the biopsy. This was discussed with Dr. Kim. Because of the complex nature of the loculated effusion it seems very unlikely that all of this fluid could be drained before biopsy. We decided to proceed with biopsy given the critical need of ensuring diagnosis. The optimal site for biopsy was marked at the skin. The skin was cleansed with chlorhexidine solution and sterilely draped. 1% buffered lidocaine was used to anesthetize the overlying skin and soft tissues to the level of the pleura. A 17-gauge coaxial needle was introduced down to the right upper lobe consolidation using an anterior lateral approach. 5 X 2 cm core biopsy samples were obtained using an 18-gauge cutting needle. Cytopathology was present at the time of the procedure and determined that adequate tissue samples were obtained. Patient tolerated the procedure well. There were no immediate complications.      Impression: 1. Successful and uncomplicated transthoracic CT guided core biopsy of the right upper lobe consolidation of interest. No immediate complication. Patient tolerated procedure well. 2. Exam to be followed by CT guided pleural drain placement and then patient Hospital admission for treatment of presumed empyema.  SEDATION: Conscious sedation was provided with supervision by myself and a dedicated certified nursing observer with 0.5 mg IV Versed and 25 mcg IV fentanyl. Continuous monitoring of heart rhythm, blood pressure, and pulse oximetry was  performed throughout the procedure. The first dose of sedation was administered at 1103 hours, and my personal supervision of the patient was completed at time of the procedure completion at 1149 hours. Total intraprocedure time was 46 minutes. This report was finalized on 05/09/2023 13:25 by Dr Dejon Callaway, .    US Thoracentesis    Result Date: 5/9/2023  Narrative: INDICATIONS FOR PROCEDURE: Right pleural effusion  PROCEDURE: 1. Thoracentesis, diagnostic and therapeutic. 2. Ultrasound guidance for thoracentesis, imaging supervision and interpretation.  ANESTHESIA: 1% lidocaine, injected locally.      COMPLICATIONS: None.    EXAMINATIONS FOR COMPARISON:  CT scan dated 03/30/2023.  DESCRIPTION OF PROCEDURE: The risk and benefits of the procedure were explained to the patient. Specifically, the risks of bleeding, infection, pneumothorax (possible thoracostomy tube), and damage to surrounding structures were discussed. The patient's questions were answered. The patient opted to proceed. Written and verbal consent were obtained from the patient.  TIME OUT was taken and the patient's name, medical record number, date of birth, procedure and entry site were confirmed. The site of the procedure was confirmed and marked.  The right posterior thorax was prepped and draped in sterile fashion. The area was anesthetized with buffered lidocaine 2%.  A 5 Hebrew 15cm pigtail catheter was inserted into the pleural effusion using ultrasound guidance. The collection was quite loculated/complex. Approximately 200 mL of cloudy yellowish/greenish fluid was recovered and sent to side for analysis.  The patient tolerated the procedure well. No immediate complications were noted.      Impression: Successful ultrasound guided right thoracentesis. Fluid collection was quite complex and only 200 mL of cloudy yellowish/greenish fluid could be obtained, set aside for lab analysis. Findings and recommendations were discussed with SUKUMAR MCGOVERN  JORGE L on 5/9/2023 at 12:29 PM CDT.     This report was finalized on 05/09/2023 12:29 by Dr Dejon Callaway, .     Assessment & Plan     Independent Review of Radiographic Studies:    Improved right posterior pleural space with good position of pigtail catheter    Reviewed with Dr. Kim as well    Diagnoses and all orders for this visit:    1. Empyema, right (Primary)  -     XR Chest 2 View; Future    2. Right lung mass           Overall, Castillo Trujillo is doing well. Continue right chest tube to heimlich valve. Imaging shows improvement in right posterior pleural space but not ready for chest tube removal yet. Plan to follow up in 2 weeks with CXR. Continue flushing chest tube to maintain patency.   They have been asked to call our office tomorrow after appt with oncology for an update. Based upon previous PFTs he was not felt to be a surgical candidate but hopeful this will change with improvement in lung function and chest tube removal. Patient has been instructed to contact our office with any questions or concerns should they arise prior to the next office visit.     BMI is >= 25 and <30. (Overweight) The following options were offered after discussion;: weight loss educational material (shared in after visit summary).    Castillo Trujillo  reports that he quit smoking about 6 months ago. His smoking use included cigarettes. He started smoking about 47 years ago. He has a 161.00 pack-year smoking history. He has been exposed to tobacco smoke. He has never used smokeless tobacco.           Advance Care Planning   ACP discussion was declined by the patient. Patient does not have an advance directive, declines further assistance.

## 2023-06-07 ENCOUNTER — TELEPHONE (OUTPATIENT)
Dept: CARDIAC SURGERY | Facility: CLINIC | Age: 63
End: 2023-06-07
Payer: COMMERCIAL

## 2023-06-07 NOTE — TELEPHONE ENCOUNTER
Wife calling to request to speak with Barbara BUSTILLOS or Dr Kim.  States pt was inpt here for 3 weeks under the care of Dr Kim and was transferred to [place].  States Dr Kim and Barbara requested they call back with an update on pt's outcome and she is calling to speak with them for this purpose.  Can reach her at #[phone]/[place]

## 2023-06-07 NOTE — TELEPHONE ENCOUNTER
"Called back and spoke with patient's wife earlier and just now had time to document.  She reports she was given good news at office visit at [place] yesterday and told that patient does not have cancer and the previously seen lung mass is \"healed\".  Advised her that I would relay the news to Dr. Kim and we would obtain the office note from [place] for review.  I was able to find this in care everywhere and it appears he was seen by a thoracic surgeon, not an oncologist which we were under the impression the referral was for oncology.  Dr. Kim tried to reach out to Dr. Vaughn to discuss this mutual patient but he was not available.  Advised patient's wife that we would call back for further recommendations but otherwise keep follow-up with myself in a couple weeks.  She verbalizes understanding."

## 2023-06-08 ENCOUNTER — READMISSION MANAGEMENT (OUTPATIENT)
Dept: CALL CENTER | Facility: HOSPITAL | Age: 63
End: 2023-06-08
Payer: COMMERCIAL

## 2023-06-08 NOTE — OUTREACH NOTE
Medical Week 2 Survey      Flowsheet Row Responses   [facility] facility patient discharged from? [place]   Does the patient have one of the following disease processes/diagnoses(primary or secondary)? Other   Week 2 attempt successful? No   Unsuccessful attempts Attempt 1            Mi CLEMENTE - Registered Nurse

## 2023-06-12 DIAGNOSIS — K21.9 GASTROESOPHAGEAL REFLUX DISEASE, UNSPECIFIED WHETHER ESOPHAGITIS PRESENT: ICD-10-CM

## 2023-06-12 DIAGNOSIS — I10 PRIMARY HYPERTENSION: ICD-10-CM

## 2023-06-12 DIAGNOSIS — E03.9 ACQUIRED HYPOTHYROIDISM: ICD-10-CM

## 2023-06-12 DIAGNOSIS — J44.9 STAGE 2 MODERATE COPD BY GOLD CLASSIFICATION: ICD-10-CM

## 2023-06-12 RX ORDER — ASPIRIN 81 MG/1
81 TABLET, CHEWABLE ORAL DAILY
Qty: 90 TABLET | Refills: 2 | Status: SHIPPED | OUTPATIENT
Start: 2023-06-12

## 2023-06-12 RX ORDER — PANTOPRAZOLE SODIUM 40 MG/1
40 TABLET, DELAYED RELEASE ORAL DAILY
Qty: 90 TABLET | Refills: 1 | Status: SHIPPED | OUTPATIENT
Start: 2023-06-12

## 2023-06-12 RX ORDER — LISINOPRIL 10 MG/1
10 TABLET ORAL DAILY
Qty: 90 TABLET | Refills: 1 | Status: SHIPPED | OUTPATIENT
Start: 2023-06-12

## 2023-06-12 RX ORDER — TIOTROPIUM BROMIDE AND OLODATEROL 3.124; 2.736 UG/1; UG/1
2 SPRAY, METERED RESPIRATORY (INHALATION)
Qty: 4 G | Refills: 3 | Status: SHIPPED | OUTPATIENT
Start: 2023-06-12

## 2023-06-12 RX ORDER — MULTIVIT-MIN/IRON/FOLIC ACID/K 18-600-40
2000 CAPSULE ORAL DAILY
Qty: 90 CAPSULE | Refills: 3 | Status: SHIPPED | OUTPATIENT
Start: 2023-06-12

## 2023-06-12 RX ORDER — ALBUTEROL SULFATE 90 UG/1
2 AEROSOL, METERED RESPIRATORY (INHALATION) EVERY 4 HOURS PRN
Qty: 8 G | Refills: 3 | Status: SHIPPED | OUTPATIENT
Start: 2023-06-12

## 2023-06-12 RX ORDER — ERGOCALCIFEROL 1.25 MG/1
50000 CAPSULE ORAL WEEKLY
Qty: 5 CAPSULE | Refills: 0 | OUTPATIENT
Start: 2023-06-12

## 2023-06-12 RX ORDER — IBUPROFEN 800 MG/1
800 TABLET ORAL 3 TIMES DAILY PRN
Qty: 30 TABLET | Refills: 0 | Status: SHIPPED | OUTPATIENT
Start: 2023-06-12

## 2023-06-12 RX ORDER — LEVOTHYROXINE SODIUM 0.2 MG/1
200 TABLET ORAL DAILY
Qty: 90 TABLET | Refills: 1 | Status: SHIPPED | OUTPATIENT
Start: 2023-06-12

## 2023-06-12 NOTE — TELEPHONE ENCOUNTER
Caller: VALENTIN BACA    Relationship: Emergency Contact    Best call back number: [phone]     Requested Prescriptions:   Requested Prescriptions     Pending Prescriptions Disp Refills    levothyroxine (SYNTHROID, LEVOTHROID) 200 MCG tablet 90 tablet 1     Sig: Take 1 tablet by mouth Daily.    pantoprazole (PROTONIX) 40 MG EC tablet 90 tablet 1     Sig: Take 1 tablet by mouth Daily.    tiotropium bromide-olodaterol (Stiolto Respimat) 2.5-2.5 MCG/ACT aerosol solution inhaler 4 g 3     Sig: Inhale 2 puffs Daily.    aspirin 81 MG chewable tablet       Sig: Chew 1 tablet Daily.    vitamin D (ERGOCALCIFEROL) 1.25 MG (99450 UT) capsule capsule 5 capsule      Sig: Take 1 capsule by mouth 1 (One) Time Per Week. MONDAY    lisinopril (PRINIVIL,ZESTRIL) 10 MG tablet 90 tablet 1     Sig: Take 1 tablet by mouth Daily.    ibuprofen (ADVIL,MOTRIN) 800 MG tablet       Sig: Take 1 tablet by mouth 3 (Three) Times a Day As Needed for Mild Pain.    albuterol sulfate  (90 Base) MCG/ACT inhaler 8 g 3     Sig: Inhale 2 puffs Every 4 (Four) Hours As Needed for Wheezing.        Pharmacy where request should be sent: [id number] - [place], [place] - [address] [facility] [phone] [facility] [phone] FX     Last office visit with prescribing clinician: 5/2/2023   Last telemedicine visit with prescribing clinician: Visit date not found   Next office visit with prescribing clinician: 8/8/2023     Additional details provided by patient:     Does the patient have less than a 3 day supply:  [x] Yes  [] No    Would you like a call back once the refill request has been completed: [x] Yes [] No      Helen Zhao   06/12/23 10:02 CDT

## 2023-06-13 ENCOUNTER — READMISSION MANAGEMENT (OUTPATIENT)
Dept: CALL CENTER | Facility: HOSPITAL | Age: 63
End: 2023-06-13
Payer: COMMERCIAL

## 2023-06-13 NOTE — OUTREACH NOTE
Medical Week 2 Survey      Flowsheet Row Responses   [facility] patient discharged from? [place]   Does the patient have one of the following disease processes/diagnoses(primary or secondary)? Other   Week 2 attempt successful? No   Unsuccessful attempts Attempt 1  [wife declines call as pt/wife are driving home from appt in [place]--agreed to call another day]            DEEPTHI DA SILVA - Registered Nurse

## 2023-06-14 NOTE — TELEPHONE ENCOUNTER
Reviewed records again.  It appears chest tube was removed yesterday at [place].  Discussed with Dr. Kim and he spoke to Dr. Vaughn yesterday afternoon.  His plan will be to repeat a CT scan of the chest in a couple months.  Called to discuss with patient, he arrived home last night.  Tried to call his mobile number listed but it was disconnected.  Advised patient since his chest tube was removed there is no need for follow-up with our office next week.  Please cancel this appointment.  Should he need any further assistance from our office he can call or Dr. Vaughn will let us know.  He appreciates the care we rendered. He will continue to follow at [place] thoracic surgery.

## 2023-08-01 NOTE — TELEPHONE ENCOUNTER
Caller: VALENTIN BACA    Relationship: Emergency Contact    Best call back number: [phone]    What is the best time to reach you: ANYTIME    Who are you requesting to speak with (clinical staff, provider,  specific staff member): CLINICAL    What was the call regarding: WANTING TO LOOK INTO GETTING MEDICATION AFTER HOSPITAL VISIT, CALLED IN AND STATES THAT HOSPITAL DID NOT PRESCRIBE ANYTHING TO HIM, IS AWARE OF APPOINTMENT ON 06.05.2023    Is it okay if the provider responds through [facility]: EITHER WAY   8

## 2023-08-09 ENCOUNTER — OFFICE VISIT (OUTPATIENT)
Dept: INTERNAL MEDICINE | Facility: CLINIC | Age: 63
End: 2023-08-09
Payer: COMMERCIAL

## 2023-08-09 VITALS
WEIGHT: 212.4 LBS | HEART RATE: 76 BPM | SYSTOLIC BLOOD PRESSURE: 146 MMHG | BODY MASS INDEX: 27.26 KG/M2 | HEIGHT: 74 IN | RESPIRATION RATE: 16 BRPM | DIASTOLIC BLOOD PRESSURE: 78 MMHG

## 2023-08-09 DIAGNOSIS — J86.9 EMPYEMA, RIGHT: ICD-10-CM

## 2023-08-09 DIAGNOSIS — J44.9 STAGE 2 MODERATE COPD BY GOLD CLASSIFICATION: Primary | ICD-10-CM

## 2023-08-09 DIAGNOSIS — E55.9 VITAMIN D DEFICIENCY: ICD-10-CM

## 2023-08-09 DIAGNOSIS — E03.9 ACQUIRED HYPOTHYROIDISM: ICD-10-CM

## 2023-08-09 DIAGNOSIS — Z23 NEED FOR VACCINATION: ICD-10-CM

## 2023-08-09 DIAGNOSIS — Z86.010 PERSONAL HISTORY OF COLONIC POLYPS: ICD-10-CM

## 2023-08-09 DIAGNOSIS — K21.9 GASTROESOPHAGEAL REFLUX DISEASE, UNSPECIFIED WHETHER ESOPHAGITIS PRESENT: ICD-10-CM

## 2023-08-09 DIAGNOSIS — I10 PRIMARY HYPERTENSION: ICD-10-CM

## 2023-08-09 PROBLEM — R55 SYNCOPE AND COLLAPSE: Status: RESOLVED | Noted: 2023-03-30 | Resolved: 2023-08-09

## 2023-08-09 PROBLEM — J90 RECURRENT RIGHT PLEURAL EFFUSION: Status: RESOLVED | Noted: 2023-05-09 | Resolved: 2023-08-09

## 2023-08-09 RX ORDER — ALBUTEROL SULFATE 90 UG/1
2 AEROSOL, METERED RESPIRATORY (INHALATION) EVERY 4 HOURS PRN
Qty: 8 G | Refills: 3 | Status: SHIPPED | OUTPATIENT
Start: 2023-08-09

## 2023-08-09 RX ORDER — LISINOPRIL 10 MG/1
10 TABLET ORAL DAILY
Qty: 90 TABLET | Refills: 1 | Status: SHIPPED | OUTPATIENT
Start: 2023-08-09 | End: 2023-08-09 | Stop reason: SDUPTHER

## 2023-08-09 RX ORDER — MULTIVIT-MIN/IRON/FOLIC ACID/K 18-600-40
2000 CAPSULE ORAL DAILY
Qty: 90 CAPSULE | Refills: 3 | Status: SHIPPED | OUTPATIENT
Start: 2023-08-09

## 2023-08-09 RX ORDER — LEVOTHYROXINE SODIUM 0.2 MG/1
200 TABLET ORAL DAILY
Qty: 90 TABLET | Refills: 1 | Status: SHIPPED | OUTPATIENT
Start: 2023-08-09

## 2023-08-09 RX ORDER — PANTOPRAZOLE SODIUM 40 MG/1
40 TABLET, DELAYED RELEASE ORAL DAILY
Qty: 90 TABLET | Refills: 1 | Status: SHIPPED | OUTPATIENT
Start: 2023-08-09

## 2023-08-09 RX ORDER — IBUPROFEN 800 MG/1
800 TABLET ORAL 2 TIMES DAILY PRN
Qty: 60 TABLET | Refills: 1 | Status: SHIPPED | OUTPATIENT
Start: 2023-08-09

## 2023-08-09 RX ORDER — ASPIRIN 81 MG/1
81 TABLET, CHEWABLE ORAL DAILY
Qty: 90 TABLET | Refills: 2 | Status: CANCELLED | OUTPATIENT
Start: 2023-08-09

## 2023-08-09 RX ORDER — LISINOPRIL 20 MG/1
20 TABLET ORAL DAILY
Qty: 90 TABLET | Refills: 1 | Status: SHIPPED | OUTPATIENT
Start: 2023-08-09

## 2023-08-09 NOTE — PROGRESS NOTES
CC: f/u COPD    History:  Castillo Trujillo is a 63 y.o. male   He notes he has been doing well and has not had any physical limitations.  He still has some dyspnea on exertion, but has had some cost concerns regarding his Stiolto.  He is interested to know whether he has any fluid built on his lung, but is very pleased to know that the pseudotumor is no longer of concern and has resolved on recent imaging.      ROS:  Review of Systems   Constitutional:  Negative for chills and fever.   Respiratory:  Negative for cough and shortness of breath.    Cardiovascular:  Negative for chest pain and palpitations.   Gastrointestinal:  Negative for abdominal pain and constipation.      reports that he quit smoking about 8 months ago. His smoking use included cigarettes. He started smoking about 47 years ago. He has a 161.00 pack-year smoking history. He has been exposed to tobacco smoke. He has never used smokeless tobacco. He reports current alcohol use. He reports that he does not currently use drugs.      Current Outpatient Medications:     albuterol sulfate  (90 Base) MCG/ACT inhaler, Inhale 2 puffs Every 4 (Four) Hours As Needed for Wheezing., Disp: 8 g, Rfl: 3    Cholecalciferol (Vitamin D) 50 MCG (2000 UT) capsule, Take 1 capsule by mouth Daily., Disp: 90 capsule, Rfl: 3    ibuprofen (ADVIL,MOTRIN) 800 MG tablet, Take 1 tablet by mouth 2 (Two) Times a Day As Needed for Mild Pain., Disp: 60 tablet, Rfl: 1    levothyroxine (SYNTHROID, LEVOTHROID) 200 MCG tablet, Take 1 tablet by mouth Daily., Disp: 90 tablet, Rfl: 1    lisinopril (PRINIVIL,ZESTRIL) 20 MG tablet, Take 1 tablet by mouth Daily., Disp: 90 tablet, Rfl: 1    pantoprazole (PROTONIX) 40 MG EC tablet, Take 1 tablet by mouth Daily., Disp: 90 tablet, Rfl: 1    vitamin D (ERGOCALCIFEROL) 1.25 MG (02848 UT) capsule capsule, Take 1 capsule by mouth 1 (One) Time Per Week. MONDAY, Disp: , Rfl:     Sodium Chloride Flush (sodium chloride 0.9 % flush) 0.9 % solution,  "Infuse 10 mL into a venous catheter Every 8 (Eight) Hours for 15 days. Flush chest tube with 10 mL normal saline flush three times per day to maintain chest tube patency. This is not IV solution., Disp: 450 mL, Rfl: 0    Umeclidinium-Vilanterol (ANORO ELLIPTA) 62.5-25 MCG/ACT aerosol powder  inhaler, Inhale 1 puff Daily., Disp: 180 each, Rfl: 1    OBJECTIVE:  /78 (BP Location: Left arm, Patient Position: Sitting, Cuff Size: Adult)   Pulse 76   Resp 16   Ht 188 cm (74\")   Wt 96.3 kg (212 lb 6.4 oz)   BMI 27.27 kg/mý    Physical Exam  Constitutional:       General: He is not in acute distress.  Cardiovascular:      Rate and Rhythm: Normal rate and regular rhythm.      Heart sounds: Normal heart sounds. No murmur heard.  Pulmonary:      Effort: Pulmonary effort is normal.      Breath sounds: Normal breath sounds. No wheezing.   Neurological:      Mental Status: He is alert and oriented to person, place, and time.      Gait: Gait normal.   Psychiatric:         Mood and Affect: Mood normal.         Behavior: Behavior normal.       Assessment/Plan     Diagnoses and all orders for this visit:    1. Stage 2 moderate COPD by GOLD classification (Primary)  -     albuterol sulfate  (90 Base) MCG/ACT inhaler; Inhale 2 puffs Every 4 (Four) Hours As Needed for Wheezing.  Dispense: 8 g; Refill: 3  -     Umeclidinium-Vilanterol (ANORO ELLIPTA) 62.5-25 MCG/ACT aerosol powder  inhaler; Inhale 1 puff Daily.  Dispense: 180 each; Refill: 1  -     Complete PFT - Pre & Post Bronchodilator; Future  Repeat PFTs now that his pseudotumor and mass effect is markedly decreased. Change to anoro for cost concerns to see if this is covered better. Encourage copay savings by going online.     2. Acquired hypothyroidism  -     levothyroxine (SYNTHROID, LEVOTHROID) 200 MCG tablet; Take 1 tablet by mouth Daily.  Dispense: 90 tablet; Refill: 1    3. Primary hypertension  -     Discontinue: lisinopril (PRINIVIL,ZESTRIL) 10 MG tablet; " Take 1 tablet by mouth Daily.  Dispense: 90 tablet; Refill: 1  -     lisinopril (PRINIVIL,ZESTRIL) 20 MG tablet; Take 1 tablet by mouth Daily.  Dispense: 90 tablet; Refill: 1  Poorly controlled, BP goal for age is <140/90 per JNC 8 guidelines, and increase lisinopril.    4. Gastroesophageal reflux disease, unspecified whether esophagitis present  -     pantoprazole (PROTONIX) 40 MG EC tablet; Take 1 tablet by mouth Daily.  Dispense: 90 tablet; Refill: 1  Stable without exacerbation on PPI.    5. Vitamin D deficiency  -     Cholecalciferol (Vitamin D) 50 MCG (2000 UT) capsule; Take 1 capsule by mouth Daily.  Dispense: 90 capsule; Refill: 3    6. Empyema, right  -     XR Chest PA & Lateral; Future  Surveillance with Xray.     7. Personal history of colonic polyps  -     Ambulatory Referral to Gastroenterology  Refer back to Dr. Obrien for surveillance given adenomatous polyps on last testing in 2015.     8. Need for vaccination  -     Pneumococcal Conjugate Vaccine 20-Valent (PCV20)    Other orders  -     ibuprofen (ADVIL,MOTRIN) 800 MG tablet; Take 1 tablet by mouth 2 (Two) Times a Day As Needed for Mild Pain.  Dispense: 60 tablet; Refill: 1    An After Visit Summary was printed and given to the patient at discharge.  Return in about 4 months (around 12/9/2023) for Medicare Wellness.      Juan Vaughn D.O. 8/9/2023   Electronically signed.

## 2023-08-21 LAB
BEAKER LAB AP INTRAOPERATIVE CONSULTATION: NORMAL
CYTO UR: NORMAL
LAB AP CASE REPORT: NORMAL
LAB AP CLINICAL INFORMATION: NORMAL
LAB AP DIAGNOSIS COMMENT: NORMAL
Lab: NORMAL
PATH REPORT.FINAL DX SPEC: NORMAL
PATH REPORT.GROSS SPEC: NORMAL

## 2023-09-14 ENCOUNTER — HOSPITAL ENCOUNTER (OUTPATIENT)
Dept: PULMONOLOGY | Facility: HOSPITAL | Age: 63
Discharge: HOME | End: 2023-09-14
Payer: MEDICARE

## 2023-09-14 DIAGNOSIS — J44.9 STAGE 2 MODERATE COPD BY GOLD CLASSIFICATION: ICD-10-CM

## 2023-09-14 PROCEDURE — 94726 PLETHYSMOGRAPHY LUNG VOLUMES: CPT

## 2023-09-14 PROCEDURE — 94729 DIFFUSING CAPACITY: CPT

## 2023-09-14 PROCEDURE — 94060 EVALUATION OF WHEEZING: CPT

## 2023-09-14 RX ORDER — ALBUTEROL SULFATE 2.5 MG/3ML
2.5 SOLUTION RESPIRATORY (INHALATION) ONCE
Status: COMPLETED | OUTPATIENT
Start: 2023-09-14 | End: 2023-09-14

## 2023-09-14 RX ADMIN — ALBUTEROL SULFATE 2.5 MG: 2.5 SOLUTION RESPIRATORY (INHALATION) at 09:25

## 2023-09-19 RX ORDER — IBUPROFEN 800 MG/1
800 TABLET ORAL 2 TIMES DAILY PRN
Qty: 60 TABLET | Refills: 1 | Status: SHIPPED | OUTPATIENT
Start: 2023-09-19

## 2023-09-19 NOTE — TELEPHONE ENCOUNTER
Rx Refill Note  Requested Prescriptions     Pending Prescriptions Disp Refills    ibuprofen (ADVIL,MOTRIN) 800 MG tablet [Pharmacy Med Name: IBUPROFEN 800 MG  Tablet] 60 tablet 1     Sig: TAKE 1 TABLET BY MOUTH 2 (TWO) TIMES A DAY AS NEEDED FOR MILD PAIN.      Last office visit with prescribing clinician: 8/9/2023   Last telemedicine visit with prescribing clinician: Visit date not found   Next office visit with prescribing clinician: 12/13/2023                         Would you like a call back once the refill request has been completed: [] Yes [] No    If the office needs to give you a call back, can they leave a voicemail: [] Yes [] No    Shelton Vazquez MA  09/19/23, 10:51 CDT

## 2023-09-26 ENCOUNTER — TELEPHONE (OUTPATIENT)
Dept: GASTROENTEROLOGY | Age: 63
End: 2023-09-26

## 2023-09-26 RX ORDER — ALBUTEROL SULFATE 90 UG/1
2 AEROSOL, METERED RESPIRATORY (INHALATION) EVERY 4 HOURS PRN
Qty: 8 G | Refills: 3 | Status: SHIPPED | OUTPATIENT
Start: 2023-09-26

## 2023-09-26 NOTE — TELEPHONE ENCOUNTER
Patients wife called she states that the Albuterol inhaler is not covered & she states that the alternative is Ventolin inhaler. Please send. Thanks you

## 2023-10-26 DIAGNOSIS — K21.9 GASTROESOPHAGEAL REFLUX DISEASE, UNSPECIFIED WHETHER ESOPHAGITIS PRESENT: ICD-10-CM

## 2023-10-26 DIAGNOSIS — I10 PRIMARY HYPERTENSION: ICD-10-CM

## 2023-10-26 RX ORDER — LISINOPRIL 20 MG/1
20 TABLET ORAL DAILY
Qty: 90 TABLET | Refills: 1 | OUTPATIENT
Start: 2023-10-26

## 2023-10-26 RX ORDER — PANTOPRAZOLE SODIUM 40 MG/1
40 TABLET, DELAYED RELEASE ORAL DAILY
Qty: 90 TABLET | Refills: 1 | OUTPATIENT
Start: 2023-10-26

## 2023-10-26 NOTE — TELEPHONE ENCOUNTER
Rx Refill Note  Requested Prescriptions     Pending Prescriptions Disp Refills    lisinopril (PRINIVIL,ZESTRIL) 20 MG tablet [Pharmacy Med Name: LISINOPRIL 20MG TABLET] 90 tablet 1     Sig: TAKE ONE (1) TABLET BY MOUTH DAILY.    pantoprazole (PROTONIX) 40 MG EC tablet [Pharmacy Med Name: PANTOPRAZOLE SODIUM 40MG TABLET DR] 90 tablet 1     Sig: TAKE ONE (1) TABLET BY MOUTH DAILY.      Last office visit with prescribing clinician: 8/9/2023   Last telemedicine visit with prescribing clinician: Visit date not found   Next office visit with prescribing clinician: 12/13/2023                         Would you like a call back once the refill request has been completed: [] Yes [] No    If the office needs to give you a call back, can they leave a voicemail: [] Yes [] No    Shelton Vazquez MA  10/26/23, 13:26 CDT

## 2023-11-03 NOTE — CASE MANAGEMENT/SOCIAL WORK
Continued Stay Note  CIELO Portillo     Patient Name: Castillo Trujillo  MRN: [id number]  Today's Date: 5/19/2023    Admit Date: 5/9/2023    Plan: Home   Discharge Plan       Row Name 05/19/23 1046       Plan    Plan Home    Plan Comments Pt still has a chest tube. He has been ambulating in the halls independently. Plan is for pt to return home with his wife at d/c.                   Discharge Codes    No documentation.                 Expected Discharge Date and Time       Expected Discharge Date Expected Discharge Time    May 23, 2023               JOHN Marcus     Working on behavioral therapies with psych

## 2023-11-15 DIAGNOSIS — I10 PRIMARY HYPERTENSION: ICD-10-CM

## 2023-11-15 RX ORDER — LISINOPRIL 20 MG/1
20 TABLET ORAL DAILY
Qty: 90 TABLET | Refills: 1 | OUTPATIENT
Start: 2023-11-15

## 2023-12-13 ENCOUNTER — NURSE TRIAGE (OUTPATIENT)
Dept: CALL CENTER | Facility: HOSPITAL | Age: 63
End: 2023-12-13
Payer: MEDICARE

## 2023-12-13 NOTE — TELEPHONE ENCOUNTER
"Caller unable to reach provider at this time to cancel 0800 appointment for this a.m.  Was to arrive at 0745. Will route this message to provider. Instructed caller to call back at 0800 when phones answered by office staff. Verbalized understanding.    Reason for Disposition   Requesting regular office appointment    Additional Information   Negative: [1] Caller is not with the adult (patient) AND [2] reporting urgent symptoms   Negative: Lab result questions   Negative: Medication questions   Negative: Caller can't be reached by phone   Negative: Caller has already spoken to PCP or another triager   Negative: RN needs further essential information from caller in order to complete triage   Negative: [1] Caller requesting NON-URGENT health information AND [2] PCP's office is the best resource   Negative: Health Information question, no triage required and triager able to answer question   Negative: General information question, no triage required and triager able to answer question   Negative: Question about upcoming scheduled test, no triage required and triager able to answer question   Negative: [1] Caller is not with the adult (patient) AND [2] probable NON-URGENT symptoms    Answer Assessment - Initial Assessment Questions  1. REASON FOR CALL or QUESTION: \"What is your reason for calling today?\" or \"How can I best help you?\" or \"What question do you have that I can help answer?\"      Need to cancel 8a.m. appointment with Dr. Vaughn. Was to be there at 0745.    Protocols used: Information Only Call - No Triage-ADULT-    "

## 2023-12-19 ENCOUNTER — OFFICE VISIT (OUTPATIENT)
Dept: INTERNAL MEDICINE | Facility: CLINIC | Age: 63
End: 2023-12-19
Payer: MEDICARE

## 2023-12-19 VITALS
HEIGHT: 74 IN | WEIGHT: 239 LBS | HEART RATE: 76 BPM | BODY MASS INDEX: 30.67 KG/M2 | OXYGEN SATURATION: 98 % | SYSTOLIC BLOOD PRESSURE: 168 MMHG | DIASTOLIC BLOOD PRESSURE: 93 MMHG

## 2023-12-19 DIAGNOSIS — E55.9 VITAMIN D DEFICIENCY: ICD-10-CM

## 2023-12-19 DIAGNOSIS — J45.20 MILD INTERMITTENT ASTHMA WITHOUT COMPLICATION: ICD-10-CM

## 2023-12-19 DIAGNOSIS — Z00.00 MEDICARE ANNUAL WELLNESS VISIT, SUBSEQUENT: Primary | ICD-10-CM

## 2023-12-19 DIAGNOSIS — I10 PRIMARY HYPERTENSION: ICD-10-CM

## 2023-12-19 DIAGNOSIS — D64.89 ANEMIA DUE TO OTHER CAUSE, NOT CLASSIFIED: ICD-10-CM

## 2023-12-19 DIAGNOSIS — R73.03 PREDIABETES: ICD-10-CM

## 2023-12-19 DIAGNOSIS — Z13.220 SCREENING, LIPID: ICD-10-CM

## 2023-12-19 PROBLEM — J44.9 STAGE 2 MODERATE COPD BY GOLD CLASSIFICATION: Status: RESOLVED | Noted: 2023-04-10 | Resolved: 2023-12-19

## 2023-12-19 RX ORDER — LISINOPRIL 20 MG/1
40 TABLET ORAL DAILY
Qty: 90 TABLET | Refills: 1 | Status: SHIPPED | OUTPATIENT
Start: 2023-12-19

## 2023-12-19 NOTE — PATIENT INSTRUCTIONS
Medicare Wellness  Personal Prevention Plan of Service     Date of Office Visit:    Encounter Provider:  TRESSA Schilling  Place of Service:  [facility]  Patient Name: Castillo Trujillo  :  1960    As part of the Medicare Wellness portion of your visit today, we are providing you with this personalized preventive plan of services (PPPS). This plan is based upon recommendations of the [place] Preventive Services Task Force (USPSTF) and the Advisory Committee on Immunization Practices (ACIP).    This lists the preventive care services that should be considered, and provides dates of when you are due. Items listed as completed are up-to-date and do not require any further intervention.    Health Maintenance   Topic Date Due    COLORECTAL CANCER SCREENING  Never done    ZOSTER VACCINE (1 of 2) Never done    ANNUAL WELLNESS VISIT  Never done    COVID-19 Vaccine (3 - 2023- season) 2023    LUNG CANCER SCREENING  2024    BMI FOLLOWUP  2024    TDAP/TD VACCINES (2 - Td or Tdap) 2027    HEPATITIS C SCREENING  Completed    INFLUENZA VACCINE  Completed    Pneumococcal Vaccine 65+  Completed    AAA SCREEN (ONE-TIME)  Completed       No orders of the defined types were placed in this encounter.      Return in about 3 months    Increase lisinopril from 20 mg to 40 mg daily   Blood pressure goal is <150/90; if you are not meeting this goal return in the next month.   Check daily at home

## 2023-12-19 NOTE — PROGRESS NOTES
The ABCs of the Annual Wellness Visit  Subsequent Medicare Wellness Visit    Subjective    {Wrapup  Review (Popup)  Advance Care Planning  Labs  CC  Problem List  Visit Diagnosis  Medications  Result Review  Imaging  [facility]  [facility]  SnapShot  Encounters  Notes  Media  Procedures :23}  Castillo Trujillo is a 63 y.o. male who presents for a Subsequent Medicare Wellness Visit.    The following portions of the patient's history were reviewed and   updated as appropriate: allergies, current medications, past family history, past medical history, past social history, past surgical history, and problem list.    Compared to one year ago, the patient feels his physical   health is better.    Compared to one year ago, the patient feels his mental   health is better.    Recent Hospitalizations:  This patient has had a [facility] admission record on file within the last 365 days.    Current Medical Providers:  Patient Care Team:  Juan Vaughn DO as PCP - General (Internal Medicine)  Benny Kim MD as Surgeon (Cardiothoracic Surgery)          No opioid medication identified on active medication list. I have reviewed chart for other potential  high risk medication/s and harmful drug interactions in the elderly.        Aspirin is not on active medication list.  Aspirin use is not indicated based on review of current medical condition/s. Risk of harm outweighs potential benefits.  .    Patient Active Problem List   Diagnosis    Primary hypertension    Acquired hypothyroidism    GERD (gastroesophageal reflux disease)    Vitamin D deficiency    Adrenal nodule    Right lung mass    Tobacco abuse, in remission    Moderate malnutrition    Stage 2 moderate COPD by GOLD classification    Empyema, right    Empyema lung     Advance Care Planning   Advance Care Planning     Advance Directive is not on file.  ACP discussion was held with the patient during this visit.  "Patient does not have an advance directive, information provided.     Objective    Vitals:    23 0835   BP: 159/99   BP Location: Right arm   Patient Position: Sitting   Cuff Size: Adult   Pulse: 76   SpO2: 98%   Weight: 108 kg (239 lb)   Height: 188 cm (74\")     Estimated body mass index is 30.69 kg/m² as calculated from the following:    Height as of this encounter: 188 cm (74\").    Weight as of this encounter: 108 kg (239 lb).           Does the patient have evidence of cognitive impairment?   No            HEALTH RISK ASSESSMENT    Smoking Status:  Social History     Tobacco Use   Smoking Status Former    Packs/day: 3.50    Years: 46.00    Additional pack years: 0.00    Total pack years: 161.00    Types: Cigarettes    Start date:     Quit date: 2022    Years since quittin.0    Passive exposure: Past   Smokeless Tobacco Never     Alcohol Consumption:  Social History     Substance and Sexual Activity   Alcohol Use Yes    Comment: I might drink a 12-pack a month     Fall Risk Screen:    HAMLET Fall Risk Assessment was completed, and patient is at LOW risk for falls.Assessment completed on:2023    Depression Screenin/19/2023     8:43 AM   PHQ-2/PHQ-9 Depression Screening   Little Interest or Pleasure in Doing Things 0-->not at all   Feeling Down, Depressed or Hopeless 1-->several days   PHQ-9: Brief Depression Severity Measure Score 1       Health Habits and Functional and Cognitive Screenin/19/2023     8:45 AM   Functional & Cognitive Status   Do you have difficulty preparing food and eating? No   Do you have difficulty bathing yourself, getting dressed or grooming yourself? No   Do you have difficulty using the toilet? No   Do you have difficulty moving around from place to place? No   Do you have trouble with steps or getting out of a bed or a chair? No   Current Diet Unhealthy Diet   Dental Exam Up to date   Eye Exam Up to date   Exercise (times per week) 1 times per " week   Current Exercises Include Yard Work   Do you need help using the phone?  No   Are you deaf or do you have serious difficulty hearing?  No   Do you need help to go to places out of walking distance? No   Do you need help shopping? No   Do you need help preparing meals?  No   Do you need help with housework?  No   Do you need help with laundry? No   Do you need help taking your medications? No   Do you need help managing money? Yes   Do you ever drive or ride in a car without wearing a seat belt? No   Have you felt unusual stress, anger or loneliness in the last month? Yes   Who do you live with? Spouse   If you need help, do you have trouble finding someone available to you? No   Do you have difficulty concentrating, remembering or making decisions? No       Age-appropriate Screening Schedule:  Refer to the list below for future screening recommendations based on patient's age, sex and/or medical conditions. Orders for these recommended tests are listed in the plan section. The patient has been provided with a written plan.    Health Maintenance   Topic Date Due    COLORECTAL CANCER SCREENING  Never done    ZOSTER VACCINE (1 of 2) Never done    ANNUAL WELLNESS VISIT  Never done    COVID-19 Vaccine (3 - 2023-24 season) 09/01/2023    LUNG CANCER SCREENING  06/05/2024    BMI FOLLOWUP  06/05/2024    TDAP/TD VACCINES (2 - Td or Tdap) 07/20/2027    HEPATITIS C SCREENING  Completed    INFLUENZA VACCINE  Completed    Pneumococcal Vaccine 65+  Completed    AAA SCREEN (ONE-TIME)  Completed                  CMS Preventative Services Quick Reference  Risk Factors Identified During Encounter:    Vision Screening Recommended    The above risks/problems have been discussed with the patient.  Pertinent information has been shared with the patient in the After Visit Summary.    Diagnoses and all orders for this visit:    1. Medicare annual wellness visit, subsequent (Primary)    2. Stage 2 moderate COPD by GOLD  classification    3. Vitamin D deficiency    4. Gastroesophageal reflux disease without esophagitis    5. Primary hypertension        Follow Up:   Next Medicare Wellness visit to be scheduled in 1 year.      An After Visit Summary and PPPS were made available to the patient.

## 2023-12-19 NOTE — PROGRESS NOTES
The ABCs of the Annual Wellness Visit  Subsequent Medicare Wellness Visit    Subjective    Castillo Trujillo is a 63 y.o. male who presents for a Subsequent Medicare Wellness Visit.    The following portions of the patient's history were reviewed and   updated as appropriate: allergies, current medications, past family history, past medical history, past social history, past surgical history, and problem list.    Compared to one year ago, the patient feels his physical   health is better.    Compared to one year ago, the patient feels his mental   health is better.    Recent Hospitalizations:  This patient has had a [facility] admission record on file within the last 365 days.    Current Medical Providers:  Patient Care Team:  Juan Vaughn DO as PCP - General (Internal Medicine)  Benny Kim MD as Surgeon (Cardiothoracic Surgery)    Outpatient Medications Prior to Visit   Medication Sig Dispense Refill    albuterol sulfate HFA (Ventolin HFA) 108 (90 Base) MCG/ACT inhaler Inhale 2 puffs Every 4 (Four) Hours As Needed for Wheezing. 8 g 3    albuterol sulfate  (90 Base) MCG/ACT inhaler Inhale 2 puffs Every 4 (Four) Hours As Needed for Wheezing. 8 g 3    Cholecalciferol (Vitamin D) 50 MCG (2000 UT) capsule Take 1 capsule by mouth Daily. 90 capsule 3    ibuprofen (ADVIL,MOTRIN) 800 MG tablet Take 1 tablet by mouth 2 (Two) Times a Day As Needed for Mild Pain. 60 tablet 3    levothyroxine (SYNTHROID, LEVOTHROID) 200 MCG tablet Take 1 tablet by mouth Daily. 90 tablet 1    pantoprazole (PROTONIX) 40 MG EC tablet Take 1 tablet by mouth Daily. 90 tablet 1    vitamin D (ERGOCALCIFEROL) 1.25 MG (98835 UT) capsule capsule Take 1 capsule by mouth 1 (One) Time Per Week. MONDAY      lisinopril (PRINIVIL,ZESTRIL) 20 MG tablet Take 1 tablet by mouth Daily. 90 tablet 1    Sodium Chloride Flush (sodium chloride 0.9 % flush) 0.9 % solution Infuse 10 mL into a venous catheter Every 8 (Eight) Hours for 15 days.  "Flush chest tube with 10 mL normal saline flush three times per day to maintain chest tube patency. This is not IV solution. 450 mL 0    Umeclidinium-Vilanterol (ANORO ELLIPTA) 62.5-25 MCG/ACT aerosol powder  inhaler Inhale 1 puff Daily. 180 each 1     No facility-administered medications prior to visit.       No opioid medication identified on active medication list. I have reviewed chart for other potential  high risk medication/s and harmful drug interactions in the elderly.        Aspirin is not on active medication list.  Aspirin use is not indicated based on review of current medical condition/s. Risk of harm outweighs potential benefits.  .    Patient Active Problem List   Diagnosis    Primary hypertension    Acquired hypothyroidism    GERD (gastroesophageal reflux disease)    Vitamin D deficiency    Adrenal nodule    Right lung mass    Tobacco abuse, in remission    Moderate malnutrition    Empyema, right    Empyema lung    Mild intermittent asthma without complication    Other specified anemias     Advance Care Planning   Advance Care Planning     Advance Directive is not on file.  ACP discussion was held with the patient during this visit. Patient does not have an advance directive, information provided.     Objective    Vitals:    12/19/23 0835 12/19/23 0909   BP: 159/99 168/93   BP Location: Right arm Left arm   Patient Position: Sitting Sitting   Cuff Size: Adult Adult   Pulse: 76    SpO2: 98%    Weight: 108 kg (239 lb)    Height: 188 cm (74\")      Estimated body mass index is 30.69 kg/m² as calculated from the following:    Height as of this encounter: 188 cm (74\").    Weight as of this encounter: 108 kg (239 lb).           Does the patient have evidence of cognitive impairment? No          HEALTH RISK ASSESSMENT    Smoking Status:  Social History     Tobacco Use   Smoking Status Former    Packs/day: 3.50    Years: 46.00    Additional pack years: 0.00    Total pack years: 161.00    Types: Cigarettes    " Start date:     Quit date: 2022    Years since quittin.0    Passive exposure: Past   Smokeless Tobacco Never     Alcohol Consumption:  Social History     Substance and Sexual Activity   Alcohol Use Yes    Comment: I might drink a 12-pack a month     Fall Risk Screen:    HAMLET Fall Risk Assessment was completed, and patient is at LOW risk for falls.Assessment completed on:2023    Depression Screenin/19/2023     8:43 AM   PHQ-2/PHQ-9 Depression Screening   Little Interest or Pleasure in Doing Things 0-->not at all   Feeling Down, Depressed or Hopeless 1-->several days   PHQ-9: Brief Depression Severity Measure Score 1       Health Habits and Functional and Cognitive Screenin/19/2023     8:45 AM   Functional & Cognitive Status   Do you have difficulty preparing food and eating? No   Do you have difficulty bathing yourself, getting dressed or grooming yourself? No   Do you have difficulty using the toilet? No   Do you have difficulty moving around from place to place? No   Do you have trouble with steps or getting out of a bed or a chair? No   Current Diet Unhealthy Diet   Dental Exam Up to date   Eye Exam Up to date   Exercise (times per week) 1 times per week   Current Exercises Include Yard Work   Do you need help using the phone?  No   Are you deaf or do you have serious difficulty hearing?  No   Do you need help to go to places out of walking distance? No   Do you need help shopping? No   Do you need help preparing meals?  No   Do you need help with housework?  No   Do you need help with laundry? No   Do you need help taking your medications? No   Do you need help managing money? Yes   Do you ever drive or ride in a car without wearing a seat belt? No   Have you felt unusual stress, anger or loneliness in the last month? Yes   Who do you live with? Spouse   If you need help, do you have trouble finding someone available to you? No   Do you have difficulty concentrating,  remembering or making decisions? No       Age-appropriate Screening Schedule:  Refer to the list below for future screening recommendations based on patient's age, sex and/or medical conditions. Orders for these recommended tests are listed in the plan section. The patient has been provided with a written plan.    Health Maintenance   Topic Date Due    COLORECTAL CANCER SCREENING  Never done    ZOSTER VACCINE (1 of 2) Never done    ANNUAL WELLNESS VISIT  Never done    COVID-19 Vaccine (3 - 2023-24 season) 09/01/2023    LUNG CANCER SCREENING  06/05/2024    BMI FOLLOWUP  06/05/2024    TDAP/TD VACCINES (2 - Td or Tdap) 07/20/2027    HEPATITIS C SCREENING  Completed    INFLUENZA VACCINE  Completed    Pneumococcal Vaccine 65+  Completed    AAA SCREEN (ONE-TIME)  Completed                  CMS Preventative Services Quick Reference  Risk Factors Identified During Encounter  Immunizations Discussed/Encouraged: RSV (Respiratory Syncytial Virus) Declined COVID and Shingles vaccine  Dental Screening Recommended  Vision Screening Recommended  The above risks/problems have been discussed with the patient.  Pertinent information has been shared with the patient in the After Visit Summary.  An After Visit Summary and PPPS were made available to the patient.    Follow Up:   Next Medicare Wellness visit to be scheduled in 1 year.       Additional E&M Note during same encounter follows:  Patient has multiple medical problems which are significant and separately identifiable that require additional work above and beyond the Medicare Wellness Visit.      Chief Complaint  Medicare Wellness-Subsequent visit    Subjective        HPI  Castillo Trujillo is also being seen today for hypertension    Blood pressure elevated today. Denies chest pain or any other cardiac symptoms. Voices compliance with lisinopril.   Otherwise says he feels well. Had retired but returned back to work and is doing well.   Was referred to pulmonology for COPD. Was  "diagnosed instead with asthma and was negative for lung cancer. Anoro inhaler dc'd by pulmonology and is taking only albuterol inhaler prn. Has only had to use once in the last month.     Had colonoscopy scheduled but had to cancel due to family emergency. Is going to reschedule at [facility].     Is due for 6 month labs.     ROS- As noted per HPI         Objective   Vital Signs:  /93 (BP Location: Left arm, Patient Position: Sitting, Cuff Size: Adult)   Pulse 76   Ht 188 cm (74\")   Wt 108 kg (239 lb)   SpO2 98%   BMI 30.69 kg/m²     Physical Exam  Vitals and nursing note reviewed.   Constitutional:       Appearance: Normal appearance. He is normal weight.   HENT:      Mouth/Throat:      Mouth: Mucous membranes are moist.   Cardiovascular:      Rate and Rhythm: Normal rate and regular rhythm.      Pulses: Normal pulses.      Heart sounds: Normal heart sounds. No murmur heard.     No friction rub. No gallop.   Pulmonary:      Effort: Pulmonary effort is normal. No respiratory distress.      Breath sounds: Normal breath sounds. No wheezing.   Musculoskeletal:         General: Normal range of motion.      Cervical back: Normal range of motion and neck supple.   Skin:     General: Skin is warm and dry.      Capillary Refill: Capillary refill takes less than 2 seconds.   Neurological:      General: No focal deficit present.      Mental Status: He is alert and oriented to person, place, and time.   Psychiatric:         Mood and Affect: Mood normal.         Behavior: Behavior normal.         Thought Content: Thought content normal.         Judgment: Judgment normal.          The following data was reviewed by: TRESSA Schilling on 12/19/2023:  CMP          5/26/2023    00:04 5/27/2023    23:26 5/29/2023    23:40   CMP   Glucose 103  112  136    BUN 11  9  12    Creatinine 0.75  0.81  0.76    EGFR 100.1  97.8  99.7    Sodium 142  140  143    Potassium 4.0  3.3  3.7    Chloride 104  102  104    Calcium 9.9  9.4  " 9.8    BUN/Creatinine Ratio 14.7  11.1  15.8    Anion Gap 11.0  11.0  12.0      CBC          5/26/2023    00:04 5/27/2023    23:26 5/29/2023    23:40   CBC   WBC 6.27  7.19  6.10    RBC 4.24  4.15  4.26    Hemoglobin 10.1  10.0  10.2    Hematocrit 35.3  34.3  35.8    MCV 83.3  82.7  84.0    MCH 23.8  24.1  23.9    MCHC 28.6  29.2  28.5    RDW 17.6  17.9  18.5    Platelets 517  423  387      Most Recent A1C          5/2/2023    10:02   HGBA1C Most Recent   Hemoglobin A1C 5.50                 Assessment and Plan   Diagnoses and all orders for this visit:    1. Medicare annual wellness visit, subsequent (Primary)    2. Primary hypertension  Comments:  Increase lisinopril to 40 mg daily   Goal BP <150/90 per JNC 8 Guidelines   Check at home. Return sooner if goal not met  Orders:  -     lisinopril (PRINIVIL,ZESTRIL) 20 MG tablet; Take 2 tablets by mouth Daily.  Dispense: 90 tablet; Refill: 1    3. Mild intermittent asthma without complication  Comments:  Stable. Continue albuterol inhaler as directed.    4. Vitamin D deficiency  Comments:  Checking vitamin D level.  Orders:  -     Vitamin D 25 hydroxy; Future    5. Anemia due to other cause, not classified  Comments:  Checking anemia labs.  Orders:  -     CBC (No Diff); Future    6. Prediabetes  -     Comprehensive metabolic panel; Future  -     Cancel: Hemoglobin A1c; Future  Last A1C out of prediabetic range at 5.5, previous was 5.7    7. Screening, lipid  Comments:  Lipid panel ordered   Goals are: LDL <100, HDL >40, Trig <150  Orders:  -     Lipid Panel             Follow Up   Return in about 3 months (around 3/19/2024) for Recheck.  Patient was given instructions and counseling regarding his condition or for health maintenance advice. Please see specific information pulled into the AVS if appropriate.    Castillo verbalizes understanding and agreement with the plan of care

## 2023-12-21 RX ORDER — ALBUTEROL SULFATE 90 UG/1
2 AEROSOL, METERED RESPIRATORY (INHALATION)
Qty: 18 G | Refills: 3 | Status: SHIPPED | OUTPATIENT
Start: 2023-12-21

## 2024-01-18 DIAGNOSIS — I10 PRIMARY HYPERTENSION: ICD-10-CM

## 2024-01-18 RX ORDER — LISINOPRIL 20 MG/1
20 TABLET ORAL DAILY
Qty: 90 TABLET | Refills: 1 | OUTPATIENT
Start: 2024-01-18

## 2024-02-12 NOTE — PROGRESS NOTES
"Patient name: Castillo Trujillo  Patient : 1960  VISIT # [id number]  MR #[id number]    Procedure: 12 Kazakh right pigtail chest tube placement by interventional radiology  Procedure Date: 2023    Subjective   CC: shortness of breath   Sitting on the side of the bed, wife at bedside today.  No overnight events.  Afebrile.  Labs pending this morning.  He reports minimal pain associated to the right chest tube in place.  This is relieved with Norco.  He is ambulating throughout the day.  Nutrition notes reviewed: He refused oral supplementation.       Objective     Visit Vitals  /73 (BP Location: Right arm, Patient Position: Lying)   Pulse 74   Temp 99.8 °F (37.7 °C) (Oral)   Resp 18   Ht 188 cm (74\")   Wt 90.7 kg (200 lb)   SpO2 95%   BMI 25.68 kg/m²       Intake/Output Summary (Last 24 hours) at 2023 0854  Last data filed at 2023 0716  Gross per 24 hour   Intake 240 ml   Output 3603 ml   Net -3363 ml     Right chest tube output: 178 mL / 24 hours, no airleak    Lab:     CBC:  Results from last 7 days   Lab Units 23  1613 23  0743   WBC 10*3/mm3 13.16*  --    HEMATOCRIT % 32.3*  --    PLATELETS 10*3/mm3 654* 607*          BMP:  Results from last 7 days   Lab Units 05/10/23  1153 23  1613   SODIUM mmol/L 133* 137   POTASSIUM mmol/L 3.2* 3.1*   CHLORIDE mmol/L 99 98   CO2 mmol/L 21.0* 25.0   GLUCOSE mg/dL 93 138*   BUN mg/dL 9 12   CREATININE mg/dL 0.63* 0.68*          COAG:  Results from last 7 days   Lab Units 23  0743   INR  1.12*   APTT seconds 38.8*       Blood cultures show no growth at 24 hours  Pleural fluid culture shows no growth at 2 days, Gram stain shows gram-positive cocci and gram-negative bacilli  Right lung mass needle biopsy in process, pleural fluid cytology in process    IMAGES:       Imaging Results (Last 24 Hours)     Procedure Component Value Units Date/Time    XR Chest 1 View [[id number]] Collected: 23     Updated: 23 0724    " 2024       Danae Ayers MD  [address] [place] [address]  Via In Basket      Patient: John Sellers   YOB: 1983   Date of Visit: 2024       Dear Dr. Milton Taylor:    I saw your patient, Marianna Leslie, for an evaluation. Below are my notes for this visit with her. If you have questions, please do not hesitate to call me. Sincerely,        Clarence Hoskins MD        CC: No Recipients  Clarence Hoskins MD  2024 11:16 AM  Sign when Signing Visit  NEUROSURGERY    :  1983,  MRN:  [id number],  CSN:  [id number]  John Sellers is seen on 2024 in Neurosurgical follow-up at [facility]. SUBJECTIVE:    Ms. 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 She has a neurosurgical history significant for a left L4-5 paul laminectomy on 2021 and a C5-6, C6-7 anterior cervical decompression and fusion on 10/28/2020. She was noncompliant with postoperative follow-up and didn't return after her lumbar surgery until 3/4/2021 at which time she presented with increased back and left leg pain. She felt that her lumbar surgery may have improved her lumbar pain but not her leg symptoms. She reported falling down seven steps in 2021, which exacerbated her low back and left lateral leg and dorsal foot pain. She tried physical therapy, chiropractic care, taking ibuprofen, applying ice and heat, and doing leg exercises without improvement. MRI imaging of the lumbar spine revealed subtle changes that may have been consistent with a left L5 neural impingement and a redo left L4-5 microdiskectomy was discussed and considered during a visit on 2021. The patient preferred to avoid a surgery at the time and received a left L4-5 transforaminal epidural steroid injection with Dr. Dagmar Rayo on 2022, which provided her "with no relief. The patient was last seen in the clinic on 1/4/2024 and complained of low back pain radiating into her groin, left lateral leg, and anterior shin. It was recommended that she undergo an MRI of the lumbar spine with and without contrast to evaluate for neural impingement and return to the clinic to review the results. Today, she reports persistent daily low back pain and burning that radiates into her bilateral legs and is associated with subjective left greater than right leg weakness after a period of lifting, bending, or activity. She continues with a daily back home exercise program without significant improvement. .     Neurosurgical History:  -1/5/2021: Left L4-5 paul laminectomy by Dr. Dario Corea  -10/28/2020: C5-6, C6-7 anterior cervical decompression and fusion with medtronic instrumentation by Dr. Denise Garcias:    Visit Vitals  /74 (BP Location: LUE - Left upper extremity, Patient Position: Sitting, Cuff Size: Regular)   Pulse 97   Ht 5' 6"" (1.676 m)   Wt 63.4 kg (139 lb 11.2 oz)   LMP 02/12/2024 (Exact Date) Comment: denies pregnancy   SpO2 98%   BMI 22.55 kg/mÂ²     Ms. Srini Paige is a 41year old female in no acute distress. Awake, alert, and conversant. Cranial nerves are grossly intact. Healed anterior cervical and lumbar scars. Lumbar tattoo  Full lumbar flexion. Motor Exam demonstrates no fasciculations, wasting, or changes in tone in all extremities. Individual motor testing of the lower extremities bilaterally reveals psoas 5/5, quadriceps 5/5, gastrocnemius 5/5, EHL 5/5, and anterior tibialis 5/5. Sensation is reduced to pinprick in the distal left L5 dermatome and in the left anterior thigh. Deep Tendon Reflexes are 1/4 patellar and trace achilles bilaterally. Station is steady. Gait is tandem. Pulses are intact in all extremities.   Left facet tenderness to palpation, no SI tenderness    IMAGING:  MRI Lumbar Spine images reviewed:  DATE: 2/5/2024  FINDINGS: " Narrative:      EXAM/TECHNIQUE: XR CHEST 1 VW-     INDICATION: chest tube     COMPARISON: 05/10/2023     FINDINGS:     Right-sided pleural drain is stable in position. No change in small  residual right-sided pleural effusion. No significant change in RIGHT  mid to lower lung zone consolidation. LEFT lung and pleural space appear  clear. No pneumothorax. Stable cardiac silhouette.       Impression:         No change in appearance of the chest.  This report was finalized on 05/11/2023 07:20 by Dr. Yogi Lopez MD.        Independent review of imaging today shows right-sided pigtail chest tube in place, no pneumothorax, right basilar opacity    Physical Exam:  General: Alert, oriented. No apparent distress.   Cardiovascular: Regular rate and rhythm without murmur, rubs, or gallops.    Pulmonary: Decreased breath sounds to right lung and bilateral bases.  No wheezing or rhonchi.    Chest: Right side chest tube to-20 cm H2O suction. No air leak. Fluid is yellow/tan, cloudy.     Abdomen: Soft, non distended, and non tender.  Extremities: Warm, moves all extremities. .   Neurologic:  Grossly intact with no focal deficits.            Impression:  Empyema, right  Right lung mass  Recurrent right pleural effusion  Tobacco abuse, in remission  Stage 2 moderate COPD by GOLD classification  Hypokalemia  Unintentional weight loss    Plan:  Continue chest tube suction at -20 cm H2O  Flush chest tube with 10 mL normal saline every 8 hours to maintain patency  Chest x-ray daily  Will start 5-day course of intrapleural tPA/DNase therapy today.  Discussed with patient and wife and they are agreeable to proceed.  Plan to reimage chest after 5-day course completed.  Follow pleural cultures and await tissue biopsy  Potassium supplementation ordered by hospitalist service, labs pending this morning.  Continue IV antibiotics.  Encourage pulmonary toilet and ambulation  Discussed with nursing        Barbara May,  5 nonrib-bearing lumbar type vertebrae are identified and designated L1-L5. There is normal lumbar spine alignment. There is minimal disc height loss at L4-L5. There are mild to moderate Modic type I endplate degenerative changes at  the level (slightly increased compared to prior MRI). There is expected  associated mild endplate enhancement. No other abnormal enhancement is  appreciated in the lumbar spine. No pathologic marrow lesions are identified. The conus medullaris terminates at L1 and is unremarkable. L1-L2, L2-L3 and L3-L4: No significant degenerative changes. L4-L5: Mild disc bulge with superimposed small central protrusion (and  questionable couple millimeter superior extrusion) stable. There is stable  mild right lateral recess narrowing. The overall spinal canal is not  significantly narrowed. There is stable mild to moderate bilateral neural  from narrowing. L5-S1: Minimal disc bulge. The spinal canal and neural foramina are patent     IMPRESSION:      Stable mild narrowing of the right lateral recess and mild to moderate  narrowing of the neural foramina at L4-L5 due to stable disc  bulge/protrusion at the level. X-ray Lumbar Spine with Flexion and Extension images reviewed:  DATE: 12/16/2021  FINDINGS: Normal alignment. No fracture. No abnormal motion on flexion and  extension. No significant degenerative change. IMPRESSION:      No findings of instability. MRI Lumbar Spine images reviewed:  DATE: 11/26/2021  FINDINGS:  Numbering: For spinal numbering purposes there are 5 lumbar-type vertebral bodies with  the caudal most fully formed disc space designated as L5-S1. Vertebrae:  Vertebral body heights are preserved. Trace grade 1 anterolisthesis of L4 on L5. Normal bone marrow signal for age. Subcentimeter T1 hyperintense probable  hemangioma at T12. No osseous destructive lesion. Soft tissues:    No signal abnormalities.      Spinal Cord: TRESSA  05/11/23  08:54 CDT       Conus medullaris terminates at L1-L2. Unremarkable cauda equina nerve roots. Degenerative changes:     T12-L1:   No significant abnormality. L1-L2:   No significant abnormality. L2-L3:   No significant abnormality. L3-L4:  No significant abnormality. L4-L5:   Operative level. Subtle underlying changes of prior left L5 hemilaminotomy  suggested. Mild intervertebral disc height and T2 signal loss and small posterior  fissure. Minimal inflammatory endplate degeneration asymmetrically on the left. Mild symmetric disc bulge with superimposed shallow central disc  protrusion. Additionally there is minimal new indeterminate T1 and T2  hypointense signal abnormality extending superiorly from the disc along the  right central zone dorsal to the L4 vertebral body inferiorly (series 5,  image 9, series 7, image 8 and series 6, image 28). No significant spinal  canal stenosis. Mild bilateral foraminal narrowing due to disc bulge and facet arthropathy. L5-S1:   Minimal symmetric disc bulge without significant spinal canal stenosis. Minimal bilateral foraminal narrowing due to disc bulge and facet  arthropathy. IMPRESSION:      1. Interval left L4-L5 microdiscectomy changes with minimal new  indeterminate signal abnormality extending superiorly along right central  zone dorsal to the L4 vertebral body inferiorly, possibly epidural scarring  or superimposed superiorly migrated disc extrusion, not differentiated  without contrast.  Persistent mild underlying disc bulge and shallow  central disc protrusion without significant spinal canal stenosis. 2.  Minimal spondylosis at L5-S1 without significant spinal canal stenosis.      3.  Foraminal narrowing is mild at bilateral L4-L5 and minimal at bilateral  L5-S1.  _______________________________________________________________    IMPRESSIONs:    L4-5 recurrent disc herniation and facet arthropathy contributing to low back pain and radicular leg pain most prominent on the left. She has failed several years of activity modification, PT and a home exercise program.  The patient has a history for left L4-5 laminectomy and microdiskectomy in 2021 with initial excellent relief of symptoms. Further nonsurgical treatment such as PT, MARKIE, MBB and medication discussed. The surgical option of an L4-5 transforaminal lumbar interbody fusion with bone graft, bone marrow aspirate concentrate and instrumentation was recommended. Alternatively, the option of a redo microdiskectomy was discussed and considered but with anticipated persistent low back pain and a higher risk of did recurrent disc herniation. Current literature suggests that an interbody fusion would have better postoperative outcome than redo microdiskectomy. That surgery including its risks, benefits, alternatives, postoperative expectations and recovery time was reviewed. The surgery was reviewed on anatomical spine model, on the patient's MRI and on an educational pamphlet. Risks including infection, bleeding, paralysis, weakness, numbness, spinal fluid leak, reoperation, postoperative instability and even death discussed. The patient's questions have been answered to her satisfaction. LSO is ordered to be worn during ambulation and ADL's to restrict mobility of the trunk and facilitate healing following the surgical procedure. RECOMMENDATIONs:    Discussed with patient management options. L4-5 Lumbar Laminectomy, Redo L4-5 Microdiskectomy, L4-5 transforaminal lumbar interbody fusion with bone graft, bone marrow aspirate concentrate and instrumentation  Preoperative medical clearance     Greater than 40 minutes were spent in taking history, updating history, review of records, personal review and interpretation of imaging, physical exam, counseling, managing orders, documentation, and coordination of care.       Mayra Crowley MD   2/12/2024     cc: Dianna Beltran MD      On 2/12/2024, Alcira CARCAMO scribed the services personally performed by Kiran Alexander MD.   The documentation recorded by the scribe accurately and completely reflects the service(s) I personally performed and the decisions made by me. Kiran Alexander MD    Current Outpatient Medications   Medication Sig Dispense Refill   â¢ amphetamine-dextroamphetamine (Adderall XR) 20 MG 24 hr capsule Take 1 capsule by mouth every morning. Do not start before February 9, 2024. 30 capsule 0   â¢ amphetamine-dextroamphetamine (Adderall) 10 MG tablet Take 1 tablet by mouth 2 times daily. Do not start before February 9, 2024. 60 tablet 0   â¢ benzonatate (TESSALON PERLES) 100 MG capsule Take 1 capsule by mouth 3 times daily as needed for Cough. May increase to 2cap TID if symptoms severe. Do not chew/pop/suck on capsule. (Patient not taking: Reported on 2/12/2024) 60 capsule 0   â¢ valACYclovir (Valtrex) 500 MG tablet 1 tablet every 12 hours for 3 days as needed for intermittent treatment of recurrent genital herpes (Patient not taking: Reported on 2/12/2024) 30 tablet 0   â¢ fluticasone (FLONASE) 50 MCG/ACT nasal spray SHAKE LIQUID AND USE 1 SPRAY IN EACH NOSTRIL EVERY MORNING AND EVERY EVENING (Patient not taking: Reported on 2/12/2024) 48 g 0   â¢ Calcium Carb-Cholecalciferol (CALCIUM 600 + D PO) Take 1 tablet by mouth daily. No current facility-administered medications for this visit.

## 2024-02-15 DIAGNOSIS — I10 PRIMARY HYPERTENSION: ICD-10-CM

## 2024-02-15 RX ORDER — LISINOPRIL 20 MG/1
20 TABLET ORAL DAILY
Qty: 180 TABLET | Refills: 3 | Status: SHIPPED | OUTPATIENT
Start: 2024-02-15

## 2024-02-15 RX ORDER — IBUPROFEN 800 MG/1
800 TABLET ORAL EVERY 8 HOURS PRN
Qty: 90 TABLET | Refills: 0 | Status: SHIPPED | OUTPATIENT
Start: 2024-02-15

## 2024-03-14 RX ORDER — IBUPROFEN 800 MG/1
800 TABLET ORAL EVERY 8 HOURS PRN
Qty: 90 TABLET | Refills: 1 | Status: SHIPPED | OUTPATIENT
Start: 2024-03-14

## 2024-03-21 ENCOUNTER — OFFICE VISIT (OUTPATIENT)
Dept: INTERNAL MEDICINE | Facility: CLINIC | Age: 64
End: 2024-03-21
Payer: MEDICARE

## 2024-03-21 VITALS
BODY MASS INDEX: 29.2 KG/M2 | OXYGEN SATURATION: 98 % | DIASTOLIC BLOOD PRESSURE: 96 MMHG | SYSTOLIC BLOOD PRESSURE: 168 MMHG | HEART RATE: 65 BPM | RESPIRATION RATE: 16 BRPM | WEIGHT: 227.5 LBS | HEIGHT: 74 IN

## 2024-03-21 DIAGNOSIS — Z87.891 PERSONAL HISTORY OF NICOTINE DEPENDENCE: ICD-10-CM

## 2024-03-21 DIAGNOSIS — E03.9 ACQUIRED HYPOTHYROIDISM: ICD-10-CM

## 2024-03-21 DIAGNOSIS — E27.8 ADRENAL NODULE: ICD-10-CM

## 2024-03-21 DIAGNOSIS — Z12.11 SCREENING FOR COLORECTAL CANCER: ICD-10-CM

## 2024-03-21 DIAGNOSIS — E55.9 VITAMIN D DEFICIENCY: ICD-10-CM

## 2024-03-21 DIAGNOSIS — I10 PRIMARY HYPERTENSION: Primary | ICD-10-CM

## 2024-03-21 DIAGNOSIS — Z12.12 SCREENING FOR COLORECTAL CANCER: ICD-10-CM

## 2024-03-21 PROBLEM — J86.9 EMPYEMA, RIGHT: Status: RESOLVED | Noted: 2023-05-09 | Resolved: 2024-03-21

## 2024-03-21 PROBLEM — J86.9 EMPYEMA LUNG: Status: RESOLVED | Noted: 2023-05-10 | Resolved: 2024-03-21

## 2024-03-21 PROBLEM — J43.2 CENTRILOBULAR EMPHYSEMA: Status: RESOLVED | Noted: 2023-10-19 | Resolved: 2024-03-21

## 2024-03-21 RX ORDER — LISINOPRIL 40 MG/1
40 TABLET ORAL DAILY
Qty: 90 TABLET | Refills: 1 | Status: SHIPPED | OUTPATIENT
Start: 2024-03-21

## 2024-03-21 RX ORDER — DEXAMETHASONE 1 MG
1 TABLET ORAL ONCE
Qty: 1 TABLET | Refills: 0 | Status: SHIPPED | OUTPATIENT
Start: 2024-03-21 | End: 2024-03-21

## 2024-03-21 RX ORDER — AMLODIPINE BESYLATE 10 MG/1
10 TABLET ORAL DAILY
Qty: 90 TABLET | Refills: 1 | Status: SHIPPED | OUTPATIENT
Start: 2024-03-21

## 2024-03-21 NOTE — PROGRESS NOTES
CC: f/u HTN    History:  Castillo Trujillo is a 64 y.o. male here for routine follow up for above problems. Patient says he is doing well overall. Blood pressure has been running high still, he is taking 40mg of Lisinopril. He is back at work and doing well. 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 Has not had any problems since then. Has not had any chest pain, palpitations, or shortness of breath. No recent illnesses. Pt is followed by pulmonology at [facility] for resolving lung mass and emphysema. He has quit smoking for 1.5 years now.         ROS:  Review of Systems   Constitutional:  Negative for fatigue and fever.   Respiratory:  Negative for cough and wheezing.    Cardiovascular:  Negative for chest pain and palpitations.   Endocrine: Negative for cold intolerance and heat intolerance.   Neurological:  Negative for headaches.   Psychiatric/Behavioral:  Negative for dysphoric mood.         reports that he quit smoking about 15 months ago. His smoking use included cigarettes. He started smoking about 48 years ago. He has a 164.1 pack-year smoking history. He has been exposed to tobacco smoke. He has never used smokeless tobacco. He reports current alcohol use. He reports that he does not currently use drugs.      Current Outpatient Medications:     albuterol sulfate HFA (Ventolin HFA) 108 (90 Base) MCG/ACT inhaler, Inhale 2 puffs 4 (Four) Times a Day., Disp: 18 g, Rfl: 5    Cholecalciferol (Vitamin D) 50 MCG (2000 UT) capsule, Take 1 capsule by mouth Daily., Disp: 90 capsule, Rfl: 3    ibuprofen (ADVIL,MOTRIN) 800 MG tablet, Take 1 tablet by mouth Every 8 (Eight) Hours As Needed for Mild Pain., Disp: 90 tablet, Rfl: 1    levothyroxine (SYNTHROID, LEVOTHROID) 200 MCG tablet, Take 1 tablet by mouth Daily., Disp: 90 tablet, Rfl: 1    lisinopril (PRINIVIL,ZESTRIL) 20 MG tablet, Take 1 tablet by mouth Daily., Disp: 180 tablet, Rfl: 3    pantoprazole (PROTONIX)  "40 MG EC tablet, Take 1 tablet by mouth Daily., Disp: 90 tablet, Rfl: 1    OBJECTIVE:  /96 (BP Location: Left arm, Patient Position: Sitting, Cuff Size: Adult)   Pulse 65   Resp 16   Ht 188 cm (74\")   Wt 103 kg (227 lb 8 oz)   SpO2 98%   BMI 29.21 kg/m²    Physical Exam  Vitals and nursing note reviewed.   Constitutional:       Appearance: Normal appearance.   HENT:      Head: Normocephalic.      Mouth/Throat:      Mouth: Mucous membranes are moist.   Eyes:      Extraocular Movements: Extraocular movements intact.      Conjunctiva/sclera: Conjunctivae normal.   Cardiovascular:      Rate and Rhythm: Normal rate and regular rhythm.      Pulses: Normal pulses.      Heart sounds: Normal heart sounds.   Pulmonary:      Effort: Pulmonary effort is normal. No respiratory distress.      Breath sounds: Normal breath sounds. No wheezing.   Musculoskeletal:         General: Normal range of motion.      Cervical back: Normal range of motion.   Neurological:      General: No focal deficit present.      Mental Status: He is alert and oriented to person, place, and time. Mental status is at baseline.   Psychiatric:         Mood and Affect: Mood normal.         Behavior: Behavior normal.         Assessment/Plan     Diagnoses and all orders for this visit:    1. Primary hypertension (Primary)  Comments:  Increase lisinopril to 40 mg daily   Goal BP <150/90 per JNC 8 Guidelines   Check at home. Return sooner if goal not met  Orders:  -     amLODIPine (NORVASC) 10 MG tablet; Take 1 tablet by mouth Daily.  Dispense: 90 tablet; Refill: 1  -     lisinopril (PRINIVIL,ZESTRIL) 40 MG tablet; Take 1 tablet by mouth Daily.  Dispense: 90 tablet; Refill: 1  -     CBC & Differential; Future  -     Comprehensive Metabolic Panel; Future  -     Lipid Panel; Future  Poorly controlled, BP goal for age is <140/90 per JNC 8 guidelines, and add amlodipine.    2. Adrenal nodule  -     Dexamethasone Level, Serum; Future  -     Cortisol - AM; " Future  -     DHEA-Sulfate; Future  -     dexAMETHasone (DECADRON) 1 MG tablet; Take 1 tablet by mouth 1 (One) Time for 1 dose. Take at 10pm the night before labs to be drawn before 9am.  Dispense: 1 tablet; Refill: 0  Functional workup for adrenal nodule. Consider further imaging for surveillance given size.     3. Vitamin D deficiency  -     Vitamin D,25-Hydroxy; Future    4. Acquired hypothyroidism  -     TSH; Future    5. Screening for colorectal cancer  -     Ambulatory Referral For Screening Colonoscopy    6. Personal history of nicotine dependence  -     CT Chest Low Dose Wo; Future    Some portions of this note including history and physical were obtained by a medical student. However, the full history, ROS, physical exam and plan were discussed and reviewed with the student and patient. Physical exam is my own. All elements of this note are reviewed and represent solely my assessment and plan.    An After Visit Summary was printed and given to the patient at discharge.  No follow-ups on file.      Juan Vaughn D.O. 3/21/2024   Electronically signed.

## 2024-04-02 ENCOUNTER — TELEPHONE (OUTPATIENT)
Dept: INTERNAL MEDICINE | Facility: CLINIC | Age: 64
End: 2024-04-02
Payer: MEDICARE

## 2024-04-02 DIAGNOSIS — E55.9 VITAMIN D DEFICIENCY: ICD-10-CM

## 2024-04-02 RX ORDER — MULTIVIT-MIN/IRON/FOLIC ACID/K 18-600-40
2000 CAPSULE ORAL DAILY
Qty: 90 CAPSULE | Refills: 3 | Status: SHIPPED | OUTPATIENT
Start: 2024-04-02

## 2024-04-02 RX ORDER — DEXAMETHASONE 1 MG
1 TABLET ORAL ONCE
Qty: 1 TABLET | Refills: 0 | Status: SHIPPED | OUTPATIENT
Start: 2024-04-02 | End: 2024-04-02

## 2024-04-02 NOTE — TELEPHONE ENCOUNTER
Pts spouse called asking if another of the pills he needs prior to taking his test can be called in so that when patient has another day off he will be able to go complete his test

## 2024-04-02 NOTE — TELEPHONE ENCOUNTER
Patient's spouse called voicing concerns about test that was supposed to be done at [facility]. Reports going there on Saturday and no one never showed up at the facility.  [facility] Dr. Vaughn prescribed a pill to take that night before testing which he did and  will need this medication again in order to complete test.    Looking in the patient's chart a one time dose of dexamethasone 1 mg was prescribed.     Spouse also mentioned he is taking vitamin d daily and not weekly. Would like this called in [facility].

## 2024-04-09 ENCOUNTER — HOSPITAL ENCOUNTER (OUTPATIENT)
Dept: CT IMAGING | Facility: HOSPITAL | Age: 64
Discharge: HOME | End: 2024-04-09
Admitting: INTERNAL MEDICINE
Payer: MEDICARE

## 2024-04-09 DIAGNOSIS — Z87.891 PERSONAL HISTORY OF NICOTINE DEPENDENCE: ICD-10-CM

## 2024-04-09 PROCEDURE — 71271 CT THORAX LUNG CANCER SCR C-: CPT

## 2024-04-11 ENCOUNTER — LAB (OUTPATIENT)
Dept: LAB | Facility: HOSPITAL | Age: 64
End: 2024-04-11
Payer: MEDICARE

## 2024-04-11 DIAGNOSIS — E55.9 VITAMIN D DEFICIENCY: ICD-10-CM

## 2024-04-11 DIAGNOSIS — E27.8 ADRENAL NODULE: ICD-10-CM

## 2024-04-11 DIAGNOSIS — E03.9 ACQUIRED HYPOTHYROIDISM: ICD-10-CM

## 2024-04-11 DIAGNOSIS — I10 PRIMARY HYPERTENSION: ICD-10-CM

## 2024-04-11 LAB
25(OH)D3 SERPL-MCNC: 35.1 NG/ML (ref 30–100)
ALBUMIN SERPL-MCNC: 4.7 G/DL (ref 3.5–5.2)
ALBUMIN/GLOB SERPL: 1.5 G/DL
ALP SERPL-CCNC: 160 U/L (ref 39–117)
ALT SERPL W P-5'-P-CCNC: 41 U/L (ref 1–41)
ANION GAP SERPL CALCULATED.3IONS-SCNC: 10 MMOL/L (ref 5–15)
AST SERPL-CCNC: 21 U/L (ref 1–40)
BASOPHILS # BLD AUTO: 0.04 10*3/MM3 (ref 0–0.2)
BASOPHILS NFR BLD AUTO: 0.6 % (ref 0–1.5)
BILIRUB SERPL-MCNC: 0.5 MG/DL (ref 0–1.2)
BUN SERPL-MCNC: 12 MG/DL (ref 8–23)
BUN/CREAT SERPL: 13.5 (ref 7–25)
CALCIUM SPEC-SCNC: 10.4 MG/DL (ref 8.6–10.5)
CHLORIDE SERPL-SCNC: 102 MMOL/L (ref 98–107)
CHOLEST SERPL-MCNC: 167 MG/DL (ref 0–200)
CO2 SERPL-SCNC: 27 MMOL/L (ref 22–29)
CORTIS SERPL-MCNC: 1.3 MCG/DL
CREAT SERPL-MCNC: 0.89 MG/DL (ref 0.76–1.27)
DEPRECATED RDW RBC AUTO: 42.1 FL (ref 37–54)
EGFRCR SERPLBLD CKD-EPI 2021: 94.5 ML/MIN/1.73
EOSINOPHIL # BLD AUTO: 0.06 10*3/MM3 (ref 0–0.4)
EOSINOPHIL NFR BLD AUTO: 1 % (ref 0.3–6.2)
ERYTHROCYTE [DISTWIDTH] IN BLOOD BY AUTOMATED COUNT: 12.7 % (ref 12.3–15.4)
GLOBULIN UR ELPH-MCNC: 3.1 GM/DL
GLUCOSE SERPL-MCNC: 119 MG/DL (ref 65–99)
HCT VFR BLD AUTO: 42.4 % (ref 37.5–51)
HDLC SERPL-MCNC: 63 MG/DL (ref 40–60)
HGB BLD-MCNC: 14 G/DL (ref 13–17.7)
IMM GRANULOCYTES # BLD AUTO: 0.03 10*3/MM3 (ref 0–0.05)
IMM GRANULOCYTES NFR BLD AUTO: 0.5 % (ref 0–0.5)
LDLC SERPL CALC-MCNC: 91 MG/DL (ref 0–100)
LDLC/HDLC SERPL: 1.44 {RATIO}
LYMPHOCYTES # BLD AUTO: 1.51 10*3/MM3 (ref 0.7–3.1)
LYMPHOCYTES NFR BLD AUTO: 24 % (ref 19.6–45.3)
MCH RBC QN AUTO: 29.9 PG (ref 26.6–33)
MCHC RBC AUTO-ENTMCNC: 33 G/DL (ref 31.5–35.7)
MCV RBC AUTO: 90.6 FL (ref 79–97)
MONOCYTES # BLD AUTO: 0.41 10*3/MM3 (ref 0.1–0.9)
MONOCYTES NFR BLD AUTO: 6.5 % (ref 5–12)
NEUTROPHILS NFR BLD AUTO: 4.24 10*3/MM3 (ref 1.7–7)
NEUTROPHILS NFR BLD AUTO: 67.4 % (ref 42.7–76)
NRBC BLD AUTO-RTO: 0 /100 WBC (ref 0–0.2)
PLATELET # BLD AUTO: 307 10*3/MM3 (ref 140–450)
PMV BLD AUTO: 9.6 FL (ref 6–12)
POTASSIUM SERPL-SCNC: 4.2 MMOL/L (ref 3.5–5.2)
PROT SERPL-MCNC: 7.8 G/DL (ref 6–8.5)
RBC # BLD AUTO: 4.68 10*6/MM3 (ref 4.14–5.8)
SODIUM SERPL-SCNC: 139 MMOL/L (ref 136–145)
TRIGL SERPL-MCNC: 66 MG/DL (ref 0–150)
TSH SERPL DL<=0.05 MIU/L-ACNC: 0.02 UIU/ML (ref 0.27–4.2)
VLDLC SERPL-MCNC: 13 MG/DL (ref 5–40)
WBC NRBC COR # BLD AUTO: 6.29 10*3/MM3 (ref 3.4–10.8)

## 2024-04-11 PROCEDURE — 80061 LIPID PANEL: CPT

## 2024-04-11 PROCEDURE — 84443 ASSAY THYROID STIM HORMONE: CPT

## 2024-04-11 PROCEDURE — 85025 COMPLETE CBC W/AUTO DIFF WBC: CPT

## 2024-04-11 PROCEDURE — 80053 COMPREHEN METABOLIC PANEL: CPT

## 2024-04-11 PROCEDURE — 80299 QUANTITATIVE ASSAY DRUG: CPT

## 2024-04-11 PROCEDURE — 82306 VITAMIN D 25 HYDROXY: CPT

## 2024-04-11 PROCEDURE — 36415 COLL VENOUS BLD VENIPUNCTURE: CPT

## 2024-04-11 PROCEDURE — 82533 TOTAL CORTISOL: CPT

## 2024-04-11 PROCEDURE — 82627 DEHYDROEPIANDROSTERONE: CPT

## 2024-04-12 LAB — DHEA-S SERPL-MCNC: 37.4 UG/DL (ref 30.9–295.6)

## 2024-04-25 LAB — DEXAMETHASONE SERPL-MCNC: 374 NG/DL

## 2024-05-07 DIAGNOSIS — K21.9 GASTROESOPHAGEAL REFLUX DISEASE, UNSPECIFIED WHETHER ESOPHAGITIS PRESENT: ICD-10-CM

## 2024-05-07 RX ORDER — PANTOPRAZOLE SODIUM 40 MG/1
40 TABLET, DELAYED RELEASE ORAL DAILY
Qty: 90 TABLET | Refills: 1 | Status: SHIPPED | OUTPATIENT
Start: 2024-05-07

## 2024-05-15 RX ORDER — IBUPROFEN 800 MG/1
800 TABLET ORAL EVERY 8 HOURS PRN
Qty: 90 TABLET | Refills: 1 | Status: SHIPPED | OUTPATIENT
Start: 2024-05-15

## 2024-05-16 DIAGNOSIS — E03.9 ACQUIRED HYPOTHYROIDISM: ICD-10-CM

## 2024-05-16 RX ORDER — LEVOTHYROXINE SODIUM 175 UG/1
175 TABLET ORAL DAILY
Qty: 90 TABLET | Refills: 4 | OUTPATIENT
Start: 2024-05-16

## 2024-05-16 RX ORDER — IBUPROFEN 800 MG/1
TABLET ORAL
Refills: 4 | OUTPATIENT
Start: 2024-05-16

## 2024-05-16 NOTE — TELEPHONE ENCOUNTER
Rx Refill Note  Requested Prescriptions     Pending Prescriptions Disp Refills    levothyroxine (SYNTHROID, LEVOTHROID) 175 MCG tablet [Pharmacy Med Name: LEVOTHYROXINE SODIUM 175MCG TABLET] 90 tablet 4     Sig: TAKE ONE (1) TABLET BY MOUTH DAILY.    ibuprofen (ADVIL,MOTRIN) 800 MG tablet [Pharmacy Med Name: IBUPROFEN 800MG TABLET]  4     Sig: TAKE ONE (1) TABLET BY MOUTH EVERY 8 (EIGHT) HOURS AS NEEDED FOR MILD PAIN.      Last office visit with prescribing clinician: 3/21/2024   Last telemedicine visit with prescribing clinician: Visit date not found   Next office visit with prescribing clinician: 9/23/2024                         Would you like a call back once the refill request has been completed: [] Yes [] No    If the office needs to give you a call back, can they leave a voicemail: [] Yes [] No    Shelton Vazquez MA  05/16/24, 15:51 CDT

## 2024-05-20 ENCOUNTER — OFFICE VISIT (OUTPATIENT)
Dept: GASTROENTEROLOGY | Facility: CLINIC | Age: 64
End: 2024-05-20
Payer: MEDICARE

## 2024-05-20 VITALS
OXYGEN SATURATION: 99 % | BODY MASS INDEX: 32.47 KG/M2 | TEMPERATURE: 98 F | DIASTOLIC BLOOD PRESSURE: 90 MMHG | HEART RATE: 63 BPM | WEIGHT: 245 LBS | SYSTOLIC BLOOD PRESSURE: 158 MMHG | HEIGHT: 73 IN

## 2024-05-20 DIAGNOSIS — Z86.010 HISTORY OF COLON POLYPS: ICD-10-CM

## 2024-05-20 DIAGNOSIS — Z12.11 ENCOUNTER FOR SCREENING FOR MALIGNANT NEOPLASM OF COLON: Primary | ICD-10-CM

## 2024-05-20 DIAGNOSIS — Z80.0 FAMILY HISTORY OF COLON CANCER: ICD-10-CM

## 2024-05-20 PROCEDURE — 3080F DIAST BP >= 90 MM HG: CPT | Performed by: NURSE PRACTITIONER

## 2024-05-20 PROCEDURE — 1160F RVW MEDS BY RX/DR IN RCRD: CPT | Performed by: NURSE PRACTITIONER

## 2024-05-20 PROCEDURE — 3077F SYST BP >= 140 MM HG: CPT | Performed by: NURSE PRACTITIONER

## 2024-05-20 PROCEDURE — S0260 H&P FOR SURGERY: HCPCS | Performed by: NURSE PRACTITIONER

## 2024-05-20 PROCEDURE — 1159F MED LIST DOCD IN RCRD: CPT | Performed by: NURSE PRACTITIONER

## 2024-05-20 NOTE — PROGRESS NOTES
"Chief Complaint   Patient presents with    Colonoscopy     Had colon at [facility] had polyps       PCP: Juan Vaughn DO  REFER: Juan Vaughn,     Subjective     HPI    Castillo Trujillo is a 64 y.o. male who presents to office for preventative maintenance.  There is  a personal history of colon polyps.   He does not have complaints of nausea/vomiting, change in bowels, weight loss, no BRBPR, no melena.  There is (sibling) a family history of colon cancer in first degree relative.  There is not a family history of colon polyps in first degree relative.  Castillo Trujillo last [id number]-5-42 years ago .  Bowels do move on regular basis.    Colonoscopy 5-10 years ago, [facility] per patient positive for polyps per patient     Lab Results - Last 18 Months   Lab Units 04/11/24  0729 05/29/23  2340 05/27/23  2326 05/26/23  0004 05/24/23  0036 05/23/23  0547 05/10/23  1153 05/09/23  1613 05/02/23  1002 04/01/23  0505 03/31/23  0357 03/30/23  0908   GLUCOSE mg/dL 119* 136* 112* 103* 103* 91   < > 138* 103*   < > 98 127*   SODIUM mmol/L 139 143 140 142 138 136   < > 137 139   < > 134* 132*   POTASSIUM mmol/L 4.2 3.7 3.3* 4.0 3.9 4.1   < > 3.1* 3.6   < > 3.9 4.0   CREATININE mg/dL 0.89 0.76 0.81 0.75* 0.87 0.71*   < > 0.68* 0.87   < > 0.59* 0.78   BUN mg/dL 12 12 9 11 9 8   < > 12 8   < > 9 8   BUN / CREAT RATIO  13.5 15.8 11.1 14.7 10.3 11.3   < > 17.6 9.2   < > 15.3 10.3   ALK PHOS U/L 160*  --   --   --   --  204*  --  400* 192*  --  224* 262*   ALT (SGPT) U/L 41  --   --   --   --  26  --  27 17  --  18 27   AST (SGOT) U/L 21  --   --   --   --  19  --  21 13  --  9 18   BILIRUBIN mg/dL 0.5  --   --   --   --  0.2  --  0.6 0.2  --  0.4 0.5   ALBUMIN g/dL 4.7  --   --   --   --  3.3*  --  3.2* 3.2*  --  3.1* 3.3*    < > = values in this interval not displayed.       No results for input(s): \"EGFRIFNONA\", \"EGFRIFAFRI\", \"EGFRRESULT\" in the last 08995 hours.     Past Medical History:   Diagnosis Date    Arthritis  "    GERD (gastroesophageal reflux disease)     Hypertension     Stage 2 moderate COPD by GOLD classification     Thyroid disorder      Past Surgical History:   Procedure Laterality Date    APPENDECTOMY      INCISIONAL HERNIA REPAIR Left     GROIN WITH MESH     Outpatient Medications Marked as Taking for the 24 encounter (Office Visit) with Baldev Veronica APRN   Medication Sig Dispense Refill    albuterol sulfate HFA (Ventolin HFA) 108 (90 Base) MCG/ACT inhaler Inhale 2 puffs 4 (Four) Times a Day. 18 g 5    amLODIPine (NORVASC) 10 MG tablet Take 1 tablet by mouth Daily. 90 tablet 1    Cholecalciferol (Vitamin D) 50 MCG (2000 UT) capsule Take 1 capsule by mouth Daily. 90 capsule 3    ibuprofen (ADVIL,MOTRIN) 800 MG tablet Take 1 tablet by mouth Every 8 (Eight) Hours As Needed for Mild Pain. 90 tablet 1    levothyroxine (SYNTHROID, LEVOTHROID) 175 MCG tablet Take 1 tablet by mouth Daily. 90 tablet 0    lisinopril (PRINIVIL,ZESTRIL) 40 MG tablet Take 1 tablet by mouth Daily. 90 tablet 1    pantoprazole (PROTONIX) 40 MG EC tablet Take 1 tablet by mouth Daily. 90 tablet 1     No Known Allergies  Social History     Socioeconomic History    Marital status:    Tobacco Use    Smoking status: Former     Current packs/day: 0.00     Average packs/day: 3.5 packs/day for 46.9 years (164.1 ttl pk-yrs)     Types: Cigarettes     Start date:      Quit date: 2022     Years since quittin.4     Passive exposure: Past    Smokeless tobacco: Never   Vaping Use    Vaping status: Never Used   Substance and Sexual Activity    Alcohol use: Yes     Comment: I might drink a 12-pack a month    Drug use: Not Currently    Sexual activity: Yes     Partners: Female     Review of Systems   Constitutional:  Negative for fever and unexpected weight change.   HENT:  Negative for trouble swallowing.    Respiratory:  Negative for shortness of breath.    Cardiovascular:  Negative for chest pain.   Gastrointestinal:  Negative  for abdominal pain and anal bleeding.     Objective   Vitals:    05/20/24 0956   BP: 158/90   Pulse: 63   Temp: 98 °F (36.7 °C)   SpO2: 99%     Physical Exam  Constitutional:       Appearance: Normal appearance. He is well-developed.   Eyes:      General: No scleral icterus.  Cardiovascular:      Heart sounds: Normal heart sounds. No murmur heard.  Pulmonary:      Effort: Pulmonary effort is normal.   Abdominal:      General: Bowel sounds are normal. There is no distension.      Palpations: Abdomen is soft.      Tenderness: There is no abdominal tenderness. There is no guarding.   Skin:     General: Skin is warm and dry.      Coloration: Skin is not jaundiced.   Neurological:      Mental Status: He is alert.   Psychiatric:         Behavior: Behavior is cooperative.       Imaging Results (Most Recent)       None          Body mass index is 32.32 kg/m².    Assessment & Plan   Diagnoses and all orders for this visit:    1. Encounter for screening for malignant neoplasm of colon (Primary)    2. History of colon polyps  -     Case Request; Standing  -     Implement Anesthesia Orders Day of Procedure; Standing  -     Obtain Informed Consent; Standing  -     Case Request    3. Family history of colon cancer  Comments:  brother      COLONOSCOPY WITH ANESTHESIA (N/A)    Miralax prep    Recommend daily use of Miralax, adjust as needed  Encouraged addition of dietary fiber, increasing daily water consumption, as well daily physical activity    Patient is to continue all blood pressure and cardiac medications prior to procedure and has been advised to take medications morning of procedure   Pt verbalized understanding    Advised pt to stop use of NSAIDs, Fish Oil, and MV 5 days prior to procedure, per Dr Mcmahon protocol.  Tylenol based products are ok to take.  Pt verbalized understanding.     All risks, benefits, alternatives, and indications of colonoscopy procedure have been discussed with the patient. Risks to include  perforation of the colon requiring possible surgery or colostomy, risk of bleeding from biopsies or removal of colon tissue, possibility of missing a colon polyp or cancer, or adverse drug reaction.  Benefits to include the diagnosis and management of disease of the colon and rectum. Alternatives to include barium enema, radiographic evaluation, lab testing or no intervention. He verbalizes understanding and agrees.         Baldev Veronica, APRN  05/20/24        There are no Patient Instructions on file for this visit.

## 2024-05-21 PROBLEM — Z86.0100 HISTORY OF COLON POLYPS: Status: ACTIVE | Noted: 2024-05-20

## 2024-05-21 PROBLEM — Z86.010 HISTORY OF COLON POLYPS: Status: ACTIVE | Noted: 2024-05-20

## 2024-06-14 DIAGNOSIS — K21.9 GASTROESOPHAGEAL REFLUX DISEASE, UNSPECIFIED WHETHER ESOPHAGITIS PRESENT: ICD-10-CM

## 2024-06-14 DIAGNOSIS — E03.9 ACQUIRED HYPOTHYROIDISM: ICD-10-CM

## 2024-06-14 RX ORDER — IBUPROFEN 800 MG/1
800 TABLET ORAL EVERY 8 HOURS PRN
Qty: 90 TABLET | Refills: 1 | Status: SHIPPED | OUTPATIENT
Start: 2024-06-14

## 2024-06-14 RX ORDER — PANTOPRAZOLE SODIUM 40 MG/1
40 TABLET, DELAYED RELEASE ORAL DAILY
Qty: 90 TABLET | Refills: 3 | OUTPATIENT
Start: 2024-06-14

## 2024-06-14 RX ORDER — LEVOTHYROXINE SODIUM 0.2 MG/1
200 TABLET ORAL DAILY
Qty: 90 TABLET | Refills: 3 | OUTPATIENT
Start: 2024-06-14

## 2024-06-20 ENCOUNTER — HOSPITAL ENCOUNTER (OUTPATIENT)
Facility: HOSPITAL | Age: 64
Setting detail: HOSPITAL OUTPATIENT SURGERY
Discharge: HOME | End: 2024-06-20
Attending: INTERNAL MEDICINE | Admitting: INTERNAL MEDICINE
Payer: MEDICARE

## 2024-06-20 ENCOUNTER — ANESTHESIA (OUTPATIENT)
Dept: GASTROENTEROLOGY | Facility: HOSPITAL | Age: 64
End: 2024-06-20
Payer: MEDICARE

## 2024-06-20 ENCOUNTER — ANESTHESIA EVENT (OUTPATIENT)
Dept: GASTROENTEROLOGY | Facility: HOSPITAL | Age: 64
End: 2024-06-20
Payer: MEDICARE

## 2024-06-20 ENCOUNTER — TELEPHONE (OUTPATIENT)
Dept: GASTROENTEROLOGY | Facility: CLINIC | Age: 64
End: 2024-06-20
Payer: MEDICARE

## 2024-06-20 VITALS
WEIGHT: 238 LBS | RESPIRATION RATE: 10 BRPM | HEART RATE: 67 BPM | TEMPERATURE: 96.5 F | BODY MASS INDEX: 32.23 KG/M2 | OXYGEN SATURATION: 96 % | DIASTOLIC BLOOD PRESSURE: 80 MMHG | SYSTOLIC BLOOD PRESSURE: 136 MMHG | HEIGHT: 72 IN

## 2024-06-20 DIAGNOSIS — Z86.010 HISTORY OF COLON POLYPS: ICD-10-CM

## 2024-06-20 PROCEDURE — 25810000003 SODIUM CHLORIDE 0.9 % SOLUTION: Performed by: NURSE ANESTHETIST, CERTIFIED REGISTERED

## 2024-06-20 PROCEDURE — 25010000002 PROPOFOL 10 MG/ML EMULSION: Performed by: NURSE ANESTHETIST, CERTIFIED REGISTERED

## 2024-06-20 PROCEDURE — 45385 COLONOSCOPY W/LESION REMOVAL: CPT | Performed by: INTERNAL MEDICINE

## 2024-06-20 RX ORDER — PROPOFOL 10 MG/ML
VIAL (ML) INTRAVENOUS AS NEEDED
Status: DISCONTINUED | OUTPATIENT
Start: 2024-06-20 | End: 2024-06-20 | Stop reason: SURG

## 2024-06-20 RX ORDER — LIDOCAINE HYDROCHLORIDE 20 MG/ML
INJECTION, SOLUTION EPIDURAL; INFILTRATION; INTRACAUDAL; PERINEURAL AS NEEDED
Status: DISCONTINUED | OUTPATIENT
Start: 2024-06-20 | End: 2024-06-20 | Stop reason: SURG

## 2024-06-20 RX ORDER — SODIUM CHLORIDE 0.9 % (FLUSH) 0.9 %
10 SYRINGE (ML) INJECTION AS NEEDED
Status: DISCONTINUED | OUTPATIENT
Start: 2024-06-20 | End: 2024-06-20 | Stop reason: HOSPADM

## 2024-06-20 RX ORDER — LIDOCAINE HYDROCHLORIDE 10 MG/ML
0.5 INJECTION, SOLUTION EPIDURAL; INFILTRATION; INTRACAUDAL; PERINEURAL ONCE AS NEEDED
Status: CANCELLED | OUTPATIENT
Start: 2024-06-20

## 2024-06-20 RX ORDER — SODIUM CHLORIDE 9 MG/ML
500 INJECTION, SOLUTION INTRAVENOUS CONTINUOUS PRN
Status: DISCONTINUED | OUTPATIENT
Start: 2024-06-20 | End: 2024-06-20 | Stop reason: HOSPADM

## 2024-06-20 RX ADMIN — PROPOFOL 400 MG: 10 INJECTION, EMULSION INTRAVENOUS at 07:44

## 2024-06-20 RX ADMIN — LIDOCAINE HYDROCHLORIDE 100 MG: 20 INJECTION, SOLUTION EPIDURAL; INFILTRATION; INTRACAUDAL; PERINEURAL at 07:44

## 2024-06-20 RX ADMIN — SODIUM CHLORIDE 500 ML: 9 INJECTION, SOLUTION INTRAVENOUS at 07:23

## 2024-06-20 NOTE — ANESTHESIA POSTPROCEDURE EVALUATION
Patient: Castillo Trujillo    Procedure Summary       Date: 06/20/24 Room / Location: [facility] ENDOSCOPY 6 / [facility]    Anesthesia Start: 0740 Anesthesia Stop: 0803    Procedure: COLONOSCOPY WITH ANESTHESIA Diagnosis:       History of colon polyps      (History of colon polyps [Z86.010])    Surgeons: Michel Mcmahon DO Provider: Jean Roque CRNA    Anesthesia Type: MAC ASA Status: 2            Anesthesia Type: MAC    Vitals  Vitals Value Taken Time   BP     Temp     Pulse 67 06/20/24 0803   Resp     SpO2     Vitals shown include unfiled device data.        Post Anesthesia Care and Evaluation    Patient location during evaluation: PHASE II  Patient participation: complete - patient participated  Level of consciousness: awake and alert  Pain score: 0    Airway patency: patent  Anesthetic complications: No anesthetic complications  PONV Status: none  Cardiovascular status: acceptable  Respiratory status: acceptable  Hydration status: acceptable

## 2024-06-20 NOTE — H&P
[facility]  Pre Procedure History & Physical    Chief Complaint:   Polyps    Subjective     HPI:   Polyps    Past Medical History:   Past Medical History:   Diagnosis Date    Arthritis     Asthma     GERD (gastroesophageal reflux disease)     Hypertension     Stage 2 moderate COPD by GOLD classification     Thyroid disorder        Past Surgical History:  Past Surgical History:   Procedure Laterality Date    APPENDECTOMY      INCISIONAL HERNIA REPAIR Left     GROIN WITH MESH       Family History:  Family History   Problem Relation Age of Onset    Stomach cancer Mother     Stroke Father     Breast cancer Sister     Colon cancer Brother     Colon polyps Neg Hx     Esophageal cancer Neg Hx        Social History:   reports that he quit smoking about 18 months ago. His smoking use included cigarettes. He started smoking about 48 years ago. He has a 164.1 pack-year smoking history. He has been exposed to tobacco smoke. He has never used smokeless tobacco. He reports current alcohol use. He reports that he does not use drugs.    Medications:   Prior to Admission medications    Medication Sig Start Date End Date Taking? Authorizing Provider   amLODIPine (NORVASC) 10 MG tablet Take 1 tablet by mouth Daily. 3/21/24  Yes Juan Vaughn, DO   Cholecalciferol (Vitamin D) 50 MCG (2000 UT) capsule Take 1 capsule by mouth Daily. 4/2/24  Yes Charissa Fraga APRN   ibuprofen (ADVIL,MOTRIN) 800 MG tablet Take 1 tablet by mouth Every 8 (Eight) Hours As Needed for Mild Pain. 6/14/24  Yes Charissa Fraga APRN   levothyroxine (SYNTHROID, LEVOTHROID) 175 MCG tablet Take 1 tablet by mouth Daily. 4/17/24  Yes Juan Vaughn, DO   lisinopril (PRINIVIL,ZESTRIL) 40 MG tablet Take 1 tablet by mouth Daily. 3/21/24  Yes Juan Vaughn, DO   pantoprazole (PROTONIX) 40 MG EC tablet Take 1 tablet by mouth Daily. 5/7/24  Yes Charissa Fraga APRN   albuterol sulfate HFA (Ventolin HFA) 108 (90 Base) MCG/ACT  "inhaler Inhale 2 puffs 4 (Four) Times a Day. 1/18/24   Charissa Fraga APRN       Allergies:  Patient has no known allergies.    ROS:    General: Weight stable  Resp: No SOA  Cardiovascular: No CP    Objective     Blood pressure 174/81, pulse 72, temperature 96.5 °F (35.8 °C), temperature source Temporal, resp. rate (!) 72, height 182.9 cm (72\"), weight 108 kg (238 lb), SpO2 99%.    Physical Exam   Constitutional: Pt is oriented to person, place, and in no distress.   HENT: Mouth/Throat: Oropharynx is clear.   Cardiovascular: Normal rate, regular rhythm.    Pulmonary/Chest: Effort normal. No respiratory distress. No  wheezes.   Abdominal: Soft. Non-distended.  Skin: Skin is warm and dry.   Psychiatric: Mood, memory, affect and judgment appear normal.     Assessment & Plan     Diagnosis:  Polyps    Anticipated Surgical Procedure:  C-scope    The risks, benefits, and alternatives of this procedure have been discussed with the patient or the responsible party- the patient understands and agrees to proceed.        "

## 2024-06-20 NOTE — ANESTHESIA PREPROCEDURE EVALUATION
Anesthesia Evaluation     no history of anesthetic complications:   NPO Solid Status: > 8 hours  NPO Liquid Status: > 2 hours           Airway   Mallampati: II  No difficulty expected  Dental      Pulmonary    (+) a smoker (quit 2022) Former, COPD, asthma,  Cardiovascular   Exercise tolerance: good (4-7 METS)    (+) hypertension  (-) CAD      Neuro/Psych  (-) seizures, TIA, CVA  GI/Hepatic/Renal/Endo    (+) GERD, thyroid problem hypothyroidism  (-) liver disease, no renal disease, diabetes    Musculoskeletal     Abdominal    Substance History      OB/GYN          Other   arthritis,                 Anesthesia Plan    ASA 2     MAC     intravenous induction     Anesthetic plan, risks, benefits, and alternatives have been provided, discussed and informed consent has been obtained with: patient.    CODE STATUS:

## 2024-08-05 DIAGNOSIS — E03.9 ACQUIRED HYPOTHYROIDISM: ICD-10-CM

## 2024-08-05 RX ORDER — LEVOTHYROXINE SODIUM 0.2 MG/1
200 TABLET ORAL DAILY
Qty: 90 TABLET | Refills: 1 | OUTPATIENT
Start: 2024-08-05

## 2024-08-07 DIAGNOSIS — E03.9 ACQUIRED HYPOTHYROIDISM: ICD-10-CM

## 2024-08-07 RX ORDER — LEVOTHYROXINE SODIUM 0.2 MG/1
200 TABLET ORAL DAILY
Qty: 90 TABLET | Refills: 1 | OUTPATIENT
Start: 2024-08-07

## 2024-09-03 DIAGNOSIS — E03.9 ACQUIRED HYPOTHYROIDISM: ICD-10-CM

## 2024-09-03 RX ORDER — LEVOTHYROXINE SODIUM 200 UG/1
200 TABLET ORAL DAILY
Qty: 90 TABLET | Refills: 3 | OUTPATIENT
Start: 2024-09-03

## 2024-09-05 DIAGNOSIS — I10 PRIMARY HYPERTENSION: ICD-10-CM

## 2024-09-05 DIAGNOSIS — E03.9 ACQUIRED HYPOTHYROIDISM: ICD-10-CM

## 2024-09-05 RX ORDER — LISINOPRIL 40 MG/1
40 TABLET ORAL DAILY
Qty: 90 TABLET | Refills: 1 | Status: SHIPPED | OUTPATIENT
Start: 2024-09-05

## 2024-09-05 RX ORDER — AMLODIPINE BESYLATE 10 MG/1
10 TABLET ORAL DAILY
Qty: 90 TABLET | Refills: 1 | Status: SHIPPED | OUTPATIENT
Start: 2024-09-05

## 2024-09-05 RX ORDER — LEVOTHYROXINE SODIUM 200 UG/1
200 TABLET ORAL DAILY
Qty: 90 TABLET | Refills: 1 | OUTPATIENT
Start: 2024-09-05

## 2024-09-05 NOTE — TELEPHONE ENCOUNTER
Rx Refill Note  Requested Prescriptions     Pending Prescriptions Disp Refills    lisinopril (PRINIVIL,ZESTRIL) 40 MG tablet [Pharmacy Med Name: LISINOPRIL 40MG TABLET] 90 tablet 1     Sig: TAKE ONE (1) TABLET BY MOUTH DAILY.    amLODIPine (NORVASC) 10 MG tablet [Pharmacy Med Name: AMLODIPINE BESYLATE 10MG TABLET] 90 tablet 1     Sig: TAKE ONE (1) TABLET BY MOUTH DAILY.      Last office visit with prescribing clinician: 3/21/2024   Last telemedicine visit with prescribing clinician: Visit date not found   Next office visit with prescribing clinician: 9/23/2024                         Would you like a call back once the refill request has been completed: [] Yes [] No    If the office needs to give you a call back, can they leave a voicemail: [] Yes [] No    Shelton Vazquez MA  09/05/24, 09:31 CDT

## 2024-09-05 NOTE — TELEPHONE ENCOUNTER
Rx Refill Note  Requested Prescriptions     Pending Prescriptions Disp Refills    levothyroxine (SYNTHROID, LEVOTHROID) 200 MCG tablet [Pharmacy Med Name: LEVOTHYROXINE SODIUM 200MCG TABLET] 90 tablet 1     Sig: TAKE ONE (1) TABLET BY MOUTH DAILY.      Last office visit with prescribing clinician: 12/19/2023   Last telemedicine visit with prescribing clinician: Visit date not found   Next office visit with prescribing clinician: Visit date not found                         Would you like a call back once the refill request has been completed: [] Yes [] No    If the office needs to give you a call back, can they leave a voicemail: [] Yes [] No    Shelton Vazquez MA  09/05/24, 09:35 CDT

## 2024-09-05 NOTE — TELEPHONE ENCOUNTER
PATIENT HAS BEEN CALLED, GIVEN MESSAGE AND WILL HAVE THIS DRAWN IN AROUND 1 WEEK WHEN HE RETURNS FROM [place].

## 2024-09-12 ENCOUNTER — TELEPHONE (OUTPATIENT)
Dept: INTERNAL MEDICINE | Facility: CLINIC | Age: 64
End: 2024-09-12
Payer: MEDICARE

## 2024-09-12 DIAGNOSIS — E03.9 ACQUIRED HYPOTHYROIDISM: ICD-10-CM

## 2024-09-12 RX ORDER — LEVOTHYROXINE SODIUM 175 UG/1
175 TABLET ORAL DAILY
Qty: 90 TABLET | Refills: 1 | Status: SHIPPED | OUTPATIENT
Start: 2024-09-12

## 2024-10-07 ENCOUNTER — OFFICE VISIT (OUTPATIENT)
Dept: INTERNAL MEDICINE | Facility: CLINIC | Age: 64
End: 2024-10-07
Payer: MEDICARE

## 2024-10-07 ENCOUNTER — LAB (OUTPATIENT)
Dept: LAB | Facility: HOSPITAL | Age: 64
End: 2024-10-07
Payer: MEDICARE

## 2024-10-07 VITALS
OXYGEN SATURATION: 96 % | SYSTOLIC BLOOD PRESSURE: 132 MMHG | DIASTOLIC BLOOD PRESSURE: 82 MMHG | WEIGHT: 247 LBS | HEART RATE: 73 BPM | HEIGHT: 72 IN | RESPIRATION RATE: 16 BRPM | BODY MASS INDEX: 33.46 KG/M2

## 2024-10-07 DIAGNOSIS — R25.2 CRAMP AND SPASM: ICD-10-CM

## 2024-10-07 DIAGNOSIS — R74.8 ABNORMAL LEVELS OF OTHER SERUM ENZYMES: ICD-10-CM

## 2024-10-07 DIAGNOSIS — E66.811 CLASS 1 OBESITY DUE TO EXCESS CALORIES WITH SERIOUS COMORBIDITY AND BODY MASS INDEX (BMI) OF 33.0 TO 33.9 IN ADULT: ICD-10-CM

## 2024-10-07 DIAGNOSIS — E55.9 VITAMIN D DEFICIENCY: ICD-10-CM

## 2024-10-07 DIAGNOSIS — K59.09 OTHER CONSTIPATION: ICD-10-CM

## 2024-10-07 DIAGNOSIS — I10 PRIMARY HYPERTENSION: Primary | ICD-10-CM

## 2024-10-07 DIAGNOSIS — E03.9 ACQUIRED HYPOTHYROIDISM: ICD-10-CM

## 2024-10-07 DIAGNOSIS — R53.83 OTHER FATIGUE: ICD-10-CM

## 2024-10-07 DIAGNOSIS — E66.09 CLASS 1 OBESITY DUE TO EXCESS CALORIES WITH SERIOUS COMORBIDITY AND BODY MASS INDEX (BMI) OF 33.0 TO 33.9 IN ADULT: ICD-10-CM

## 2024-10-07 DIAGNOSIS — K21.9 GASTROESOPHAGEAL REFLUX DISEASE, UNSPECIFIED WHETHER ESOPHAGITIS PRESENT: ICD-10-CM

## 2024-10-07 DIAGNOSIS — Z23 NEED FOR VACCINATION: ICD-10-CM

## 2024-10-07 LAB
25(OH)D3 SERPL-MCNC: 26.7 NG/ML (ref 30–100)
ALBUMIN SERPL-MCNC: 4.1 G/DL (ref 3.5–5.2)
ALBUMIN/GLOB SERPL: 1.3 G/DL
ALP SERPL-CCNC: 117 U/L (ref 39–117)
ALT SERPL W P-5'-P-CCNC: 26 U/L (ref 1–41)
ANION GAP SERPL CALCULATED.3IONS-SCNC: 11 MMOL/L (ref 5–15)
AST SERPL-CCNC: 27 U/L (ref 1–40)
BILIRUB SERPL-MCNC: 0.4 MG/DL (ref 0–1.2)
BUN SERPL-MCNC: 15 MG/DL (ref 8–23)
BUN/CREAT SERPL: 12.1 (ref 7–25)
CALCIUM SPEC-SCNC: 9.5 MG/DL (ref 8.6–10.5)
CHLORIDE SERPL-SCNC: 106 MMOL/L (ref 98–107)
CHOLEST SERPL-MCNC: 169 MG/DL (ref 0–200)
CO2 SERPL-SCNC: 27 MMOL/L (ref 22–29)
CREAT SERPL-MCNC: 1.24 MG/DL (ref 0.76–1.27)
DEPRECATED RDW RBC AUTO: 47.3 FL (ref 37–54)
EGFRCR SERPLBLD CKD-EPI 2021: 64.1 ML/MIN/1.73
ERYTHROCYTE [DISTWIDTH] IN BLOOD BY AUTOMATED COUNT: 14 % (ref 12.3–15.4)
FOLATE SERPL-MCNC: 6.78 NG/ML (ref 4.78–24.2)
GLOBULIN UR ELPH-MCNC: 3.1 GM/DL
GLUCOSE SERPL-MCNC: 90 MG/DL (ref 65–99)
HCT VFR BLD AUTO: 42.2 % (ref 37.5–51)
HDLC SERPL-MCNC: 62 MG/DL (ref 40–60)
HGB BLD-MCNC: 13.4 G/DL (ref 13–17.7)
IRON 24H UR-MRATE: 96 MCG/DL (ref 59–158)
LDLC SERPL CALC-MCNC: 95 MG/DL (ref 0–100)
LDLC/HDLC SERPL: 1.52 {RATIO}
MCH RBC QN AUTO: 29.1 PG (ref 26.6–33)
MCHC RBC AUTO-ENTMCNC: 31.8 G/DL (ref 31.5–35.7)
MCV RBC AUTO: 91.5 FL (ref 79–97)
PLATELET # BLD AUTO: 291 10*3/MM3 (ref 140–450)
PMV BLD AUTO: 10.7 FL (ref 6–12)
POTASSIUM SERPL-SCNC: 3.9 MMOL/L (ref 3.5–5.2)
PROT SERPL-MCNC: 7.2 G/DL (ref 6–8.5)
RBC # BLD AUTO: 4.61 10*6/MM3 (ref 4.14–5.8)
SODIUM SERPL-SCNC: 144 MMOL/L (ref 136–145)
TRIGL SERPL-MCNC: 64 MG/DL (ref 0–150)
TSH SERPL DL<=0.05 MIU/L-ACNC: 21.11 UIU/ML (ref 0.27–4.2)
VIT B12 BLD-MCNC: 457 PG/ML (ref 211–946)
VLDLC SERPL-MCNC: 12 MG/DL (ref 5–40)
WBC NRBC COR # BLD AUTO: 11.35 10*3/MM3 (ref 3.4–10.8)

## 2024-10-07 PROCEDURE — 80061 LIPID PANEL: CPT | Performed by: NURSE PRACTITIONER

## 2024-10-07 PROCEDURE — 1159F MED LIST DOCD IN RCRD: CPT | Performed by: NURSE PRACTITIONER

## 2024-10-07 PROCEDURE — 80053 COMPREHEN METABOLIC PANEL: CPT

## 2024-10-07 PROCEDURE — 82607 VITAMIN B-12: CPT

## 2024-10-07 PROCEDURE — 84443 ASSAY THYROID STIM HORMONE: CPT

## 2024-10-07 PROCEDURE — 36415 COLL VENOUS BLD VENIPUNCTURE: CPT

## 2024-10-07 PROCEDURE — 1160F RVW MEDS BY RX/DR IN RCRD: CPT | Performed by: NURSE PRACTITIONER

## 2024-10-07 PROCEDURE — 90662 IIV NO PRSV INCREASED AG IM: CPT | Performed by: NURSE PRACTITIONER

## 2024-10-07 PROCEDURE — 82746 ASSAY OF FOLIC ACID SERUM: CPT

## 2024-10-07 PROCEDURE — 3075F SYST BP GE 130 - 139MM HG: CPT | Performed by: NURSE PRACTITIONER

## 2024-10-07 PROCEDURE — 3079F DIAST BP 80-89 MM HG: CPT | Performed by: NURSE PRACTITIONER

## 2024-10-07 PROCEDURE — 82306 VITAMIN D 25 HYDROXY: CPT

## 2024-10-07 PROCEDURE — 85027 COMPLETE CBC AUTOMATED: CPT

## 2024-10-07 PROCEDURE — 83540 ASSAY OF IRON: CPT

## 2024-10-07 PROCEDURE — 99214 OFFICE O/P EST MOD 30 MIN: CPT | Performed by: NURSE PRACTITIONER

## 2024-10-07 PROCEDURE — G0008 ADMIN INFLUENZA VIRUS VAC: HCPCS | Performed by: NURSE PRACTITIONER

## 2024-10-07 RX ORDER — FLUTICASONE FUROATE AND VILANTEROL 200; 25 UG/1; UG/1
1 POWDER RESPIRATORY (INHALATION) DAILY
COMMUNITY
Start: 2024-08-22

## 2024-10-07 RX ORDER — ALBUTEROL SULFATE 90 UG/1
2 INHALANT RESPIRATORY (INHALATION)
Qty: 18 G | Refills: 5 | Status: SHIPPED | OUTPATIENT
Start: 2024-10-07

## 2024-10-07 RX ORDER — PANTOPRAZOLE SODIUM 40 MG/1
40 TABLET, DELAYED RELEASE ORAL DAILY
Qty: 90 TABLET | Refills: 1 | Status: SHIPPED | OUTPATIENT
Start: 2024-10-07

## 2024-10-07 RX ORDER — IBUPROFEN 800 MG/1
800 TABLET, FILM COATED ORAL EVERY 8 HOURS PRN
Qty: 90 TABLET | Refills: 1 | Status: SHIPPED | OUTPATIENT
Start: 2024-10-07

## 2024-10-07 RX ORDER — ALUMINUM ZIRCONIUM OCTACHLOROHYDREX GLY 16 G/100G
GEL TOPICAL 3 TIMES DAILY
Qty: 283 G | Refills: 12 | Status: SHIPPED | OUTPATIENT
Start: 2024-10-07

## 2024-10-07 NOTE — PATIENT INSTRUCTIONS

## 2024-10-07 NOTE — ASSESSMENT & PLAN NOTE
Patient's (Body mass index is 33.5 kg/m².) indicates that they are obese (BMI >30) with health conditions that include hypertension . Weight is unchanged. BMI  is above average; BMI management plan is completed. We discussed portion control, increasing exercise, and Information on healthy weight added to patient's after visit summary.

## 2024-10-07 NOTE — ASSESSMENT & PLAN NOTE
Hypertension is stable and controlled  Continue current treatment regimen.  Blood pressure will be reassessed in 6 months  His blood pressure goal is less than 150/90 per JNC 8 guidelines..

## 2024-10-07 NOTE — PROGRESS NOTES
"Chief Complaint  Leg Pain (cramps) and Constipation    Subjective        Castillo Trujillo is a 64 y.o. male who presents today for evaluation of the above problems.    History of Present Illness  The patient presents for evaluation of constipation and leg cramps.    He has been experiencing constipation for several months, with bowel movements occurring approximately once a week. He has not experienced any weight loss. He has attempted to alleviate his symptoms with MiraLAX and Dulcolax and maintains hydration by consuming 3 to 4 bottles of water daily. His diet is not rich fiber,  His occupation as a  involves prolonged sitting so he does not get a lot of physical activity.    He also reports leg cramps that have been present for a couple of months. These cramps are particularly noticeable when he stretches or moves in certain ways, describing the sensation as a rib overlapping another rib. The cramps primarily affect his feet and legs and are more pronounced at bedtime. He does not report excessive sweating, chest pain, or breathing difficulties. Also reports frequent hand cramps and drawing of the hands throughout the day.     Wants influenza vaccine today.     Denies any problems or adverse side effects of current meds and voices compliance.  Needs refills.  Is due to have TSH rechecked.  Denies any abnormal blood pressure readings at home.    Review of Systems - Negative except as noted per HPI  History obtained from the patient    Objective   Vital Signs:  /82 (BP Location: Left arm, Patient Position: Sitting, Cuff Size: Other (Comment))   Pulse 73   Resp 16   Ht 182.9 cm (72\")   Wt 112 kg (247 lb)   SpO2 96%   BMI 33.50 kg/m²   Estimated body mass index is 33.5 kg/m² as calculated from the following:    Height as of this encounter: 182.9 cm (72\").    Weight as of this encounter: 112 kg (247 lb).       BMI is >= 30 and <35. (Class 1 Obesity). The following options were offered after " discussion;: weight loss educational material (shared in after visit summary), exercise counseling/recommendations, and Information on healthy weight added to patient's after visit summary.    Physical Exam  Vitals and nursing note reviewed.   Constitutional:       Appearance: Normal appearance. He is normal weight.   HENT:      Mouth/Throat:      Mouth: Mucous membranes are moist.   Cardiovascular:      Rate and Rhythm: Normal rate and regular rhythm.      Pulses: Normal pulses.      Heart sounds: Normal heart sounds. No murmur heard.     No friction rub. No gallop.   Pulmonary:      Effort: Pulmonary effort is normal. No respiratory distress.      Breath sounds: Normal breath sounds. No wheezing.   Musculoskeletal:         General: Normal range of motion.      Cervical back: Normal range of motion and neck supple.      Right lower leg: No edema.      Left lower leg: No edema.   Skin:     General: Skin is warm and dry.      Capillary Refill: Capillary refill takes less than 2 seconds.   Neurological:      General: No focal deficit present.      Mental Status: He is alert and oriented to person, place, and time.   Psychiatric:         Mood and Affect: Mood normal.         Behavior: Behavior normal.         Thought Content: Thought content normal.         Judgment: Judgment normal.          Result Review :  The following data was reviewed by: TRESSA Schilling on 10/07/2024:  CMP          4/11/2024    07:29 10/7/2024    09:58   CMP   Glucose 119  90    BUN 12  15    Creatinine 0.89  1.24    EGFR 94.5  64.1    Sodium 139  144    Potassium 4.2  3.9    Chloride 102  106    Calcium 10.4  9.5    Total Protein 7.8  7.2    Albumin 4.7  4.1    Globulin 3.1  3.1    Total Bilirubin 0.5  0.4    Alkaline Phosphatase 160  117    AST (SGOT) 21  27    ALT (SGPT) 41  26    Albumin/Globulin Ratio 1.5  1.3    BUN/Creatinine Ratio 13.5  12.1    Anion Gap 10.0  11.0      CBC          4/11/2024    07:29 10/7/2024    09:58   CBC    WBC 6.29  11.35    RBC 4.68  4.61    Hemoglobin 14.0  13.4    Hematocrit 42.4  42.2    MCV 90.6  91.5    MCH 29.9  29.1    MCHC 33.0  31.8    RDW 12.7  14.0    Platelets 307  291      Lipid Panel          4/11/2024    07:29 10/7/2024    09:58   Lipid Panel   Total Cholesterol 167  169    Triglycerides 66  64    HDL Cholesterol 63  62    VLDL Cholesterol 13  12    LDL Cholesterol  91  95    LDL/HDL Ratio 1.44  1.52      TSH          4/11/2024    07:29 10/7/2024    09:58   TSH   TSH 0.024  21.110                   Assessment and Plan   Diagnoses and all orders for this visit:    1. Primary hypertension (Primary)  Assessment & Plan:  Hypertension is stable and controlled  Continue current treatment regimen.  Blood pressure will be reassessed in 6 months  His blood pressure goal is less than 150/90 per JNC 8 guidelines..      2. Other constipation  Comments:  Increase water to 6-8 bottles per day.  Increase fiber in diet-handout per AVS summary.  Increase activity.  Mag citrate and Metamucil.    3. Cramp and spasm  Comments:  Will check electrolytes and vitamin levels. Increase water and electrolyte solution such as gatorade.    4. Acquired hypothyroidism  Assessment & Plan:  Checking TSH levels today.    Orders:  -     TSH; Future    5. Class 1 obesity due to excess calories with serious comorbidity and body mass index (BMI) of 33.0 to 33.9 in adult  Assessment & Plan:  Patient's (Body mass index is 33.5 kg/m².) indicates that they are obese (BMI >30) with health conditions that include hypertension . Weight is unchanged. BMI  is above average; BMI management plan is completed. We discussed portion control, increasing exercise, and Information on healthy weight added to patient's after visit summary.       6. Vitamin D deficiency  Comments:  Due to have vitamin D rechecked.  Orders:  -     Vitamin D,25-Hydroxy; Future    7. Gastroesophageal reflux disease, unspecified whether esophagitis present  Comments:  Stable.  Plan to reassess in 3 to 6 months.  Orders:  -     pantoprazole (PROTONIX) 40 MG EC tablet; Take 1 tablet by mouth Daily.  Dispense: 90 tablet; Refill: 1    8. Need for vaccination  Comments:  Tolerated influenza vaccine well without difficulty. Is going to obtain Shingles vaccinations from pharmacy.  Orders:  -     Fluzone High-Dose 65+yrs (0317-7615)    9. Abnormal levels of other serum enzymes  -     Iron; Future    10. Other fatigue  -     CBC (No Diff); Future  -     Comprehensive Metabolic Panel; Future  -     Vitamin B12; Future  -     Folate; Future  -     Iron; Future    Other orders  -     albuterol sulfate HFA (Ventolin HFA) 108 (90 Base) MCG/ACT inhaler; Inhale 2 puffs 4 (Four) Times a Day.  Dispense: 18 g; Refill: 5  -     ibuprofen (ADVIL,MOTRIN) 800 MG tablet; Take 1 tablet by mouth Every 8 (Eight) Hours As Needed for Mild Pain.  Dispense: 90 tablet; Refill: 1  -     magnesium citrate solution; Take 296 mL by mouth 1 (One) Time for 1 dose.  Dispense: 296 mL; Refill: 2  -     psyllium (Metamucil Smooth Texture) 58.6 % powder; Take  by mouth 3 (Three) Times a Day.  Dispense: 283 g; Refill: 12    I will be providing care over continum for this patient including management of both chronic and acute medical conditions          I spent 30 minutes caring for Castillo on this date of service. This time includes time spent by me in the following activities:preparing for the visit, reviewing tests, obtaining and/or reviewing a separately obtained history, performing a medically appropriate examination and/or evaluation , counseling and educating the patient/family/caregiver, ordering medications, tests, or procedures, documenting information in the medical record, and independently interpreting results and communicating that information with the patient/family/caregiver  Follow Up   Return in about 3 months (around 1/7/2025) for Medicare Wellness.  Patient was given instructions and counseling regarding his  condition or for health maintenance advice. Please see specific information pulled into the AVS if appropriate.       Patient or patient representative verbalized consent for the use of Ambient Listening during the visit with  TRESSA Schilling for chart documentation. 10/7/2024  11:36 CDT

## 2024-10-08 ENCOUNTER — TELEPHONE (OUTPATIENT)
Dept: INTERNAL MEDICINE | Facility: CLINIC | Age: 64
End: 2024-10-08
Payer: MEDICARE

## 2024-10-08 RX ORDER — CETIRIZINE HYDROCHLORIDE 10 MG/1
10 TABLET ORAL DAILY
Qty: 30 TABLET | Refills: 2 | Status: SHIPPED | OUTPATIENT
Start: 2024-10-08

## 2024-10-08 RX ORDER — FLUTICASONE PROPIONATE 50 UG/1
2 SPRAY, METERED NASAL DAILY
Qty: 9.9 ML | Refills: 2 | Status: SHIPPED | OUTPATIENT
Start: 2024-10-08

## 2024-10-08 RX ORDER — ERGOCALCIFEROL 1.25 MG/1
50000 CAPSULE, LIQUID FILLED ORAL WEEKLY
Qty: 5 CAPSULE | Refills: 2 | Status: SHIPPED | OUTPATIENT
Start: 2024-10-08

## 2024-10-08 RX ORDER — LEVOTHYROXINE SODIUM 200 UG/1
200 TABLET ORAL DAILY
Qty: 90 TABLET | Refills: 0 | Status: SHIPPED | OUTPATIENT
Start: 2024-10-08

## 2024-10-08 NOTE — PROGRESS NOTES
Call and let him know that overall his labs look okay except for his TSH.  His TSH is 21 so going to have to increase his levothyroxine from 175 mcg to 200 mcg and we need to recheck this in 6 weeks.  I am going to go ahead and put in a repeat lab work for 6 weeks that he can have done at his convenience.  Otherwise his cholesterol panel, kidney and liver function, blood counts look okay with the exception of a slightly elevated white blood cell count which we will recheck at his next lab draw.  We know if he has any questions.  His vitamin D is borderline so he may want to get an over-the-counter supplement.

## 2025-01-21 ENCOUNTER — TELEPHONE (OUTPATIENT)
Dept: INTERNAL MEDICINE | Facility: CLINIC | Age: 65
End: 2025-01-21
Payer: MEDICARE

## 2025-01-21 DIAGNOSIS — E03.9 ACQUIRED HYPOTHYROIDISM: ICD-10-CM

## 2025-01-21 DIAGNOSIS — E55.9 VITAMIN D DEFICIENCY: ICD-10-CM

## 2025-01-21 RX ORDER — LEVOTHYROXINE SODIUM 200 UG/1
200 TABLET ORAL DAILY
Qty: 30 TABLET | Refills: 0 | Status: SHIPPED | OUTPATIENT
Start: 2025-01-21

## 2025-01-21 RX ORDER — LEVOTHYROXINE SODIUM 200 UG/1
200 TABLET ORAL DAILY
Qty: 90 TABLET | Refills: 0 | Status: CANCELLED | OUTPATIENT
Start: 2025-01-21

## 2025-01-21 RX ORDER — FLUTICASONE PROPIONATE 50 MCG
2 SPRAY, SUSPENSION (ML) NASAL DAILY
Qty: 9.9 G | Refills: 2 | Status: SHIPPED | OUTPATIENT
Start: 2025-01-21

## 2025-01-21 RX ORDER — MULTIVIT-MIN/IRON/FOLIC ACID/K 18-600-40
2000 CAPSULE ORAL DAILY
Qty: 90 CAPSULE | Refills: 3 | Status: SHIPPED | OUTPATIENT
Start: 2025-01-21

## 2025-01-21 NOTE — TELEPHONE ENCOUNTER
Caller: VALENTIN BACA    Relationship: Emergency Contact    Best call back number: [phone]     Requested Prescriptions:   Requested Prescriptions     Pending Prescriptions Disp Refills    levothyroxine (SYNTHROID, LEVOTHROID) 200 MCG tablet 90 tablet 0     Sig: Take 1 tablet by mouth Daily.        Pharmacy where request should be sent: [facility] [id number] [phone] [facility] [phone]      Last office visit with prescribing clinician: 10/7/2024   Last telemedicine visit with prescribing clinician: Visit date not found   Next office visit with prescribing clinician: 1/21/2025     Additional details provided by patient:     Does the patient have less than a 3 day supply:  [x] Yes  [] No    Would you like a call back once the refill request has been completed: [] Yes [x] No    If the office needs to give you a call back, can they leave a voicemail: [] Yes [x] No    Shahzad Rodgers III, RegSched Rep   01/21/25 11:55 CST

## 2025-01-21 NOTE — TELEPHONE ENCOUNTER
Rx Refill Note  Requested Prescriptions     Pending Prescriptions Disp Refills    fluticasone (FLONASE) 50 MCG/ACT nasal spray [Pharmacy Med Name: FLUTICASONE PROPIONATE 50MCG SUSPENSION]  2     Sig: ADMINISTER TWO (2) SPRAYS INTO THE NOSTRIL(S) AS DIRECTED BY PROVIDER DAILY.      Last office visit with prescribing clinician: 10/7/2024   Last telemedicine visit with prescribing clinician: Visit date not found   Next office visit with prescribing clinician: Visit date not found                         Would you like a call back once the refill request has been completed: [] Yes [] No    If the office needs to give you a call back, can they leave a voicemail: [] Yes [] No    Shelton Vazquez MA  01/21/25, 12:29 CST

## 2025-01-21 NOTE — TELEPHONE ENCOUNTER
Rx Refill Note  Requested Prescriptions     Pending Prescriptions Disp Refills    fluticasone (FLONASE) 50 MCG/ACT nasal spray [Pharmacy Med Name: FLUTICASONE PROPIONATE 50MCG SUSPENSION]  2     Sig: ADMINISTER TWO (2) SPRAYS INTO THE NOSTRIL(S) AS DIRECTED BY PROVIDER DAILY.      Last office visit with prescribing clinician: 10/7/2024   Last telemedicine visit with prescribing clinician: Visit date not found   Next office visit with prescribing clinician: Visit date not found                         Would you like a call back once the refill request has been completed: [] Yes [] No    If the office needs to give you a call back, can they leave a voicemail: [] Yes [] No    Shelton Vazquez MA  01/21/25, 14:10 CST

## 2025-01-31 ENCOUNTER — HOSPITAL ENCOUNTER (OUTPATIENT)
Dept: GENERAL RADIOLOGY | Facility: HOSPITAL | Age: 65
Discharge: HOME | End: 2025-01-31
Payer: MEDICARE

## 2025-01-31 ENCOUNTER — LAB (OUTPATIENT)
Dept: LAB | Facility: HOSPITAL | Age: 65
End: 2025-01-31
Payer: MEDICARE

## 2025-01-31 ENCOUNTER — OFFICE VISIT (OUTPATIENT)
Dept: INTERNAL MEDICINE | Facility: CLINIC | Age: 65
End: 2025-01-31
Payer: MEDICARE

## 2025-01-31 VITALS — DIASTOLIC BLOOD PRESSURE: 98 MMHG | SYSTOLIC BLOOD PRESSURE: 168 MMHG | HEART RATE: 72 BPM | OXYGEN SATURATION: 97 %

## 2025-01-31 DIAGNOSIS — R06.02 SHORTNESS OF BREATH: ICD-10-CM

## 2025-01-31 DIAGNOSIS — I10 PRIMARY HYPERTENSION: ICD-10-CM

## 2025-01-31 DIAGNOSIS — E03.9 ACQUIRED HYPOTHYROIDISM: ICD-10-CM

## 2025-01-31 DIAGNOSIS — R06.02 SHORTNESS OF BREATH: Primary | ICD-10-CM

## 2025-01-31 DIAGNOSIS — R10.11 RIGHT UPPER QUADRANT PAIN: ICD-10-CM

## 2025-01-31 LAB
ALBUMIN SERPL-MCNC: 4.8 G/DL (ref 3.5–5.2)
ALBUMIN/GLOB SERPL: 1.5 G/DL
ALP SERPL-CCNC: 111 U/L (ref 39–117)
ALT SERPL W P-5'-P-CCNC: 33 U/L (ref 1–41)
ANION GAP SERPL CALCULATED.3IONS-SCNC: 14 MMOL/L (ref 5–15)
AST SERPL-CCNC: 29 U/L (ref 1–40)
BILIRUB SERPL-MCNC: 0.2 MG/DL (ref 0–1.2)
BUN SERPL-MCNC: 12 MG/DL (ref 8–23)
BUN/CREAT SERPL: 9.6 (ref 7–25)
CALCIUM SPEC-SCNC: 10.5 MG/DL (ref 8.6–10.5)
CHLORIDE SERPL-SCNC: 102 MMOL/L (ref 98–107)
CO2 SERPL-SCNC: 26 MMOL/L (ref 22–29)
CREAT SERPL-MCNC: 1.25 MG/DL (ref 0.76–1.27)
DEPRECATED RDW RBC AUTO: 44.3 FL (ref 37–54)
EGFRCR SERPLBLD CKD-EPI 2021: 63.5 ML/MIN/1.73
ERYTHROCYTE [DISTWIDTH] IN BLOOD BY AUTOMATED COUNT: 13.6 % (ref 12.3–15.4)
GLOBULIN UR ELPH-MCNC: 3.2 GM/DL
GLUCOSE SERPL-MCNC: 91 MG/DL (ref 65–99)
HCT VFR BLD AUTO: 42.3 % (ref 37.5–51)
HGB BLD-MCNC: 14.1 G/DL (ref 13–17.7)
LIPASE SERPL-CCNC: 29 U/L (ref 13–60)
MCH RBC QN AUTO: 29.7 PG (ref 26.6–33)
MCHC RBC AUTO-ENTMCNC: 33.3 G/DL (ref 31.5–35.7)
MCV RBC AUTO: 89.2 FL (ref 79–97)
PLATELET # BLD AUTO: 295 10*3/MM3 (ref 140–450)
PMV BLD AUTO: 9.9 FL (ref 6–12)
POTASSIUM SERPL-SCNC: 3.7 MMOL/L (ref 3.5–5.2)
PROT SERPL-MCNC: 8 G/DL (ref 6–8.5)
RBC # BLD AUTO: 4.74 10*6/MM3 (ref 4.14–5.8)
SODIUM SERPL-SCNC: 142 MMOL/L (ref 136–145)
TSH SERPL DL<=0.05 MIU/L-ACNC: 9.77 UIU/ML (ref 0.27–4.2)
WBC NRBC COR # BLD AUTO: 7.09 10*3/MM3 (ref 3.4–10.8)

## 2025-01-31 PROCEDURE — 36415 COLL VENOUS BLD VENIPUNCTURE: CPT

## 2025-01-31 PROCEDURE — 3077F SYST BP >= 140 MM HG: CPT

## 2025-01-31 PROCEDURE — G2211 COMPLEX E/M VISIT ADD ON: HCPCS

## 2025-01-31 PROCEDURE — 71046 X-RAY EXAM CHEST 2 VIEWS: CPT

## 2025-01-31 PROCEDURE — 84443 ASSAY THYROID STIM HORMONE: CPT

## 2025-01-31 PROCEDURE — 99214 OFFICE O/P EST MOD 30 MIN: CPT

## 2025-01-31 PROCEDURE — 83690 ASSAY OF LIPASE: CPT

## 2025-01-31 PROCEDURE — 85027 COMPLETE CBC AUTOMATED: CPT

## 2025-01-31 PROCEDURE — 80053 COMPREHEN METABOLIC PANEL: CPT

## 2025-01-31 PROCEDURE — 3080F DIAST BP >= 90 MM HG: CPT

## 2025-01-31 RX ORDER — HYDROCHLOROTHIAZIDE 12.5 MG/1
12.5 TABLET ORAL DAILY
Qty: 30 TABLET | Refills: 5 | Status: SHIPPED | OUTPATIENT
Start: 2025-01-31 | End: 2025-02-03 | Stop reason: SDUPTHER

## 2025-01-31 NOTE — PROGRESS NOTES
"Chief Complaint   Patient presents with    Shortness of Breath     \"More than normal\"    Abdominal Pain     Right side. \"Feels like I'm bruised\" the pain is mostly at night     History:      Castillo Trujillo is a 64 y.o. male who presents today for evaluation of the above problems.      HPI  History of Present Illness  The patient is a 64-year-old male who presents for evaluation of right-sided pain.    He has been experiencing right-sided pain for the past 3 to 4 nights, which he describes as akin to a bruise. The pain is severe enough to disrupt his sleep but does not persist during the day. He reports no associated fever, nausea, or vomiting. His bowel movements remain regular. He also reports a slight increase in shortness of breath, although it is not significantly different from his baseline. He has a persistent cough, which has not worsened recently. He has been using his inhaler intermittently. He typically sleeps in a recliner due to shoulder arthritis and often wakes up with pain. 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 Despite this, his right lung continued to accumulate fluid, necessitating the insertion of three chest tubes for drainage. The third instance required a 21-day hospitalization for intensive treatment, including the creation of holes in the lung, which unfortunately did not yield any improvement. He was subsequently referred to a cancer center in [place], where it was determined that he did not have cancer or a tumor, but rather scar tissue from a previous pneumonia episode. He has a history of COPD, emphysema, and mild asthma, for which he regularly consults a pulmonologist.    ROS:  Review of Systems "   Respiratory:  Negative for chest tightness and shortness of breath.    Gastrointestinal:  Positive for abdominal pain (RUQ). Negative for nausea and vomiting.   Genitourinary:  Positive for flank pain.         Current Outpatient Medications:     albuterol sulfate HFA (Ventolin HFA) 108 (90 Base) MCG/ACT inhaler, Inhale 2 puffs 4 (Four) Times a Day., Disp: 18 g, Rfl: 5    amLODIPine (NORVASC) 10 MG tablet, Take 1 tablet by mouth Daily., Disp: 90 tablet, Rfl: 1    cetirizine (zyrTEC) 10 MG tablet, Take 1 tablet by mouth Daily., Disp: 30 tablet, Rfl: 2    Cholecalciferol (Vitamin D) 50 MCG (2000 UT) capsule, Take 1 capsule by mouth Daily., Disp: 90 capsule, Rfl: 3    fluticasone (FLONASE) 50 MCG/ACT nasal spray, Administer 2 sprays into the nostril(s) as directed by provider Daily., Disp: 9.9 g, Rfl: 2    Fluticasone Furoate-Vilanterol (BREO ELLIPTA) 200-25 MCG/ACT inhaler, Inhale 1 puff Daily., Disp: , Rfl:     ibuprofen (ADVIL,MOTRIN) 800 MG tablet, Take 1 tablet by mouth Every 8 (Eight) Hours As Needed for Mild Pain., Disp: 90 tablet, Rfl: 1    levothyroxine (SYNTHROID, LEVOTHROID) 200 MCG tablet, Take 1 tablet by mouth Daily., Disp: 30 tablet, Rfl: 0    lisinopril (PRINIVIL,ZESTRIL) 40 MG tablet, Take 1 tablet by mouth Daily., Disp: 90 tablet, Rfl: 1    pantoprazole (PROTONIX) 40 MG EC tablet, Take 1 tablet by mouth Daily., Disp: 90 tablet, Rfl: 1    psyllium (Metamucil Smooth Texture) 58.6 % powder, Take  by mouth 3 (Three) Times a Day., Disp: 283 g, Rfl: 12    Lab Results   Component Value Date    GLUCOSE 91 01/31/2025    BUN 12 01/31/2025    CREATININE 1.25 01/31/2025     01/31/2025    K 3.7 01/31/2025     01/31/2025    CALCIUM 10.5 01/31/2025    PROTEINTOT 8.0 01/31/2025    ALBUMIN 4.8 01/31/2025    ALT 33 01/31/2025    AST 29 01/31/2025    ALKPHOS 111 01/31/2025    BILITOT 0.2 01/31/2025    GLOB 3.2 01/31/2025    AGRATIO 1.5 01/31/2025    BCR 9.6 01/31/2025    ANIONGAP 14.0 01/31/2025    EGFR  63.5 01/31/2025       WBC   Date Value Ref Range Status   01/31/2025 7.09 3.40 - 10.80 10*3/mm3 Final   08/22/2022 9.0 4.8 - 10.8 K/uL Final     RBC   Date Value Ref Range Status   01/31/2025 4.74 4.14 - 5.80 10*6/mm3 Final   08/22/2022 4.59 (L) 4.70 - 6.10 M/uL Final     Hemoglobin   Date Value Ref Range Status   01/31/2025 14.1 13.0 - 17.7 g/dL Final   08/22/2022 14.7 14.0 - 18.0 g/dL Final     Hematocrit   Date Value Ref Range Status   01/31/2025 42.3 37.5 - 51.0 % Final   08/22/2022 43.8 42.0 - 52.0 % Final     MCV   Date Value Ref Range Status   01/31/2025 89.2 79.0 - 97.0 fL Final   08/22/2022 95.4 (H) 80.0 - 94.0 fL Final     MCH   Date Value Ref Range Status   01/31/2025 29.7 26.6 - 33.0 pg Final   08/22/2022 32.0 (H) 27.0 - 31.0 pg Final     MCHC   Date Value Ref Range Status   01/31/2025 33.3 31.5 - 35.7 g/dL Final   08/22/2022 33.6 33.0 - 37.0 g/dL Final     RDW   Date Value Ref Range Status   01/31/2025 13.6 12.3 - 15.4 % Final   08/22/2022 13.6 11.5 - 14.5 % Final     RDW-SD   Date Value Ref Range Status   01/31/2025 44.3 37.0 - 54.0 fl Final     MPV   Date Value Ref Range Status   01/31/2025 9.9 6.0 - 12.0 fL Final   08/22/2022 10.7 9.4 - 12.4 fL Final     Platelets   Date Value Ref Range Status   01/31/2025 295 140 - 450 10*3/mm3 Final   08/22/2022 249 130 - 400 K/uL Final     Neutrophil Rel %   Date Value Ref Range Status   08/21/2022 50.7 50.0 - 65.0 % Final     Neutrophil %   Date Value Ref Range Status   04/11/2024 67.4 42.7 - 76.0 % Final     Lymphocyte Rel %   Date Value Ref Range Status   08/21/2022 35.4 20.0 - 40.0 % Final     Lymphocyte %   Date Value Ref Range Status   04/11/2024 24.0 19.6 - 45.3 % Final     Monocyte Rel %   Date Value Ref Range Status   08/21/2022 9.3 0.0 - 10.0 % Final     Monocyte %   Date Value Ref Range Status   04/11/2024 6.5 5.0 - 12.0 % Final     Eosinophil Rel %   Date Value Ref Range Status   08/21/2022 3 0.0 - 5.0 % Final     Eosinophil %   Date Value Ref Range  Status   04/11/2024 1.0 0.3 - 6.2 % Final     Basophil Rel %   Date Value Ref Range Status   08/21/2022 1.1 (H) 0.0 - 1.0 % Final     Basophil %   Date Value Ref Range Status   04/11/2024 0.6 0.0 - 1.5 % Final     Immature Grans %   Date Value Ref Range Status   04/11/2024 0.5 0.0 - 0.5 % Final     Neutrophils Absolute   Date Value Ref Range Status   08/21/2022 4.1 1.5 - 7.5 K/uL Final     Neutrophils, Absolute   Date Value Ref Range Status   04/11/2024 4.24 1.70 - 7.00 10*3/mm3 Final     Lymphocytes Absolute   Date Value Ref Range Status   08/21/2022 2.9 1.1 - 4.5 K/uL Final     Lymphocytes, Absolute   Date Value Ref Range Status   04/11/2024 1.51 0.70 - 3.10 10*3/mm3 Final     Monocytes Absolute   Date Value Ref Range Status   08/21/2022 0.80 0.00 - 0.90 K/uL Final     Monocytes, Absolute   Date Value Ref Range Status   04/11/2024 0.41 0.10 - 0.90 10*3/mm3 Final     Eosinophils Absolute   Date Value Ref Range Status   08/21/2022 0.20 0.00 - 0.60 K/uL Final     Eosinophils, Absolute   Date Value Ref Range Status   04/11/2024 0.06 0.00 - 0.40 10*3/mm3 Final     Basophils Absolute   Date Value Ref Range Status   08/21/2022 0.10 0.00 - 0.20 K/uL Final     Basophils, Absolute   Date Value Ref Range Status   04/11/2024 0.04 0.00 - 0.20 10*3/mm3 Final     Immature Grans, Absolute   Date Value Ref Range Status   04/11/2024 0.03 0.00 - 0.05 10*3/mm3 Final   08/21/2022 0.0 K/uL Final     nRBC   Date Value Ref Range Status   04/11/2024 0.0 0.0 - 0.2 /100 WBC Final       OBJECTIVE:  Visit Vitals  /98 (BP Location: Left arm, Patient Position: Sitting, Cuff Size: Adult)   Pulse 72   SpO2 97%      Physical Exam  Constitutional:       Appearance: Normal appearance.   HENT:      Head: Normocephalic.   Cardiovascular:      Rate and Rhythm: Normal rate and regular rhythm.      Pulses: Normal pulses.      Heart sounds: Normal heart sounds.   Pulmonary:      Effort: Pulmonary effort is normal.      Breath sounds: Normal breath  sounds.   Musculoskeletal:         General: Normal range of motion.      Cervical back: Normal range of motion.   Skin:     General: Skin is warm and dry.   Neurological:      Mental Status: He is alert and oriented to person, place, and time.   Psychiatric:         Mood and Affect: Mood normal.         Behavior: Behavior normal.         Thought Content: Thought content normal.         Judgment: Judgment normal.       Physical Exam  Lungs are clear to auscultation.    Results      Assessment/Plan    Diagnoses and all orders for this visit:    1. Shortness of breath (Primary)  -     XR Chest PA & Lateral; Future  -     Comprehensive Metabolic Panel; Future  -     CBC (No Diff); Future    2. Right upper quadrant pain  -     CT Abdomen Pelvis Without Contrast; Future  -     Comprehensive Metabolic Panel; Future  -     CBC (No Diff); Future  -     Lipase; Future      Assessment & Plan  1. Right-sided pain.  Given his medical history and the severity of the pain that he is experiencing, it is prudent to conduct a chest x-ray to rule out any potential complications. Additionally, a CT scan of the abdomen will be performed to ensure there are no underlying issues. Laboratory tests will also be ordered for further evaluation. He is advised to abstain from eating until the tests are completed.    2. Hypertension  Blood pressure is elevated today at 168/98.  He has been following current medication regimen.  Will add hydrochlorothiazide 12.5 mg to be taken daily.  Recommend keeping his appointment for annual wellness visit on Monday we will reevaluate blood pressure at that time.      No follow-ups on file.      TRESSA Hoyos  14:33 CST  1/31/2025   Electronically signed      Patient or patient representative verbalized consent for the use of Ambient Listening during the visit with  TRESSA Hoyos for chart documentation. 1/31/2025  17:32 CST

## 2025-02-01 DIAGNOSIS — E03.9 ACQUIRED HYPOTHYROIDISM: Primary | ICD-10-CM

## 2025-02-01 RX ORDER — LEVOTHYROXINE SODIUM 25 UG/1
25 TABLET ORAL DAILY
Qty: 30 TABLET | Refills: 0 | Status: SHIPPED | OUTPATIENT
Start: 2025-02-01

## 2025-02-03 ENCOUNTER — OFFICE VISIT (OUTPATIENT)
Dept: INTERNAL MEDICINE | Facility: CLINIC | Age: 65
End: 2025-02-03
Payer: MEDICARE

## 2025-02-03 VITALS
DIASTOLIC BLOOD PRESSURE: 70 MMHG | BODY MASS INDEX: 33.39 KG/M2 | HEIGHT: 72 IN | SYSTOLIC BLOOD PRESSURE: 130 MMHG | HEART RATE: 71 BPM | RESPIRATION RATE: 16 BRPM | WEIGHT: 246.5 LBS | OXYGEN SATURATION: 98 %

## 2025-02-03 DIAGNOSIS — E78.2 MIXED HYPERLIPIDEMIA: ICD-10-CM

## 2025-02-03 DIAGNOSIS — I10 PRIMARY HYPERTENSION: ICD-10-CM

## 2025-02-03 DIAGNOSIS — J44.9 STAGE 2 MODERATE COPD BY GOLD CLASSIFICATION: Primary | ICD-10-CM

## 2025-02-03 DIAGNOSIS — E03.9 ACQUIRED HYPOTHYROIDISM: ICD-10-CM

## 2025-02-03 DIAGNOSIS — Z87.891 PERSONAL HISTORY OF NICOTINE DEPENDENCE: ICD-10-CM

## 2025-02-03 PROBLEM — J45.40 MODERATE PERSISTENT ASTHMA WITHOUT COMPLICATION: Status: ACTIVE | Noted: 2023-10-19

## 2025-02-03 PROBLEM — R91.8 MASS OF RIGHT LUNG: Status: RESOLVED | Noted: 2023-03-31 | Resolved: 2025-02-03

## 2025-02-03 PROBLEM — J45.20 MILD INTERMITTENT ASTHMA WITHOUT COMPLICATION: Status: RESOLVED | Noted: 2023-12-19 | Resolved: 2025-02-03

## 2025-02-03 PROCEDURE — G0537 PR RISK ASCVD TST ONCE PR 12 MO: HCPCS | Performed by: INTERNAL MEDICINE

## 2025-02-03 PROCEDURE — 1160F RVW MEDS BY RX/DR IN RCRD: CPT | Performed by: INTERNAL MEDICINE

## 2025-02-03 PROCEDURE — 99214 OFFICE O/P EST MOD 30 MIN: CPT | Performed by: INTERNAL MEDICINE

## 2025-02-03 PROCEDURE — 3075F SYST BP GE 130 - 139MM HG: CPT | Performed by: INTERNAL MEDICINE

## 2025-02-03 PROCEDURE — G0439 PPPS, SUBSEQ VISIT: HCPCS | Performed by: INTERNAL MEDICINE

## 2025-02-03 PROCEDURE — 1170F FXNL STATUS ASSESSED: CPT | Performed by: INTERNAL MEDICINE

## 2025-02-03 PROCEDURE — 1159F MED LIST DOCD IN RCRD: CPT | Performed by: INTERNAL MEDICINE

## 2025-02-03 PROCEDURE — 3078F DIAST BP <80 MM HG: CPT | Performed by: INTERNAL MEDICINE

## 2025-02-03 PROCEDURE — G2211 COMPLEX E/M VISIT ADD ON: HCPCS | Performed by: INTERNAL MEDICINE

## 2025-02-03 RX ORDER — AMLODIPINE BESYLATE 10 MG/1
10 TABLET ORAL DAILY
Qty: 90 TABLET | Refills: 1 | Status: SHIPPED | OUTPATIENT
Start: 2025-02-03

## 2025-02-03 RX ORDER — ATORVASTATIN CALCIUM 20 MG/1
20 TABLET, FILM COATED ORAL DAILY
Qty: 90 TABLET | Refills: 3 | Status: SHIPPED | OUTPATIENT
Start: 2025-02-03

## 2025-02-03 RX ORDER — IBUPROFEN 800 MG/1
800 TABLET, FILM COATED ORAL EVERY 8 HOURS PRN
Qty: 90 TABLET | Refills: 1 | Status: SHIPPED | OUTPATIENT
Start: 2025-02-03

## 2025-02-03 RX ORDER — CETIRIZINE HYDROCHLORIDE 10 MG/1
10 TABLET ORAL DAILY
Qty: 30 TABLET | Refills: 2 | Status: SHIPPED | OUTPATIENT
Start: 2025-02-03

## 2025-02-03 RX ORDER — LISINOPRIL 40 MG/1
40 TABLET ORAL DAILY
Qty: 90 TABLET | Refills: 1 | Status: SHIPPED | OUTPATIENT
Start: 2025-02-03

## 2025-02-03 RX ORDER — HYDROCHLOROTHIAZIDE 12.5 MG/1
12.5 TABLET ORAL DAILY
Qty: 90 TABLET | Refills: 1 | Status: SHIPPED | OUTPATIENT
Start: 2025-02-03

## 2025-02-03 NOTE — PATIENT INSTRUCTIONS
Medicare Wellness  Personal Prevention Plan of Service     Date of Office Visit:    Encounter Provider:  Juan Vaughn DO  Place of Service:  [facility] INTERNAL MEDICINE  Patient Name: Castillo Trujillo  :  1960    As part of the Medicare Wellness portion of your visit today, we are providing you with this personalized preventive plan of services (PPPS). This plan is based upon recommendations of the [place] Preventive Services Task Force (USPSTF) and the Advisory Committee on Immunization Practices (ACIP).    This lists the preventive care services that should be considered, and provides dates of when you are due. Items listed as completed are up-to-date and do not require any further intervention.    Health Maintenance   Topic Date Due    ZOSTER VACCINE (1 of 2) Never done    COVID-19 Vaccine (3 - - season) 2024    ANNUAL WELLNESS VISIT  2024    LUNG CANCER SCREENING  2025    BMI FOLLOWUP  10/07/2025    TDAP/TD VACCINES (2 - Td or Tdap) 2027    COLORECTAL CANCER SCREENING  2029    HEPATITIS C SCREENING  Completed    INFLUENZA VACCINE  Completed    Pneumococcal Vaccine 65+  Completed    AAA SCREEN ONCE  Completed       No orders of the defined types were placed in this encounter.      No follow-ups on file.          Advance Care Planning and Advance Directives     You make decisions on a daily basis - decisions about where you want to live, your career, your home, your life. Perhaps one of the most important decisions you face is your choice for future medical care. Take time to talk with your family and your healthcare team and start planning today.  Advance Care Planning is a process that can help you:  Understand possible future healthcare decisions in light of your own experiences  Reflect on those decision in light of your goals and values  Discuss your decisions with those closest to you and the healthcare professionals that care for  you  Make a plan by creating a document that reflects your wishes    Surrogate Decision Maker  In the event of a medical emergency, which has left you unable to communicate or to make your own decisions, you would need someone to make decisions for you.  It is important to discuss your preferences for medical treatment with this person while you are in good health.     Qualities of a surrogate decision maker:  Willing to take on this role and responsibility  Knows what you want for future medical care  Willing to follow your wishes even if they don't agree with them  Able to make difficult medical decisions under stressful circumstances    Advance Directives  These are legal documents you can create that will guide your healthcare team and decision maker(s) when needed. These documents can be stored in the electronic medical record.    Living Will - a legal document to guide your care if you have a terminal condition or a serious illness and are unable to communicate. States vary by statute in document names/types, but most forms may include one or more of the following:        -  Directions regarding life-prolonging treatments        -  Directions regarding artificially provided nutrition/hydration        -  Choosing a healthcare decision maker        -  Direction regarding organ/tissue donation    Durable Power of  for Healthcare - this document names an -in-fact to make medical decisions for you, but it may also allow this person to make personal and financial decisions for you. Please seek the advice of an  if you need this type of document.    **Advance Directives are not required and no one may discriminate against you if you do not sign one.    Medical Orders  Many states allow specific forms/orders signed by your physician to record your wishes for medical treatment in your current state of health. This form, signed in personal communication with your physician, addresses  resuscitation and other medical interventions that you may or may not want.      For more information or to schedule a time with a [facility] Advance Care Planning Facilitator contact: [facility]/ACP or call [phone] and someone will contact you directly.

## 2025-02-03 NOTE — PROGRESS NOTES
Subjective   The ABCs of the Annual Wellness Visit  Medicare Wellness Visit      Castillo Trujillo is a 64 y.o. patient who presents for a Medicare Wellness Visit.    The following portions of the patient's history were reviewed and   updated as appropriate: allergies, current medications, past family history, past medical history, past social history, past surgical history, and problem list.    Compared to one year ago, the patient's physical   health is better.  Compared to one year ago, the patient's mental   health is the same.    Recent Hospitalizations:  He was not admitted to the hospital during the last year.     Current Medical Providers:  Patient Care Team:  Juan Vaughn DO as PCP - General (Internal Medicine)  Benny Kim MD as Surgeon (Cardiothoracic Surgery)    Outpatient Medications Prior to Visit   Medication Sig Dispense Refill    albuterol sulfate HFA (Ventolin HFA) 108 (90 Base) MCG/ACT inhaler Inhale 2 puffs 4 (Four) Times a Day. 18 g 5    Cholecalciferol (Vitamin D) 50 MCG (2000 UT) capsule Take 1 capsule by mouth Daily. 90 capsule 3    fluticasone (FLONASE) 50 MCG/ACT nasal spray Administer 2 sprays into the nostril(s) as directed by provider Daily. 9.9 g 2    Fluticasone Furoate-Vilanterol (BREO ELLIPTA) 200-25 MCG/ACT inhaler Inhale 1 puff Daily.      hydroCHLOROthiazide 12.5 MG tablet Take 1 tablet by mouth Daily. 30 tablet 5    levothyroxine (Synthroid) 25 MCG tablet Take 1 tablet by mouth Daily. Take daily with 200mcg tablet to total 225mcg daily. 30 tablet 0    levothyroxine (SYNTHROID, LEVOTHROID) 200 MCG tablet Take 1 tablet by mouth Daily. 30 tablet 0    pantoprazole (PROTONIX) 40 MG EC tablet Take 1 tablet by mouth Daily. 90 tablet 1    psyllium (Metamucil Smooth Texture) 58.6 % powder Take  by mouth 3 (Three) Times a Day. 283 g 12    amLODIPine (NORVASC) 10 MG tablet Take 1 tablet by mouth Daily. 90 tablet 1    cetirizine (zyrTEC) 10 MG tablet Take 1 tablet by mouth Daily.  "30 tablet 2    ibuprofen (ADVIL,MOTRIN) 800 MG tablet Take 1 tablet by mouth Every 8 (Eight) Hours As Needed for Mild Pain. 90 tablet 1    lisinopril (PRINIVIL,ZESTRIL) 40 MG tablet Take 1 tablet by mouth Daily. 90 tablet 1     No facility-administered medications prior to visit.     No opioid medication identified on active medication list. I have reviewed chart for other potential  high risk medication/s and harmful drug interactions in the elderly.      Aspirin is not on active medication list.  Aspirin use is not indicated based on review of current medical condition/s. Risk of harm outweighs potential benefits.  .    Patient Active Problem List   Diagnosis    Primary hypertension    Acquired hypothyroidism    GERD (gastroesophageal reflux disease)    Vitamin D deficiency    Adrenal nodule    Tobacco abuse, in remission    Moderate malnutrition    Other specified anemias    Family history of colon cancer    History of colon polyps    Class 1 obesity due to excess calories with serious comorbidity and body mass index (BMI) of 33.0 to 33.9 in adult    Stage 2 moderate COPD by GOLD classification    Moderate persistent asthma without complication     Advance Care Planning Advance Directive is not on file.  ACP discussion was held with the patient during this visit. Patient does not have an advance directive, information provided.            Objective   Vitals:    02/03/25 1356   BP: 130/70   BP Location: Left arm   Patient Position: Sitting   Cuff Size: Adult   Pulse: 71   Resp: 16   SpO2: 98%   Weight: 112 kg (246 lb 8 oz)   Height: 182.9 cm (72\")       Estimated body mass index is 33.43 kg/m² as calculated from the following:    Height as of this encounter: 182.9 cm (72\").    Weight as of this encounter: 112 kg (246 lb 8 oz).                Does the patient have evidence of cognitive impairment? No                                                                                                Health  Risk " Assessment    Smoking Status:  Social History     Tobacco Use   Smoking Status Former    Current packs/day: 0.00    Average packs/day: 3.5 packs/day for 46.9 years (164.1 ttl pk-yrs)    Types: Cigarettes    Start date:     Quit date: 2022    Years since quittin.2    Passive exposure: Past   Smokeless Tobacco Never     Alcohol Consumption:  Social History     Substance and Sexual Activity   Alcohol Use Yes    Comment: I might drink a 12-pack a month       Fall Risk Screen  STEADI Fall Risk Assessment was completed, and patient is at LOW risk for falls.Assessment completed on:2/3/2025    Depression Screening   Little interest or pleasure in doing things? Not at all   Feeling down, depressed, or hopeless? Not at all   PHQ-2 Total Score 0      Health Habits and Functional and Cognitive Screenin/3/2025     2:00 PM   Functional & Cognitive Status   Do you have difficulty preparing food and eating? No   Do you have difficulty bathing yourself, getting dressed or grooming yourself? No   Do you have difficulty using the toilet? No   Do you have difficulty moving around from place to place? No   Do you have trouble with steps or getting out of a bed or a chair? No   Current Diet Unhealthy Diet   Dental Exam Not up to date   Eye Exam Up to date   Exercise (times per week) 0 times per week   Current Exercises Include No Regular Exercise   Do you need help using the phone?  No   Are you deaf or do you have serious difficulty hearing?  No   Do you need help to go to places out of walking distance? No   Do you need help shopping? No   Do you need help preparing meals?  No   Do you need help with housework?  No   Do you need help with laundry? No   Do you need help taking your medications? No   Do you need help managing money? No   Do you ever drive or ride in a car without wearing a seat belt? No   Have you felt unusual stress, anger or loneliness in the last month? No   Who do you live with? Spouse   If you  need help, do you have trouble finding someone available to you? No   Have you been bothered in the last four weeks by sexual problems? No   Do you have difficulty concentrating, remembering or making decisions? No           Age-appropriate Screening Schedule:  Refer to the list below for future screening recommendations based on patient's age, sex and/or medical conditions. Orders for these recommended tests are listed in the plan section. The patient has been provided with a written plan.    Health Maintenance List  Health Maintenance   Topic Date Due    ZOSTER VACCINE (1 of 2) Never done    COVID-19 Vaccine (3 - 2024-25 season) 09/01/2024    ANNUAL WELLNESS VISIT  12/19/2024    LUNG CANCER SCREENING  04/09/2025    LIPID PANEL  10/07/2025    BMI FOLLOWUP  10/07/2025    TDAP/TD VACCINES (2 - Td or Tdap) 07/20/2027    COLORECTAL CANCER SCREENING  06/20/2029    HEPATITIS C SCREENING  Completed    INFLUENZA VACCINE  Completed    Pneumococcal Vaccine 65+  Completed    AAA SCREEN ONCE  Completed                                                                                                                                                CMS Preventative Services Quick Reference  Risk Factors Identified During Encounter  Fall Risk-High or Moderate: Discussed Fall Prevention in the home  Immunizations Discussed/Encouraged: Shingrix and COVID19  Dental Screening Recommended  Vision Screening Recommended    The above risks/problems have been discussed with the patient.  Pertinent information has been shared with the patient in the After Visit Summary.  An After Visit Summary and PPPS were made available to the patient.    Follow Up:   Next Medicare Wellness visit to be scheduled in 1 year.         Additional E&M Note during same encounter follows:  Patient has additional, significant, and separately identifiable condition(s)/problem(s) that require work above and beyond the Medicare Wellness Visit     Chief  "Complaint  Hypertension    Subjective    HPI  Castillo is also being seen today for additional medical problem/s.       The patient presents for the evaluation of hypertension, hypothyroidism, weight management, and hypercholesterolemia.    During his last visit on Friday, his blood pressure was elevated, necessitating an adjustment in his antihypertensive medication. He feels this has been beneficial.    The patient is seeking dietary advice to facilitate weight loss and potentially reduce his need for medication. He has made dietary modifications, including reducing his intake of bread and sweets.    He has a scheduled computed tomography (CT) scan of his abdomen due to a recent episode of a sensation of fullness and pain on the right side, similar to a feeling he had with effusion in the past. However, he thinks it is reasonable to attribute this  to potential muscle strain from physical labor on an old house also.    SOCIAL HISTORY  The patient has quit smoking.  Review of Systems   Respiratory:  Negative for shortness of breath.    Cardiovascular:  Negative for chest pain.          Objective   Vital Signs:  /70 (BP Location: Left arm, Patient Position: Sitting, Cuff Size: Adult)   Pulse 71   Resp 16   Ht 182.9 cm (72\")   Wt 112 kg (246 lb 8 oz)   SpO2 98%   BMI 33.43 kg/m²   Physical Exam  Constitutional:       General: He is not in acute distress.  Cardiovascular:      Rate and Rhythm: Normal rate and regular rhythm.      Heart sounds: Normal heart sounds. No murmur heard.  Pulmonary:      Effort: Pulmonary effort is normal.      Breath sounds: Normal breath sounds. No wheezing.   Neurological:      Mental Status: He is alert and oriented to person, place, and time.      Gait: Gait normal.   Psychiatric:         Mood and Affect: Mood normal.         Behavior: Behavior normal.                       Assessment and Plan   The 10-year ASCVD risk score (Gonzalo DK, et al., 2019) is: 13%    Values used to " calculate the score:      Age: 64 years      Sex: Male      Is Non- : No      Diabetic: No      Tobacco smoker: No      Systolic Blood Pressure: 130 mmHg      Is BP treated: Yes      HDL Cholesterol: 62 mg/dL      Total Cholesterol: 169 mg/dL   Diagnoses and all orders for this visit:    1. Stage 2 moderate COPD by GOLD classification (Primary)  Stable without exacerbation.     2. Primary hypertension  -     lisinopril (PRINIVIL,ZESTRIL) 40 MG tablet; Take 1 tablet by mouth Daily.  Dispense: 90 tablet; Refill: 1  -     amLODIPine (NORVASC) 10 MG tablet; Take 1 tablet by mouth Daily.  Dispense: 90 tablet; Refill: 1  Well controlled, BP goal for age is <140/90 per JNC 8 guidelines, and continue current medications    3. Acquired hypothyroidism  Recent increase in Synthroid to improve. Recommend taking this on an empty stomach and all by itself.     4. Personal history of nicotine dependence  -     CT Chest Low Dose Wo; Future    5. Mixed hyperlipidemia  -     atorvastatin (LIPITOR) 20 MG tablet; Take 1 tablet by mouth Daily.  Dispense: 90 tablet; Refill: 3  He has no known CVD making aspirin for primary prevention unneeded. BP well controlled. ASCVD 10-year risk calculated as above. Given this elevated risk, we will initiate statin therapy to reduce risk for heart attack and stroke.     Other orders  -     ibuprofen (ADVIL,MOTRIN) 800 MG tablet; Take 1 tablet by mouth Every 8 (Eight) Hours As Needed for Mild Pain.  Dispense: 90 tablet; Refill: 1  -     cetirizine (zyrTEC) 10 MG tablet; Take 1 tablet by mouth Daily.  Dispense: 30 tablet; Refill: 2     1. Hypertension: Stable.  - Continue current medication regimen.  - Potential for reducing or discontinuing medication if blood pressure remains stable.    2. Hypothyroidism.  - Take thyroid medication on an empty stomach, either first thing in the morning or at night, ensuring no food intake for at least 20-30 minutes before or after taking the  medication.    3. Weight management.  - Practice portion control.  - Reduce intake of sweets, breads, and pastas.  - Incorporate more fresh salads into the diet.    4. Hypercholesterolemia.  - Start a daily cholesterol-lowering medication to reduce the risk of myocardial infarction or cerebrovascular accident.              Follow Up   Return in about 6 months (around 8/3/2025) for Recheck.  Patient was given instructions and counseling regarding his condition or for health maintenance advice. Please see specific information pulled into the AVS if appropriate.  Patient or patient representative verbalized consent for the use of Ambient Listening during the visit with  Juan Vaughn DO for chart documentation. 2/3/2025  14:36 CST

## 2025-02-04 ENCOUNTER — HOSPITAL ENCOUNTER (OUTPATIENT)
Dept: CT IMAGING | Facility: HOSPITAL | Age: 65
Discharge: HOME | End: 2025-02-04
Payer: MEDICARE

## 2025-02-04 DIAGNOSIS — R10.11 RIGHT UPPER QUADRANT PAIN: ICD-10-CM

## 2025-02-04 PROCEDURE — 74176 CT ABD & PELVIS W/O CONTRAST: CPT

## 2025-02-07 DIAGNOSIS — E03.9 ACQUIRED HYPOTHYROIDISM: ICD-10-CM

## 2025-02-07 RX ORDER — LEVOTHYROXINE SODIUM 25 UG/1
25 TABLET ORAL
Qty: 30 TABLET | Refills: 0 | Status: SHIPPED | OUTPATIENT
Start: 2025-02-07

## 2025-02-07 RX ORDER — LEVOTHYROXINE SODIUM 200 UG/1
200 TABLET ORAL DAILY
Qty: 30 TABLET | Refills: 0 | Status: SHIPPED | OUTPATIENT
Start: 2025-02-07

## 2025-02-07 NOTE — TELEPHONE ENCOUNTER
Rx Refill Note  Requested Prescriptions     Pending Prescriptions Disp Refills    levothyroxine (SYNTHROID, LEVOTHROID) 25 MCG tablet [Pharmacy Med Name: LEVOTHYROXINE SODIUM 25MCG TABLET] 30 tablet 0     Sig: TAKE ONE (1) TABLET BY MOUTH DAILY. TAKE DAILY WITH 200MCG TABLET TO TOTAL 225MCG DAILY.    levothyroxine (SYNTHROID, LEVOTHROID) 200 MCG tablet [Pharmacy Med Name: LEVOTHYROXINE SODIUM 200MCG TABLET] 30 tablet 0     Sig: TAKE ONE (1) TABLET BY MOUTH DAILY.      Last office visit with prescribing clinician: 2/3/2025   Last telemedicine visit with prescribing clinician: Visit date not found   Next office visit with prescribing clinician: Visit date not found                         Would you like a call back once the refill request has been completed: [] Yes [] No    If the office needs to give you a call back, can they leave a voicemail: [] Yes [] No    Shelton Vazquez MA  02/07/25, 13:56 CST

## 2025-03-07 DIAGNOSIS — E03.9 ACQUIRED HYPOTHYROIDISM: ICD-10-CM

## 2025-03-07 NOTE — TELEPHONE ENCOUNTER
Rx Refill Note  Requested Prescriptions     Pending Prescriptions Disp Refills    levothyroxine (SYNTHROID, LEVOTHROID) 200 MCG tablet [Pharmacy Med Name: LEVOTHYROXINE SODIUM 200MCG TABLET] 30 tablet 0     Sig: Take 1 tablet by mouth Daily.      Last office visit with prescribing clinician: 2/3/2025   Last telemedicine visit with prescribing clinician: Visit date not found   Next office visit with prescribing clinician: 8/4/2025                         Would you like a call back once the refill request has been completed: [] Yes [] No    If the office needs to give you a call back, can they leave a voicemail: [] Yes [] No    Shelton Vazquez MA  03/07/25, 16:28 CST

## 2025-03-08 RX ORDER — LEVOTHYROXINE SODIUM 200 UG/1
200 TABLET ORAL DAILY
Qty: 30 TABLET | Refills: 0 | Status: SHIPPED | OUTPATIENT
Start: 2025-03-08

## 2025-03-25 DIAGNOSIS — E03.9 ACQUIRED HYPOTHYROIDISM: ICD-10-CM

## 2025-03-25 RX ORDER — LEVOTHYROXINE SODIUM 25 UG/1
25 TABLET ORAL
Qty: 30 TABLET | Refills: 0 | Status: SHIPPED | OUTPATIENT
Start: 2025-03-25

## 2025-03-25 NOTE — TELEPHONE ENCOUNTER
Rx Refill Note  Requested Prescriptions     Pending Prescriptions Disp Refills    levothyroxine (SYNTHROID, LEVOTHROID) 25 MCG tablet [Pharmacy Med Name: LEVOTHYROXINE SODIUM 25MCG TABLET] 30 tablet 0     Sig: TAKE ONE (1) TABLET BY MOUTH EVERY MORNING.      Last office visit with prescribing clinician: 2/3/2025   Last telemedicine visit with prescribing clinician: Visit date not found   Next office visit with prescribing clinician: 8/4/2025                         Would you like a call back once the refill request has been completed: [] Yes [] No    If the office needs to give you a call back, can they leave a voicemail: [] Yes [] No    Shelton Vazquez MA  03/25/25, 09:54 CDT

## 2025-04-28 DIAGNOSIS — E03.9 ACQUIRED HYPOTHYROIDISM: ICD-10-CM

## 2025-04-28 RX ORDER — LEVOTHYROXINE SODIUM 200 UG/1
200 TABLET ORAL DAILY
Qty: 30 TABLET | Refills: 0 | OUTPATIENT
Start: 2025-04-28

## 2025-04-28 RX ORDER — FLUTICASONE PROPIONATE 50 MCG
2 SPRAY, SUSPENSION (ML) NASAL DAILY
Qty: 16 G | Refills: 2 | Status: SHIPPED | OUTPATIENT
Start: 2025-04-28

## 2025-05-27 DIAGNOSIS — E03.9 ACQUIRED HYPOTHYROIDISM: ICD-10-CM

## 2025-05-28 RX ORDER — LEVOTHYROXINE SODIUM 200 UG/1
200 TABLET ORAL DAILY
Qty: 90 TABLET | Refills: 0 | Status: SHIPPED | OUTPATIENT
Start: 2025-05-28

## 2025-05-28 RX ORDER — IBUPROFEN 800 MG/1
TABLET, FILM COATED ORAL
Qty: 90 TABLET | Refills: 1 | Status: SHIPPED | OUTPATIENT
Start: 2025-05-28

## 2025-05-28 NOTE — TELEPHONE ENCOUNTER
Levothyroxine 200 mg filled 03/08/2025, qty 30, no refills.    Ibuprofen last filled 02/03/2025, qty 90, 1 refill

## 2025-05-28 NOTE — TELEPHONE ENCOUNTER
Rx Refill Note  Requested Prescriptions     Pending Prescriptions Disp Refills    levothyroxine (SYNTHROID, LEVOTHROID) 200 MCG tablet [Pharmacy Med Name: LEVOTHYROXINE SODIUM 200MCG TABLET] 30 tablet 0     Sig: TAKE ONE (1) TABLET BY MOUTH DAILY.    ibuprofen (ADVIL,MOTRIN) 800 MG tablet [Pharmacy Med Name: IBUPROFEN 800MG TABLET] 90 tablet 1     Sig: TAKE ONE (1) TABLET BY MOUTH EVERY 8 (EIGHT) HOURS AS NEEDED FOR MILD PAIN.      Last office visit with prescribing clinician: 2/3/2025   Last telemedicine visit with prescribing clinician: Visit date not found   Next office visit with prescribing clinician: 8/4/2025                         Would you like a call back once the refill request has been completed: [] Yes [] No    If the office needs to give you a call back, can they leave a voicemail: [] Yes [] No    TESS Thomas  05/28/25, 09:39 CDT

## 2025-06-11 ENCOUNTER — HOSPITAL ENCOUNTER (OUTPATIENT)
Dept: CT IMAGING | Facility: HOSPITAL | Age: 65
Discharge: HOME | End: 2025-06-11
Admitting: INTERNAL MEDICINE
Payer: MEDICARE

## 2025-06-11 DIAGNOSIS — Z87.891 PERSONAL HISTORY OF NICOTINE DEPENDENCE: ICD-10-CM

## 2025-06-11 PROCEDURE — 71271 CT THORAX LUNG CANCER SCR C-: CPT

## 2025-07-23 DIAGNOSIS — K21.9 GASTROESOPHAGEAL REFLUX DISEASE, UNSPECIFIED WHETHER ESOPHAGITIS PRESENT: ICD-10-CM

## 2025-07-23 RX ORDER — IBUPROFEN 800 MG/1
800 TABLET, FILM COATED ORAL EVERY 8 HOURS PRN
Qty: 90 TABLET | Refills: 1 | Status: SHIPPED | OUTPATIENT
Start: 2025-07-23

## 2025-07-23 RX ORDER — FLUTICASONE PROPIONATE 50 MCG
2 SPRAY, SUSPENSION (ML) NASAL DAILY
Qty: 16 G | Refills: 2 | Status: SHIPPED | OUTPATIENT
Start: 2025-07-23

## 2025-07-23 RX ORDER — PANTOPRAZOLE SODIUM 40 MG/1
40 TABLET, DELAYED RELEASE ORAL DAILY
Qty: 90 TABLET | Refills: 1 | Status: SHIPPED | OUTPATIENT
Start: 2025-07-23

## 2025-08-04 ENCOUNTER — OFFICE VISIT (OUTPATIENT)
Dept: INTERNAL MEDICINE | Facility: CLINIC | Age: 65
End: 2025-08-04
Payer: MEDICARE

## 2025-08-04 VITALS
OXYGEN SATURATION: 98 % | HEART RATE: 71 BPM | RESPIRATION RATE: 16 BRPM | HEIGHT: 72 IN | SYSTOLIC BLOOD PRESSURE: 148 MMHG | BODY MASS INDEX: 36.41 KG/M2 | DIASTOLIC BLOOD PRESSURE: 78 MMHG | WEIGHT: 268.8 LBS

## 2025-08-04 DIAGNOSIS — E03.9 ACQUIRED HYPOTHYROIDISM: ICD-10-CM

## 2025-08-04 DIAGNOSIS — J45.40 MODERATE PERSISTENT ASTHMA WITHOUT COMPLICATION: ICD-10-CM

## 2025-08-04 DIAGNOSIS — J44.9 STAGE 2 MODERATE COPD BY GOLD CLASSIFICATION: Primary | ICD-10-CM

## 2025-08-04 DIAGNOSIS — I10 PRIMARY HYPERTENSION: ICD-10-CM

## 2025-08-04 PROCEDURE — 3078F DIAST BP <80 MM HG: CPT | Performed by: INTERNAL MEDICINE

## 2025-08-04 PROCEDURE — G2211 COMPLEX E/M VISIT ADD ON: HCPCS | Performed by: INTERNAL MEDICINE

## 2025-08-04 PROCEDURE — 3077F SYST BP >= 140 MM HG: CPT | Performed by: INTERNAL MEDICINE

## 2025-08-04 PROCEDURE — 99214 OFFICE O/P EST MOD 30 MIN: CPT | Performed by: INTERNAL MEDICINE

## 2025-08-04 PROCEDURE — 1160F RVW MEDS BY RX/DR IN RCRD: CPT | Performed by: INTERNAL MEDICINE

## 2025-08-04 PROCEDURE — 1159F MED LIST DOCD IN RCRD: CPT | Performed by: INTERNAL MEDICINE

## 2025-08-04 RX ORDER — DILTIAZEM HYDROCHLORIDE 60 MG/1
2 TABLET, FILM COATED ORAL
COMMUNITY
Start: 2025-05-20

## 2025-08-04 RX ORDER — HYDROCHLOROTHIAZIDE 25 MG/1
25 TABLET ORAL DAILY
Qty: 90 TABLET | Refills: 1 | Status: SHIPPED | OUTPATIENT
Start: 2025-08-04

## 2025-08-04 RX ORDER — LISINOPRIL 40 MG/1
40 TABLET ORAL DAILY
Qty: 90 TABLET | Refills: 1 | Status: SHIPPED | OUTPATIENT
Start: 2025-08-04

## 2025-08-04 RX ORDER — LEVOTHYROXINE SODIUM 200 UG/1
200 TABLET ORAL DAILY
Qty: 90 TABLET | Refills: 1 | Status: SHIPPED | OUTPATIENT
Start: 2025-08-04

## 2025-08-04 RX ORDER — AMLODIPINE BESYLATE 10 MG/1
10 TABLET ORAL DAILY
Qty: 90 TABLET | Refills: 1 | Status: SHIPPED | OUTPATIENT
Start: 2025-08-04

## 2025-08-04 RX ORDER — CETIRIZINE HYDROCHLORIDE 10 MG/1
10 TABLET ORAL DAILY
Qty: 30 TABLET | Refills: 2 | Status: SHIPPED | OUTPATIENT
Start: 2025-08-04

## (undated) DEVICE — MASK,OXYGEN,MED CONC,ADLT,7' TUB, UC: Brand: PENDING

## (undated) DEVICE — THE SINGLE USE ETRAP – POLYP TRAP IS USED FOR SUCTION RETRIEVAL OF ENDOSCOPICALLY REMOVED POLYPS.: Brand: ETRAP

## (undated) DEVICE — SENSR O2 OXIMAX FNGR A/ 18IN NONSTR

## (undated) DEVICE — Device: Brand: DEFENDO AIR/WATER/SUCTION AND BIOPSY VALVE

## (undated) DEVICE — YANKAUER,BULB TIP WITH VENT: Brand: ARGYLE

## (undated) DEVICE — SNAR POLYP CAPTIVATOR MICROHEX 13 240CM

## (undated) DEVICE — THE CHANNEL CLEANING BRUSH IS A NYLON FLEXI BRUSH ATTACHED TO A FLEXIBLE PLASTIC SHEATH DESIGNED TO SAFELY REMOVE DEBRIS FROM FLEXIBLE ENDOSCOPES.